# Patient Record
Sex: MALE | Race: WHITE | NOT HISPANIC OR LATINO | Employment: UNEMPLOYED | URBAN - METROPOLITAN AREA
[De-identification: names, ages, dates, MRNs, and addresses within clinical notes are randomized per-mention and may not be internally consistent; named-entity substitution may affect disease eponyms.]

---

## 2019-05-28 ENCOUNTER — TRANSCRIBE ORDERS (OUTPATIENT)
Dept: ADMINISTRATIVE | Facility: HOSPITAL | Age: 49
End: 2019-05-28

## 2019-05-28 DIAGNOSIS — I47.2 NON-SUSTAINED VENTRICULAR TACHYCARDIA (HCC): Primary | ICD-10-CM

## 2019-05-30 ENCOUNTER — HOSPITAL ENCOUNTER (OUTPATIENT)
Dept: NON INVASIVE DIAGNOSTICS | Facility: HOSPITAL | Age: 49
Discharge: HOME | End: 2019-05-30
Payer: COMMERCIAL

## 2019-05-30 DIAGNOSIS — I47.2 NON-SUSTAINED VENTRICULAR TACHYCARDIA (HCC): ICD-10-CM

## 2019-05-30 PROCEDURE — 93225 XTRNL ECG REC<48 HRS REC: CPT

## 2019-05-30 PROCEDURE — 93226 XTRNL ECG REC<48 HR SCAN A/R: CPT

## 2019-06-07 PROCEDURE — 93227 XTRNL ECG REC<48 HR R&I: CPT | Performed by: INTERNAL MEDICINE

## 2019-07-19 ENCOUNTER — TELEPHONE (OUTPATIENT)
Dept: CARDIOLOGY CLINIC | Facility: CLINIC | Age: 49
End: 2019-07-19

## 2019-07-19 ENCOUNTER — APPOINTMENT (OUTPATIENT)
Dept: LAB | Facility: HOSPITAL | Age: 49
End: 2019-07-19
Payer: COMMERCIAL

## 2019-07-19 ENCOUNTER — OFFICE VISIT (OUTPATIENT)
Dept: CARDIOLOGY CLINIC | Facility: CLINIC | Age: 49
End: 2019-07-19
Payer: COMMERCIAL

## 2019-07-19 VITALS
DIASTOLIC BLOOD PRESSURE: 66 MMHG | WEIGHT: 169.6 LBS | BODY MASS INDEX: 31.21 KG/M2 | SYSTOLIC BLOOD PRESSURE: 116 MMHG | HEART RATE: 61 BPM | HEIGHT: 62 IN

## 2019-07-19 DIAGNOSIS — R07.9 CHEST PAIN, UNSPECIFIED TYPE: Primary | ICD-10-CM

## 2019-07-19 DIAGNOSIS — I25.118 CORONARY ARTERY DISEASE OF NATIVE ARTERY OF NATIVE HEART WITH STABLE ANGINA PECTORIS (HCC): ICD-10-CM

## 2019-07-19 DIAGNOSIS — E78.2 MIXED HYPERLIPIDEMIA: ICD-10-CM

## 2019-07-19 DIAGNOSIS — I10 ESSENTIAL HYPERTENSION: ICD-10-CM

## 2019-07-19 LAB
ALBUMIN SERPL BCP-MCNC: 3.9 G/DL (ref 3.5–5.7)
ALP SERPL-CCNC: 84 U/L (ref 40–150)
ALT SERPL W P-5'-P-CCNC: 11 U/L (ref 7–52)
ANION GAP SERPL CALCULATED.3IONS-SCNC: 5 MMOL/L (ref 4–13)
AST SERPL W P-5'-P-CCNC: 11 U/L (ref 13–39)
BASOPHILS # BLD AUTO: 0 THOUSANDS/ΜL (ref 0–0.1)
BASOPHILS NFR BLD AUTO: 1 % (ref 0–2)
BILIRUB SERPL-MCNC: 0.7 MG/DL (ref 0.2–1)
BUN SERPL-MCNC: 17 MG/DL (ref 7–25)
CALCIUM SERPL-MCNC: 9 MG/DL (ref 8.6–10.5)
CHLORIDE SERPL-SCNC: 112 MMOL/L (ref 98–107)
CO2 SERPL-SCNC: 26 MMOL/L (ref 21–31)
CREAT SERPL-MCNC: 1.04 MG/DL (ref 0.7–1.3)
EOSINOPHIL # BLD AUTO: 0.2 THOUSAND/ΜL (ref 0–0.61)
EOSINOPHIL NFR BLD AUTO: 3 % (ref 0–5)
ERYTHROCYTE [DISTWIDTH] IN BLOOD BY AUTOMATED COUNT: 13.4 % (ref 11.5–14.5)
GFR SERPL CREATININE-BSD FRML MDRD: 85 ML/MIN/1.73SQ M
GLUCOSE P FAST SERPL-MCNC: 94 MG/DL (ref 65–99)
HCT VFR BLD AUTO: 35.9 % (ref 42–47)
HGB BLD-MCNC: 12.8 G/DL (ref 14–18)
LYMPHOCYTES # BLD AUTO: 1.4 THOUSANDS/ΜL (ref 0.6–4.47)
LYMPHOCYTES NFR BLD AUTO: 22 % (ref 21–51)
MCH RBC QN AUTO: 32.2 PG (ref 26–34)
MCHC RBC AUTO-ENTMCNC: 35.7 G/DL (ref 31–37)
MCV RBC AUTO: 90 FL (ref 81–99)
MONOCYTES # BLD AUTO: 0.5 THOUSAND/ΜL (ref 0.17–1.22)
MONOCYTES NFR BLD AUTO: 8 % (ref 2–12)
NEUTROPHILS # BLD AUTO: 4.2 THOUSANDS/ΜL (ref 1.4–6.5)
NEUTS SEG NFR BLD AUTO: 66 % (ref 42–75)
PLATELET # BLD AUTO: 160 THOUSANDS/UL (ref 149–390)
PMV BLD AUTO: 7.9 FL (ref 8.6–11.7)
POTASSIUM SERPL-SCNC: 3.4 MMOL/L (ref 3.5–5.5)
PROT SERPL-MCNC: 6.3 G/DL (ref 6.4–8.9)
RBC # BLD AUTO: 3.99 MILLION/UL (ref 4.3–5.9)
SODIUM SERPL-SCNC: 143 MMOL/L (ref 134–143)
WBC # BLD AUTO: 6.4 THOUSAND/UL (ref 4.8–10.8)

## 2019-07-19 PROCEDURE — 36415 COLL VENOUS BLD VENIPUNCTURE: CPT

## 2019-07-19 PROCEDURE — 80053 COMPREHEN METABOLIC PANEL: CPT

## 2019-07-19 PROCEDURE — 93000 ELECTROCARDIOGRAM COMPLETE: CPT | Performed by: INTERNAL MEDICINE

## 2019-07-19 PROCEDURE — 85025 COMPLETE CBC W/AUTO DIFF WBC: CPT

## 2019-07-19 PROCEDURE — 99244 OFF/OP CNSLTJ NEW/EST MOD 40: CPT | Performed by: INTERNAL MEDICINE

## 2019-07-19 RX ORDER — CARVEDILOL 25 MG/1
25 TABLET ORAL 2 TIMES DAILY
Refills: 3 | COMMUNITY
Start: 2019-06-17 | End: 2019-12-20 | Stop reason: ALTCHOICE

## 2019-07-19 RX ORDER — NITROGLYCERIN 0.4 MG/1
TABLET SUBLINGUAL
COMMUNITY
Start: 2019-05-03

## 2019-07-19 RX ORDER — LOSARTAN POTASSIUM 100 MG/1
100 TABLET ORAL EVERY 24 HOURS
Qty: 30 TABLET | Refills: 3
Start: 2019-07-19

## 2019-07-19 RX ORDER — ATORVASTATIN CALCIUM 80 MG/1
1 TABLET, FILM COATED ORAL EVERY 24 HOURS
COMMUNITY
Start: 2019-05-03 | End: 2020-06-29 | Stop reason: SDUPTHER

## 2019-07-19 RX ORDER — ISOSORBIDE MONONITRATE 30 MG/1
30 TABLET, EXTENDED RELEASE ORAL DAILY
Qty: 30 TABLET | Refills: 3 | Status: SHIPPED | OUTPATIENT
Start: 2019-07-19 | End: 2019-09-13

## 2019-07-19 RX ORDER — PANTOPRAZOLE SODIUM 40 MG/1
40 TABLET, DELAYED RELEASE ORAL DAILY
Refills: 3 | COMMUNITY
Start: 2019-07-11

## 2019-07-19 RX ORDER — AMLODIPINE BESYLATE 10 MG/1
10 TABLET ORAL DAILY
Refills: 0 | COMMUNITY
Start: 2019-05-30 | End: 2019-09-13 | Stop reason: ALTCHOICE

## 2019-07-19 RX ORDER — LOSARTAN POTASSIUM 100 MG/1
1 TABLET ORAL EVERY 24 HOURS
COMMUNITY
Start: 2019-05-03 | End: 2019-07-19 | Stop reason: ALTCHOICE

## 2019-07-19 NOTE — PROGRESS NOTES
Cardiology Consultation     Lizabeth Curry  [id number]  1970  [facility] [address] [address] [phone]    1  Chest pain, unspecified type  POCT ECG   2  Essential hypertension     3  Coronary artery disease of native artery of native heart with stable angina pectoris ([address] )     4  Mixed hyperlipidemia       Chief Complaint   Patient presents with    Heart Problem     NP - establish care  hx MI in May with stents   Chest Pain     random, sharp mid-sternal associated with SOB and throat/back pain  used nitro twice - relieved with one tablet   Shortness of Breath     with walking etc      Palpitations     daily    Dizziness     random, daily  can sometimes be assoc w/ low BPs     HPI: Patient is here for management of cardiac issues  He has a h/o 4 prior MIs resulting in multiple stent placements  Most recent MI was earlier this year (May 2019) with a late in-stent thrombosis in the ramus requiring placement of 2 BEVERLY  He has continued chest pains that are random in occurrence, not necessarily related to exertion, sharp, mid-sternal, and associated with shortness of breath along with neck and back pain  He has tried using NTG and it does relieve with it  He has significant fatigue as well  No reported palpitations, syncope, LE edema, orthopnea, PND, or significant weight changes  Patient Active Problem List   Diagnosis    Coronary artery disease of native artery of native heart with stable angina pectoris ([address] )    Essential hypertension    Mixed hyperlipidemia    Chest pain     History reviewed  No pertinent past medical history    Social History     Socioeconomic History    Marital status: Single     Spouse name: Not on file    Number of children: Not on file    Years of education: Not on file    Highest education level: Not on file   Occupational History    Not on file   Social Needs    Financial resource strain: Not on file    Food insecurity:     Worry: Not on file     Inability: Not on file    Transportation needs:     Medical: Not on file     Non-medical: Not on file   Tobacco Use    Smoking status: Not on file   Substance and Sexual Activity    Alcohol use: Not on file    Drug use: Not on file    Sexual activity: Not on file   Lifestyle    Physical activity:     Days per week: Not on file     Minutes per session: Not on file    Stress: Not on file   Relationships    Social connections:     Talks on phone: Not on file     Gets together: Not on file     Attends Congregational service: Not on file     Active member of club or organization: Not on file     Attends meetings of clubs or organizations: Not on file     Relationship status: Not on file    Intimate partner violence:     Fear of current or ex partner: Not on file     Emotionally abused: Not on file     Physically abused: Not on file     Forced sexual activity: Not on file   Other Topics Concern    Not on file   Social History Narrative    Not on file      History reviewed  No pertinent family history  History reviewed  No pertinent surgical history      Current Outpatient Medications:     amLODIPine (NORVASC) 10 mg tablet, Take 10 mg by mouth daily, Disp: , Rfl: 0    aspirin (ASPIRIN LOW DOSE) 81 MG tablet, Take by mouth, Disp: , Rfl:     atorvastatin (LIPITOR) 80 mg tablet, Take 1 tablet by mouth every 24 hours, Disp: , Rfl:     carvedilol (COREG) 25 mg tablet, Take 25 mg by mouth 2 (two) times a day Morning and evening, Disp: , Rfl: 3    nitroglycerin (NITROSTAT) 0 4 mg SL tablet, as directed, Disp: , Rfl:     pantoprazole (PROTONIX) 40 mg tablet, Take 40 mg by mouth daily, Disp: , Rfl: 3    ticagrelor (BRILINTA) 90 MG, Take 1 tablet by mouth Every 12 hours, Disp: , Rfl:   No Known Allergies  Vitals:    07/19/19 0751   BP: 116/66   BP Location: Left arm   Patient Position: Sitting   Cuff Size: Standard   Pulse: 61   Weight: 76 9 kg (169 lb 9 6 oz)   Height: 5' 2" (1 575 m)       Labs:  No results found for any previous visit  No results found for: CHOL, TRIG, HDL, LDLDIRECT  Imaging: No results found  Review of Systems:  Review of Systems   Constitutional: Negative for activity change, appetite change, fatigue and fever  HENT: Negative for nosebleeds and sore throat  Eyes: Negative for photophobia and visual disturbance  Respiratory: Negative for cough, chest tightness, shortness of breath and wheezing  Cardiovascular: Negative for chest pain, palpitations and leg swelling  Gastrointestinal: Negative for abdominal pain, diarrhea, nausea and vomiting  Endocrine: Negative for polyuria  Genitourinary: Negative for dysuria, frequency and hematuria  Musculoskeletal: Negative for arthralgias, back pain and gait problem  Skin: Negative for pallor and rash  Neurological: Negative for dizziness, syncope, speech difficulty and light-headedness  Hematological: Does not bruise/bleed easily  Psychiatric/Behavioral: Negative for agitation, behavioral problems and confusion  Physical Exam:  Physical Exam   Constitutional: He is oriented to person, place, and time  He appears well-developed and well-nourished  HENT:   Head: Normocephalic and atraumatic  Nose: Nose normal    Eyes: Pupils are equal, round, and reactive to light  EOM are normal  No scleral icterus  Neck: Normal range of motion  Neck supple  No JVD present  Cardiovascular: Normal rate, regular rhythm and normal heart sounds  Exam reveals no gallop and no friction rub  No murmur heard  Pulmonary/Chest: Effort normal and breath sounds normal  No respiratory distress  He has no wheezes  He has no rales  Abdominal: Soft  Bowel sounds are normal  He exhibits no distension  There is no tenderness  Musculoskeletal: Normal range of motion  He exhibits no edema or deformity     Neurological: He is alert and oriented to person, place, and time  No cranial nerve deficit  Skin: Skin is warm and dry  No rash noted  He is not diaphoretic  Psychiatric: He has a normal mood and affect  His behavior is normal    Vitals reviewed  Blood pressure 116/66, pulse 61, height 5' 2" (1 575 m), weight 76 9 kg (169 lb 9 6 oz)  EKG:  Normal sinus rhythm  Nonspecific T wave abnormality  Abnormal ECG    Discussion/Summary:  Chest pain: with underlying significant premature CAD and strong family history of CAD  With residual disease in LAD and RCA as well as per patient report  With recent stent placement in the setting of unstable angina in May 2019 - multiple episodes of in stent thrombosis/restenosis and continued chest pains even with minimal exertion - he has been on Brilinta for over a year and has instent restenosis with this  Will refer for repeat cardiac cath and potential CABG  Will also add Imdur to help with symptom management in the meantime  Continued on ASA, Brilinta, and ARB - we will stop amlodipine if BP is running on low end - at the current time, if BP runs lower then will hold amlodipine  At this time, there does appear to be a reason for having an ARB with the reported EF of 40-45%  Will check an echo to evaluate LV EF and wall motion as well  HTN: well controlled, if not low on current regimen  Will stop ARB and switch to Imdur for CAD symptom management  HLD: continued on statin  Frequent PVCs: as above, perhaps triggered by ischemic disease  Will continue coreg for the time being and then proceed with possible revascularization

## 2019-07-19 NOTE — PATIENT INSTRUCTIONS
Coronary Artery Disease   AMBULATORY CARE:   Coronary artery disease (CAD)  is narrowing of the arteries to your heart caused by a buildup of plaque  Plaque is made up of cholesterol and other substances  The narrowing in your arteries decreases the amount of blood that can flow to your heart  This causes your heart to get less oxygen  You may not have any symptoms of CAD  It is important for you to manage CAD even if you feel well  CAD can lead to a heart attack if it is not managed  Common symptoms include the following:   · Chest pain (angina), causing burning, squeezing, or crushing tightness in your chest    · Pain that spreads to your neck, jaw, or shoulder blade    · Nausea, vomiting, sweating, fainting, and hands and feet that are cold to the touch  Call 911 for any of the following:   · You have any of the following signs of a heart attack:      ¨ Squeezing, pressure, or pain in your chest that lasts longer than 5 minutes or returns    ¨ Discomfort or pain in your back, neck, jaw, stomach, or arm     ¨ Trouble breathing    ¨ Nausea or vomiting    ¨ Lightheadedness or a sudden cold sweat, especially with chest pain or trouble breathing    Contact your healthcare provider if:   · You have chest pain that is more frequent, or you have chest pain at rest     · You have questions or concerns about your condition or care  Medicines used to treat CAD:   · Blood pressure medicines  are given to lower your blood pressure  ACE inhibitors help keep your blood vessels relaxed and open to help keep blood flowing into your heart  Beta-blockers keep your heart pumping strongly and regularly so it does not work too hard to get oxygen  · Cholesterol medicines  help lower blood cholesterol levels  · Nitrates , such as nitroglycerin, relax the arteries of your heart so it gets more oxygen  They help to relieve your chest pain  · Antiplatelets , such as aspirin, help prevent blood clots   Take your antiplatelet medicine exactly as directed  These medicines make it more likely for you to bleed or bruise  If you are told to take aspirin, do not take acetaminophen or ibuprofen instead  · Blood thinners  keep clots from forming in your blood  Clots may cause heart attacks, strokes, or death  This medicine makes it more likely for you to bleed or bruise  · Do not take certain medicines without asking your healthcare provider first   These include NSAIDs, herbal or vitamin supplements, or hormones (estrogen or progestin)  Procedures used to treat CAD:   · Angioplasty  may be done to open an artery blocked by plaque  A tube with a balloon on the end is threaded into the blocked artery  Once the tube is in the artery, the balloon is inflated  As the balloon inflates, it presses the plaque against the artery wall to open the artery  A stent may be placed in your artery to keep it open  · Coronary artery bypass graft surgery (CABG)  is open heart surgery  Healthcare providers take arteries or veins from other areas in your body and use them to bypass or go around the blocked arteries of your heart  Cardiac rehabilitation:  Your healthcare provider may recommend that you attend cardiac rehabilitation (rehab)  This is a program run by specialists who will help you safely strengthen your heart and reduce the risk for more heart disease  The plan includes exercise, relaxation, stress management, and heart-healthy nutrition  Healthcare providers will also check to make sure any medicines you are taking are working  Manage CAD to prevent a heart attack:   · Do not smoke  Nicotine and other chemicals in cigarettes and cigars can cause heart and lung damage  Ask your healthcare provider for information if you currently smoke and need help to quit  E-cigarettes or smokeless tobacco still contain nicotine  Talk to your healthcare provider before you use these products  · Exercise regularly    Exercise at least 30 minutes each day, on most days of the week  Exercise helps to lower high cholesterol and high blood pressure  It can also help you maintain a healthy weight  Ask your healthcare provider about the kind of exercise you should do and how to get started  · Maintain a healthy weight  If you are overweight, talk to your healthcare provider about how to lose weight  A weight loss of 10% can improve your heart health  · Eat heart-healthy foods  Include fresh fruits and vegetables in your meal plan  Choose low-fat foods, such as skim or 1% fat milk, low-fat cheese and yogurt, fish, chicken (without skin), and lean meats  Eat two 4-ounce servings of fish high in omega-3 fats each week, such as salmon, fresh tuna, and herring  Do not eat foods that are high in sodium, such as canned foods, potato chips, salty snacks, and cold cuts  Put less table salt on your food  · Limit or do not drink alcohol  A drink of alcohol is 12 ounces of beer, 5 ounces of wine, or 1½ ounces of liquor  · Manage other health conditions  Follow your healthcare provider's advice on how to manage other conditions that can affect your heart health  These include diabetes, high blood pressure, and high cholesterol  You may need to take medicines for these conditions and make other lifestyle changes  · Ask if you should have a flu vaccine  The flu can be dangerous for a person who has CAD  The flu vaccine is available every year in the fall  Follow up with your healthcare provider as directed: You may need to return for other tests  You may also be referred to a cardiac surgeon  Write down your questions so you remember to ask them during your visits  © 2017 [address]  Information is for End User's use only and may not be sold, redistributed or otherwise used for commercial purposes  All illustrations and images included in CareNotes® are the copyrighted property of [employer]  or [employer]    The above information is an  only  It is not intended as medical advice for individual conditions or treatments  Talk to your doctor, nurse or pharmacist before following any medical regimen to see if it is safe and effective for you

## 2019-07-19 NOTE — TELEPHONE ENCOUNTER
Patient schedule for LHC per Dr Debora Riddle at [facility] on Wednesday July 24,2019 will be perform by Dr Elizabeth Jalloh  Patient is going this afternoon to get blood test     Patient aware of general instructions

## 2019-07-19 NOTE — H&P (VIEW-ONLY)
Cardiology Consultation     Roxana Pearce  [id number]  1970  [facility] [address] [address] [phone]    1  Chest pain, unspecified type  POCT ECG   2  Essential hypertension     3  Coronary artery disease of native artery of native heart with stable angina pectoris ([address] )     4  Mixed hyperlipidemia       Chief Complaint   Patient presents with    Heart Problem     NP - establish care  hx MI in May with stents   Chest Pain     random, sharp mid-sternal associated with SOB and throat/back pain  used nitro twice - relieved with one tablet   Shortness of Breath     with walking etc      Palpitations     daily    Dizziness     random, daily  can sometimes be assoc w/ low BPs     HPI: Patient is here for management of cardiac issues  He has a h/o 4 prior MIs resulting in multiple stent placements  Most recent MI was earlier this year (May 2019) with a late in-stent thrombosis in the ramus requiring placement of 2 BEVERLY  He has continued chest pains that are random in occurrence, not necessarily related to exertion, sharp, mid-sternal, and associated with shortness of breath along with neck and back pain  He has tried using NTG and it does relieve with it  He has significant fatigue as well  No reported palpitations, syncope, LE edema, orthopnea, PND, or significant weight changes  Patient Active Problem List   Diagnosis    Coronary artery disease of native artery of native heart with stable angina pectoris ([address] )    Essential hypertension    Mixed hyperlipidemia    Chest pain     History reviewed  No pertinent past medical history    Social History     Socioeconomic History    Marital status: Single     Spouse name: Not on file    Number of children: Not on file    Years of education: Not on file    Highest education level: Not on file   Occupational History    Not on file   Social Needs    Financial resource strain: Not on file    Food insecurity:     Worry: Not on file     Inability: Not on file    Transportation needs:     Medical: Not on file     Non-medical: Not on file   Tobacco Use    Smoking status: Not on file   Substance and Sexual Activity    Alcohol use: Not on file    Drug use: Not on file    Sexual activity: Not on file   Lifestyle    Physical activity:     Days per week: Not on file     Minutes per session: Not on file    Stress: Not on file   Relationships    Social connections:     Talks on phone: Not on file     Gets together: Not on file     Attends Baptism service: Not on file     Active member of club or organization: Not on file     Attends meetings of clubs or organizations: Not on file     Relationship status: Not on file    Intimate partner violence:     Fear of current or ex partner: Not on file     Emotionally abused: Not on file     Physically abused: Not on file     Forced sexual activity: Not on file   Other Topics Concern    Not on file   Social History Narrative    Not on file      History reviewed  No pertinent family history  History reviewed  No pertinent surgical history      Current Outpatient Medications:     amLODIPine (NORVASC) 10 mg tablet, Take 10 mg by mouth daily, Disp: , Rfl: 0    aspirin (ASPIRIN LOW DOSE) 81 MG tablet, Take by mouth, Disp: , Rfl:     atorvastatin (LIPITOR) 80 mg tablet, Take 1 tablet by mouth every 24 hours, Disp: , Rfl:     carvedilol (COREG) 25 mg tablet, Take 25 mg by mouth 2 (two) times a day Morning and evening, Disp: , Rfl: 3    nitroglycerin (NITROSTAT) 0 4 mg SL tablet, as directed, Disp: , Rfl:     pantoprazole (PROTONIX) 40 mg tablet, Take 40 mg by mouth daily, Disp: , Rfl: 3    ticagrelor (BRILINTA) 90 MG, Take 1 tablet by mouth Every 12 hours, Disp: , Rfl:   No Known Allergies  Vitals:    07/19/19 0751   BP: 116/66   BP Location: Left arm   Patient Position: Sitting   Cuff Size: Standard   Pulse: 61   Weight: 76 9 kg (169 lb 9 6 oz)   Height: 5' 2" (1 575 m)       Labs:  No results found for any previous visit  No results found for: CHOL, TRIG, HDL, LDLDIRECT  Imaging: No results found  Review of Systems:  Review of Systems   Constitutional: Negative for activity change, appetite change, fatigue and fever  HENT: Negative for nosebleeds and sore throat  Eyes: Negative for photophobia and visual disturbance  Respiratory: Negative for cough, chest tightness, shortness of breath and wheezing  Cardiovascular: Negative for chest pain, palpitations and leg swelling  Gastrointestinal: Negative for abdominal pain, diarrhea, nausea and vomiting  Endocrine: Negative for polyuria  Genitourinary: Negative for dysuria, frequency and hematuria  Musculoskeletal: Negative for arthralgias, back pain and gait problem  Skin: Negative for pallor and rash  Neurological: Negative for dizziness, syncope, speech difficulty and light-headedness  Hematological: Does not bruise/bleed easily  Psychiatric/Behavioral: Negative for agitation, behavioral problems and confusion  Physical Exam:  Physical Exam   Constitutional: He is oriented to person, place, and time  He appears well-developed and well-nourished  HENT:   Head: Normocephalic and atraumatic  Nose: Nose normal    Eyes: Pupils are equal, round, and reactive to light  EOM are normal  No scleral icterus  Neck: Normal range of motion  Neck supple  No JVD present  Cardiovascular: Normal rate, regular rhythm and normal heart sounds  Exam reveals no gallop and no friction rub  No murmur heard  Pulmonary/Chest: Effort normal and breath sounds normal  No respiratory distress  He has no wheezes  He has no rales  Abdominal: Soft  Bowel sounds are normal  He exhibits no distension  There is no tenderness  Musculoskeletal: Normal range of motion  He exhibits no edema or deformity     Neurological: He is alert and oriented to person, place, and time  No cranial nerve deficit  Skin: Skin is warm and dry  No rash noted  He is not diaphoretic  Psychiatric: He has a normal mood and affect  His behavior is normal    Vitals reviewed  Blood pressure 116/66, pulse 61, height 5' 2" (1 575 m), weight 76 9 kg (169 lb 9 6 oz)  EKG:  Normal sinus rhythm  Nonspecific T wave abnormality  Abnormal ECG    Discussion/Summary:  Chest pain: with underlying significant premature CAD and strong family history of CAD  With residual disease in LAD and RCA as well as per patient report  With recent stent placement in the setting of unstable angina in May 2019 - multiple episodes of in stent thrombosis/restenosis and continued chest pains even with minimal exertion - he has been on Brilinta for over a year and has instent restenosis with this  Will refer for repeat cardiac cath and potential CABG  Will also add Imdur to help with symptom management in the meantime  Continued on ASA, Brilinta, and ARB - we will stop amlodipine if BP is running on low end - at the current time, if BP runs lower then will hold amlodipine  At this time, there does appear to be a reason for having an ARB with the reported EF of 40-45%  Will check an echo to evaluate LV EF and wall motion as well  HTN: well controlled, if not low on current regimen  Will stop ARB and switch to Imdur for CAD symptom management  HLD: continued on statin  Frequent PVCs: as above, perhaps triggered by ischemic disease  Will continue coreg for the time being and then proceed with possible revascularization

## 2019-07-23 ENCOUNTER — TELEPHONE (OUTPATIENT)
Dept: INPATIENT UNIT | Facility: HOSPITAL | Age: 49
End: 2019-07-23

## 2019-07-23 RX ORDER — SODIUM CHLORIDE 9 MG/ML
125 INJECTION, SOLUTION INTRAVENOUS CONTINUOUS
Status: CANCELLED | OUTPATIENT
Start: 2019-07-23

## 2019-07-23 RX ORDER — ASPIRIN 81 MG/1
324 TABLET, CHEWABLE ORAL ONCE
Status: CANCELLED | OUTPATIENT
Start: 2019-07-23 | End: 2019-07-23

## 2019-07-24 ENCOUNTER — HOSPITAL ENCOUNTER (OUTPATIENT)
Dept: NON INVASIVE DIAGNOSTICS | Facility: HOSPITAL | Age: 49
Discharge: HOME | End: 2019-07-24
Attending: INTERNAL MEDICINE | Admitting: INTERNAL MEDICINE
Payer: COMMERCIAL

## 2019-07-24 VITALS
WEIGHT: 166 LBS | TEMPERATURE: 98 F | DIASTOLIC BLOOD PRESSURE: 68 MMHG | RESPIRATION RATE: 16 BRPM | OXYGEN SATURATION: 100 % | HEART RATE: 72 BPM | HEIGHT: 68 IN | SYSTOLIC BLOOD PRESSURE: 127 MMHG | BODY MASS INDEX: 25.16 KG/M2

## 2019-07-24 DIAGNOSIS — I25.118 CORONARY ARTERY DISEASE OF NATIVE ARTERY OF NATIVE HEART WITH STABLE ANGINA PECTORIS (HCC): Primary | ICD-10-CM

## 2019-07-24 DIAGNOSIS — R07.9 CHEST PAIN, UNSPECIFIED TYPE: ICD-10-CM

## 2019-07-24 LAB
ATRIAL RATE: 62 BPM
P AXIS: 38 DEGREES
PR INTERVAL: 168 MS
QRS AXIS: 81 DEGREES
QRSD INTERVAL: 96 MS
QT INTERVAL: 384 MS
QTC INTERVAL: 389 MS
T WAVE AXIS: 82 DEGREES
VENTRICULAR RATE: 62 BPM

## 2019-07-24 PROCEDURE — 93454 CORONARY ARTERY ANGIO S&I: CPT | Performed by: INTERNAL MEDICINE

## 2019-07-24 PROCEDURE — 99152 MOD SED SAME PHYS/QHP 5/>YRS: CPT | Performed by: INTERNAL MEDICINE

## 2019-07-24 PROCEDURE — 93010 ELECTROCARDIOGRAM REPORT: CPT | Performed by: INTERNAL MEDICINE

## 2019-07-24 PROCEDURE — 93005 ELECTROCARDIOGRAM TRACING: CPT

## 2019-07-24 PROCEDURE — C1894 INTRO/SHEATH, NON-LASER: HCPCS | Performed by: INTERNAL MEDICINE

## 2019-07-24 PROCEDURE — C1769 GUIDE WIRE: HCPCS | Performed by: INTERNAL MEDICINE

## 2019-07-24 RX ORDER — VERAPAMIL HCL 2.5 MG/ML
AMPUL (ML) INTRAVENOUS CODE/TRAUMA/SEDATION MEDICATION
Status: COMPLETED | OUTPATIENT
Start: 2019-07-24 | End: 2019-07-24

## 2019-07-24 RX ORDER — ONDANSETRON 2 MG/ML
4 INJECTION INTRAMUSCULAR; INTRAVENOUS EVERY 6 HOURS PRN
Status: DISCONTINUED | OUTPATIENT
Start: 2019-07-24 | End: 2019-07-24 | Stop reason: HOSPADM

## 2019-07-24 RX ORDER — ACETAMINOPHEN 325 MG/1
650 TABLET ORAL EVERY 4 HOURS PRN
Status: DISCONTINUED | OUTPATIENT
Start: 2019-07-24 | End: 2019-07-24 | Stop reason: HOSPADM

## 2019-07-24 RX ORDER — MIDAZOLAM HYDROCHLORIDE 1 MG/ML
INJECTION INTRAMUSCULAR; INTRAVENOUS CODE/TRAUMA/SEDATION MEDICATION
Status: COMPLETED | OUTPATIENT
Start: 2019-07-24 | End: 2019-07-24

## 2019-07-24 RX ORDER — ASPIRIN 81 MG/1
324 TABLET, CHEWABLE ORAL ONCE
Status: COMPLETED | OUTPATIENT
Start: 2019-07-24 | End: 2019-07-24

## 2019-07-24 RX ORDER — SODIUM CHLORIDE 9 MG/ML
125 INJECTION, SOLUTION INTRAVENOUS CONTINUOUS
Status: DISCONTINUED | OUTPATIENT
Start: 2019-07-24 | End: 2019-07-24

## 2019-07-24 RX ORDER — NITROGLYCERIN 0.4 MG/1
0.4 TABLET SUBLINGUAL
Status: DISCONTINUED | OUTPATIENT
Start: 2019-07-24 | End: 2019-07-24 | Stop reason: HOSPADM

## 2019-07-24 RX ORDER — NITROGLYCERIN 20 MG/100ML
INJECTION INTRAVENOUS CODE/TRAUMA/SEDATION MEDICATION
Status: COMPLETED | OUTPATIENT
Start: 2019-07-24 | End: 2019-07-24

## 2019-07-24 RX ORDER — LIDOCAINE HYDROCHLORIDE 10 MG/ML
INJECTION, SOLUTION INFILTRATION; PERINEURAL CODE/TRAUMA/SEDATION MEDICATION
Status: COMPLETED | OUTPATIENT
Start: 2019-07-24 | End: 2019-07-24

## 2019-07-24 RX ORDER — HEPARIN SODIUM 1000 [USP'U]/ML
INJECTION, SOLUTION INTRAVENOUS; SUBCUTANEOUS CODE/TRAUMA/SEDATION MEDICATION
Status: COMPLETED | OUTPATIENT
Start: 2019-07-24 | End: 2019-07-24

## 2019-07-24 RX ORDER — SODIUM CHLORIDE 9 MG/ML
125 INJECTION, SOLUTION INTRAVENOUS CONTINUOUS
Status: DISCONTINUED | OUTPATIENT
Start: 2019-07-24 | End: 2019-07-24 | Stop reason: HOSPADM

## 2019-07-24 RX ORDER — FENTANYL CITRATE 50 UG/ML
INJECTION, SOLUTION INTRAMUSCULAR; INTRAVENOUS CODE/TRAUMA/SEDATION MEDICATION
Status: COMPLETED | OUTPATIENT
Start: 2019-07-24 | End: 2019-07-24

## 2019-07-24 RX ADMIN — SODIUM CHLORIDE 125 ML/HR: 0.9 INJECTION, SOLUTION INTRAVENOUS at 08:48

## 2019-07-24 RX ADMIN — VERAPAMIL HYDROCHLORIDE 2.5 MG: 2.5 INJECTION, SOLUTION INTRAVENOUS at 11:30

## 2019-07-24 RX ADMIN — FENTANYL CITRATE 50 MCG: 50 INJECTION, SOLUTION INTRAMUSCULAR; INTRAVENOUS at 11:23

## 2019-07-24 RX ADMIN — NITROGLYCERIN 200 MCG: 20 INJECTION INTRAVENOUS at 11:30

## 2019-07-24 RX ADMIN — MIDAZOLAM 2 MG: 1 INJECTION INTRAMUSCULAR; INTRAVENOUS at 11:24

## 2019-07-24 RX ADMIN — LIDOCAINE HYDROCHLORIDE 1 ML: 10 INJECTION, SOLUTION INFILTRATION; PERINEURAL at 11:27

## 2019-07-24 RX ADMIN — IOHEXOL 80 ML: 350 INJECTION, SOLUTION INTRAVENOUS at 11:39

## 2019-07-24 RX ADMIN — ASPIRIN 81 MG 243 MG: 81 TABLET ORAL at 08:47

## 2019-07-24 RX ADMIN — HEPARIN SODIUM 4000 UNITS: 1000 INJECTION INTRAVENOUS; SUBCUTANEOUS at 11:30

## 2019-07-24 NOTE — INTERVAL H&P NOTE
Update: (This section must be completed if the H&P was completed greater than 24 hrs to procedure or admission)    H&P reviewed  After examining the patient, I find no changed to the H&P since it had been written  Patient re-evaluated   Accept as history and physical   Vitals:    07/24/19 0830   BP: 118/72   Pulse: 60   Resp: 16   Temp: 98 °F (36 7 °C)   SpO2: 99%         Carolin Cortes MD/July 24, 2019/11:16 AM

## 2019-07-24 NOTE — DISCHARGE INSTRUCTIONS
1  Please see the post cardiac catheterization dishcarge instructions  No heavy lifting, greater than 10 lbs  or strenuous  activity for 48 hrs  2 Remove band aid tomorrow  Shower and wash area- wrist gently with soap and water- beginning tomorrow  Rinse and pat dry  Apply new water seal band aid  Repeat this process for 5 days  No powders, creams lotions or antibiotic ointments  for 5 days  No tub baths, hot tubs or swimming for 5 days  3  Please call our office ([phone]) if you have any fever, redness, swelling, discharge from your wrist access site  4 No driving for 1 day    Left Heart Catheterization   WHAT YOU NEED TO KNOW:   A left heart catheterization is a procedure to look at your heart and its arteries  You may need this procedure if you have chest pain, heart disease, or your heart is not working as it should  DISCHARGE INSTRUCTIONS:   Follow up with your healthcare provider as directed:  Write down your questions so you remember to ask them during your visits  Limit activity as directed:   · Avoid unnecessary stair climbing for 48 hours, if a catheter was put in your groin  · Do not place pressure on your arm, hand, or wrist, if the catheter was placed in your wrist  Avoid pushing, pulling, or heavy lifting with that arm  · If you need to cough, support the area where the catheter was inserted with your hand  · Ask your healthcare provider how long you need to limit movement and avoid certain activities  · You may feel like resting more after your procedure  Slowly start to do more each day  Rest when you feel it is needed  Drink liquids as directed:  Liquids help flush the dye used for your procedure out of your body  Ask your healthcare provider how much liquid to drink each day, and which liquids to drink  Some foods, such as soup and fruit, also provide liquid  Wound care:  Ask your healthcare provider about how to care for your incision wound   Ask when you can get into a tub, shower, or pool  Contact your healthcare provider if:   · You have a fever  · The skin around your wound is red, swollen, or has pus coming from it  · You have trouble breathing, or your skin is itchy, swollen, or has a rash  · You have questions or concerns about your condition or care  Seek care immediately or call 911 if:   · The area where the catheter was placed is swollen and filled with blood or is bleeding  · The leg or arm used for the procedure becomes numb or turns white or blue  · You feel lightheaded, short of breath, and have chest pain  · You cough up blood  · You have any of the following signs of a heart attack:      ¨ Squeezing, pressure, or pain in your chest that lasts longer than 5 minutes or returns    ¨ Discomfort or pain in your back, neck, jaw, stomach, or arm     ¨ Trouble breathing    ¨ Nausea or vomiting    ¨ Lightheadedness or a sudden cold sweat, especially with chest pain or trouble breathing    · Your arm or leg feels warm, tender, and painful  It may look swollen and red  · You have any of the following signs of a stroke:     ¨ Part of your face droops or is numb    ¨ Weakness in an arm or leg    ¨ Confusion or difficulty speaking    ¨ Dizziness, a severe headache, or vision loss  © 2017 [address] [employer]  Information is for End User's use only and may not be sold, redistributed or otherwise used for commercial purposes  All illustrations and images included in CareNotes® are the copyrighted property of [employer]  or [employer]  The above information is an  only  It is not intended as medical advice for individual conditions or treatments  Talk to your doctor, nurse or pharmacist before following any medical regimen to see if it is safe and effective for you

## 2019-07-24 NOTE — BRIEF OP NOTE (RAD/CATH)
Cardiac catheterization:  Right radial      Angiography:  Coronary anatomy is a right-dominant system  Left main coronary artery is a large vessel that is unobstructed  It trifurcate into a left anterior descending ramus intermedius and left circumflex branch  Left anterior descending artery is a large vessel that reaches the apex  It contains diffuse mild atherosclerosis with 30% obstruction  A relatively thin diagonal branch contains a 75% obstruction  Ramus intermedius is a large vessel  It is stented throughout its proximal and midportion  Stents are patent  Circumflex coronary artery is a large vessel giving off a large marginal system  Marginal system contains a diffuse 50% obstruction  A small subbranch of the marginal is totally occluded and fills via collateral flow  Right coronary artery is a dominant vessel that bifurcates at the crux into a PDA and right posterolateral segment with a number right posterolateral branches  The right coronary artery has been stented throughout its proximal and mid segment  In its mid segment there is an eccentric 60-70% obstruction in a previously stented region  More distally near the crux there was a 40% obstruction  There was an 80% obstruction of a small right posterolateral branch  Recommendations: Three-vessel coronary artery disease however the large LAD appears nonobstructive in the ramus intermedius is revascularized  Mid right coronary artery has a borderline lesion  Small vessel disease is noted  For now continue with medical therapy  Would consider stress testing with imaging to assess functional capacity and ischemic burden

## 2019-07-26 ENCOUNTER — HOSPITAL ENCOUNTER (OUTPATIENT)
Dept: NON INVASIVE DIAGNOSTICS | Facility: CLINIC | Age: 49
Discharge: HOME | End: 2019-07-26
Payer: COMMERCIAL

## 2019-07-26 ENCOUNTER — TELEPHONE (OUTPATIENT)
Dept: CARDIOLOGY CLINIC | Facility: CLINIC | Age: 49
End: 2019-07-26

## 2019-07-26 DIAGNOSIS — I25.118 CORONARY ARTERY DISEASE OF NATIVE ARTERY OF NATIVE HEART WITH STABLE ANGINA PECTORIS (HCC): ICD-10-CM

## 2019-07-26 DIAGNOSIS — R07.9 CHEST PAIN, UNSPECIFIED TYPE: ICD-10-CM

## 2019-07-26 PROCEDURE — 93306 TTE W/DOPPLER COMPLETE: CPT

## 2019-07-26 PROCEDURE — 93306 TTE W/DOPPLER COMPLETE: CPT | Performed by: INTERNAL MEDICINE

## 2019-07-26 NOTE — TELEPHONE ENCOUNTER
Pt had cardiac cath two days ago  F/U with you on 8/30  He would like you to call him to explain cath results which he can see in My Chart  He said he did not get a satisfactory explanation on discharge  He is worried about the % stenosis mentioned      Please call:  [phone]

## 2019-09-13 ENCOUNTER — OFFICE VISIT (OUTPATIENT)
Dept: CARDIOLOGY CLINIC | Facility: CLINIC | Age: 49
End: 2019-09-13
Payer: COMMERCIAL

## 2019-09-13 VITALS
HEART RATE: 64 BPM | SYSTOLIC BLOOD PRESSURE: 114 MMHG | WEIGHT: 169.6 LBS | BODY MASS INDEX: 25.7 KG/M2 | HEIGHT: 68 IN | DIASTOLIC BLOOD PRESSURE: 68 MMHG

## 2019-09-13 DIAGNOSIS — I25.118 CORONARY ARTERY DISEASE OF NATIVE ARTERY OF NATIVE HEART WITH STABLE ANGINA PECTORIS (HCC): ICD-10-CM

## 2019-09-13 DIAGNOSIS — I10 ESSENTIAL HYPERTENSION: ICD-10-CM

## 2019-09-13 DIAGNOSIS — E78.2 MIXED HYPERLIPIDEMIA: ICD-10-CM

## 2019-09-13 DIAGNOSIS — R07.9 CHEST PAIN, UNSPECIFIED TYPE: Primary | ICD-10-CM

## 2019-09-13 PROCEDURE — 3074F SYST BP LT 130 MM HG: CPT | Performed by: INTERNAL MEDICINE

## 2019-09-13 PROCEDURE — 3078F DIAST BP <80 MM HG: CPT | Performed by: INTERNAL MEDICINE

## 2019-09-13 PROCEDURE — 99214 OFFICE O/P EST MOD 30 MIN: CPT | Performed by: INTERNAL MEDICINE

## 2019-09-13 PROCEDURE — 93000 ELECTROCARDIOGRAM COMPLETE: CPT | Performed by: INTERNAL MEDICINE

## 2019-09-13 RX ORDER — ISOSORBIDE MONONITRATE 30 MG/1
30 TABLET, EXTENDED RELEASE ORAL 2 TIMES DAILY
Qty: 60 TABLET | Refills: 3 | Status: SHIPPED | OUTPATIENT
Start: 2019-09-13 | End: 2020-10-08

## 2019-09-13 RX ORDER — VENLAFAXINE HYDROCHLORIDE 75 MG/1
75 CAPSULE, EXTENDED RELEASE ORAL DAILY
COMMUNITY
Start: 2019-08-05 | End: 2022-01-24

## 2019-09-13 NOTE — PATIENT INSTRUCTIONS
Coronary Artery Disease   AMBULATORY CARE:   Coronary artery disease (CAD)  is narrowing of the arteries to your heart caused by a buildup of plaque  Plaque is made up of cholesterol and other substances  The narrowing in your arteries decreases the amount of blood that can flow to your heart  This causes your heart to get less oxygen  You may not have any symptoms of CAD  It is important for you to manage CAD even if you feel well  CAD can lead to a heart attack if it is not managed  Common symptoms include the following:   · Chest pain (angina), causing burning, squeezing, or crushing tightness in your chest    · Pain that spreads to your neck, jaw, or shoulder blade    · Nausea, vomiting, sweating, fainting, and hands and feet that are cold to the touch  Call 911 for any of the following:   · You have any of the following signs of a heart attack:      ¨ Squeezing, pressure, or pain in your chest that lasts longer than 5 minutes or returns    ¨ Discomfort or pain in your back, neck, jaw, stomach, or arm     ¨ Trouble breathing    ¨ Nausea or vomiting    ¨ Lightheadedness or a sudden cold sweat, especially with chest pain or trouble breathing    Contact your healthcare provider if:   · You have chest pain that is more frequent, or you have chest pain at rest     · You have questions or concerns about your condition or care  Medicines used to treat CAD:   · Blood pressure medicines  are given to lower your blood pressure  ACE inhibitors help keep your blood vessels relaxed and open to help keep blood flowing into your heart  Beta-blockers keep your heart pumping strongly and regularly so it does not work too hard to get oxygen  · Cholesterol medicines  help lower blood cholesterol levels  · Nitrates , such as nitroglycerin, relax the arteries of your heart so it gets more oxygen  They help to relieve your chest pain  · Antiplatelets , such as aspirin, help prevent blood clots   Take your antiplatelet medicine exactly as directed  These medicines make it more likely for you to bleed or bruise  If you are told to take aspirin, do not take acetaminophen or ibuprofen instead  · Blood thinners  keep clots from forming in your blood  Clots may cause heart attacks, strokes, or death  This medicine makes it more likely for you to bleed or bruise  · Do not take certain medicines without asking your healthcare provider first   These include NSAIDs, herbal or vitamin supplements, or hormones (estrogen or progestin)  Procedures used to treat CAD:   · Angioplasty  may be done to open an artery blocked by plaque  A tube with a balloon on the end is threaded into the blocked artery  Once the tube is in the artery, the balloon is inflated  As the balloon inflates, it presses the plaque against the artery wall to open the artery  A stent may be placed in your artery to keep it open  · Coronary artery bypass graft surgery (CABG)  is open heart surgery  Healthcare providers take arteries or veins from other areas in your body and use them to bypass or go around the blocked arteries of your heart  Cardiac rehabilitation:  Your healthcare provider may recommend that you attend cardiac rehabilitation (rehab)  This is a program run by specialists who will help you safely strengthen your heart and reduce the risk for more heart disease  The plan includes exercise, relaxation, stress management, and heart-healthy nutrition  Healthcare providers will also check to make sure any medicines you are taking are working  Manage CAD to prevent a heart attack:   · Do not smoke  Nicotine and other chemicals in cigarettes and cigars can cause heart and lung damage  Ask your healthcare provider for information if you currently smoke and need help to quit  E-cigarettes or smokeless tobacco still contain nicotine  Talk to your healthcare provider before you use these products  · Exercise regularly    Exercise at least 30 minutes each day, on most days of the week  Exercise helps to lower high cholesterol and high blood pressure  It can also help you maintain a healthy weight  Ask your healthcare provider about the kind of exercise you should do and how to get started  · Maintain a healthy weight  If you are overweight, talk to your healthcare provider about how to lose weight  A weight loss of 10% can improve your heart health  · Eat heart-healthy foods  Include fresh fruits and vegetables in your meal plan  Choose low-fat foods, such as skim or 1% fat milk, low-fat cheese and yogurt, fish, chicken (without skin), and lean meats  Eat two 4-ounce servings of fish high in omega-3 fats each week, such as salmon, fresh tuna, and herring  Do not eat foods that are high in sodium, such as canned foods, potato chips, salty snacks, and cold cuts  Put less table salt on your food  · Limit or do not drink alcohol  A drink of alcohol is 12 ounces of beer, 5 ounces of wine, or 1½ ounces of liquor  · Manage other health conditions  Follow your healthcare provider's advice on how to manage other conditions that can affect your heart health  These include diabetes, high blood pressure, and high cholesterol  You may need to take medicines for these conditions and make other lifestyle changes  · Ask if you should have a flu vaccine  The flu can be dangerous for a person who has CAD  The flu vaccine is available every year in the fall  Follow up with your healthcare provider as directed: You may need to return for other tests  You may also be referred to a cardiac surgeon  Write down your questions so you remember to ask them during your visits  © 2017 [address] [employer]  Information is for End User's use only and may not be sold, redistributed or otherwise used for commercial purposes  All illustrations and images included in CareNotes® are the copyrighted property of [employer]  or [employer]in [employer]erg[employer]    The above information is an  only  It is not intended as medical advice for individual conditions or treatments  Talk to your doctor, nurse or pharmacist before following any medical regimen to see if it is safe and effective for you

## 2019-09-13 NOTE — PROGRESS NOTES
Cardiology Consultation     Tj Nash  [id number]  1970  [facility] [address] [id number] [phone]    1  Chest pain, unspecified type  POCT ECG   2  Coronary artery disease of native artery of native heart with stable angina pectoris ([address] )     3  Essential hypertension     4  Mixed hyperlipidemia       Chief Complaint   Patient presents with    Follow-up     1 month, recent cath   Chest Pain     took nitro x2 since cath, needed 2 tablets during one episode to relieve   Palpitations     daily - "a lot"    Fatigue     daily, worse progressively through day     HPI: Patient is here for management of cardiac issues  He has a h/o 4 prior MIs resulting in multiple stent placements  Most recent MI was earlier this year (May 2019) with a late in-stent thrombosis in the ramus requiring placement of 2 BEVERLY  He has continued chest pains that are random in occurrence, not necessarily related to exertion, sharp, mid-sternal, and associated with shortness of breath along with neck and back pain  He has tried using NTG and it does relieve with it - has required 2 nitros since his most recent cath on one episode and one other episode requiring 1 nitro  He has significant fatigue as well  No reported palpitations, syncope, LE edema, orthopnea, PND, or significant weight changes  Patient Active Problem List   Diagnosis    Coronary artery disease of native artery of native heart with stable angina pectoris ([facility] [address]    Essential hypertension    Mixed hyperlipidemia    Chest pain     History reviewed  No pertinent past medical history    Social History     Socioeconomic History    Marital status: /Civil Union     Spouse name: Not on file    Number of children: Not on file    Years of education: Not on file    Highest education level: Not on file   Occupational History  Not on file   Social Needs    Financial resource strain: Not on file    Food insecurity:     Worry: Not on file     Inability: Not on file    Transportation needs:     Medical: Not on file     Non-medical: Not on file   Tobacco Use    Smoking status: Not on file   Substance and Sexual Activity    Alcohol use: Not on file    Drug use: Not on file    Sexual activity: Not on file   Lifestyle    Physical activity:     Days per week: Not on file     Minutes per session: Not on file    Stress: Not on file   Relationships    Social connections:     Talks on phone: Not on file     Gets together: Not on file     Attends Voodoo service: Not on file     Active member of club or organization: Not on file     Attends meetings of clubs or organizations: Not on file     Relationship status: Not on file    Intimate partner violence:     Fear of current or ex partner: Not on file     Emotionally abused: Not on file     Physically abused: Not on file     Forced sexual activity: Not on file   Other Topics Concern    Not on file   Social History Narrative    Not on file      History reviewed  No pertinent family history  History reviewed  No pertinent surgical history      Current Outpatient Medications:     amLODIPine (NORVASC) 10 mg tablet, Take 10 mg by mouth daily, Disp: , Rfl: 0    aspirin (ASPIRIN LOW DOSE) 81 MG tablet, Take by mouth, Disp: , Rfl:     atorvastatin (LIPITOR) 80 mg tablet, Take 1 tablet by mouth every 24 hours, Disp: , Rfl:     carvedilol (COREG) 25 mg tablet, Take 25 mg by mouth 2 (two) times a day Morning and evening, Disp: , Rfl: 3    isosorbide mononitrate (IMDUR) 30 mg 24 hr tablet, Take 1 tablet (30 mg total) by mouth daily, Disp: 30 tablet, Rfl: 3    losartan (COZAAR) 100 MG tablet, Take 1 tablet (100 mg total) by mouth every 24 hours, Disp: 30 tablet, Rfl: 3    nitroglycerin (NITROSTAT) 0 4 mg SL tablet, as directed, Disp: , Rfl:     pantoprazole (PROTONIX) 40 mg tablet, Take 40 mg by mouth daily, Disp: , Rfl: 3    ticagrelor (BRILINTA) 90 MG, Take 1 tablet by mouth Every 12 hours, Disp: , Rfl:     venlafaxine (EFFEXOR-XR) 75 mg 24 hr capsule, Take 75 mg by mouth daily , Disp: , Rfl:   No Known Allergies  Vitals:    09/13/19 0804   BP: 114/68   BP Location: Left arm   Patient Position: Sitting   Cuff Size: Standard   Pulse: 64   Weight: 76 9 kg (169 lb 9 6 oz)   Height: 5' 8" (1 727 m)       Labs:  Admission on 07/24/2019, Discharged on 07/24/2019   Component Date Value    Ventricular Rate 07/24/2019 62     Atrial Rate 07/24/2019 62     RI Interval 07/24/2019 168     QRSD Interval 07/24/2019 96     QT Interval 07/24/2019 384     QTC Interval 07/24/2019 389     P Axis 07/24/2019 38     QRS Axis 07/24/2019 81     T Wave Axis 07/24/2019 82    Orders Only on 07/19/2019   Component Date Value    WBC 07/19/2019 6 40     RBC 07/19/2019 3 99*    Hemoglobin 07/19/2019 12 8*    Hematocrit 07/19/2019 35 9*    MCV 07/19/2019 90     MCH 07/19/2019 32 2     MCHC 07/19/2019 35 7     RDW 07/19/2019 13 4     MPV 07/19/2019 7 9*    Platelets 38/58/0665 160     Neutrophils Relative 07/19/2019 66     Lymphocytes Relative 07/19/2019 22     Monocytes Relative 07/19/2019 8     Eosinophils Relative 07/19/2019 3     Basophils Relative 07/19/2019 1     Neutrophils Absolute 07/19/2019 4 20     Lymphocytes Absolute 07/19/2019 1 40     Monocytes Absolute 07/19/2019 0 50     Eosinophils Absolute 07/19/2019 0 20     Basophils Absolute 07/19/2019 0 00     Sodium 07/19/2019 143     Potassium 07/19/2019 3 4*    Chloride 07/19/2019 112*    CO2 07/19/2019 26     ANION GAP 07/19/2019 5     BUN 07/19/2019 17     Creatinine 07/19/2019 1 04     Glucose, Fasting 07/19/2019 94     Calcium 07/19/2019 9 0     AST 07/19/2019 11*    ALT 07/19/2019 11     Alkaline Phosphatase 07/19/2019 84     Total Protein 07/19/2019 6 3*    Albumin 07/19/2019 3 9     Total Bilirubin 07/19/2019 0 70     eGFR 07/19/2019 85      No results found for: CHOL, TRIG, HDL, LDLDIRECT  Imaging: No results found  Review of Systems:  Review of Systems   Constitutional: Negative for activity change, appetite change, fatigue and fever  HENT: Negative for nosebleeds and sore throat  Eyes: Negative for photophobia and visual disturbance  Respiratory: Positive for chest tightness  Negative for cough, shortness of breath and wheezing  Cardiovascular: Negative for chest pain, palpitations and leg swelling  Gastrointestinal: Negative for abdominal pain, diarrhea, nausea and vomiting  Endocrine: Negative for polyuria  Genitourinary: Negative for dysuria, frequency and hematuria  Musculoskeletal: Negative for arthralgias, back pain and gait problem  Skin: Negative for pallor and rash  Neurological: Negative for dizziness, syncope, speech difficulty and light-headedness  Hematological: Does not bruise/bleed easily  Psychiatric/Behavioral: Negative for agitation, behavioral problems and confusion  Physical Exam:  Physical Exam   Constitutional: He is oriented to person, place, and time  He appears well-developed and well-nourished  HENT:   Head: Normocephalic and atraumatic  Nose: Nose normal    Eyes: Pupils are equal, round, and reactive to light  EOM are normal  No scleral icterus  Neck: Normal range of motion  Neck supple  No JVD present  Cardiovascular: Normal rate, regular rhythm and normal heart sounds  Exam reveals no gallop and no friction rub  No murmur heard  Pulmonary/Chest: Effort normal and breath sounds normal  No respiratory distress  He has no wheezes  He has no rales  Abdominal: Soft  Bowel sounds are normal  He exhibits no distension  There is no tenderness  Musculoskeletal: Normal range of motion  He exhibits no edema or deformity  Neurological: He is alert and oriented to person, place, and time  No cranial nerve deficit  Skin: Skin is warm and dry  No rash noted  He is not diaphoretic  Psychiatric: He has a normal mood and affect  His behavior is normal    Vitals reviewed  Blood pressure 114/68, pulse 64, height 5' 8" (1 727 m), weight 76 9 kg (169 lb 9 6 oz)  EKG:  Normal sinus rhythm  Rightward axis  Nonspecific T wave abnormality  Abnormal ECG    Discussion/Summary:  Chest pain: with underlying significant premature CAD and strong family history of CAD  With residual disease in LAD and RCA as well as per patient report  With recent stent placement in the setting of unstable angina in May 2019 - multiple episodes of in stent thrombosis/restenosis and continued chest pains even with minimal exertion - he has been on Brilinta for over a year and has instent restenosis with this  We referred for repeat cardiac cath and potential CABG - which did reveal disease, but not quite ready for CABG at this time - mild LAD atherosclerosis, 30% pLAD, 75% D2, 50% OM1 with occluded subbranch, 0% pR1 at site of prior stent, 70% mRCA at site of prior stent with eccentric lesion 70% PLV1  We also added Imdur to help with symptom management in the meantime  Continued on ASA, Brilinta, and ARB - we will stop amlodipine if BP is running on low end - at the current time, if BP runs lower then will hold amlodipine  There did appear to be a reason for having an ARB with the reported EF of 40-45%  We checked an echo to evaluate LV EF and wall motion as well - which revealed a normal LV function with no wall motion abnormalities  With continued chest pain requiring nitro use, will increase Imdur to 30mg twice daily  Discussed the possibility of starting on cardiac rehab to help with conditioning and improving fatigue level  Will refer for this now  HTN: well controlled, if not low on current regimen  Will continue to increase Imdur for CAD symptom management  Will stop amlodipine in order to allow addition of higher dose of Imdur  HLD: continued on statin    Will recheck levels after next visit  Frequent PVCs: as above, perhaps triggered by ischemic disease  Will continue coreg for the time being and then proceed with possible revascularization

## 2019-10-03 ENCOUNTER — CLINICAL SUPPORT (OUTPATIENT)
Dept: CARDIAC REHAB | Facility: HOSPITAL | Age: 49
End: 2019-10-03
Payer: COMMERCIAL

## 2019-10-03 DIAGNOSIS — I20.8 STABLE ANGINA PECTORIS (HCC): Primary | ICD-10-CM

## 2019-10-03 DIAGNOSIS — I25.118 CORONARY ARTERY DISEASE OF NATIVE ARTERY OF NATIVE HEART WITH STABLE ANGINA PECTORIS (HCC): ICD-10-CM

## 2019-10-03 PROCEDURE — 93797 PHYS/QHP OP CAR RHAB WO ECG: CPT | Performed by: PHYSICAL THERAPIST

## 2019-10-03 NOTE — PROGRESS NOTES
Cardiac Rehabilitation Plan of Care   Care Plan       Today's date: 10/3/2019   Visits: Initial Visit -  Patient name: Jessenia Valdez      : 1970  Age: 51 y o  MRN: [id number]  Referring Physician: Lacy Neal MD  Cardiologist: Dr Jamie Forbes  Provider: Vitaliy  Clinician: Nikhil Pryor, MPT    Dx:   Encounter Diagnosis   Name Primary?  Coronary artery disease of native artery of native heart with stable angina pectoris (HCC)    Stable Angina  MI w/BEVERLY X 2    Date of onset: 2019      SUMMARY OF PROGRESS: Patient performed an initial Sub max TM ETT w/correct hemodynamic responses to activity  Ben Bryson was able to attain a 5 8 MET Level and complete 12 minutes  Patient noted minimal angina/discomfort  His resting vitals were HR 74 and /54  Peak vitals were  and /80  His recovery vitals were HR 65 and /58  Patient rated the test a 4 on a 1-10 RPE Scale  Patient performed the session on room air and was able to maintain an 02 saturation of 97% or greater  Telemetry revealed NSR  Patient recently quit smoking  Patient is an excellent rehabilitation candidate due to high prior level of function, support system and willingness to improve  Patient is currently off work  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[address]  Patient's program will be progressed as he is able to tolerate  Patient will be educated specific to his disease process  Patient issued schedule of CR topics  Medication compliance: Yes   Comments: Patient admits to medication compliance  Fall Risk: Low   Comments: Patient exhibits good dynamic standing balance and 5/5 BLE strength  EKG changes: NSR      EXERCISE ASSESSMENT and PLAN    Current Exercise Program in Rehab:       Frequency: 3 days/week        Minutes: 30-35         METS: 4 to 5           HR:20-30 > RHR      RPE: 4-6        Modalities: Treadmill, UBE, NuStep and Recumbent bike      Exercise Progression 30 Day Goals :    Frequency: 5 days/week   Minutes: 35-40   METS: 5 to 5 5   HR:    RPE:4-7   Modalities: Treadmill, Airdyne bike, Lifecycle and NuStep    Strength trainin-3 days / week  12-15 repetitions  1-2 sets per modality    Modalities: Leg Press, Chest Press and Seated Row    Progressing: In Progress    Home Exercise: None    Goals: 10% improvement in functional capacity, Reduced Dyspnea with physical activity  0-10, improved DASI score by 10%, Increase in peak CR METs by 40%, Resume ADLs with increased strength, Return to work unrestricted and Exercise 5 days/wk, >150mins/wk  Education: Benefit of exercise for CAD risk factors, home exercise guidelines, signs and sxs, RPE scale, class: Risk Factors for Heart Disease and Exercise instructions/guidelines for discharge    Plan:education on home exercise guidelines, home exercise 30+ mins 2 days opposite CR and Education class: Risk Factors for Heart Disease  Readiness to change: Preparation      NUTRITION ASSESSMENT AND PLAN    Weight control:    Starting weight: 169   Current weight:   169  Waist circumference:    Starting:    Current:    Diabetes: N/A  Lipid management: Discussed diet and lipid management  Goals:LDL <100, HDL >40, TRG <150 and CHOL <200  Education: heart healthy eating  low sodium diet  hydration  diet and lipid management  class: Heart Healthy Eating  class:  Label Reading  Progressing: In Progress  Plan: Education class: Reading Food Labels, Education Class: Heart Healthy Eating, Increase PUFA and MUFA, Decrease SFA, Increase whole grains, increase fruits/vegs, eliminate processed meats, reduce red meat 1x/wk, swtich to low fat dairy and Reduce added sugars <25g/day  Readiness to change: Preparation      PSYCHOSOCIAL ASSESSMENT AND PLAN    Emotional:  PHQ-9 = 5-9 = Mild Depression  Self-reported stress level: 3   Social support: Excellent  Goals:  Reduce perceived stress to 1-3/10, improved [facility] QOL < 27, PHQ-9 - reduced severity by one level, continue medical therapy, Physical Fitness in [facility] Score < 3, Social Support in [facility] Score < 3, Daily Activity in [facility] Score < 3, Social Activities in [facility] Score < 3, Quality of Life in [facility] Score < 3 , Change in Health in [place] Score < 3 , Increased interest in doing things, improved sleep, improved positive thoughts of well being and increased energy  Education: signs/sxs of depression, benefits of positive support system, coping mechanisms, depression and CAD, class:  Stress and Your Health  and class:  Relaxation  Progressing: In Progress  Plan: Class: Stress and Your Health, Class: Relaxation and Practice relaxation techniques  Readiness to change: Preparation      OTHER CORE COMPONENTS     Tobacco:   Social History     Tobacco Use   Smoking Status Not on file       Tobacco Use Intervention: Referral to tobacco expert:   Brief counseling by cardiac rehab professional  Date: 10/3/19  Patient recently quit smoking  Patient requires further education on smoking cessation  Blood pressure:    Restin/54   Exercise: 138/80   Recovery: 120/58    Goals: consistent BP < 130/80, reduced dietary sodium <2300mg, consistent exercise >150 mins/wk, set a target quit date, make contact with a tobacco use prevention program and Abstain from smoking  Education:  understanding HTN and CAD, low sodium diet and HTN, Education class:  Common Heart Medications, Education class: Understanding Heart Disease, smoking and heart disease, tobacco triggers, setting a smoking cessation plan, relapse education and provide information on tobacco cessation treatment  Progressing: In Progress  Plan: Class: Understanding Heart Disease and Class: Common Heart Medications  Readiness to change: Preparation    CARDIAC REHAB ASSESSMENT    Today's date: October 3, 2019  Patient name: Caty Reveles     : 1970       MRN: [id number]  PCP: No primary care provider on file    Referring Physician: Hua Bush MD  Cardiologist: Dr Soha Ott  Surgeon: Dr Gwynne Cheadle  Dx:   Encounter Diagnosis   Name Primary?  Coronary artery disease of native artery of native heart with stable angina pectoris (HCC)    MI w/BEVERLY X 2  Stable Angina    Date of onset: 7/24/19  Cultural needs: None    Height:    Wt Readings from Last 1 Encounters:   09/13/19 76 9 kg (169 lb 9 6 oz)      Weight:   Ht Readings from Last 1 Encounters:   09/13/19 5' 8" (1 727 m)     Medical History: No past medical history on file  Physical Limitations: None    Risk Factors   Cholesterol: Yes  Smoking: Recent user, quit in last 6 months  HTN: No  DM: No  Obesity: No   Inactivity: No  Stress:  perceived  stress: 3/10   Stressors: Health and Employment   Goals for Stress Management: Remain a stress level of 3/10 or less    Family History:No family history on file  Allergies: Patient has no known allergies  ETOH:   Social History     Substance and Sexual Activity   Alcohol Use Not on file         Current Medications:   Current Outpatient Medications   Medication Sig Dispense Refill    aspirin (ASPIRIN LOW DOSE) 81 MG tablet Take by mouth      atorvastatin (LIPITOR) 80 mg tablet Take 1 tablet by mouth every 24 hours      carvedilol (COREG) 25 mg tablet Take 25 mg by mouth 2 (two) times a day Morning and evening  3    isosorbide mononitrate (IMDUR) 30 mg 24 hr tablet Take 1 tablet (30 mg total) by mouth 2 (two) times a day 60 tablet 3    losartan (COZAAR) 100 MG tablet Take 1 tablet (100 mg total) by mouth every 24 hours 30 tablet 3    nitroglycerin (NITROSTAT) 0 4 mg SL tablet as directed      pantoprazole (PROTONIX) 40 mg tablet Take 40 mg by mouth daily  3    ticagrelor (BRILINTA) 90 MG Take 1 tablet by mouth Every 12 hours      venlafaxine (EFFEXOR-XR) 75 mg 24 hr capsule Take 75 mg by mouth daily        No current facility-administered medications for this visit              Functional Status Prior to Diagnosis for Treatment Occupation: full time job - heavy duty labor at a [address]: Outdoor activities, carpentry, running and [address]: No limitations  [place]: No limitations  Exercise: Running  Other:     Current Functional Status  Occupation: full time job as above  Recreation: As above  ADLs:No limitations  [place]: No limitations  Exercise: Patient agreeable to attend CR until program completion  Other:     Patient Specific Goals:  Remain smoke free, attain highest functional level w/out symptoms, increase strength, decrease risk profile, return to work full time/duty and decrease reliance on medication  Short Term Program Goals: dietary modifications increased strength improved energy/stamina with ADLs exercise 120-150 mins/wk return to work    Long Term Goals: increased maximal walking duration  increased intial training workload  Improved [facility] Status score  Improved functional capacity  Improved Quality of Life - [facility] score reduced  Improved lipid profile  Abstain from smoking  Reduced stress    Ability to reach goals/rehabilitation potential:  Excellent    Projected return to function: 12 weeks  Objective tests: sub-max TM ETT      Nutritional   Reviewed details of Rate your Plate  Discussed key elements of heart healthy eating  Reviewed patient goals for dietary modifications and their clinical implications  Reviewed most recent lipid profile       Goals for dietary modification: choose lean cuts of meat  poultry without the skin  low fat ground meat and poultry  eliminate processed meats  reduce portions of meat to 3 oz  increase fish intake  more meatless meals  low fat dairy   reduced fat cheese  increase whole grains  increase fruits and vegetables  eliminate butter  low sodium  improved snack choices  more nuts/seeds      Emotional/Social  Reports sufficient emotional support    SOCIAL SUPPORT NETWORK    Marital status:     Rate 1-5:    Marriage: 5   Family: 5   Financial: 5   Relationships: 5   Spirituality: 5   Intellectual: 5      Domestic Violence Screening: No    Comments: Patient present w/weakness and debility related to ongoing cardiac issues  Patient expresses frustration w/his current physical condition  Patient requires skilled CR interventions in order to attain his goals  Patient very pleasant and cooperative w/CR staff  Patient very motivated to progress        Jayme Sorto 1970      Risk: LOW/MOD/HIGH High       Pre Post % change  Goal   Date: 10/3/2019      Physical       Sub Max ETT (mets) 5 8  -100 0% 10% increase   6MWT (feet) NA  #VALUE! 10% increase   UCSD Dyspnea Score NA  #VALUE! 5 pt decrease   Supplemental O2 use (L) 0      [facility]KE AI (est  peak O2) 36 7  -100 0%    COPD assessment Test (CAT) NA   2 pt decrease   Peak exercise CR/ CT (mets) 5 8  -100 0% 40% increase   Emotional       PHQ 9  (> 5 refer to MD) 6  -100 0% 4 pt decrease   SHAHLA 7 (> 8 refer to MD) 0      [facility] lower score = improvement     Total  30  -100 0% < 27   Feelings 1  -100 0% < 3   Physical fitness 5  -100 0% < 3   Social Support 5  0 0% < 3   Daily activities 3  -100 0% < 3   Social Activities 1  -100 0% < 3   Pain 3  -100 0% < 3   Overall Health 5  -100 0% < 3   Quality of Life 4  -100 0% < 3   Change in health 3  -100 0% < 3   Dietary       Rate your plate 59  -778 0% > 58   Measurements       Weight 169  -100 0% 2 5-5%    BMI 25 79  -100 0% 19-25   Waist Circ  36  -100 0% < 40 M / < 35 F    BP left arm     (systolic) 399  -919 1% < 814                              (diastolic) 54  -557 7% < 90   Smoking #/day (if applicable) 0  #DIV/0! 0   Lipids/ Glucose (Date) 9/7/2019  -1    Total cholesterol 127  -100 0%    Triglycerides 295  -100 0% < 150   HDL 26  -100 0% 40-60   LDL 42  -100 0% < 100   A1C % 6 2  -100 0% 4 0 - 5 6%   Fasting   -100 0%           Physician signature: _______________________ _________________     Date:

## 2019-10-07 ENCOUNTER — CLINICAL SUPPORT (OUTPATIENT)
Dept: CARDIAC REHAB | Facility: HOSPITAL | Age: 49
End: 2019-10-07
Payer: COMMERCIAL

## 2019-10-07 DIAGNOSIS — I20.8 STABLE ANGINA (HCC): ICD-10-CM

## 2019-10-07 PROCEDURE — 93798 PHYS/QHP OP CAR RHAB W/ECG: CPT

## 2019-10-11 ENCOUNTER — CLINICAL SUPPORT (OUTPATIENT)
Dept: CARDIAC REHAB | Facility: HOSPITAL | Age: 49
End: 2019-10-11
Payer: COMMERCIAL

## 2019-10-11 DIAGNOSIS — R07.9 CHEST PAIN, UNSPECIFIED TYPE: ICD-10-CM

## 2019-10-11 PROCEDURE — 93798 PHYS/QHP OP CAR RHAB W/ECG: CPT

## 2019-10-14 ENCOUNTER — CLINICAL SUPPORT (OUTPATIENT)
Dept: CARDIAC REHAB | Facility: HOSPITAL | Age: 49
End: 2019-10-14
Payer: COMMERCIAL

## 2019-10-14 DIAGNOSIS — I20.8 STABLE ANGINA (HCC): ICD-10-CM

## 2019-10-14 PROCEDURE — 93798 PHYS/QHP OP CAR RHAB W/ECG: CPT

## 2019-10-18 ENCOUNTER — CLINICAL SUPPORT (OUTPATIENT)
Dept: CARDIAC REHAB | Facility: HOSPITAL | Age: 49
End: 2019-10-18
Payer: COMMERCIAL

## 2019-10-18 DIAGNOSIS — I20.8 STABLE ANGINA (HCC): ICD-10-CM

## 2019-10-18 PROCEDURE — 93798 PHYS/QHP OP CAR RHAB W/ECG: CPT

## 2019-10-21 ENCOUNTER — CLINICAL SUPPORT (OUTPATIENT)
Dept: CARDIAC REHAB | Facility: HOSPITAL | Age: 49
End: 2019-10-21
Payer: COMMERCIAL

## 2019-10-21 DIAGNOSIS — I20.8 STABLE ANGINA (HCC): ICD-10-CM

## 2019-10-21 PROCEDURE — 93798 PHYS/QHP OP CAR RHAB W/ECG: CPT

## 2019-10-25 ENCOUNTER — CLINICAL SUPPORT (OUTPATIENT)
Dept: CARDIAC REHAB | Facility: HOSPITAL | Age: 49
End: 2019-10-25
Payer: COMMERCIAL

## 2019-10-25 DIAGNOSIS — I20.8 STABLE ANGINA (HCC): ICD-10-CM

## 2019-10-25 PROCEDURE — 93798 PHYS/QHP OP CAR RHAB W/ECG: CPT

## 2019-10-28 ENCOUNTER — CLINICAL SUPPORT (OUTPATIENT)
Dept: CARDIAC REHAB | Facility: HOSPITAL | Age: 49
End: 2019-10-28
Payer: COMMERCIAL

## 2019-10-28 DIAGNOSIS — I20.8 STABLE ANGINA (HCC): ICD-10-CM

## 2019-10-28 PROCEDURE — 93798 PHYS/QHP OP CAR RHAB W/ECG: CPT

## 2019-10-31 ENCOUNTER — CLINICAL SUPPORT (OUTPATIENT)
Dept: CARDIAC REHAB | Facility: HOSPITAL | Age: 49
End: 2019-10-31
Payer: COMMERCIAL

## 2019-10-31 DIAGNOSIS — I20.8 ANGINA DECUBITUS (HCC): ICD-10-CM

## 2019-10-31 PROCEDURE — 93798 PHYS/QHP OP CAR RHAB W/ECG: CPT

## 2019-10-31 NOTE — PROGRESS NOTES
Cardiac Rehabilitation Plan of Care   Care Plan       Today's date: 10/31/2019   Visits:   Patient name: Mary York      : 1970  Age: 51 y o  MRN: [id number]  Referring Physician: Nickie Granados MD  Cardiologist: Dr Adilene Mason  Provider: Janell Pallas  Clinician: Carol Bowman, MPT    Dx:   No diagnosis found  Stable Angina  MI w/BEVERLY X 2    Date of onset: 2019      SUMMARY OF PROGRESS: Patient has completed  visits as of today  He has correct hemodynamic responses to activity  [employer] is able to work consistently at a 4 5 MET Level  Patient has not c/o angina in the past few weeks  His resting vitals were HR 67 and /56  Peak vitals were  and /66  His recovery vitals were HR 65 and /58  Patient rates his program a 3 to 4 on a 1-10 RPE Scale  Patient performed the session on room air and was able to maintain an 02 saturation of 97% or greater  Telemetry revealed NSR  Patient recently quit smoking  Patient remains smoke free! 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[address]  Patient's program will be progressed as he is able to tolerate  Patient will be educated specific to his disease process  Patient issued schedule of CR topics  He has been attending the weekly education session  Medication compliance: Yes   Comments: Patient admits to medication compliance  Fall Risk: Low   Comments: Patient exhibits good dynamic standing balance and 5/5 BLE strength  EKG changes: NSR      EXERCISE ASSESSMENT and PLAN    Current Exercise Program in Rehab:       Frequency: 3 to 5 days/week        Minutes: 35-40         METS: 4 5  to 5           HR:20-30 > RHR      RPE: 4-6        Modalities: Treadmill, UBE, NuStep and Recumbent bike      Exercise Progression 30 Day Goals :    Frequency: 5 days/week   Minutes: 40-45   METS: 5 to 5 5   HR:    RPE:4-7   Modalities: Treadmill, Airdyne bike, Lifecycle and NuStep    Strength trainin-3 days / week  12-15 repetitions  1-2 sets per modality    Modalities: Leg Press, Chest Press and Seated Row    Progressing: In Progress    Home Exercise: None    Goals: 10% improvement in functional capacity, Reduced Dyspnea with physical activity  0-1/10, improved DASI score by 10%, Increase in peak CR METs by 40%, Resume ADLs with increased strength, Return to work unrestricted and Exercise 5 days/wk, >150mins/wk  Education: Benefit of exercise for CAD risk factors, home exercise guidelines, signs and sxs, RPE scale, class: Risk Factors for Heart Disease and Exercise instructions/guidelines for discharge    Plan:education on home exercise guidelines, home exercise 30+ mins 2 days opposite CR and Education class: Risk Factors for Heart Disease  Readiness to change: Action      NUTRITION ASSESSMENT AND PLAN    Weight control:    Starting weight: 169   Current weight:   165  Waist circumference:    Startin   Current:  32  Diabetes: N/A  Lipid management: Discussed diet and lipid management  Goals:LDL <100, HDL >40, TRG <150 and CHOL <200  Education: heart healthy eating  low sodium diet  hydration  diet and lipid management  class: Heart Healthy Eating  class:  Label Reading  Progressing: In Progress  Plan: Education class: Reading Food Labels, Education Class: Heart Healthy Eating, Increase PUFA and MUFA, Decrease SFA, Increase whole grains, increase fruits/vegs, eliminate processed meats, reduce red meat 1x/wk, swtich to low fat dairy and Reduce added sugars <25g/day  Readiness to change: Action      PSYCHOSOCIAL ASSESSMENT AND PLAN    Emotional:  PHQ-9 = 5-9 = Mild Depression  Self-reported stress level: 1 improved from 3 one month ago    Social support: Excellent  Goals:  Reduce perceived stress to 1-3/10, improved [facility] QOL < 27, PHQ-9 - reduced severity by one level, continue medical therapy, Physical Fitness in [facility] Score < 3, Social Support in [facility] Score < 3, Daily Activity in [facility] Score < 3, Social Activities in [facility] Score < 3, Quality of Life in [facility] Score < 3 , Change in Health in [place] Score < 3 , Increased interest in doing things, improved sleep, improved positive thoughts of well being and increased energy  Education: signs/sxs of depression, benefits of positive support system, coping mechanisms, depression and CAD, class:  Stress and Your Health  and class:  Relaxation  Progressing: In Progress  Plan: Class: Stress and Your Health, Class: Relaxation and Practice relaxation techniques  Readiness to change: Action      OTHER CORE COMPONENTS     Tobacco:   Social History     Tobacco Use   Smoking Status Not on file       Tobacco Use Intervention: Referral to tobacco expert:   Brief counseling by cardiac rehab professional  Date: 10/3/19  Patient recently quit smoking  Patient requires further education on smoking cessation  Blood pressure:    Restin/56   Exercise: 128/66   Recovery: 120/58    Goals: consistent BP < 130/80, reduced dietary sodium <2300mg, consistent exercise >150 mins/wk, set a target quit date, make contact with a tobacco use prevention program and Abstain from smoking  Education:  understanding HTN and CAD, low sodium diet and HTN, Education class:  Common Heart Medications, Education class: Understanding Heart Disease, smoking and heart disease, tobacco triggers, setting a smoking cessation plan, relapse education and provide information on tobacco cessation treatment  Progressing: In Progress  Plan: Class: Understanding Heart Disease and Class: Common Heart Medications  Readiness to change: Action

## 2019-11-01 ENCOUNTER — APPOINTMENT (OUTPATIENT)
Dept: CARDIAC REHAB | Facility: HOSPITAL | Age: 49
End: 2019-11-01
Payer: COMMERCIAL

## 2019-11-04 ENCOUNTER — CLINICAL SUPPORT (OUTPATIENT)
Dept: CARDIAC REHAB | Facility: HOSPITAL | Age: 49
End: 2019-11-04
Payer: COMMERCIAL

## 2019-11-04 DIAGNOSIS — I20.8 STABLE ANGINA (HCC): ICD-10-CM

## 2019-11-04 PROCEDURE — 93798 PHYS/QHP OP CAR RHAB W/ECG: CPT

## 2019-11-08 ENCOUNTER — CLINICAL SUPPORT (OUTPATIENT)
Dept: CARDIAC REHAB | Facility: HOSPITAL | Age: 49
End: 2019-11-08
Payer: COMMERCIAL

## 2019-11-08 DIAGNOSIS — I20.8 STABLE ANGINA (HCC): ICD-10-CM

## 2019-11-08 PROCEDURE — 93798 PHYS/QHP OP CAR RHAB W/ECG: CPT

## 2019-11-11 ENCOUNTER — CLINICAL SUPPORT (OUTPATIENT)
Dept: CARDIAC REHAB | Facility: HOSPITAL | Age: 49
End: 2019-11-11
Payer: COMMERCIAL

## 2019-11-11 DIAGNOSIS — I20.8 STABLE ANGINA (HCC): ICD-10-CM

## 2019-11-11 PROCEDURE — 93798 PHYS/QHP OP CAR RHAB W/ECG: CPT

## 2019-11-15 ENCOUNTER — CLINICAL SUPPORT (OUTPATIENT)
Dept: CARDIAC REHAB | Facility: HOSPITAL | Age: 49
End: 2019-11-15
Payer: COMMERCIAL

## 2019-11-15 DIAGNOSIS — I20.8 STABLE ANGINA (HCC): ICD-10-CM

## 2019-11-15 PROCEDURE — 93798 PHYS/QHP OP CAR RHAB W/ECG: CPT

## 2019-11-18 ENCOUNTER — CLINICAL SUPPORT (OUTPATIENT)
Dept: CARDIAC REHAB | Facility: HOSPITAL | Age: 49
End: 2019-11-18
Payer: COMMERCIAL

## 2019-11-18 DIAGNOSIS — I20.8 STABLE ANGINA (HCC): ICD-10-CM

## 2019-11-18 PROCEDURE — 93798 PHYS/QHP OP CAR RHAB W/ECG: CPT

## 2019-11-21 NOTE — PROGRESS NOTES
Cardiac Rehabilitation Plan of Care   60 Day Plan of Care      Today's date: 2019   Visits:   Patient name: Aldo Mcconnell      : 1970  Age: 51 y o  MRN: [id number]  Referring Physician: Camilo Brady MD  Cardiologist: Dr Bernardo Tran  Provider: Alexis Cade  Clinician: Eloise Soni, MPT    Dx:   Encounter Diagnosis   Name Primary?  Stable angina (HCC)    Stable Angina  MI w/BEVERLY X 2    Date of onset: 2019      SUMMARY OF PROGRESS: Patient has completed  visits as of today  He has correct hemodynamic responses to activity  [employer] is able to work consistently at a 5 48 MET Level  Patient has not c/o angina in the past 4-6 weeks  His resting vitals were HR 67 and /66  Peak vitals were  and /82  His recovery vitals were HR 65 and /66  Patient rates his program a 4 to 5 on a 1-10 RPE Scale  Patient performed the session on room air and was able to maintain an 02 saturation of 97% or greater  Telemetry revealed NSR  Patient recently quit smoking  Patient remains smoke free! 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[address] [employer]  Patient's program will be progressed as he is able to tolerate  Patient will be educated specific to his disease process  Patient issued schedule of CR topics  He has been attending the weekly education session  Medication compliance: Yes   Comments: Patient admits to medication compliance  Fall Risk: Low   Comments: Patient exhibits good dynamic standing balance and 5/5 BLE strength  EKG changes: NSR      EXERCISE ASSESSMENT and PLAN    Current Exercise Program in Rehab:       Frequency: 3 to 5 days/week        Minutes: 40-45        METS: 5 to 5 5           HR:20-30 > RHR      RPE: 4-6        Modalities: Treadmill, UBE, NuStep and Recumbent bike      Exercise Progression 30 Day Goals :    Frequency: 5 to 7 days/week   Minutes: 45-50   METS: 5 5 to 6   HR:    RPE:4-7   Modalities: Treadmill, Airdyne bike, Lifecycle and NuStep    Strength trainin-3 days / week  12-15 repetitions  1-2 sets per modality    Modalities: Leg Press, Chest Press and Seated Row    Progressing: In Progress    Home Exercise: None    Goals: 10% improvement in functional capacity, Reduced Dyspnea with physical activity  0-10, improved DASI score by 10%, Increase in peak CR METs by 40%, Resume ADLs with increased strength, Return to work unrestricted and Exercise 5 days/wk, >150mins/wk  Education: Benefit of exercise for CAD risk factors, home exercise guidelines, signs and sxs, RPE scale, class: Risk Factors for Heart Disease and Exercise instructions/guidelines for discharge    Plan:education on home exercise guidelines, home exercise 30+ mins 2 days opposite CR and Education class: Risk Factors for Heart Disease  Readiness to change: Action      NUTRITION ASSESSMENT AND PLAN    Weight control:    Starting weight: 169   Current weight:   165  Waist circumference:    Startin   Current:  32  Diabetes: N/A  Lipid management: Discussed diet and lipid management  Goals:LDL <100, HDL >40, TRG <150 and CHOL <200  Education: heart healthy eating  low sodium diet  hydration  diet and lipid management  class: Heart Healthy Eating  class:  Label Reading  Progressing: In Progress  Plan: Education class: Reading Food Labels, Education Class: Heart Healthy Eating, Increase PUFA and MUFA, Decrease SFA, Increase whole grains, increase fruits/vegs, eliminate processed meats, reduce red meat 1x/wk, swtich to low fat dairy and Reduce added sugars <25g/day  Readiness to change: Maintenance      PSYCHOSOCIAL ASSESSMENT AND PLAN    Emotional:  PHQ-9 = 5-9 = Mild Depression  Self-reported stress level: 1 improved from 3 one month ago    Social support: Excellent  Goals:  Reduce perceived stress to -3/10, improved [facility] QOL < 27, PHQ-9 - reduced severity by one level, continue medical therapy, Physical Fitness in [facility] Score < 3, Social Support in [facility] Score < 3, Daily Activity in [facility] Score < 3, Social Activities in [facility] Score < 3, Quality of Life in [facility] Score < 3 , Change in Health in [facility] Score < 3 , Increased interest in doing things, improved sleep, improved positive thoughts of well being and increased energy  Education: signs/sxs of depression, benefits of positive support system, coping mechanisms, depression and CAD, class:  Stress and Your Health  and class:  Relaxation  Progressing: In Progress  Plan: Class: Stress and Your Health, Class: Relaxation and Practice relaxation techniques  Readiness to change: Maintenance      OTHER CORE COMPONENTS     Tobacco:   Social History     Tobacco Use   Smoking Status Not on file       Tobacco Use Intervention: Referral to tobacco expert:   Brief counseling by cardiac rehab professional  Date: 10/3/19  Patient recently quit smoking  Patient requires further education on smoking cessation  Trygkrystle Marquez has remained smoke free since beginning CR  Blood pressure:    Restin/66   Exercise: 146/82   Recovery: 120/58    Goals: consistent BP < 130/80, reduced dietary sodium <2300mg, consistent exercise >150 mins/wk, set a target quit date, make contact with a tobacco use prevention program and Abstain from smoking  Education:  understanding HTN and CAD, low sodium diet and HTN, Education class:  Common Heart Medications, Education class: Understanding Heart Disease, smoking and heart disease, tobacco triggers, setting a smoking cessation plan, relapse education and provide information on tobacco cessation treatment  Progressing: In Progress  Plan: Class: Understanding Heart Disease and Class: Common Heart Medications  Readiness to change: Maintenance

## 2019-11-22 ENCOUNTER — CLINICAL SUPPORT (OUTPATIENT)
Dept: CARDIAC REHAB | Facility: HOSPITAL | Age: 49
End: 2019-11-22
Payer: COMMERCIAL

## 2019-11-22 DIAGNOSIS — I20.8 STABLE ANGINA (HCC): ICD-10-CM

## 2019-11-22 PROCEDURE — 93798 PHYS/QHP OP CAR RHAB W/ECG: CPT

## 2019-11-25 ENCOUNTER — CLINICAL SUPPORT (OUTPATIENT)
Dept: CARDIAC REHAB | Facility: HOSPITAL | Age: 49
End: 2019-11-25
Payer: COMMERCIAL

## 2019-11-25 DIAGNOSIS — I20.8 STABLE ANGINA (HCC): ICD-10-CM

## 2019-11-25 PROCEDURE — 93798 PHYS/QHP OP CAR RHAB W/ECG: CPT

## 2019-11-29 ENCOUNTER — CLINICAL SUPPORT (OUTPATIENT)
Dept: CARDIAC REHAB | Facility: HOSPITAL | Age: 49
End: 2019-11-29
Payer: COMMERCIAL

## 2019-11-29 DIAGNOSIS — I20.8 STABLE ANGINA (HCC): ICD-10-CM

## 2019-11-29 PROCEDURE — 93798 PHYS/QHP OP CAR RHAB W/ECG: CPT

## 2019-12-02 ENCOUNTER — CLINICAL SUPPORT (OUTPATIENT)
Dept: CARDIAC REHAB | Facility: HOSPITAL | Age: 49
End: 2019-12-02
Payer: COMMERCIAL

## 2019-12-02 DIAGNOSIS — I20.8 STABLE ANGINA (HCC): ICD-10-CM

## 2019-12-02 PROCEDURE — 93798 PHYS/QHP OP CAR RHAB W/ECG: CPT

## 2019-12-06 ENCOUNTER — TRANSCRIBE ORDERS (OUTPATIENT)
Dept: ADMINISTRATIVE | Facility: HOSPITAL | Age: 49
End: 2019-12-06

## 2019-12-06 ENCOUNTER — HOSPITAL ENCOUNTER (OUTPATIENT)
Dept: RADIOLOGY | Facility: HOSPITAL | Age: 49
Discharge: HOME | End: 2019-12-06
Payer: COMMERCIAL

## 2019-12-06 ENCOUNTER — CLINICAL SUPPORT (OUTPATIENT)
Dept: CARDIAC REHAB | Facility: HOSPITAL | Age: 49
End: 2019-12-06
Payer: COMMERCIAL

## 2019-12-06 DIAGNOSIS — M54.2 NECK PAIN: ICD-10-CM

## 2019-12-06 DIAGNOSIS — M54.2 NECK PAIN: Primary | ICD-10-CM

## 2019-12-06 DIAGNOSIS — I20.8 STABLE ANGINA (HCC): ICD-10-CM

## 2019-12-06 PROCEDURE — 93798 PHYS/QHP OP CAR RHAB W/ECG: CPT

## 2019-12-06 PROCEDURE — 72050 X-RAY EXAM NECK SPINE 4/5VWS: CPT

## 2019-12-09 ENCOUNTER — CLINICAL SUPPORT (OUTPATIENT)
Dept: CARDIAC REHAB | Facility: HOSPITAL | Age: 49
End: 2019-12-09
Payer: COMMERCIAL

## 2019-12-09 DIAGNOSIS — I20.8 STABLE ANGINA (HCC): ICD-10-CM

## 2019-12-09 PROCEDURE — 93798 PHYS/QHP OP CAR RHAB W/ECG: CPT

## 2019-12-13 ENCOUNTER — CLINICAL SUPPORT (OUTPATIENT)
Dept: CARDIAC REHAB | Facility: HOSPITAL | Age: 49
End: 2019-12-13
Payer: COMMERCIAL

## 2019-12-13 DIAGNOSIS — I20.8 STABLE ANGINA (HCC): ICD-10-CM

## 2019-12-13 PROCEDURE — 93798 PHYS/QHP OP CAR RHAB W/ECG: CPT

## 2019-12-16 ENCOUNTER — CLINICAL SUPPORT (OUTPATIENT)
Dept: CARDIAC REHAB | Facility: HOSPITAL | Age: 49
End: 2019-12-16
Payer: COMMERCIAL

## 2019-12-16 DIAGNOSIS — I20.8 STABLE ANGINA (HCC): ICD-10-CM

## 2019-12-16 PROCEDURE — 93798 PHYS/QHP OP CAR RHAB W/ECG: CPT

## 2019-12-19 NOTE — PROGRESS NOTES
Cardiac Rehabilitation Plan of Care   90 Day    Today's date: 2019   Visits:   Patient name: Kayleigh Oreilly      : 1970  Age: 51 y o  MRN: [id number]  Referring Physician: Anoop Sanders MD  Cardiologist: Dr Erica Lyons  Provider: [facility], [facility]  Clinician: Viridiana Morris MS, CCRP    Dx:   Encounter Diagnosis   Name Primary?  Stable angina (HCC)    Stable Angina  MI w/BEVERLY X 2    Date of onset: 2019      SUMMARY OF PROGRESS: Patient has completed  visits as of today  He has correct hemodynamic responses to activity  Mame Rod is able to work consistently at a 5 48 MET Level  Patient has not c/o angina in the past 4-6 weeks  His resting vitals were HR 87 and /78  Peak vitals were  and /60  His recovery vitals were HR 91 and /60  Patient rates his program a "4-5" on the 1-10 RPE Scale  Telemetry revealed NSR  Patient recently quit smoking and patient continues to remain smoke free! 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[address] [employer]  Patient's program will be progressed as he is able to tolerate  Patient will be educated specific to his disease process  Patient issued schedule of CR topics  He has been attending the weekly education session  Medication compliance: Yes   Comments: Patient admits to medication compliance  Fall Risk: Low   Comments: Patient exhibits good dynamic standing balance and 5/5 BLE strength      EKG changes: NSR      EXERCISE ASSESSMENT and PLAN    Current Exercise Program in Rehab:       Frequency: 3 to 5 days/week        Minutes: 45-50       METS: 5 to 5 5           HR: 20-30 > RHR; 107-117   RPE: 4-5       Modalities: Treadmill, UBE, NuStep and Recumbent bike      Exercise Progression 30 Day Goals :    Frequency: 5 to 7 days/week   Minutes: 45-50   METS: 5 5 to 6   HR: 20-30 > RHR; 107-117   RPE: 4-6   Modalities: Treadmill, Airdyne bike, Lifecycle and NuStep    Strength trainin-3 days / week  12-15 repetitions  1-2 sets per modality    Modalities: Leg Press, Chest Press and Seated Row    Progressing: In Progress    Home Exercise: None    Goals: 10% improvement in functional capacity, Reduced Dyspnea with physical activity  0-1/10, improved DASI score by 10%, Increase in peak CR METs by 40%, Resume ADLs with increased strength, Return to work unrestricted and Exercise 5 days/wk, >150mins/wk  Education: Benefit of exercise for CAD risk factors, home exercise guidelines, signs and sxs, RPE scale, class: Risk Factors for Heart Disease and Exercise instructions/guidelines for discharge    Plan:education on home exercise guidelines, home exercise 30+ mins 2 days opposite CR and Education class: Risk Factors for Heart Disease  Readiness to change: Action      NUTRITION ASSESSMENT AND PLAN    Weight control:    Starting weight: 169   Current weight:   165  Waist circumference:    Startin   Current:  32  Diabetes: N/A  Lipid management: Discussed diet and lipid management  Goals:LDL <100, HDL >40, TRG <150 and CHOL <200  Education: heart healthy eating  low sodium diet  hydration  diet and lipid management  class: Heart Healthy Eating  class:  Label Reading  Progressing: In Progress  Plan: Education class: Reading Food Labels, Education Class: Heart Healthy Eating, Increase PUFA and MUFA, Decrease SFA, Increase whole grains, increase fruits/vegs, eliminate processed meats, reduce red meat 1x/wk, swtich to low fat dairy and Reduce added sugars <25g/day  Readiness to change: Maintenance      PSYCHOSOCIAL ASSESSMENT AND PLAN    Emotional:  PHQ-9 = 5-9 = Mild Depression  Self-reported stress level: 1 improved from 3 one month ago    Social support: Excellent  Goals:  Reduce perceived stress to 1-3/10, improved [facility] QOL < 27, PHQ-9 - reduced severity by one level, continue medical therapy, Physical Fitness in [facility], [place] Score < 3, Social Support in [facility], [place] Score < 3, Daily Activity in [facility], [place] Score < 3, Social Activities in [facility], [place] Score < 3, Quality of Life in [facility] Score < 3 , Change in Health in [place] Score < 3 , Increased interest in doing things, improved sleep, improved positive thoughts of well being and increased energy  Education: signs/sxs of depression, benefits of positive support system, coping mechanisms, depression and CAD, class:  Stress and Your Health  and class:  Relaxation  Progressing: In Progress  Plan: Class: Stress and Your Health, Class: Relaxation and Practice relaxation techniques  Readiness to change: Maintenance      OTHER CORE COMPONENTS     Tobacco:   Social History     Tobacco Use   Smoking Status Not on file       Tobacco Use Intervention: Referral to tobacco expert:   Brief counseling by cardiac rehab professional  Date: 10/3/19  Patient recently quit smoking  Patient requires further education on smoking cessation  Mame Rod has remained smoke free since beginning CR  Blood pressure:    Restin/78   Exercise: 126/60   Recovery: 122/60    Goals: consistent BP < 130/80, reduced dietary sodium <2300mg, consistent exercise >150 mins/wk, set a target quit date, make contact with a tobacco use prevention program and Abstain from smoking  Education:  understanding HTN and CAD, low sodium diet and HTN, Education class:  Common Heart Medications, Education class: Understanding Heart Disease, smoking and heart disease, tobacco triggers, setting a smoking cessation plan, relapse education and provide information on tobacco cessation treatment  Progressing: In Progress  Plan: Class: Understanding Heart Disease and Class: Common Heart Medications  Readiness to change: Maintenance

## 2019-12-20 ENCOUNTER — APPOINTMENT (OUTPATIENT)
Dept: CARDIAC REHAB | Facility: HOSPITAL | Age: 49
End: 2019-12-20
Payer: COMMERCIAL

## 2019-12-20 ENCOUNTER — OFFICE VISIT (OUTPATIENT)
Dept: CARDIOLOGY CLINIC | Facility: CLINIC | Age: 49
End: 2019-12-20
Payer: COMMERCIAL

## 2019-12-20 VITALS
SYSTOLIC BLOOD PRESSURE: 102 MMHG | HEIGHT: 68 IN | BODY MASS INDEX: 27.13 KG/M2 | HEART RATE: 70 BPM | DIASTOLIC BLOOD PRESSURE: 70 MMHG | WEIGHT: 179 LBS

## 2019-12-20 DIAGNOSIS — I10 ESSENTIAL HYPERTENSION: ICD-10-CM

## 2019-12-20 DIAGNOSIS — I25.118 CORONARY ARTERY DISEASE OF NATIVE ARTERY OF NATIVE HEART WITH STABLE ANGINA PECTORIS (HCC): Primary | ICD-10-CM

## 2019-12-20 DIAGNOSIS — I49.3 PVC (PREMATURE VENTRICULAR CONTRACTION): ICD-10-CM

## 2019-12-20 DIAGNOSIS — E78.2 MIXED HYPERLIPIDEMIA: ICD-10-CM

## 2019-12-20 PROCEDURE — 3078F DIAST BP <80 MM HG: CPT | Performed by: INTERNAL MEDICINE

## 2019-12-20 PROCEDURE — 99214 OFFICE O/P EST MOD 30 MIN: CPT | Performed by: INTERNAL MEDICINE

## 2019-12-20 PROCEDURE — 3074F SYST BP LT 130 MM HG: CPT | Performed by: INTERNAL MEDICINE

## 2019-12-20 NOTE — PROGRESS NOTES
Cardiology Consultation     Nighat Ann  [id number]  1970  [facility] [address] [address] [phone]    1  Coronary artery disease of native artery of native heart with stable angina pectoris ([facility] [address] )     2  Essential hypertension     3  Mixed hyperlipidemia  Lipid panel   4  PVC (premature ventricular contraction)       Chief Complaint   Patient presents with    Follow-up     3 month     Dizziness    Palpitations     HPI: Patient is here for management of cardiac issues  He has a h/o 4 prior MIs resulting in multiple stent placements  Most recent MI was earlier this year (May 2019) with a late in-stent thrombosis in the ramus requiring placement of 2 BEVERLY  He has continued chest pains that are random in occurrence, not necessarily related to exertion, sharp, mid-sternal, and associated with shortness of breath along with neck and back pain  He continues using NTG and it does relieve with it  He is tolerating the addition of Imdur other than some dizziness that has occurred with low BPs  He has significant fatigue as well  No reported palpitations, syncope, LE edema, orthopnea, PND, or significant weight changes  Patient Active Problem List   Diagnosis    Coronary artery disease of native artery of native heart with stable angina pectoris ([facility] [address] )    Essential hypertension    Mixed hyperlipidemia    Chest pain    PVC (premature ventricular contraction)     History reviewed  No pertinent past medical history    Social History     Socioeconomic History    Marital status: /Civil Union     Spouse name: Not on file    Number of children: Not on file    Years of education: Not on file    Highest education level: Not on file   Occupational History    Not on file   Social Needs    Financial resource strain: Not on file    Food insecurity:     Worry: Not on file Inability: Not on file    Transportation needs:     Medical: Not on file     Non-medical: Not on file   Tobacco Use    Smoking status: Not on file   Substance and Sexual Activity    Alcohol use: Not on file    Drug use: Not on file    Sexual activity: Not on file   Lifestyle    Physical activity:     Days per week: Not on file     Minutes per session: Not on file    Stress: Not on file   Relationships    Social connections:     Talks on phone: Not on file     Gets together: Not on file     Attends Orthodoxy service: Not on file     Active member of club or organization: Not on file     Attends meetings of clubs or organizations: Not on file     Relationship status: Not on file    Intimate partner violence:     Fear of current or ex partner: Not on file     Emotionally abused: Not on file     Physically abused: Not on file     Forced sexual activity: Not on file   Other Topics Concern    Not on file   Social History Narrative    Not on file      History reviewed  No pertinent family history  History reviewed  No pertinent surgical history      Current Outpatient Medications:     aspirin (ASPIRIN LOW DOSE) 81 MG tablet, Take by mouth, Disp: , Rfl:     atorvastatin (LIPITOR) 80 mg tablet, Take 1 tablet by mouth every 24 hours, Disp: , Rfl:     isosorbide mononitrate (IMDUR) 30 mg 24 hr tablet, Take 1 tablet (30 mg total) by mouth 2 (two) times a day, Disp: 60 tablet, Rfl: 3    losartan (COZAAR) 100 MG tablet, Take 1 tablet (100 mg total) by mouth every 24 hours, Disp: 30 tablet, Rfl: 3    nitroglycerin (NITROSTAT) 0 4 mg SL tablet, as directed, Disp: , Rfl:     pantoprazole (PROTONIX) 40 mg tablet, Take 40 mg by mouth daily, Disp: , Rfl: 3    ticagrelor (BRILINTA) 90 MG, Take 1 tablet by mouth Every 12 hours, Disp: , Rfl:     venlafaxine (EFFEXOR-XR) 75 mg 24 hr capsule, Take 75 mg by mouth daily , Disp: , Rfl:   No Known Allergies  Vitals:    12/20/19 0919   BP: 102/70   BP Location: Right arm Patient Position: Sitting   Cuff Size: Standard   Pulse: 70   Weight: 81 2 kg (179 lb)   Height: 5' 8" (1 727 m)       Labs:  Admission on 07/24/2019, Discharged on 07/24/2019   Component Date Value    Ventricular Rate 07/24/2019 62     Atrial Rate 07/24/2019 62     FL Interval 07/24/2019 168     QRSD Interval 07/24/2019 96     QT Interval 07/24/2019 384     QTC Interval 07/24/2019 389     P Axis 07/24/2019 38     QRS Axis 07/24/2019 81     T Wave Axis 07/24/2019 82    Orders Only on 07/19/2019   Component Date Value    WBC 07/19/2019 6 40     RBC 07/19/2019 3 99*    Hemoglobin 07/19/2019 12 8*    Hematocrit 07/19/2019 35 9*    MCV 07/19/2019 90     MCH 07/19/2019 32 2     MCHC 07/19/2019 35 7     RDW 07/19/2019 13 4     MPV 07/19/2019 7 9*    Platelets 96/92/3410 160     Neutrophils Relative 07/19/2019 66     Lymphocytes Relative 07/19/2019 22     Monocytes Relative 07/19/2019 8     Eosinophils Relative 07/19/2019 3     Basophils Relative 07/19/2019 1     Neutrophils Absolute 07/19/2019 4 20     Lymphocytes Absolute 07/19/2019 1 40     Monocytes Absolute 07/19/2019 0 50     Eosinophils Absolute 07/19/2019 0 20     Basophils Absolute 07/19/2019 0 00     Sodium 07/19/2019 143     Potassium 07/19/2019 3 4*    Chloride 07/19/2019 112*    CO2 07/19/2019 26     ANION GAP 07/19/2019 5     BUN 07/19/2019 17     Creatinine 07/19/2019 1 04     Glucose, Fasting 07/19/2019 94     Calcium 07/19/2019 9 0     AST 07/19/2019 11*    ALT 07/19/2019 11     Alkaline Phosphatase 07/19/2019 84     Total Protein 07/19/2019 6 3*    Albumin 07/19/2019 3 9     Total Bilirubin 07/19/2019 0 70     eGFR 07/19/2019 85      No results found for: CHOL, TRIG, HDL, LDLDIRECT  Imaging: No results found  Review of Systems:  Review of Systems   Constitutional: Negative for activity change, appetite change, fatigue and fever  HENT: Negative for nosebleeds and sore throat      Eyes: Negative for photophobia and visual disturbance  Respiratory: Positive for chest tightness  Negative for cough, shortness of breath and wheezing  Cardiovascular: Negative for chest pain, palpitations and leg swelling  Gastrointestinal: Negative for abdominal pain, diarrhea, nausea and vomiting  Endocrine: Negative for polyuria  Genitourinary: Negative for dysuria, frequency and hematuria  Musculoskeletal: Negative for arthralgias, back pain and gait problem  Skin: Negative for pallor and rash  Neurological: Negative for dizziness, syncope, speech difficulty and light-headedness  Hematological: Does not bruise/bleed easily  Psychiatric/Behavioral: Negative for agitation, behavioral problems and confusion  Physical Exam:  Physical Exam   Constitutional: He is oriented to person, place, and time  He appears well-developed and well-nourished  HENT:   Head: Normocephalic and atraumatic  Nose: Nose normal    Eyes: Pupils are equal, round, and reactive to light  EOM are normal  No scleral icterus  Neck: Normal range of motion  Neck supple  No JVD present  Cardiovascular: Normal rate, regular rhythm and normal heart sounds  Exam reveals no gallop and no friction rub  No murmur heard  Pulmonary/Chest: Effort normal and breath sounds normal  No respiratory distress  He has no wheezes  He has no rales  Abdominal: Soft  Bowel sounds are normal  He exhibits no distension  There is no tenderness  Musculoskeletal: Normal range of motion  He exhibits no edema or deformity  Neurological: He is alert and oriented to person, place, and time  No cranial nerve deficit  Skin: Skin is warm and dry  No rash noted  He is not diaphoretic  Psychiatric: He has a normal mood and affect  His behavior is normal    Vitals reviewed  Blood pressure 102/70, pulse 70, height 5' 8" (1 727 m), weight 81 2 kg (179 lb)      EKG:  Normal sinus rhythm  Rightward axis  Nonspecific T wave abnormality  Abnormal ECG    Discussion/Summary:  Chest pain: with underlying significant premature CAD and strong family history of CAD  With residual disease in LAD and RCA as well as per patient report  With recent stent placement in the setting of unstable angina in May 2019 - multiple episodes of in stent thrombosis/restenosis and continued chest pains even with minimal exertion - he has been on Brilinta for over a year and has instent restenosis with this  We referred for repeat cardiac cath and potential CABG - which did reveal disease, but not quite ready for CABG at this time - mild LAD atherosclerosis, 30% pLAD, 75% D2, 50% OM1 with occluded subbranch, 0% pR1 at site of prior stent, 70% mRCA at site of prior stent with eccentric lesion 70% PLV1  We also added Imdur to help with symptom management in the meantime  Continued on ASA, Brilinta, and ARB - we stopped amlodipine since BP is running on low end  There did appear to be a reason for having an ARB with the reported EF of 40-45%  We checked an echo to evaluate LV EF and wall motion as well - which revealed a normal LV function with no wall motion abnormalities  With continued chest pain requiring nitro use, we increased Imdur to 30mg twice daily at last visit  He has started on cardiac rehab to help with conditioning and improving fatigue level - does get chest pain with exertion with this  For improved control of chest pain and fatigue, will change Coreg to metoprolol for less alpha-blockade and more beta-specific blockade  HTN: well controlled, if not low on current regimen  Will continue Imdur and Coreg for CAD symptom management  We stopped amlodipine in order to allow addition of higher dose of Imdur  HLD: continued on statin  Will recheck levels now  Frequent PVCs: as above, perhaps triggered by ischemic disease  Will continue coreg for the time being  Dizziness: seems positional - occurs with lying on left side

## 2019-12-20 NOTE — PATIENT INSTRUCTIONS
Chest Pain   AMBULATORY CARE:   Chest pain  can be caused by a range of conditions, from not serious to life-threatening  It may be caused by a heart attack or a blood clot in your lungs  Sometimes chest pain or pressure is caused by poor blood flow to your heart (angina)  Infection, inflammation, or a fracture in the bones or cartilage in your chest can cause pain or discomfort  Chest pain can also be a symptom of a digestive problem, such as acid reflux or a stomach ulcer  An anxiety attack or a strong emotion such as anger can also cause chest pain  It is important to follow up with your healthcare provider to find the cause of your chest pain  Common symptoms you may have with chest pain:   · Fever or sweating     · Nausea or vomiting     · Shortness of breath     · Discomfort or pressure that spreads from your chest to your back, jaw, or arm     · A racing or slow heartbeat     · Feeling weak, tired, or faint  Call 911 if:   · You have any of the following signs of a heart attack:      ¨ Squeezing, pressure, or pain in your chest that lasts longer than 5 minutes or returns    ¨ Discomfort or pain in your back, neck, jaw, stomach, or arm     ¨ Trouble breathing    ¨ Nausea or vomiting    ¨ Lightheadedness or a sudden cold sweat, especially with chest pain or trouble breathing    Seek care immediately if:   · You have chest discomfort that gets worse, even with medicine  · You cough or vomit blood  · Your bowel movements are black or bloody  · You cannot stop vomiting, or it hurts to swallow  Contact your healthcare provider if:   · You have questions or concerns about your condition or care  Treatment for chest pain  may include medicine to treat your symptoms while your healthcare provider finds the cause of your chest pain  · Medicines  may be given to treat the cause of your chest pain  Examples include pain medicine, anxiety medicine, or medicines to increase blood flow to your heart  · Do not take certain medicines without asking your healthcare provider first   These include NSAIDs, herbal or vitamin supplements, or hormones (estrogen or progestin)  Follow up with your healthcare provider within 72 hours, or as directed: You may need to return for more tests to find the cause of your chest pain  You may be referred to a specialist, such as a cardiologist or gastroenterologist  Write down your questions so you remember to ask them during your visits  Healthy living tips: The following are general healthy guidelines  If your chest pain is caused by a heart problem, your healthcare provider will give you specific guidelines to follow  · Do not smoke  Nicotine and other chemicals in cigarettes and cigars can cause lung and heart damage  Ask your healthcare provider for information if you currently smoke and need help to quit  E-cigarettes or smokeless tobacco still contain nicotine  Talk to your healthcare provider before you use these products  · Eat a variety of healthy, low-fat foods  Healthy foods include fruits, vegetables, whole-grain breads, low-fat dairy products, beans, lean meats, and fish  Ask for more information about a heart healthy diet  · Ask about activity  Your healthcare provider will tell you which activities to limit or avoid  Ask when you can drive, return to work, and have sex  Ask about the best exercise plan for you  · Maintain a healthy weight  Ask your healthcare provider how much you should weigh  Ask him or her to help you create a weight loss plan if you are overweight  · Get the flu and pneumonia vaccines  All adults should get the influenza (flu) vaccine  Get it every year as soon as it becomes available  The pneumococcal vaccine is given to adults aged 72 years or older  The vaccine is given every 5 years to prevent pneumococcal disease, such as pneumonia    © 2017 [employer] Information is for End User's use only and may not be sold, redistributed or otherwise used for commercial purposes  All illustrations and images included in CareNotes® are the copyrighted property of A [employer]  or [employer]in Verg[employer]  The above information is an  only  It is not intended as medical advice for individual conditions or treatments  Talk to your doctor, nurse or pharmacist before following any medical regimen to see if it is safe and effective for you

## 2019-12-23 ENCOUNTER — CLINICAL SUPPORT (OUTPATIENT)
Dept: CARDIAC REHAB | Facility: HOSPITAL | Age: 49
End: 2019-12-23
Payer: COMMERCIAL

## 2019-12-23 DIAGNOSIS — I20.8 STABLE ANGINA PECTORIS (HCC): ICD-10-CM

## 2019-12-23 PROCEDURE — 93798 PHYS/QHP OP CAR RHAB W/ECG: CPT

## 2019-12-24 ENCOUNTER — APPOINTMENT (OUTPATIENT)
Dept: CARDIAC REHAB | Facility: HOSPITAL | Age: 49
End: 2019-12-24
Payer: COMMERCIAL

## 2019-12-27 ENCOUNTER — CLINICAL SUPPORT (OUTPATIENT)
Dept: CARDIAC REHAB | Facility: HOSPITAL | Age: 49
End: 2019-12-27
Payer: COMMERCIAL

## 2019-12-27 DIAGNOSIS — I20.8 STABLE ANGINA (HCC): ICD-10-CM

## 2019-12-27 PROCEDURE — 93798 PHYS/QHP OP CAR RHAB W/ECG: CPT

## 2019-12-30 ENCOUNTER — APPOINTMENT (OUTPATIENT)
Dept: CARDIAC REHAB | Facility: HOSPITAL | Age: 49
End: 2019-12-30
Payer: COMMERCIAL

## 2019-12-31 ENCOUNTER — APPOINTMENT (OUTPATIENT)
Dept: CARDIAC REHAB | Facility: HOSPITAL | Age: 49
End: 2019-12-31
Payer: COMMERCIAL

## 2019-12-31 ENCOUNTER — CLINICAL SUPPORT (OUTPATIENT)
Dept: CARDIAC REHAB | Facility: HOSPITAL | Age: 49
End: 2019-12-31
Payer: COMMERCIAL

## 2019-12-31 DIAGNOSIS — I20.8 STABLE ANGINA (HCC): ICD-10-CM

## 2019-12-31 PROCEDURE — 93798 PHYS/QHP OP CAR RHAB W/ECG: CPT

## 2020-01-03 ENCOUNTER — CLINICAL SUPPORT (OUTPATIENT)
Dept: CARDIAC REHAB | Facility: HOSPITAL | Age: 50
End: 2020-01-03
Payer: COMMERCIAL

## 2020-01-03 DIAGNOSIS — I20.8 STABLE ANGINA (HCC): ICD-10-CM

## 2020-01-03 PROCEDURE — 93798 PHYS/QHP OP CAR RHAB W/ECG: CPT

## 2020-01-06 ENCOUNTER — CLINICAL SUPPORT (OUTPATIENT)
Dept: CARDIAC REHAB | Facility: HOSPITAL | Age: 50
End: 2020-01-06
Payer: COMMERCIAL

## 2020-01-06 DIAGNOSIS — I20.8 STABLE ANGINA (HCC): ICD-10-CM

## 2020-01-06 PROCEDURE — 93798 PHYS/QHP OP CAR RHAB W/ECG: CPT

## 2020-01-10 ENCOUNTER — CLINICAL SUPPORT (OUTPATIENT)
Dept: CARDIAC REHAB | Facility: HOSPITAL | Age: 50
End: 2020-01-10
Payer: COMMERCIAL

## 2020-01-10 DIAGNOSIS — I20.8 STABLE ANGINA (HCC): ICD-10-CM

## 2020-01-10 PROCEDURE — 93798 PHYS/QHP OP CAR RHAB W/ECG: CPT

## 2020-01-13 ENCOUNTER — CLINICAL SUPPORT (OUTPATIENT)
Dept: CARDIAC REHAB | Facility: HOSPITAL | Age: 50
End: 2020-01-13
Payer: COMMERCIAL

## 2020-01-13 DIAGNOSIS — I20.8 STABLE ANGINA (HCC): ICD-10-CM

## 2020-01-13 PROCEDURE — 93798 PHYS/QHP OP CAR RHAB W/ECG: CPT

## 2020-01-13 NOTE — PROGRESS NOTES
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[employer]  Patient's program will be progressed as he is able to tolerate  Patient will be educated specific to his disease process  Patient issued schedule of CR topics  He has been attending the weekly education session  Medication compliance: Yes   Comments: Patient admits to medication compliance  Fall Risk: Low   Comments: Patient exhibits good dynamic standing balance and 5/5 BLE strength      EKG changes: NSR      EXERCISE ASSESSMENT and PLAN    Current Exercise Program in Rehab:       Frequency: 3 to 5 days/week        Minutes: 45-50       METS: 5 to 5 5           HR: 20-30 > RHR; 107-117   RPE: 4-5       Modalities: Treadmill, UBE, NuStep and Recumbent bike      Exercise Progression 30 Day Goals :    Frequency: 5 to 7 days/week   Minutes: 45-50   METS: 5 5 to 6   HR: 20-30 > RHR; 107-117   RPE: 4-6   Modalities: Treadmill, Airdyne bike, Lifecycle and NuStep    Strength trainin-3 days / week  12-15 repetitions  1-2 sets per modality    Modalities: Leg Press, Chest Press and Seated Row    Progressing: Action    Home Exercise: None    Goals: 10% improvement in functional capacity, Reduced Dyspnea with physical activity  0-1/10, improved DASI score by 10%, Increase in peak CR METs by 40%, Resume ADLs with increased strength, Return to work unrestricted and Exercise 5 days/wk, >150mins/wk  Education: Benefit of exercise for CAD risk factors, home exercise guidelines, signs and sxs, RPE scale, class: Risk Factors for Heart Disease and Exercise instructions/guidelines for discharge    Plan:education on home exercise guidelines, home exercise 30+ mins 2 days opposite CR and Education class: Risk Factors for Heart Disease  Readiness to change: Action      NUTRITION ASSESSMENT AND PLAN    Weight control:    Starting weight: 169   Current weight:   165  Waist circumference:    Startin   Current:  32  Diabetes: N/A  Lipid management: Discussed diet and lipid management  Goals:LDL <100, HDL >40, TRG <150 and CHOL <200  Education: heart healthy eating  low sodium diet  hydration  diet and lipid management  class: Heart Healthy Eating  class:  Label Reading  Progressing: In Progress  Plan: Education class: Reading Food Labels, Education Class: Heart Healthy Eating, Increase PUFA and MUFA, Decrease SFA, Increase whole grains, increase fruits/vegs, eliminate processed meats, reduce red meat 1x/wk, swtich to low fat dairy and Reduce added sugars <25g/day  Readiness to change: Maintenance      PSYCHOSOCIAL ASSESSMENT AND PLAN    Emotional:  PHQ-9 = 5-9 = Mild Depression  Self-reported stress level: 1 improved from 3 one month ago    Social support: Excellent  Goals:  Reduce perceived stress to 1-3/10, improved [facility] QOL < 27, PHQ-9 - reduced severity by one level, continue medical therapy, Physical Fitness in [facility] Score < 3, Social Support in [facility] Score < 3, Daily Activity in [facility] Score < 3, Social Activities in [facility] Score < 3, Quality of Life in [facility] Score < 3 , Change in Health in [place] Score < 3 , Increased interest in doing things, improved sleep, improved positive thoughts of well being and increased energy  Education: signs/sxs of depression, benefits of positive support system, coping mechanisms, depression and CAD, class:  Stress and Your Health  and class:  Relaxation  Progressing: In Progress  Plan: Class: Stress and Your Health, Class: Relaxation and Practice relaxation techniques  Readiness to change: Maintenance      OTHER CORE COMPONENTS     Tobacco:   Social History     Tobacco Use   Smoking Status Not on file       Tobacco Use Intervention: Referral to tobacco expert:   Brief counseling by cardiac rehab professional  Date: 10/3/19  Patient recently quit smoking  Patient requires further education on smoking cessation  Mame Rod has remained smoke free since beginning CR  Blood pressure:    Restin/68   Exercise: 138/86   Recovery: 118/52    Goals: consistent BP < 130/80, reduced dietary sodium <2300mg, consistent exercise >150 mins/wk, set a target quit date, make contact with a tobacco use prevention program and Abstain from smoking  Education:  understanding HTN and CAD, low sodium diet and HTN, Education class:  Common Heart Medications, Education class: Understanding Heart Disease, smoking and heart disease, tobacco triggers, setting a smoking cessation plan, relapse education and provide information on tobacco cessation treatment  Progressing: In Progress  Plan: Class: Understanding Heart Disease and Class: Common Heart Medications  Readiness to change: Maintenance

## 2020-01-17 ENCOUNTER — CLINICAL SUPPORT (OUTPATIENT)
Dept: CARDIAC REHAB | Facility: HOSPITAL | Age: 50
End: 2020-01-17
Payer: COMMERCIAL

## 2020-01-17 DIAGNOSIS — I20.8 STABLE ANGINA (HCC): ICD-10-CM

## 2020-01-17 PROCEDURE — 93798 PHYS/QHP OP CAR RHAB W/ECG: CPT

## 2020-01-20 ENCOUNTER — CLINICAL SUPPORT (OUTPATIENT)
Dept: CARDIAC REHAB | Facility: HOSPITAL | Age: 50
End: 2020-01-20
Payer: COMMERCIAL

## 2020-01-20 DIAGNOSIS — I20.8 STABLE ANGINA (HCC): ICD-10-CM

## 2020-01-20 PROCEDURE — 93798 PHYS/QHP OP CAR RHAB W/ECG: CPT

## 2020-01-22 ENCOUNTER — CLINICAL SUPPORT (OUTPATIENT)
Dept: CARDIAC REHAB | Facility: HOSPITAL | Age: 50
End: 2020-01-22
Payer: COMMERCIAL

## 2020-01-22 DIAGNOSIS — I20.8 STABLE ANGINA (HCC): ICD-10-CM

## 2020-01-22 PROCEDURE — 93798 PHYS/QHP OP CAR RHAB W/ECG: CPT

## 2020-01-24 ENCOUNTER — CLINICAL SUPPORT (OUTPATIENT)
Dept: CARDIAC REHAB | Facility: HOSPITAL | Age: 50
End: 2020-01-24
Payer: COMMERCIAL

## 2020-01-24 DIAGNOSIS — I20.8 STABLE ANGINA (HCC): ICD-10-CM

## 2020-01-24 PROCEDURE — 93798 PHYS/QHP OP CAR RHAB W/ECG: CPT

## 2020-01-27 ENCOUNTER — CLINICAL SUPPORT (OUTPATIENT)
Dept: CARDIAC REHAB | Facility: HOSPITAL | Age: 50
End: 2020-01-27
Payer: COMMERCIAL

## 2020-01-27 DIAGNOSIS — I20.8 STABLE ANGINA (HCC): ICD-10-CM

## 2020-01-27 PROCEDURE — 93798 PHYS/QHP OP CAR RHAB W/ECG: CPT

## 2020-01-28 ENCOUNTER — TRANSCRIBE ORDERS (OUTPATIENT)
Dept: ADMINISTRATIVE | Facility: HOSPITAL | Age: 50
End: 2020-01-28

## 2020-01-28 DIAGNOSIS — I65.09 VERTEBRAL ARTERY STENOSIS, UNSPECIFIED LATERALITY: Primary | ICD-10-CM

## 2020-01-29 ENCOUNTER — APPOINTMENT (OUTPATIENT)
Dept: CARDIAC REHAB | Facility: HOSPITAL | Age: 50
End: 2020-01-29
Payer: COMMERCIAL

## 2020-01-29 ENCOUNTER — CLINICAL SUPPORT (OUTPATIENT)
Dept: CARDIAC REHAB | Facility: HOSPITAL | Age: 50
End: 2020-01-29
Payer: COMMERCIAL

## 2020-01-29 DIAGNOSIS — I20.8 STABLE ANGINA (HCC): ICD-10-CM

## 2020-01-29 PROCEDURE — 93798 PHYS/QHP OP CAR RHAB W/ECG: CPT

## 2020-01-31 ENCOUNTER — CLINICAL SUPPORT (OUTPATIENT)
Dept: CARDIAC REHAB | Facility: HOSPITAL | Age: 50
End: 2020-01-31
Payer: COMMERCIAL

## 2020-01-31 VITALS — BODY MASS INDEX: 26.52 KG/M2 | HEIGHT: 68 IN | WEIGHT: 175 LBS

## 2020-01-31 DIAGNOSIS — I20.8 STABLE ANGINA (HCC): ICD-10-CM

## 2020-01-31 PROCEDURE — 93798 PHYS/QHP OP CAR RHAB W/ECG: CPT

## 2020-01-31 NOTE — PROGRESS NOTES
Cardiac Rehabilitation Plan of Care   120 Day    Today's date: 2020   Visits:   Patient name: Marcin Sheth      : 1970  Age: 51 y o  MRN: [id number]  Referring Physician: Brian Ramos MD  Cardiologist: Dr Gilles Lockett  Provider: Ryne Fulton  Clinician: Alma Alas MS, CCRP    Dx: Stable Angina  MI with BEVERLY X 2    Date of onset: 2019      SUMMARY OF PROGRESS: At this date, patient has completed   He has correct hemodynamic responses to activity  Bernarda Johnson is able to work consistently at a 5 78 MET Level  Patient has not without angina in the past 4-6 weeks  His resting vitals were HR 84 and /56  Peak vitals were  and /68  His recovery vitals were HR 93 and /58  Patient rates his program a 4 on the 1-10 RPE Scale  Telemetry revealed Normal sinus rhythm with occasional PVCs    Patient recently quit smoking and patient continues to remain smoke free! 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[place] [address]  Patient's program will be progressed as he is able to tolerate  Patient will be educated specific to his disease process  Patient issued schedule of Cardiac Rehab topics  He has been attending the weekly education session  Patient states he has an elliptical at home which he plans on using, as well as a weight system  Additionally, the patient states he is planning on joining the local gym to continue his exercise program  Patient was given a Home Exercise Program upon discharge, and plans to use his current program at the gym  Medication compliance: Yes   Comments: Patient admits to medication compliance  Fall Risk: Low   Comments: Patient exhibits good dynamic standing balance and 5/5 BLE strength      EKG changes: NSR      EXERCISE ASSESSMENT and PLAN    Current Exercise Program in Rehab:       Frequency: 4-5 days/week        Minutes: 45-50       METS: 5 5-6 0     HR: 20-30 > RHR; 104-114   RPE: 4-5       Modalities: Treadmill, UBE, NuStep and Recumbent bike      Exercise Progression 30 Day Goals :    Frequency: 5-6 days/week   Minutes: 45-50   METS: 6 0-6 5   HR: 20-30 > RHR; 104-114   RPE: 4-6   Modalities: Treadmill, Airdyne bike, Lifecycle and NuStep    Strength trainin-3 days / week  12-15 repetitions  1-2 sets per modality    Modalities: Leg Press, Chest Press and Seated Row    Progressing: Action    Home Exercise: None    Goals: 10% improvement in functional capacity, Reduced Dyspnea with physical activity  0-10, improved DASI score by 10%, Increase in peak CR METs by 40%, Resume ADLs with increased strength, Return to work unrestricted and Exercise 5 days/wk, >150mins/wk  Education: Benefit of exercise for CAD risk factors, home exercise guidelines, signs and sxs, RPE scale, class: Risk Factors for Heart Disease and Exercise instructions/guidelines for discharge    Plan:education on home exercise guidelines, home exercise 30+ mins 2 days opposite CR and Education class: Risk Factors for Heart Disease  Readiness to change: Action      NUTRITION ASSESSMENT AND PLAN    Weight control:    Starting weight: 169   Current weight: Weight - Scale: 79 4 kg (175 lb) 165  Waist circumference:    Startin   Current:  32  Diabetes: N/A  Lipid management: Discussed diet and lipid management  Goals:LDL <100, HDL >40, TRG <150 and CHOL <200  Education: heart healthy eating  low sodium diet  hydration  diet and lipid management  class: Heart Healthy Eating  class:  Label Reading  Progressing: In Progress  Plan: Education class: Reading Food Labels, Education Class: Heart Healthy Eating, Increase PUFA and MUFA, Decrease SFA, Increase whole grains, increase fruits/vegs, eliminate processed meats, reduce red meat 1x/wk, swtich to low fat dairy and Reduce added sugars <25g/day  Readiness to change: Maintenance      PSYCHOSOCIAL ASSESSMENT AND PLAN    Emotional:  PHQ-9 = 5-9 = Mild Depression  Self-reported stress level: 1 improved from 3 one month ago  Social support: Excellent  Goals:  Reduce perceived stress to 1-3/10, improved [facility] QOL < 27, PHQ-9 - reduced severity by one level, continue medical therapy, Physical Fitness in [facility] Score < 3, Social Support in [facility] Score < 3, Daily Activity in [facility] Score < 3, Social Activities in [facility] Score < 3, Quality of Life in [facility] Score < 3 , Change in Health in [place] Score < 3 , Increased interest in doing things, improved sleep, improved positive thoughts of well being and increased energy  Education: signs/sxs of depression, benefits of positive support system, coping mechanisms, depression and CAD, class:  Stress and Your Health  and class:  Relaxation  Progressing: In Progress  Plan: Class: Stress and Your Health, Class: Relaxation and Practice relaxation techniques  Readiness to change: Maintenance      OTHER CORE COMPONENTS     Tobacco:   Social History     Tobacco Use   Smoking Status Not on file       Tobacco Use Intervention: Referral to tobacco expert:   Brief counseling by cardiac rehab professional  Date: 10/3/19  Patient recently quit smoking  Patient requires further education on smoking cessation  Victor Manuel Aguero has remained smoke free since beginning CR      Blood pressure:    Restin/56   Exercise: 136/68   Recovery: 120/58    Goals: consistent BP < 130/80, reduced dietary sodium <2300mg, consistent exercise >150 mins/wk, set a target quit date, make contact with a tobacco use prevention program and Abstain from smoking  Education:  understanding HTN and CAD, low sodium diet and HTN, Education class:  Common Heart Medications, Education class: Understanding Heart Disease, smoking and heart disease, tobacco triggers, setting a smoking cessation plan, relapse education and provide information on tobacco cessation treatment  Progressing: In Progress  Plan: Class: Understanding Heart Disease and Class: Common Heart Medications  Readiness to change: Maintenance    [facility] 1970      Risk: HIGH        Pre Post % change  Goal   Date: 10/3/2019      Physical           Sub Max ETT (mets) 5 8 7 5 29 3% 10% increase   6MWT (feet) NA NA #VALUE! 10% increase   UCSD Dyspnea Score NA NA #VALUE! 5 pt decrease   Supplemental O2 use (L) 0 0     [facility]KE AI (est  peak O2) 36 7 50 7 38 1%    COPD assessment Test (CAT) NA NA  2 pt decrease   Peak exercise CR/ HI (mets) 5 8 7 5 29 3% 40% increase   Emotional           PHQ 9  (> 5 refer to MD) 6 4 -33 3% 4 pt decrease   SHAHLA 7 (> 8 refer to MD) 0 0     [facility] lower score = improvement     Total  30 21 -30 0% < 27   Feelings 1 1 0 0% < 3   Physical fitness 5 1 -80 0% < 3   Social Support 5 5 100 0% < 3   Daily activities 3 1 -66 7% < 3   Social Activities 1 2 100 0% < 3   Pain 3 3 0 0% < 3   Overall Health 5 4 -20 0% < 3   Quality of Life 4 2 -50 0% < 3   Change in health 3 2 -33 3% < 3   Dietary           Rate your plate 59 65 55 9% > 58   Measurements           Weight 169 175 3 6% 2 5-5%    BMI 25 79 26 6 3 1% 19-25   Waist Circ  36 36 0 0% < 40 M / < 35 F    BP left arm     (systolic) 117 179 -4 5% < 140                              (diastolic) 54 58 5 6% < 90   Smoking #/day (if applicable) 0 0 #DIV/0! 0   Lipids/ Glucose (Date) 9/7/2019 9/7/2019 0     Total cholesterol 127 127 0 0%    Triglycerides 295 295 0 0% < 150   HDL 26 26 0 0% 40-60   LDL 42 42 0 0% < 100   A1C % 6 2 6 2 0 0% 4 0 - 5 6%   Fasting  131 0 0%           Physician signature: _______________________ _________________     Date:

## 2020-02-03 ENCOUNTER — HOSPITAL ENCOUNTER (OUTPATIENT)
Dept: MRI IMAGING | Facility: HOSPITAL | Age: 50
Discharge: HOME | End: 2020-02-03
Payer: COMMERCIAL

## 2020-02-03 DIAGNOSIS — I65.09 VERTEBRAL ARTERY STENOSIS, UNSPECIFIED LATERALITY: ICD-10-CM

## 2020-02-03 PROCEDURE — 70547 MR ANGIOGRAPHY NECK W/O DYE: CPT

## 2020-02-07 ENCOUNTER — TRANSCRIBE ORDERS (OUTPATIENT)
Dept: ADMINISTRATIVE | Facility: HOSPITAL | Age: 50
End: 2020-02-07

## 2020-02-07 DIAGNOSIS — I25.10 ATHEROSCLEROSIS OF NATIVE CORONARY ARTERY WITHOUT ANGINA PECTORIS, UNSPECIFIED WHETHER NATIVE OR TRANSPLANTED HEART: ICD-10-CM

## 2020-02-07 DIAGNOSIS — Z01.812 PRE-OPERATIVE LABORATORY EXAMINATION: Primary | ICD-10-CM

## 2020-02-07 DIAGNOSIS — M54.2 CERVICALGIA: ICD-10-CM

## 2020-02-11 ENCOUNTER — LAB (OUTPATIENT)
Dept: LAB | Facility: HOSPITAL | Age: 50
End: 2020-02-11
Payer: COMMERCIAL

## 2020-02-11 DIAGNOSIS — Z01.812 PRE-OPERATIVE LABORATORY EXAMINATION: ICD-10-CM

## 2020-02-11 LAB
BUN SERPL-MCNC: 15 MG/DL (ref 7–25)
CHOLEST SERPL-MCNC: 120 MG/DL (ref 0–200)
CREAT SERPL-MCNC: 0.89 MG/DL (ref 0.7–1.3)
GFR SERPL CREATININE-BSD FRML MDRD: 101 ML/MIN/1.73SQ M
HDLC SERPL-MCNC: 29 MG/DL
LDLC SERPL CALC-MCNC: 24 MG/DL (ref 0–100)
NONHDLC SERPL-MCNC: 91 MG/DL
TRIGL SERPL-MCNC: 333 MG/DL (ref 44–166)

## 2020-02-11 PROCEDURE — 84520 ASSAY OF UREA NITROGEN: CPT

## 2020-02-11 PROCEDURE — 36415 COLL VENOUS BLD VENIPUNCTURE: CPT

## 2020-02-11 PROCEDURE — 82565 ASSAY OF CREATININE: CPT

## 2020-02-11 PROCEDURE — 80061 LIPID PANEL: CPT | Performed by: INTERNAL MEDICINE

## 2020-02-12 ENCOUNTER — HOSPITAL ENCOUNTER (OUTPATIENT)
Dept: CT IMAGING | Facility: HOSPITAL | Age: 50
Discharge: HOME | End: 2020-02-12
Payer: COMMERCIAL

## 2020-02-12 DIAGNOSIS — M54.2 CERVICALGIA: ICD-10-CM

## 2020-02-12 DIAGNOSIS — I25.10 ATHEROSCLEROSIS OF NATIVE CORONARY ARTERY WITHOUT ANGINA PECTORIS, UNSPECIFIED WHETHER NATIVE OR TRANSPLANTED HEART: ICD-10-CM

## 2020-02-12 PROCEDURE — 70498 CT ANGIOGRAPHY NECK: CPT

## 2020-02-12 PROCEDURE — 70496 CT ANGIOGRAPHY HEAD: CPT

## 2020-02-12 RX ADMIN — IOHEXOL 85 ML: 350 INJECTION, SOLUTION INTRAVENOUS at 08:05

## 2020-02-24 ENCOUNTER — HOSPITAL ENCOUNTER (OUTPATIENT)
Facility: HOSPITAL | Age: 50
Setting detail: OBSERVATION
Discharge: HOME | End: 2020-02-25
Attending: EMERGENCY MEDICINE | Admitting: INTERNAL MEDICINE
Payer: COMMERCIAL

## 2020-02-24 ENCOUNTER — APPOINTMENT (EMERGENCY)
Dept: RADIOLOGY | Facility: HOSPITAL | Age: 50
End: 2020-02-24
Payer: COMMERCIAL

## 2020-02-24 DIAGNOSIS — R07.9 CHEST PAIN: Primary | ICD-10-CM

## 2020-02-24 DIAGNOSIS — I25.118 CORONARY ARTERY DISEASE OF NATIVE ARTERY OF NATIVE HEART WITH STABLE ANGINA PECTORIS (HCC): ICD-10-CM

## 2020-02-24 LAB
ALBUMIN SERPL BCP-MCNC: 4.2 G/DL (ref 3.5–5.7)
ALP SERPL-CCNC: 92 U/L (ref 40–150)
ALT SERPL W P-5'-P-CCNC: 26 U/L (ref 7–52)
ANION GAP SERPL CALCULATED.3IONS-SCNC: 8 MMOL/L (ref 4–13)
APTT PPP: 30 SECONDS (ref 23–37)
AST SERPL W P-5'-P-CCNC: 23 U/L (ref 13–39)
BASOPHILS # BLD AUTO: 0.1 THOUSANDS/ΜL (ref 0–0.1)
BASOPHILS NFR BLD AUTO: 1 % (ref 0–2)
BILIRUB SERPL-MCNC: 0.7 MG/DL (ref 0.2–1)
BUN SERPL-MCNC: 13 MG/DL (ref 7–25)
CALCIUM SERPL-MCNC: 9.1 MG/DL (ref 8.6–10.5)
CHLORIDE SERPL-SCNC: 103 MMOL/L (ref 98–107)
CO2 SERPL-SCNC: 28 MMOL/L (ref 21–31)
CREAT SERPL-MCNC: 1.01 MG/DL (ref 0.7–1.3)
EOSINOPHIL # BLD AUTO: 0.3 THOUSAND/ΜL (ref 0–0.61)
EOSINOPHIL NFR BLD AUTO: 3 % (ref 0–5)
ERYTHROCYTE [DISTWIDTH] IN BLOOD BY AUTOMATED COUNT: 13 % (ref 11.5–14.5)
GFR SERPL CREATININE-BSD FRML MDRD: 87 ML/MIN/1.73SQ M
GLUCOSE SERPL-MCNC: 119 MG/DL (ref 65–99)
HCT VFR BLD AUTO: 41.1 % (ref 42–47)
HGB BLD-MCNC: 14.1 G/DL (ref 14–18)
INR PPP: 0.97 (ref 0.9–1.5)
LYMPHOCYTES # BLD AUTO: 2.3 THOUSANDS/ΜL (ref 0.6–4.47)
LYMPHOCYTES NFR BLD AUTO: 25 % (ref 21–51)
MCH RBC QN AUTO: 31.6 PG (ref 26–34)
MCHC RBC AUTO-ENTMCNC: 34.3 G/DL (ref 31–37)
MCV RBC AUTO: 92 FL (ref 81–99)
MONOCYTES # BLD AUTO: 0.7 THOUSAND/ΜL (ref 0.17–1.22)
MONOCYTES NFR BLD AUTO: 8 % (ref 2–12)
NEUTROPHILS # BLD AUTO: 5.8 THOUSANDS/ΜL (ref 1.4–6.5)
NEUTS SEG NFR BLD AUTO: 63 % (ref 42–75)
PLATELET # BLD AUTO: 175 THOUSANDS/UL (ref 149–390)
PMV BLD AUTO: 7.7 FL (ref 8.6–11.7)
POTASSIUM SERPL-SCNC: 3.8 MMOL/L (ref 3.5–5.5)
PROT SERPL-MCNC: 6.8 G/DL (ref 6.4–8.9)
PROTHROMBIN TIME: 11.2 SECONDS (ref 10.2–13)
RBC # BLD AUTO: 4.47 MILLION/UL (ref 4.3–5.9)
SODIUM SERPL-SCNC: 139 MMOL/L (ref 134–143)
TROPONIN I SERPL-MCNC: <0.03 NG/ML
TROPONIN I SERPL-MCNC: <0.03 NG/ML
WBC # BLD AUTO: 9.2 THOUSAND/UL (ref 4.8–10.8)

## 2020-02-24 PROCEDURE — 85730 THROMBOPLASTIN TIME PARTIAL: CPT | Performed by: EMERGENCY MEDICINE

## 2020-02-24 PROCEDURE — 71046 X-RAY EXAM CHEST 2 VIEWS: CPT

## 2020-02-24 PROCEDURE — 84484 ASSAY OF TROPONIN QUANT: CPT | Performed by: INTERNAL MEDICINE

## 2020-02-24 PROCEDURE — 84484 ASSAY OF TROPONIN QUANT: CPT | Performed by: EMERGENCY MEDICINE

## 2020-02-24 PROCEDURE — 80053 COMPREHEN METABOLIC PANEL: CPT | Performed by: EMERGENCY MEDICINE

## 2020-02-24 PROCEDURE — 99220 PR INITIAL OBSERVATION CARE/DAY 70 MINUTES: CPT | Performed by: INTERNAL MEDICINE

## 2020-02-24 PROCEDURE — 85610 PROTHROMBIN TIME: CPT | Performed by: EMERGENCY MEDICINE

## 2020-02-24 PROCEDURE — 93005 ELECTROCARDIOGRAM TRACING: CPT

## 2020-02-24 PROCEDURE — 99285 EMERGENCY DEPT VISIT HI MDM: CPT

## 2020-02-24 PROCEDURE — 85025 COMPLETE CBC W/AUTO DIFF WBC: CPT | Performed by: EMERGENCY MEDICINE

## 2020-02-24 PROCEDURE — 99285 EMERGENCY DEPT VISIT HI MDM: CPT | Performed by: EMERGENCY MEDICINE

## 2020-02-24 PROCEDURE — 36415 COLL VENOUS BLD VENIPUNCTURE: CPT

## 2020-02-24 RX ORDER — PANTOPRAZOLE SODIUM 40 MG/1
40 TABLET, DELAYED RELEASE ORAL
Status: DISCONTINUED | OUTPATIENT
Start: 2020-02-25 | End: 2020-02-25 | Stop reason: HOSPADM

## 2020-02-24 RX ORDER — ATORVASTATIN CALCIUM 40 MG/1
80 TABLET, FILM COATED ORAL EVERY 24 HOURS
Status: DISCONTINUED | OUTPATIENT
Start: 2020-02-25 | End: 2020-02-25 | Stop reason: HOSPADM

## 2020-02-24 RX ORDER — NITROGLYCERIN 0.4 MG/1
0.4 TABLET SUBLINGUAL
Status: DISCONTINUED | OUTPATIENT
Start: 2020-02-24 | End: 2020-02-25 | Stop reason: HOSPADM

## 2020-02-24 RX ORDER — LOSARTAN POTASSIUM 50 MG/1
100 TABLET ORAL DAILY
Status: DISCONTINUED | OUTPATIENT
Start: 2020-02-25 | End: 2020-02-25 | Stop reason: HOSPADM

## 2020-02-24 RX ORDER — ASPIRIN 81 MG/1
81 TABLET, CHEWABLE ORAL DAILY
Status: DISCONTINUED | OUTPATIENT
Start: 2020-02-25 | End: 2020-02-25 | Stop reason: HOSPADM

## 2020-02-24 RX ORDER — VENLAFAXINE HYDROCHLORIDE 75 MG/1
75 CAPSULE, EXTENDED RELEASE ORAL DAILY
Status: DISCONTINUED | OUTPATIENT
Start: 2020-02-25 | End: 2020-02-25 | Stop reason: HOSPADM

## 2020-02-24 RX ORDER — ACETAMINOPHEN 325 MG/1
650 TABLET ORAL EVERY 4 HOURS PRN
Status: DISCONTINUED | OUTPATIENT
Start: 2020-02-24 | End: 2020-02-25 | Stop reason: HOSPADM

## 2020-02-24 RX ORDER — ISOSORBIDE MONONITRATE 30 MG/1
30 TABLET, EXTENDED RELEASE ORAL 2 TIMES DAILY
Status: CANCELLED | OUTPATIENT
Start: 2020-02-25

## 2020-02-24 RX ORDER — SODIUM CHLORIDE 9 MG/ML
125 INJECTION, SOLUTION INTRAVENOUS CONTINUOUS
Status: DISCONTINUED | OUTPATIENT
Start: 2020-02-24 | End: 2020-02-24

## 2020-02-24 RX ORDER — NITROGLYCERIN 0.4 MG/1
0.4 TABLET SUBLINGUAL ONCE
Status: COMPLETED | OUTPATIENT
Start: 2020-02-24 | End: 2020-02-24

## 2020-02-24 RX ADMIN — NITROGLYCERIN 0.4 MG: 0.4 TABLET SUBLINGUAL at 18:56

## 2020-02-24 RX ADMIN — SODIUM CHLORIDE 125 ML/HR: 0.9 INJECTION, SOLUTION INTRAVENOUS at 19:16

## 2020-02-24 RX ADMIN — TICAGRELOR 90 MG: 90 TABLET ORAL at 20:55

## 2020-02-24 RX ADMIN — NITROGLYCERIN 0.5 INCH: 20 OINTMENT TOPICAL at 20:55

## 2020-02-24 RX ADMIN — METOPROLOL TARTRATE 25 MG: 25 TABLET, FILM COATED ORAL at 20:54

## 2020-02-24 NOTE — ED PROVIDER NOTES
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 [employer]  pressure pain radiating to between shoulderblades  arrives to triage comparing today's pain to past four MIs   denies SOB  admits to diaphoresis     Patient presents with several hours of chest pain which has not resolved with 2 sublingual nitro as well as his daily isosorbide  Patient has had 4 MIs any tells me that for the MIs he did not have typical EKG changes  Patient has known significant lesions in his RCA and LAD  Patient is an ex-smoker with history of hypertension  There was a discussion with his cardiologist about getting CABG in the future  No fever no chills no cough no sputum no abdominal pain no urinary or bowel symptoms no leg pain or swelling  Prior to Admission Medications   Prescriptions Last Dose Informant Patient Reported?  Taking?   aspirin (ASPIRIN LOW DOSE) 81 MG tablet 2/24/2020 at Unknown time Self Yes Yes   Sig: Take by mouth   atorvastatin (LIPITOR) 80 mg tablet 2/24/2020 at Unknown time Self Yes Yes   Sig: Take 1 tablet by mouth every 24 hours   isosorbide mononitrate (IMDUR) 30 mg 24 hr tablet 2/24/2020 at Unknown time Self No Yes   Sig: Take 1 tablet (30 mg total) by mouth 2 (two) times a day   losartan (COZAAR) 100 MG tablet 2/24/2020 at Unknown time Self No Yes   Sig: Take 1 tablet (100 mg total) by mouth every 24 hours   metoprolol tartrate (LOPRESSOR) 25 mg tablet 2/24/2020 at Unknown time  No Yes   Sig: Take 1 tablet (25 mg total) by mouth every 12 (twelve) hours   nitroglycerin (NITROSTAT) 0 4 mg SL tablet 2/24/2020 at Unknown time Self Yes Yes   Sig: as directed   pantoprazole (PROTONIX) 40 mg tablet 2/24/2020 at Unknown time Self Yes Yes   Sig: Take 40 mg by mouth daily   ticagrelor (BRILINTA) 90 MG 2/24/2020 at Unknown time Self Yes Yes   Sig: Take 1 tablet by mouth Every 12 hours   venlafaxine (EFFEXOR-XR) 75 mg 24 hr capsule 2/24/2020 at Unknown time Self Yes Yes   Sig: Take 75 mg by mouth daily       Facility-Administered Medications: None       Past Medical History:   Diagnosis Date    GERD (gastroesophageal reflux disease)     History of transfusion     Hypertension     Myocardial infarct ([facility] [address] )     4 MIs as of 2/24/2020       Past Surgical History:   Procedure Laterality Date    CORONARY ANGIOPLASTY WITH STENT PLACEMENT         History reviewed  No pertinent family history  I have reviewed and agree with the history as documented  E-Cigarette/Vaping    E-Cigarette Use Never User      E-Cigarette/Vaping Substances     Social History     Tobacco Use    Smoking status: Never Smoker    Smokeless tobacco: Never Used   Substance Use Topics    Alcohol use: Yes     Frequency: 2-4 times a month    Drug use: Never       Review of Systems   Constitutional: Negative for fever  HENT: Negative for rhinorrhea  Eyes: Negative for visual disturbance  Respiratory: Positive for chest tightness  Negative for shortness of breath  Cardiovascular: Negative for chest pain  Gastrointestinal: Negative for abdominal pain, diarrhea and vomiting  Endocrine: Negative for polydipsia  Genitourinary: Negative for dysuria, frequency and hematuria  Musculoskeletal: Negative for neck stiffness  Skin: Negative for rash  Allergic/Immunologic: Negative for immunocompromised state  Neurological: Negative for speech difficulty, weakness and numbness  Physical Exam  Physical Exam   Constitutional: He is oriented to person, place, and time  He appears well-nourished  No distress  HENT:   Head: Normocephalic and atraumatic  Eyes: Conjunctivae and EOM are normal    Neck: Normal range of motion  Neck supple  Cardiovascular: Regular rhythm and normal heart sounds  Pulmonary/Chest: Effort normal and breath sounds normal    Abdominal: Soft  Bowel sounds are normal  He exhibits no mass  There is no tenderness  There is no guarding  Musculoskeletal: He exhibits no edema  Right lower leg: He exhibits no tenderness and no edema  Left lower leg: He exhibits no tenderness and no edema  Neurological: He is alert and oriented to person, place, and time  Skin: Skin is warm and dry  Psychiatric: He has a normal mood and affect         Vital Signs  ED Triage Vitals [02/24/20 1821]   Temperature Pulse Respirations Blood Pressure SpO2   99 7 °F (37 6 °C) 97 18 (!) 169/101 99 %      Temp Source Heart Rate Source Patient Position - Orthostatic VS BP Location FiO2 (%)   Temporal Monitor -- -- --      Pain Score       No Pain           Vitals:    02/24/20 1821 02/24/20 1831 02/24/20 1845   BP: (!) 169/101 165/98 143/89   Pulse: 97 89 84         Visual Acuity      ED Medications  Medications   sodium chloride 0 9 % infusion (has no administration in time range)   nitroglycerin (NITROSTAT) SL tablet 0 4 mg (0 4 mg Sublingual Given 2/24/20 1856)       Diagnostic Studies  Results Reviewed     Procedure Component Value Units Date/Time    Protime-INR [[id number]]  (Normal) Collected:  02/24/20 1853    Lab Status:  Final result Specimen:  Blood from Arm, Right Updated:  02/24/20 1904     Protime 11 2 seconds      INR 0 97    APTT [[id number]]  (Normal) Collected:  02/24/20 1853    Lab Status:  Final result Specimen:  Blood from Arm, Right Updated:  02/24/20 1904     PTT 30 seconds     Troponin I [[id number]]  (Normal) Collected:  02/24/20 1822    Lab Status:  Final result Specimen:  Blood from Arm, Right Updated:  02/24/20 1844     Troponin I <0 03 ng/mL     Comprehensive metabolic panel [[id number]]  (Abnormal) Collected:  02/24/20 1822    Lab Status:  Final result Specimen:  Blood from Arm, Right Updated:  02/24/20 1844     Sodium 139 mmol/L      Potassium 3 8 mmol/L      Chloride 103 mmol/L      CO2 28 mmol/L      ANION GAP 8 mmol/L      BUN 13 mg/dL      Creatinine 1 01 mg/dL      Glucose 119 mg/dL      Calcium 9 1 mg/dL      AST 23 U/L      ALT 26 U/L      Alkaline Phosphatase 92 U/L      Total Protein 6 8 g/dL      Albumin 4 2 g/dL      Total Bilirubin 0 70 mg/dL      eGFR 87 ml/min/1 73sq m     Narrative:       National Kidney Disease Foundation guidelines for Chronic Kidney Disease (CKD):     Stage 1 with normal or high GFR (GFR > 90 mL/min/1 73 square meters)    Stage 2 Mild CKD (GFR = 60-89 mL/min/1 73 square meters)    Stage 3A Moderate CKD (GFR = 45-59 mL/min/1 73 square meters)    Stage 3B Moderate CKD (GFR = 30-44 mL/min/1 73 square meters)    Stage 4 Severe CKD (GFR = 15-29 mL/min/1 73 square meters)    Stage 5 End Stage CKD (GFR <15 mL/min/1 73 square meters)  Note: GFR calculation is accurate only with a steady state creatinine    CBC and differential [[id number]]  (Abnormal) Collected:  02/24/20 1822    Lab Status:  Final result Specimen:  Blood from Arm, Right Updated:  02/24/20 1827     WBC 9 20 Thousand/uL      RBC 4 47 Million/uL      Hemoglobin 14 1 g/dL      Hematocrit 41 1 %      MCV 92 fL      MCH 31 6 pg      MCHC 34 3 g/dL      RDW 13 0 %      MPV 7 7 fL      Platelets 534 Thousands/uL      Neutrophils Relative 63 %      Lymphocytes Relative 25 %      Monocytes Relative 8 %      Eosinophils Relative 3 %      Basophils Relative 1 %      Neutrophils Absolute 5 80 Thousands/µL      Lymphocytes Absolute 2 30 Thousands/µL      Monocytes Absolute 0 70 Thousand/µL      Eosinophils Absolute 0 30 Thousand/µL      Basophils Absolute 0 10 Thousands/µL                  XR chest 2 views    (Results Pending)              Procedures  Procedures         ED Course  ED Course as of Feb 24 1905   Mon Feb 24, 2020   1845 EKG: Sinus rhythm, rate within normal limits, QRS axils rightward, no acute ischemic changes, some wandering baseline obscuring v3                                     MDM  Number of Diagnoses or Management Options  Chest pain:   Diagnosis management comments: EKG does not show acute ischemia and 1st troponin is negative however given the patient's prior history he will obviously need to be admitted to the hospital for serial EKGs and troponins        Disposition  Final diagnoses:   Chest pain     Time reflects when diagnosis was documented in both MDM as applicable and the Disposition within this note     Time User Action Codes Description Comment    2/24/2020  7:04 PM Armida Macario Add [R07 9] Chest pain       ED Disposition     ED Disposition Condition Date/Time Comment    Admit Stable Mon Feb 24, 2020  7:04 PM Case was discussed with macie and the patient's admission status was agreed to be Admission Status: observation status to the service of Dr Michel Cooper   Follow-up Information    None         Patient's Medications   Discharge Prescriptions    No medications on file     No discharge procedures on file      PDMP Review     None          ED Provider  Electronically Signed by           Sonali Newell MD  02/24/20 7082

## 2020-02-25 VITALS
SYSTOLIC BLOOD PRESSURE: 105 MMHG | DIASTOLIC BLOOD PRESSURE: 55 MMHG | WEIGHT: 179.01 LBS | HEART RATE: 61 BPM | RESPIRATION RATE: 12 BRPM | TEMPERATURE: 97 F | OXYGEN SATURATION: 98 % | HEIGHT: 68 IN | BODY MASS INDEX: 27.13 KG/M2

## 2020-02-25 LAB
ALBUMIN SERPL BCP-MCNC: 3.8 G/DL (ref 3.5–5.7)
ALP SERPL-CCNC: 87 U/L (ref 40–150)
ALT SERPL W P-5'-P-CCNC: 21 U/L (ref 7–52)
ANION GAP SERPL CALCULATED.3IONS-SCNC: 6 MMOL/L (ref 4–13)
AST SERPL W P-5'-P-CCNC: 18 U/L (ref 13–39)
ATRIAL RATE: 75 BPM
ATRIAL RATE: 97 BPM
BASOPHILS # BLD AUTO: 0 THOUSANDS/ΜL (ref 0–0.1)
BASOPHILS NFR BLD AUTO: 1 % (ref 0–2)
BILIRUB SERPL-MCNC: 0.9 MG/DL (ref 0.2–1)
BUN SERPL-MCNC: 13 MG/DL (ref 7–25)
CALCIUM SERPL-MCNC: 8.7 MG/DL (ref 8.6–10.5)
CHLORIDE SERPL-SCNC: 108 MMOL/L (ref 98–107)
CO2 SERPL-SCNC: 27 MMOL/L (ref 21–31)
CREAT SERPL-MCNC: 0.85 MG/DL (ref 0.7–1.3)
EOSINOPHIL # BLD AUTO: 0.3 THOUSAND/ΜL (ref 0–0.61)
EOSINOPHIL NFR BLD AUTO: 4 % (ref 0–5)
ERYTHROCYTE [DISTWIDTH] IN BLOOD BY AUTOMATED COUNT: 13.2 % (ref 11.5–14.5)
GFR SERPL CREATININE-BSD FRML MDRD: 102 ML/MIN/1.73SQ M
GLUCOSE P FAST SERPL-MCNC: 117 MG/DL (ref 65–99)
GLUCOSE SERPL-MCNC: 117 MG/DL (ref 65–99)
HCT VFR BLD AUTO: 40.5 % (ref 42–47)
HGB BLD-MCNC: 13.6 G/DL (ref 14–18)
INR PPP: 0.93 (ref 0.9–1.5)
LYMPHOCYTES # BLD AUTO: 1.5 THOUSANDS/ΜL (ref 0.6–4.47)
LYMPHOCYTES NFR BLD AUTO: 21 % (ref 21–51)
MCH RBC QN AUTO: 31.1 PG (ref 26–34)
MCHC RBC AUTO-ENTMCNC: 33.7 G/DL (ref 31–37)
MCV RBC AUTO: 92 FL (ref 81–99)
MONOCYTES # BLD AUTO: 0.6 THOUSAND/ΜL (ref 0.17–1.22)
MONOCYTES NFR BLD AUTO: 9 % (ref 2–12)
NEUTROPHILS # BLD AUTO: 4.6 THOUSANDS/ΜL (ref 1.4–6.5)
NEUTS SEG NFR BLD AUTO: 65 % (ref 42–75)
P AXIS: 57 DEGREES
P AXIS: 58 DEGREES
PLATELET # BLD AUTO: 155 THOUSANDS/UL (ref 149–390)
PMV BLD AUTO: 8 FL (ref 8.6–11.7)
POTASSIUM SERPL-SCNC: 4 MMOL/L (ref 3.5–5.5)
PR INTERVAL: 150 MS
PR INTERVAL: 158 MS
PROT SERPL-MCNC: 6.1 G/DL (ref 6.4–8.9)
PROTHROMBIN TIME: 10.8 SECONDS (ref 10.2–13)
QRS AXIS: 106 DEGREES
QRS AXIS: 106 DEGREES
QRSD INTERVAL: 96 MS
QRSD INTERVAL: 96 MS
QT INTERVAL: 338 MS
QT INTERVAL: 390 MS
QTC INTERVAL: 429 MS
QTC INTERVAL: 435 MS
RBC # BLD AUTO: 4.38 MILLION/UL (ref 4.3–5.9)
SODIUM SERPL-SCNC: 141 MMOL/L (ref 134–143)
T WAVE AXIS: 70 DEGREES
T WAVE AXIS: 81 DEGREES
TROPONIN I SERPL-MCNC: <0.03 NG/ML
VENTRICULAR RATE: 75 BPM
VENTRICULAR RATE: 97 BPM
WBC # BLD AUTO: 7.1 THOUSAND/UL (ref 4.8–10.8)

## 2020-02-25 PROCEDURE — 93005 ELECTROCARDIOGRAM TRACING: CPT

## 2020-02-25 PROCEDURE — 93010 ELECTROCARDIOGRAM REPORT: CPT | Performed by: INTERNAL MEDICINE

## 2020-02-25 PROCEDURE — 97530 THERAPEUTIC ACTIVITIES: CPT

## 2020-02-25 PROCEDURE — 97166 OT EVAL MOD COMPLEX 45 MIN: CPT

## 2020-02-25 PROCEDURE — 99217 PR OBSERVATION CARE DISCHARGE MANAGEMENT: CPT | Performed by: HOSPITALIST

## 2020-02-25 PROCEDURE — 80053 COMPREHEN METABOLIC PANEL: CPT | Performed by: PHYSICIAN ASSISTANT

## 2020-02-25 PROCEDURE — 84484 ASSAY OF TROPONIN QUANT: CPT | Performed by: PHYSICIAN ASSISTANT

## 2020-02-25 PROCEDURE — 99244 OFF/OP CNSLTJ NEW/EST MOD 40: CPT | Performed by: INTERNAL MEDICINE

## 2020-02-25 PROCEDURE — 90686 IIV4 VACC NO PRSV 0.5 ML IM: CPT | Performed by: PHYSICIAN ASSISTANT

## 2020-02-25 PROCEDURE — 90471 IMMUNIZATION ADMIN: CPT | Performed by: PHYSICIAN ASSISTANT

## 2020-02-25 PROCEDURE — 85025 COMPLETE CBC W/AUTO DIFF WBC: CPT | Performed by: PHYSICIAN ASSISTANT

## 2020-02-25 PROCEDURE — 85610 PROTHROMBIN TIME: CPT | Performed by: PHYSICIAN ASSISTANT

## 2020-02-25 PROCEDURE — 97163 PT EVAL HIGH COMPLEX 45 MIN: CPT

## 2020-02-25 RX ADMIN — PANTOPRAZOLE SODIUM 40 MG: 40 TABLET, DELAYED RELEASE ORAL at 05:48

## 2020-02-25 RX ADMIN — INFLUENZA VIRUS VACCINE 0.5 ML: 15; 15; 15; 15 SUSPENSION INTRAMUSCULAR at 01:12

## 2020-02-25 RX ADMIN — METOPROLOL TARTRATE 25 MG: 25 TABLET, FILM COATED ORAL at 09:13

## 2020-02-25 RX ADMIN — ENOXAPARIN SODIUM 40 MG: 40 INJECTION SUBCUTANEOUS at 09:13

## 2020-02-25 RX ADMIN — ASPIRIN 81 MG 81 MG: 81 TABLET ORAL at 09:13

## 2020-02-25 RX ADMIN — TICAGRELOR 90 MG: 90 TABLET ORAL at 09:13

## 2020-02-25 RX ADMIN — ACETAMINOPHEN 650 MG: 325 TABLET ORAL at 01:10

## 2020-02-25 RX ADMIN — VENLAFAXINE HYDROCHLORIDE 75 MG: 75 CAPSULE, EXTENDED RELEASE ORAL at 09:12

## 2020-02-25 RX ADMIN — LOSARTAN POTASSIUM 100 MG: 50 TABLET, FILM COATED ORAL at 09:12

## 2020-02-25 NOTE — SOCIAL WORK
Evaluated the pt at the bedside  Pt was admitted to the hospital with chest pain  Explained the role of CM and the options of discharge planning with the pt  Pt states he lives with his SO in a 2 story home  Pt has 15 NAI from the basement  Bedroom and bathroom are on the first floor  Pt states he is independent and works  Pt gets his medications from [facility]  Pt would get any meds needed right away from [address] [facility]  Pt is awaiting a cardiology consult  Pt SO will transport him home upon discharge  Patient/caregiver received discharge checklist   Content reviewed  Patient/caregiver encouraged to participate in discharge plan of care prior to discharge home  CM reviewed d/c planning process including the following: identifying help at home, patient preference for d/c planning needs, availability of treatment team to discuss questions or concerns patient and/or family may have regarding understanding medications and recognizing signs and symptoms once discharged  CM also encouraged patient to follow up with all recommended appointments after discharge  Patient advised of importance for patient and family to participate in managing patients medical well being

## 2020-02-25 NOTE — PLAN OF CARE
Pt verbalizes pain and rates same on 1-10 scale  At present is pain free  No s/s of infection  Denies nausea and sob  Denies dizziness or lightheadedness    Remains free of falls

## 2020-02-25 NOTE — OCCUPATIONAL THERAPY NOTE
Occupational Therapy Evaluation      Fredy David    2/25/2020    Principal Problem:    Chest pain  Active Problems:    Coronary artery disease of native artery of native heart with stable angina pectoris [facility])    Essential hypertension    Mixed hyperlipidemia      Past Medical History:   Diagnosis Date    Arthritis     Coronary artery disease     MI x 4; cardiac cath with 4 stents    GERD (gastroesophageal reflux disease)     Hypertension     Myocardial infarct ([facility] [address] )     4 MIs as of 2/24/2020       Past Surgical History:   Procedure Laterality Date    CORONARY ANGIOPLASTY WITH STENT PLACEMENT          02/25/20 0827   Note Type   Note type Eval only   Restrictions/Precautions   Weight Bearing Precautions Per Order No   Pain Assessment   Pain Assessment 0-10   Pain Score No Pain   Home Living   Type of [address] One level;Performs ADLs on one level; Able to live on main level with bedroom/bathroom   [address]   (N/A)   Additional Comments ambulatory w/o devices   Prior Function   Level of [place] Independent with ADLs and functional mobility   Lives With Significant other   ADL Assistance Independent   IADLs Independent   Falls in the last 6 months 0   Psychosocial   Psychosocial (WDL) WDL   ADL   Eating Assistance 7  Independent   Grooming Assistance 7  Independent   UB Bathing Assistance 7  Independent   LB Bathing Assistance 7  Independent   UB Dressing Assistance 7  Independent   LB Dressing Assistance 7  Independent   LB Dressing Deficit   (denies dizziness/lightheadedness when )   Toileting Assistance  7  Independent   Bed Mobility   Rolling R 7  Independent   Rolling L 7  Independent   Supine to Sit 7  Independent   Transfers   Sit to Stand 5  Supervision   Additional items Bedrails   Stand to Sit 5  Supervision   Additional items Armrests   Functional Mobility   Functional Mobility 5  Supervision   Additional items   (no devices)   Balance   Static Sitting Normal   Dynamic Sitting Normal   Static Standing Normal   Dynamic Standing Good   Ambulatory Good   Activity Tolerance   Activity Tolerance Patient tolerated treatment well   Nurse Made Aware yes   RUE Assessment   RUE Assessment WFL   LUE Assessment   LUE Assessment WFL   Hand Function   Gross Motor Coordination Functional   Fine Motor Coordination Functional   Cognition   Overall Cognitive Status WFL   Arousal/Participation Alert; Cooperative   Attention Within functional limits   Orientation Level Oriented X4   Memory Within functional limits   Following Commands Follows all commands and directions without difficulty   Assessment   Limitation   (at baseline)   Prognosis Good   Assessment Pt is a 53 y o  male seen for OT evaluation s/p admit to [facility] on 2/24/2020 w/ Chest pain  Comorbidities affecting pt's functional performance at time of assessment include: HTN and CAD  Personal factors affecting pt at time of IE include:steps to enter environment  Prior to admission, pt was I with all self care ADL's, IADL's, mobility w/o devices  Upon evaluation: Pt is essentially at baseline with self care and mobility  Active OT is not indicated at this time From OT standpoint, recommendation at time of d/c would be home  Independently      Goals   Patient Goals to go back home   Plan   Treatment Interventions   (eval only)   Goal Expiration Date 02/25/20   OT Treatment Day 0   OT Frequency Eval only   Recommendation   OT Discharge Recommendation Home independent   OT - OK to Discharge Yes   Barthel Index   Feeding 10   Bathing 5   Grooming Score 5   Dressing Score 10   Bladder Score 10   Bowels Score 10   Toilet Use Score 10   Transfers (Bed/Chair) Score 15   Mobility (Level Surface) Score 15   Stairs Score 0  (DNT)   Barthel Index Score 90   Clau Noriega

## 2020-02-25 NOTE — PLAN OF CARE
Problem: PHYSICAL THERAPY ADULT  Goal: Performs mobility at highest level of function for planned discharge setting  See evaluation for individualized goals  Description  Treatment/Interventions: LE strengthening/ROM, Therapeutic exercise, Endurance training, Gait training, Spoke to nursing          See flowsheet documentation for full assessment, interventions and recommendations  Note:   Prognosis: Good  Problem List: Decreased strength, Decreased endurance, Impaired balance, Decreased mobility, Decreased safety awareness  Assessment: Pt is 53 y o  male seen for PT evaluation s/p admit to [facility] ICU on 2/24/2020 w/ Chest pain  PT consulted to assess pt's functional mobility and d/c needs  Order placed for PT eval and tx, w/ eval & treat order  Comorbidities affecting pt's physical performance at time of assessment include: weakness, chest pain,HTN,CAD,PVC  PTA, pt was independent w/ all functional mobility w/ o AD and lives on a one story house   Personal factors affecting pt at time of IE include: inability to navigate community distances, unable to perform dynamic tasks in community and inability to perform IADLs  Please find objective findings from PT assessment regarding body systems outlined above with impairments and limitations including weakness, impaired balance, decreased endurance, gait deviations, decreased safety awareness and fall risk  The following objective measures performed on IE also reveal limitations: Barthel Index: 80/100  Pt's clinical presentation is currently unstable/unpredictable seen in pt's presentation of progressing symptoms prior to hospitalization,abnormal lab values  Pt to benefit from continued PT tx to address deficits as defined above and maximize level of functional independent mobility and consistency  From PT/mobility standpoint, recommendation at time of d/c would be anticipate no needs pending progress in order to facilitate return to OF  Recommendation: Home independently     PT - OK to Discharge: No    See flowsheet documentation for full assessment

## 2020-02-25 NOTE — NURSING NOTE
Assessment as charted  No chestpain, lightheadness, dizziness or sob  Breath sounds clear; no noted coughing  Pulse ox upper 90's/RA  Tolerated ambulating in unit; no S/S of any distress  Will DC home mid morning  Callbell in reach; safety maintained

## 2020-02-25 NOTE — PLAN OF CARE
Problem: DISCHARGE PLANNING - CARE MANAGEMENT  Goal: Discharge to post-acute care or home with appropriate resources  Description  INTERVENTIONS:  - Conduct assessment to determine patient/family and health care team treatment goals, and need for post-acute services based on payer coverage, community resources, and patient preferences, and barriers to discharge  - Address psychosocial, clinical, and financial barriers to discharge as identified in assessment in conjunction with the patient/family and health care team  - Arrange appropriate level of post-acute services according to patient's   needs and preference and payer coverage in collaboration with the physician and health care team  - Communicate with and update the patient/family, physician, and health care team regarding progress on the discharge plan  - Arrange appropriate transportation to post-acute venues  Discharge home with SO who will transport  Pt is independent and will have no needs     Outcome: Progressing

## 2020-02-25 NOTE — ASSESSMENT & PLAN NOTE
· History of multiple MIs in the past with multiple stents  · Continue aspirin, Brilinta, statin, beta-blocker, nitrates  · Being worked up for CABG as an outpatient  · Anatomy as follows:  mild LAD atherosclerosis, 30% pLAD, 75% D2, 50% OM1 with occluded subbranch, 0% pR1 at site of prior stent, 70% mRCA at site of prior stent with eccentric lesion 70% PLV1

## 2020-02-25 NOTE — ASSESSMENT & PLAN NOTE
· History of multiple MIs in the past with multiple stents  · Continue aspirin, Brilinta, statin, beta-blocker, nitrates  · Being worked up for CABG as an outpatient  · Anatomy as follows:  mild LAD atherosclerosis, 30% pLAD, 75% D2, 50% OM1 with occluded subbranch, 0% pR1 at site of prior stent, 70% mRCA at site of prior stent with eccentric lesion 70% PLV1  Okay for discharge-see the outline above

## 2020-02-25 NOTE — NURSING NOTE
Received into ICU bed #5 via bed from [facility] accompanied by 1 RN and pt's wife  Placed on ICU monitors and made comf   Denies sob and nausea  States of a #1 out of 10 mid chest pain through to his back    Nasal cannula O2 conts at 2 lpm

## 2020-02-25 NOTE — DISCHARGE INSTR - OTHER ORDERS
Report difficulty breathing, increased sob, lightheadedness, dizziness, nausea and/or vomiting, headaches, increased fatigue not going away with rest and chest discomfort

## 2020-02-25 NOTE — CONSULTS
Consult- Jay Cuellar 1970, 53 y o  male MRN: [id number]    Unit/Bed#: ICU 05 Encounter: [id number]    Primary Care Provider: Nakita Carey DO   Date and time admitted to hospital: 2/24/2020  6:17 PM      Inpatient consult to Cardiology  Consult performed by: Hien Bonilla MD  Consult ordered by: Carmela Rincon PA-C          Coronary artery disease of native artery of native heart with stable angina pectoris [facility])  Assessment & Plan  Likely CAD related pain  Given last cath, continued medical rx still preferred  Will ambulate  Mixed hyperlipidemia  Assessment & Plan  Tolerating high-intensity statin therapy  Essential hypertension  Assessment & Plan  Well controlled  Other summary comments:   Given negative troponin, absence of EKG changes, and currently being symptom-free , I would be in favor of ambulation and if still asymptomatic go home with continued outpatient follow-up  He sees my colleague Dr Dee Sharma in cardiology  HPI: Jay Cuellar is a 53y o  year old male with a known history of significant premature CAD  He states he has had 4 heart attacks and stents and that all of his symptoms are characterized by a sharp feeling of discomfort in the center of the chest radiating to the back  His most recent heart catheterization did not reveal surgical disease and he has been managed medically since then  He describes having an episode of chest pain approximately once per month  Yesterday afternoon while doing woodworking in his home he experienced chest pain similar to prior  Nitro was not helpful and he came to the emergency room  Total duration of symptoms was approximately 6 hours  Symptoms finally resolved after nitro paste  Troponins have been negative  This morning he feels well  EKG does not reveal any changes and troponins are negative  EKG:   Sinus rhythm  Leftward axis  Nonspecific ST segment changes      MOST  RECENT CARDIAC IMAGING:   Cardiac catheterization 2019:LAD: Angiography showed mild atherosclerosis  Proximal LAD: There was a 30 % stenosis  2nd diagonal: There was a 75 % stenosis  Circumflex: Angiography showed mild atherosclerosis  1st obtuse marginal: There was a diffuse 50 % stenosis  Small subbranch occluded  Proximal ramus intermedius: There was a 0 % stenosis at the site of a prior stent  Mid RCA: There was a diffuse 70 % stenosis at the site of a prior stent  The lesion was eccentric  1st posterolateral segment: There was a 70 % stenosis  Echocardiogram 2019:  Normal left ventricular systolic function  Review of Systems: a 10 point review of systems was conducted and is negative except for as mentioned in the HPI or as below  Historical Information   Past Medical History:   Diagnosis Date    Arthritis     Coronary artery disease     MI x 4; cardiac cath with 4 stents    GERD (gastroesophageal reflux disease)     Hypertension     Myocardial infarct ([facility] [address] )     4 MIs as of 2020     Past Surgical History:   Procedure Laterality Date    CORONARY ANGIOPLASTY WITH STENT PLACEMENT       Social History     Substance and Sexual Activity   Alcohol Use Never    Frequency: Never     Social History     Substance and Sexual Activity   Drug Use Never     Social History     Tobacco Use   Smoking Status Former Smoker    Packs/day: 1 00    Years: 10 00    Pack years: 10 00    Types: Cigarettes    Start date: 1998   Ming Lopez Last attempt to quit: 2018    Years since quittin 2   Smokeless Tobacco Never Used       Family History:   Father had early CAD      Meds/Allergies   all current active meds have been reviewed  Medications Prior to Admission   Medication    aspirin (ASPIRIN LOW DOSE) 81 MG tablet    atorvastatin (LIPITOR) 80 mg tablet    isosorbide mononitrate (IMDUR) 30 mg 24 hr tablet    losartan (COZAAR) 100 MG tablet    metoprolol tartrate (LOPRESSOR) 25 mg tablet    nitroglycerin (NITROSTAT) 0 4 mg SL tablet    pantoprazole (PROTONIX) 40 mg tablet    ticagrelor (BRILINTA) 90 MG    venlafaxine (EFFEXOR-XR) 75 mg 24 hr capsule       No Known Allergies    Objective   Vitals: Blood pressure 105/55, pulse 61, temperature (!) 97 °F (36 1 °C), temperature source Tympanic, resp  rate 12, height 5' 8" (1 727 m), weight 81 2 kg (179 lb 0 2 oz), SpO2 98 %  , Body mass index is 27 22 kg/m² ,   Orthostatic Blood Pressures      Most Recent Value   Blood Pressure  105/55 filed at 02/25/2020 0800   Patient Position - Orthostatic VS  Lying filed at 02/25/2020 8622          Systolic (76NMO), ELMA:329 , Min:105 , YDI:838     Diastolic (53OHB), EAA:28, Min:54, Max:101              Physical Exam:    General:  Normal appearance in no distress  Eyes:  Anicteric  Oral mucosa:  Moist   Neck:  No JVD  Carotid upstrokes are brisk without bruits  No masses  Chest:  Clear to auscultation and percussion  Cardiac:  Normal PMI  Normal S1 and S2  No murmur gallop or rub  Abdomen:  Soft and nontender  No palpable organomegaly or aortic enlargement  Extremities:  No peripheral edema  Musculoskeletal:  Symmetric  Vascular:  Femoral pulses are brisk without bruits  Popliteal  pulses are intact bilaterally  Pedal pulses are intact  Neuro:  Grossly symmetric  Psych:  Alert and oriented x3          Lab Results:     Troponins:   Results from last 7 days   Lab Units 02/25/20  0107 02/24/20  2206 02/24/20  1822   TROPONIN I ng/mL <0 03 <0 03 <0 03     BNP:       CBC :   Results from last 7 days   Lab Units 02/25/20  0555 02/24/20  1822   WBC Thousand/uL 7 10 9 20   HEMOGLOBIN g/dL 13 6* 14 1   HEMATOCRIT % 40 5* 41 1*   MCV fL 92 92   PLATELETS Thousands/uL 155 175     TSH:     CMP:   Results from last 7 days   Lab Units 02/25/20  0555 02/24/20  1822   POTASSIUM mmol/L 4 0 3 8   CHLORIDE mmol/L 108* 103   CO2 mmol/L 27 28   BUN mg/dL 13 13   CREATININE mg/dL 0 85 1 01   AST U/L 18 23   ALT U/L 21 26   EGFR ml/min/1 73sq m 102 87     Lipid Profile:     Coags:   Results from last 7 days   Lab Units 02/25/20  0555 02/24/20  1853   INR  0 93 0 97

## 2020-02-25 NOTE — OCCUPATIONAL THERAPY NOTE
Occupational Therapy Evaluation      Jaci Bina    2/25/2020    Principal Problem:    Chest pain  Active Problems:    Coronary artery disease of native artery of native heart with stable angina pectoris (HCC)    Essential hypertension    Mixed hyperlipidemia      Past Medical History:   Diagnosis Date    Arthritis     Coronary artery disease     MI x 4; cardiac cath with 4 stents    GERD (gastroesophageal reflux disease)     Hypertension     Myocardial infarct ([facility] [address] )     4 MIs as of 2/24/2020       Past Surgical History:   Procedure Laterality Date    CORONARY ANGIOPLASTY WITH STENT PLACEMENT          02/25/20 1864   Note Type   Note type Eval only   Restrictions/Precautions   Weight Bearing Precautions Per Order No   Pain Assessment   Pain Assessment 0-10   Pain Score No Pain   Home Living   Type of Home House   Prior Function   Level of [place] Independent with ADLs and functional mobility   Lives With Significant other   ADL Assistance Independent   IADLs Independent   Falls in the last 6 months 0   Psychosocial   Psychosocial (WDL) WDL   ADL   Eating Assistance 7  Independent   Grooming Assistance 7  Independent   UB Bathing Assistance 7  Independent   LB Bathing Assistance 7  Independent   UB Dressing Assistance 7  Independent   LB Dressing Assistance 7  Independent   LB Dressing Deficit   (denies dizziness/lightheadedness when )   Toileting Assistance  7  Independent   Bed Mobility   Rolling R 7  Independent   Rolling L 7  Independent   Supine to Sit 7  Independent   Transfers   Sit to Stand 5  Supervision   Additional items Bedrails   Stand to Sit 5  Supervision   Additional items Armrests   Functional Mobility   Functional Mobility 5  Supervision   Additional items   (no devices)   Balance   Static Sitting Normal   Dynamic Sitting Normal   Static Standing Normal   Dynamic Standing Good   Ambulatory Good   Activity Tolerance   Activity Tolerance Patient tolerated treatment well   Nurse Made Aware yes   RUE Assessment   RUE Assessment WFL   LUE Assessment   LUE Assessment WFL   Hand Function   Gross Motor Coordination Functional   Fine Motor Coordination Functional   Cognition   Overall Cognitive Status WFL   Arousal/Participation Alert; Cooperative   Attention Within functional limits   Orientation Level Oriented X4   Memory Within functional limits   Following Commands Follows all commands and directions without difficulty   Assessment   Limitation   (at baseline)   Prognosis Good   Assessment Pt is a 53 y o  male seen for OT evaluation s/p admit to [facility] on 2/24/2020 w/ Chest pain  Comorbidities affecting pt's functional performance at time of assessment include: HTN and CAD  Personal factors affecting pt at time of IE include:steps to enter environment  Prior to admission, pt was I with all self care ADL's, IADL's, mobility w/o devices  Upon evaluation: Pt is essentially at baseline with self care and mobility  Active OT is not indicated at this time From OT standpoint, recommendation at time of d/c would be home  Independently      Goals   Patient Goals to go back home   Plan   Treatment Interventions   (eval only)   Goal Expiration Date 02/25/20   OT Treatment Day 0   OT Frequency Eval only   Recommendation   OT Discharge Recommendation Home independent   OT - OK to Discharge Yes   Barthel Index   Feeding 10   Bathing 5   Grooming Score 5   Dressing Score 10   Bladder Score 10   Bowels Score 10   Toilet Use Score 10   Transfers (Bed/Chair) Score 15   Mobility (Level Surface) Score 15   Stairs Score 0  (DNT)   Barthel Index Score 90   Clau Guzman

## 2020-02-25 NOTE — DISCHARGE SUMMARY
Discharge- Luis Legacy 1970, 53 y o  male MRN: [id number]    Unit/Bed#: ICU 05 Encounter: [id number]    Primary Care Provider: Betty Damon DO   Date and time admitted to hospital: 2/24/2020  6:17 PM        * Chest pain  Assessment & Plan  · Typical chest pain - resolved  · Most likely secondary to a variant of angina  · ACS ruled out with 3 sets of troponins that were normal and EKG testing which was normal  · Status post a Cardiology evaluation  · Okay for discharge from their standpoint  · No further inpatient testing needed  · DC home on all pre-admit meds at pre-admit dosages these include aspirin, Brilinta, metoprolol, losartan, Imdur and atorvastatin  · Outpatient follow-up with PCP and Cardiology    Coronary artery disease of native artery of native heart with stable angina pectoris ([facility] [address] )  Assessment & Plan  · History of multiple MIs in the past with multiple stents  · Continue aspirin, Brilinta, statin, beta-blocker, nitrates  · Being worked up for CABG as an outpatient  · Anatomy as follows:  mild LAD atherosclerosis, 30% pLAD, 75% D2, 50% OM1 with occluded subbranch, 0% pR1 at site of prior stent, 70% mRCA at site of prior stent with eccentric lesion 70% PLV1  Okay for discharge-see the outline above    Essential hypertension  Assessment & Plan  · DC home on all pre-admit meds at pre-admit dosages    Mixed hyperlipidemia  Assessment & Plan  · Continue high-intensity statin at time of discharge      Depression-continue Effexor    Discharging Physician / Practitioner: Byron Davies MD  PCP: Betty Damon DO  Admission Date:   Admission Orders (From admission, onward)     Ordered        02/24/20 1905  Place in Observation  Once                   Discharge Date: 02/25/20    Resolved Problems  Date Reviewed: 12/20/2019    None          Consultations During Hospital Stay:  · Cardiology    Procedures Performed:   · None    Significant Findings / Test Results:   · Chest x-ray-no acute cardiopulmonary disease    Incidental Findings:   · None     Test Results Pending at Discharge (will require follow up): · None     Outpatient Tests Requested:  · None    Complications:     None    Reason for Admission:  Chest pain rule out ACS    Hospital Course:     Edwige Sellers is a 53 y o  male patient who originally presented to the hospital on 2/24/2020 due to chest pain  Please refer to the initial history and physical examination completed by Dr Reno Smith for the initial presenting features and complaints  In brief, the patient is a pleasant 45-year-old male who was admitted to the step-down unit after he came into the emergency room complaining of chest pain  Patient has a longstanding history of cardiac disease and has had stents placed in the past   He ruled out for the possibility of an acute coronary event with 3 sets of troponins that were normal here in the hospital   EKGs yielded normal sinus rhythm with no changes in any leads suggestive of any type of ischemia  After being evaluated by Cardiology, it was deemed that the patient was okay to go home and follow-up with his regular outpatient cardiologist   No changes were made to any medications  In the hospital he was additionally treated with a little nitropaste  He is on the correct cocktail of medications which include aspirin, Brilinta, metoprolol, losartan and atorvastatin along with Imdur  He will follow up in the outpatient setting with his PCP and Cardiology  Please see above list of diagnoses and related plan for additional information       Condition at Discharge: good     Discharge Day Visit / Exam:     Subjective:  Patient seen and examined, no complaints no distress  Vitals: Blood Pressure: 105/55 (02/25/20 0800)  Pulse: 61 (02/25/20 0800)  Temperature: (!) 97 °F (36 1 °C) (02/25/20 0800)  Temp Source: Tympanic (02/25/20 0800)  Respirations: 12 (02/25/20 0800)  Height: 5' 8" (172 7 cm) (02/24/20 2034)  Weight - Scale: 81 2 kg (179 lb 0 2 oz) (02/25/20 0600)  SpO2: 98 % (02/25/20 0800)  Exam:   Physical Exam   Constitutional: He is oriented to person, place, and time  He appears well-developed and well-nourished  HENT:   Head: Normocephalic and atraumatic  Nose: Nose normal    Mouth/Throat: Oropharynx is clear and moist    Eyes: Pupils are equal, round, and reactive to light  Conjunctivae and EOM are normal    Neck: Normal range of motion  Neck supple  No JVD present  No thyromegaly present  Cardiovascular: Normal rate, regular rhythm and intact distal pulses  Exam reveals no gallop and no friction rub  No murmur heard  Pulmonary/Chest: Effort normal and breath sounds normal  No respiratory distress  Abdominal: Soft  Bowel sounds are normal  He exhibits no distension and no mass  There is no tenderness  There is no guarding  Musculoskeletal: Normal range of motion  He exhibits no edema  Lymphadenopathy:     He has no cervical adenopathy  Neurological: He is alert and oriented to person, place, and time  No cranial nerve deficit  Skin: Skin is warm  No rash noted  No erythema  Psychiatric: He has a normal mood and affect  His behavior is normal    Vitals reviewed  Discussion with Family:  Patient's significant other Mario Kuhn (an employee at this campus) was bedside at the time of my discharge evaluation, all questions answered to her satisfaction    Discharge instructions/Information to patient and family:   See after visit summary for information provided to patient and family  Provisions for Follow-Up Care:  See after visit summary for information related to follow-up care and any pertinent home health orders  Disposition:     Home    For Discharges to   [address] SNF:   · Not Applicable to this Patient - Not Applicable to this Patient    Planned Readmission:    None     Discharge Statement:  I spent 40 minutes discharging the patient  This time was spent on the day of discharge   I had direct contact with the patient on the day of discharge  Greater than 50% of the total time was spent examining patient, answering all patient questions, arranging and discussing plan of care with patient as well as directly providing post-discharge instructions  Additional time then spent on discharge activities  Discharge Medications:  See after visit summary for reconciled discharge medications provided to patient and family        ** Please Note: This note has been constructed using a voice recognition system **

## 2020-02-25 NOTE — ASSESSMENT & PLAN NOTE
· Typical chest pain - resolved  · Most likely secondary to a variant of angina  · ACS ruled out with 3 sets of troponins that were normal and EKG testing which was normal  · Status post a Cardiology evaluation  · Okay for discharge from their standpoint  · No further inpatient testing needed  · DC home on all pre-admit meds at pre-admit dosages these include aspirin, Brilinta, metoprolol, losartan, Imdur and atorvastatin  · Outpatient follow-up with PCP and Cardiology

## 2020-02-25 NOTE — PHYSICAL THERAPY NOTE
Physical Therapy Evaluation     Patient's Name: Cristiane Juarez    Admitting Diagnosis  Chest pain [R07 9]  Coronary artery disease of native artery of native heart with stable angina pectoris ([facility] [address] ) [I25 118]    Problem List  Patient Active Problem List   Diagnosis    Coronary artery disease of native artery of native heart with stable angina pectoris ([facility] [address] )    Essential hypertension    Mixed hyperlipidemia    Chest pain    PVC (premature ventricular contraction)       Past Medical History  Past Medical History:   Diagnosis Date    Arthritis     Coronary artery disease     MI x 4; cardiac cath with 4 stents    GERD (gastroesophageal reflux disease)     Hypertension     [facility]     4 MIs as of 2/24/2020       Past Surgical History  Past Surgical History:   Procedure Laterality Date    CORONARY ANGIOPLASTY WITH STENT PLACEMENT          02/25/20 0851   Note Type   Note type Eval/Treat   Pain Assessment   Pain Assessment No/denies pain   Pain Score No Pain   Home Living   Type of [address] One level;Performs ADLs on one level; Able to live on main level with bedroom/bathroom   Bathroom Shower/Tub Tub/shower unit   Bathroom Toilet Standard   Bathroom Accessibility Accessible   Home Equipment   (PTA, pt  did not utilize an AD or DME )   Prior Function   Level of Young Independent with ADLs and functional mobility   Lives With Significant other   ADL Assistance Independent   IADLs Independent   Falls in the last 6 months 0   Restrictions/Precautions   Weight Bearing Precautions Per Order No   Other Precautions Fall Risk;Telemetry;Multiple lines;O2  (2 L O2 via NC)   General   Family/Caregiver Present No   Cognition   Overall Cognitive Status WFL   Arousal/Participation Alert   Orientation Level Oriented X4   Memory Within functional limits   Following Commands Follows one step commands without difficulty   RUE Assessment   RUE Assessment   (Please see OT assessment )   KIRA Assessment   LUE Assessment   (Please see OT assessment )   RLE Assessment   RLE Assessment X  (4-/5 gross musculature)   LLE Assessment   LLE Assessment X  (4-/5 gross musculature)   Coordination   Movements are Fluid and Coordinated 1   Light Touch   RLE Light Touch Grossly intact   LLE Light Touch Grossly intact   Sharp/Dull   RLE Sharp/Dull Grossly intact   LLE Sharp/Dull Grossly intact   Bed Mobility   Rolling R 7  Independent   Additional items HOB elevated;Verbal cues   Rolling L 7  Independent   Additional items HOB elevated;Verbal cues   Supine to Sit 6  Modified independent   Additional items Assist x 1;HOB elevated; Bedrails;Verbal cues   Sit to Supine   (DNT re:pt  was sitting OOB in chair upon conclusion )   Transfers   Sit to Stand 5  Supervision   Additional items Assist x 1;Bedrails;Verbal cues   Stand to Sit 5  Supervision   Additional items Assist x 1; Armrests; Verbal cues   Stand pivot 5  Supervision   Additional items Assist x 1;Verbal cues   Ambulation/Elevation   Gait pattern Forward Flexion; Short stride   Gait Assistance 5  Supervision   Additional items Assist x 1;Verbal cues   Assistive Device None   Distance 10 feet   Stair Management Assistance Not tested   Balance   Static Sitting Normal   Dynamic Sitting Normal   Static Standing Good   Dynamic Standing Good   Ambulatory Good   Endurance Deficit   Endurance Deficit Yes   Endurance Deficit Description /55->126/79  HR 79->68   Activity Tolerance   Activity Tolerance Patient tolerated treatment well   Nurse Made Aware yes   Assessment   Prognosis Good   Problem List Decreased strength;Decreased endurance; Impaired balance;Decreased mobility; Decreased safety awareness   Assessment Pt is 53 y o  male seen for PT evaluation s/p admit to [facility] ICU on 2/24/2020 w/ Chest pain  PT consulted to assess pt's functional mobility and d/c needs  Order placed for PT eval and tx, w/ eval & treat order   Comorbidities affecting pt's physical performance at time of assessment include: weakness, chest pain,HTN,CAD,PVC  PTA, pt was independent w/ all functional mobility w/ o AD and lives on a one story house   Personal factors affecting pt at time of IE include: inability to navigate community distances, unable to perform dynamic tasks in community and inability to perform IADLs  Please find objective findings from PT assessment regarding body systems outlined above with impairments and limitations including weakness, impaired balance, decreased endurance, gait deviations, decreased safety awareness and fall risk  The following objective measures performed on IE also reveal limitations: Barthel Index: 80/100  Pt's clinical presentation is currently unstable/unpredictable seen in pt's presentation of progressing symptoms prior to hospitalization,abnormal lab values  Pt to benefit from continued PT tx to address deficits as defined above and maximize level of functional independent mobility and consistency  From PT/mobility standpoint, recommendation at time of d/c would be anticipate no needs pending progress in order to facilitate return to PLOF  Goals   Patient Goals get a shower   LTG Expiration Date 03/06/20   Long Term Goal #1  BLE strength to greater than/equal to 4/5 gross musculature to increase ability to safely transfer, control descent to chair  Patient will exhibit increase dynamic standing balance to Normal, for 5-7 minutes without LOB independently to improve endurance  Patient will exhibit increase dynamic ambulatory balance to Normal for 350-500 feet w/o AD independently to improve ability to mobilize to toilet, chair and decrease risk for additional medical complications  Patient will exhibit good self monitoring and ability to follow 2 step commands to increase complexity of tasks and resume ADL's without LOB  PT Treatment Day 1   Plan   Treatment/Interventions LE strengthening/ROM; Therapeutic exercise; Endurance training;Gait training;Spoke to nursing   PT Frequency Other (Comment)  (3-5x/wk)   Recommendation   Recommendation Home independently   PT - OK to Discharge No   Additional Comments Upon conclusion, pt  was resting OOB in chair w/all needs within reach & SO at bedside  Barthel Index   Feeding 10   Bathing 5   Grooming Score 5   Dressing Score 10   Bladder Score 10   Bowels Score 10   Toilet Use Score 10   Transfers (Bed/Chair) Score 10   Mobility (Level Surface) Score 10   Stairs Score 0   Barthel Index Score 80     Additional skilled interventions: Therapeutic Activity x 10 minutes    S: No reported pain prior or during session, patient agreeable to mobilize OOB as tolerated;A&Ox4  O: therapeutic activity x 10 minutes including mobilization education: bed mobility, supine->sit, static/dynamic sitting balance w/ postural facilitation, sit<->stand transfers, static/dynamic standing balance w/ postural facilitation, and continued safety training for increased awareness  A: Patient exhibited weakness, impaired balance, gait dysfunction, decreased endurance, and decline from PLOF to benefit from continued interventions  P: Continue PT tx with progression of functional tasks as patient can safely tolerate, 3-5x/week      Promise Amor, PT

## 2020-02-25 NOTE — UTILIZATION REVIEW
Initial Clinical Review    Admission: Date/Time/Statement: Admission Orders (From admission, onward)     Ordered        02/24/20 1905  Place in Observation  Once                   Orders Placed This Encounter   Procedures    Place in Observation     Standing Status:   Standing     Number of Occurrences:   1     Order Specific Question:   Admitting Physician     Answer:   Kaylen Kerr [[id number]]     Order Specific Question:   Level of Care     Answer:   Med Surg [16]     Order Specific Question:   Bed request comments     Answer:   telemetry     ED Arrival Information     Expected Arrival [address] of Arrival Escorted By Service Admission Type    - 2/24/2020 18:13 Emergent [address] Emergency    Arrival Complaint    chest pain         Chief Complaint   Patient presents with    Chest Pain     onset 1300 today 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 [employer]  pressure pain radiating to between shoulderblades  arrives to triage comparing today's pain to past four MIs   denies SOB  admits to diaphoresis     · Assessment/Plan: 53 yr old male to the ed from home with pmh of cad status post mi with 32 stents came in with chest pain which began around 1pm  And states it is typical of the type of pain he had when he had his mi  He took 2 ntg with modest relief at home  Currently he is being worked up for a possible cabg  As an op  The plan is serial trops, telmetry, consult cardiology  He is admitted as an observation patient  With chest pain  Anatomy as follows:  mild LAD atherosclerosis, 30% pLAD, 75% D2, 50% OM1 with occluded subbranch, 0% pR1 at site of prior stent, 70% mRCA at site of prior stent with eccentric lesion 70% PLV1       ED Triage Vitals   Temperature Pulse Respirations Blood Pressure SpO2   02/24/20 1821 02/24/20 1821 02/24/20 1821 02/24/20 1821 02/24/20 1821   99 7 °F (37 6 °C) 97 18 (!) 169/101 99 %      Temp Source Heart Rate Source Patient Position - Orthostatic VS BP Location FiO2 (%) 02/24/20 1821 02/24/20 1821 02/24/20 1957 02/24/20 1957 --   Temporal Monitor Lying Right arm       Pain Score       02/24/20 1821       No Pain        Wt Readings from Last 1 Encounters:   02/25/20 81 2 kg (179 lb 0 2 oz)     Additional Vital Signs:   /25/20 0800  97 °F (36 1 °C)Abnormal   61  12  105/55  75  98 %  Nasal cannula  Lying   02/25/20 0600    78  13  122/82  96         02/25/20 0500    64  13  105/57  77         02/25/20 0400  98 °F (36 7 °C)  69  16  120/60  84  98 %  Nasal cannula  Lying   02/25/20 0300    74  17  119/54  77         02/25/20 0200    68  20  120/68  89         02/25/20 0130    73  18      99 %  Nasal cannula     02/25/20 0100    70  15  112/73  88  99 %       02/25/20 0030    71  16  119/67  86  98 %       02/25/20 0000  98 9 °F (37 2 °C)  68  15  117/69  88  98 %  Nasal cannula  Lying   02/24/20 2345    69  16  119/62  86  99 %       02/24/20 2330    69  16  120/71  90  99 %       02/24/20 2315    72  17  127/76  97  98 %           Pertinent Labs/Diagnostic Test Results:   Results from last 7 days   Lab Units 02/25/20  0555 02/24/20  1822   WBC Thousand/uL 7 10 9 20   HEMOGLOBIN g/dL 13 6* 14 1   HEMATOCRIT % 40 5* 41 1*   PLATELETS Thousands/uL 155 175   NEUTROS ABS Thousands/µL 4 60 5 80         Results from last 7 days   Lab Units 02/25/20  0555 02/24/20  1822   SODIUM mmol/L 141 139   POTASSIUM mmol/L 4 0 3 8   CHLORIDE mmol/L 108* 103   CO2 mmol/L 27 28   ANION GAP mmol/L 6 8   BUN mg/dL 13 13   CREATININE mg/dL 0 85 1 01   EGFR ml/min/1 73sq m 102 87   CALCIUM mg/dL 8 7 9 1     Results from last 7 days   Lab Units 02/25/20  0555 02/24/20  1822   AST U/L 18 23   ALT U/L 21 26   ALK PHOS U/L 87 92   TOTAL PROTEIN g/dL 6 1* 6 8   ALBUMIN g/dL 3 8 4 2   TOTAL BILIRUBIN mg/dL 0 90 0 70         Results from last 7 days   Lab Units 02/25/20  0555 02/24/20  1822   GLUCOSE RANDOM mg/dL 117* 119*     Results from last 7 days   Lab Units 02/25/20  0107 02/24/20  2206 02/24/20  1822   TROPONIN I ng/mL <0 03 <0 03 <0 03         Results from last 7 days   Lab Units 02/25/20  0555 02/24/20  1853   PROTIME seconds 10 8 11 2   INR  0 93 0 97   PTT seconds  --  30   ekg-Sinus rhythm  Rightward axis  Borderline ECG  When compared with ECG of 24-JUL-2019 08:27,  Vent   rate has increased BY  35 BPM  Nonspecific T wave abnormality no longer evident in Anterolateral leads  ekg-Normal sinus rhythm  Rightward axis  Abnormal ECG  When compared with ECG of 24-FEB-2020 18:20, (unconfirmed)  No significant change was found    Chest- no acute cardiopulmonary disease  ED Treatment:   Medication Administration from 02/24/2020 1813 to 02/24/2020 1953       Date/Time Order Dose Route Action Comments     02/24/2020 1916 sodium chloride 0 9 % infusion 125 mL/hr Intravenous New Bag      02/24/2020 1856 nitroglycerin (NITROSTAT) SL tablet 0 4 mg 0 4 mg Sublingual Given         Past Medical History:   Diagnosis Date    Arthritis     Coronary artery disease     MI x 4; cardiac cath with 4 stents    GERD (gastroesophageal reflux disease)     Hypertension     Myocardial infarct ([address] )     4 MIs as of 2/24/2020     Present on Admission:   Chest pain   Coronary artery disease of native artery of native heart with stable angina pectoris ([address] )   Essential hypertension   Mixed hyperlipidemia      Admitting Diagnosis: Chest pain [R07 9]  Coronary artery disease of native artery of native heart with stable angina pectoris ([address] ) [I25 118]  Age/Sex: 53 y o  male  Admission Orders:  Scheduled Medications:    Medications:  aspirin 81 mg Oral Daily   atorvastatin 80 mg Oral Q24H   enoxaparin 40 mg Subcutaneous Daily   losartan 100 mg Oral Daily   metoprolol tartrate 25 mg Oral Q12H KAREN   pantoprazole 40 mg Oral Early Morning   ticagrelor 90 mg Oral Q12H KAREN   venlafaxine 75 mg Oral Daily     Continuous IV Infusions:     PRN Meds:    acetaminophen 650 mg Oral Q4H PRN x1   morphine injection 2 mg Intravenous Q4H PRN   nitroglycerin 0 4 mg Sublingual Q5 Min PRN   pt    IP CONSULT TO CARDIOLOGY-- for op follow-up    Network Utilization Review Department  [email or web address]  ATTENTION: Please call with any questions or concerns to [phone] and carefully listen to the prompts so that you are directed to the right person  All voicemails are confidential   Rohith Los all requests for admission clinical reviews, approved or denied determinations and any other requests to dedicated fax number below belonging to the campus where the patient is receiving treatment   List of dedicated fax numbers for the Facilities:  [address] DENIALS (Administrative/Medical Necessity) [phone]   [address] (Maternity/NICU/Pediatrics) [phone]   [facility] [phone]   Derek Mendez [phone]   [facility] [phone]   Christean Half [phone]   [facility] [phone]   [facility]  [phone]   [address] [facility] [address]  [facility] [address] [facility] [phone]

## 2020-02-25 NOTE — DISCHARGE INSTR - APPOINTMENTS
Maintain all appointments as scheduled with your doctor  Ensure to take updated medication list to appointment

## 2020-02-25 NOTE — NURSING NOTE
DC instructions reviewed with patient and wife  Patient shaking head "yes" with full understanding  Off unit via wheelchair to car for home

## 2020-02-25 NOTE — H&P
H&P- Kayleigh Oreilly 1970, 53 y o  male MRN: [id number]    Unit/Bed#: -01 Encounter: [id number]    Primary Care Provider: Aniceto Toscano DO   Date and time admitted to hospital: 2/24/2020  6:17 PM        * Chest pain  Assessment & Plan  · Typical chest pain  · CHARBEL score 4  · Serial troponins  · Aspirin, Brilinta, statin, beta-blocker, nitrates  · Patient with known CAD, be worked up for CABG as an outpatient  · Will consult cardiology  · Will defer to Cardiology regarding further diagnostic testing such as echo  · Monitor on telemetry  · Nitro paste, morphine p r n  For chest pain    Mixed hyperlipidemia  Assessment & Plan  · Continue high-intensity statin    Essential hypertension  Assessment & Plan  · Continue home antihypertensives  · Blood pressure well controlled    Coronary artery disease of native artery of native heart with stable angina pectoris (HCC)  Assessment & Plan  · History of multiple MIs in the past with multiple stents  · Continue aspirin, Brilinta, statin, beta-blocker, nitrates  · Being worked up for CABG as an outpatient  · Anatomy as follows:  mild LAD atherosclerosis, 30% pLAD, 75% D2, 50% OM1 with occluded subbranch, 0% pR1 at site of prior stent, 70% mRCA at site of prior stent with eccentric lesion 70% PLV1      VTE Prophylaxis: Enoxaparin (Lovenox)  Code Status: full  POLST: POLST is not applicable to this patient  Discussion with family: n/a    Anticipated Length of Stay:  Patient will be admitted on an Observation basis with an anticipated length of stay of  < 2 midnights  Justification for Hospital Stay: chest pain    Chief Complaint:   Chest pain    History of Present Illness:    Kayleigh Oreilly is a 53 y o  male with a past medical history of coronary artery disease, status post MI and stents in the past, who presents with chest pain  Pain began at around 1:00 p m  This afternoon  Patient notes the pain to be typical of his cardiac chest pain    He took 2 nitroglycerin with modest relief  He subsequently presented to the emergency department for further treatment evaluation  Patient was given nitroglycerin again in the ER with improvement of his chest pain  Patient denies any shortness of breath, fevers, chills, nausea, vomiting is otherwise well  In terms of his coronary disease, he is being worked up for a CABG as an outpatient by his primary cardiologist     Review of Systems:  Review of Systems   Cardiovascular: Positive for chest pain  All other systems reviewed and are negative  Past Medical and Surgical History:   Past Medical History:   Diagnosis Date    GERD (gastroesophageal reflux disease)     History of transfusion     Hypertension     Myocardial infarct ([facility] [address] )     4 MIs as of 2/24/2020       Past Surgical History:   Procedure Laterality Date    CORONARY ANGIOPLASTY WITH STENT PLACEMENT         Meds/Allergies:  Prior to Admission medications    Medication Sig Start Date End Date Taking?  Authorizing Provider   aspirin (ASPIRIN LOW DOSE) 81 MG tablet Take by mouth   Yes Historical Provider, MD   atorvastatin (LIPITOR) 80 mg tablet Take 1 tablet by mouth every 24 hours 5/3/19  Yes Historical Provider, MD   isosorbide mononitrate (IMDUR) 30 mg 24 hr tablet Take 1 tablet (30 mg total) by mouth 2 (two) times a day 9/13/19  Yes Francesca Joy MD   losartan (COZAAR) 100 MG tablet Take 1 tablet (100 mg total) by mouth every 24 hours 7/19/19  Yes Francesca Joy MD   metoprolol tartrate (LOPRESSOR) 25 mg tablet Take 1 tablet (25 mg total) by mouth every 12 (twelve) hours 12/20/19  Yes Francesca Joy MD   nitroglycerin (NITROSTAT) 0 4 mg SL tablet as directed 5/3/19  Yes Historical Provider, MD   pantoprazole (PROTONIX) 40 mg tablet Take 40 mg by mouth daily 7/11/19  Yes Historical Provider, MD   ticagrelor (BRILINTA) 90 MG Take 1 tablet by mouth Every 12 hours 5/3/19  Yes Historical Provider, MD   venlafaxine (EFFEXOR-XR) 75 mg 24 hr capsule Take 75 mg by mouth daily  8/5/19  Yes Historical Provider, MD     I have reviewed home medications with patient personally  Allergies: No Known Allergies    Social History:  Marital Status: /Civil Union   Occupation: n/a  Patient Pre-hospital Living Situation: home  Patient Pre-hospital Level of Mobility: good  Patient Pre-hospital Diet Restrictions: cardiac  Substance Use History:   Social History     Substance and Sexual Activity   Alcohol Use Yes    Frequency: 2-4 times a month     Social History     Tobacco Use   Smoking Status Never Smoker   Smokeless Tobacco Never Used     Social History     Substance and Sexual Activity   Drug Use Never       Family History:  I have reviewed the patients family history    Physical Exam:   Vitals:   Blood Pressure: 143/92 (02/24/20 1957)  Pulse: 83 (02/24/20 1957)  Temperature: 98 2 °F (36 8 °C) (02/24/20 1957)  Temp Source: Temporal (02/24/20 1957)  Respirations: 18 (02/24/20 1957)  Height: 5' 8" (172 7 cm) (02/24/20 1821)  Weight - Scale: 83 kg (183 lb) (02/24/20 1821)  SpO2: 99 % (02/24/20 1957)    Physical Exam   Constitutional: He is oriented to person, place, and time  He appears well-developed and well-nourished  HENT:   Head: Normocephalic and atraumatic  Eyes: Pupils are equal, round, and reactive to light  EOM are normal    Neck: Normal range of motion  Neck supple  Cardiovascular: Normal rate and regular rhythm  Pulmonary/Chest: Effort normal and breath sounds normal    Abdominal: Soft  Bowel sounds are normal    Musculoskeletal: Normal range of motion  He exhibits no edema  Neurological: He is alert and oriented to person, place, and time  Skin: Skin is warm  Capillary refill takes less than 2 seconds  Psychiatric: He has a normal mood and affect  His behavior is normal  Judgment and thought content normal    Nursing note and vitals reviewed  Additional Data:   Lab Results: I have personally reviewed pertinent reports      Results from last 7 days Lab Units 02/24/20  1822   WBC Thousand/uL 9 20   HEMOGLOBIN g/dL 14 1   HEMATOCRIT % 41 1*   PLATELETS Thousands/uL 175   NEUTROS PCT % 63   LYMPHS PCT % 25   MONOS PCT % 8   EOS PCT % 3     Results from last 7 days   Lab Units 02/24/20  1822   POTASSIUM mmol/L 3 8   CHLORIDE mmol/L 103   CO2 mmol/L 28   BUN mg/dL 13   CREATININE mg/dL 1 01   CALCIUM mg/dL 9 1   ALK PHOS U/L 92   ALT U/L 26   AST U/L 23     Results from last 7 days   Lab Units 02/24/20  1853   INR  0 97               Imaging: I have personally reviewed pertinent reports  XR chest 2 views    (Results Pending)       EKG, Pathology, and Other Studies Reviewed on Admission:   · EKG: nsr    NetAccess / Epic Records Reviewed: Yes     ** Please Note: This note has been constructed using a voice recognition system   **

## 2020-02-25 NOTE — ASSESSMENT & PLAN NOTE
· Typical chest pain  · CHARBEL score 4  · Serial troponins  · Aspirin, Brilinta, statin, beta-blocker, nitrates  · Patient with known CAD, be worked up for CABG as an outpatient  · Will consult cardiology  · Will defer to Cardiology regarding further diagnostic testing such as echo  · Monitor on telemetry  · Nitro paste, morphine p r n   For chest pain

## 2020-04-29 ENCOUNTER — TELEMEDICINE (OUTPATIENT)
Dept: CARDIOLOGY CLINIC | Facility: CLINIC | Age: 50
End: 2020-04-29
Payer: COMMERCIAL

## 2020-04-29 VITALS — WEIGHT: 180 LBS | BODY MASS INDEX: 27.37 KG/M2

## 2020-04-29 DIAGNOSIS — I25.118 CORONARY ARTERY DISEASE OF NATIVE ARTERY OF NATIVE HEART WITH STABLE ANGINA PECTORIS (HCC): ICD-10-CM

## 2020-04-29 DIAGNOSIS — I10 ESSENTIAL HYPERTENSION: ICD-10-CM

## 2020-04-29 DIAGNOSIS — E78.2 MIXED HYPERLIPIDEMIA: ICD-10-CM

## 2020-04-29 DIAGNOSIS — I49.3 PVC (PREMATURE VENTRICULAR CONTRACTION): Primary | ICD-10-CM

## 2020-04-29 DIAGNOSIS — I20.8 STABLE ANGINA PECTORIS (HCC): ICD-10-CM

## 2020-04-29 PROCEDURE — 99214 OFFICE O/P EST MOD 30 MIN: CPT | Performed by: INTERNAL MEDICINE

## 2020-04-29 RX ORDER — METOPROLOL TARTRATE 50 MG/1
50 TABLET, FILM COATED ORAL EVERY 12 HOURS SCHEDULED
Qty: 60 TABLET | Refills: 3 | Status: SHIPPED | OUTPATIENT
Start: 2020-04-29 | End: 2021-03-30

## 2020-06-29 ENCOUNTER — OFFICE VISIT (OUTPATIENT)
Dept: CARDIOLOGY CLINIC | Facility: CLINIC | Age: 50
End: 2020-06-29
Payer: COMMERCIAL

## 2020-06-29 VITALS
DIASTOLIC BLOOD PRESSURE: 82 MMHG | HEIGHT: 68 IN | SYSTOLIC BLOOD PRESSURE: 132 MMHG | WEIGHT: 189 LBS | BODY MASS INDEX: 28.64 KG/M2 | OXYGEN SATURATION: 99 % | HEART RATE: 62 BPM | TEMPERATURE: 98.6 F

## 2020-06-29 DIAGNOSIS — I20.8 STABLE ANGINA PECTORIS (HCC): ICD-10-CM

## 2020-06-29 DIAGNOSIS — I25.118 CORONARY ARTERY DISEASE OF NATIVE ARTERY OF NATIVE HEART WITH STABLE ANGINA PECTORIS (HCC): ICD-10-CM

## 2020-06-29 DIAGNOSIS — I49.3 PVC (PREMATURE VENTRICULAR CONTRACTION): Primary | ICD-10-CM

## 2020-06-29 DIAGNOSIS — I10 ESSENTIAL HYPERTENSION: ICD-10-CM

## 2020-06-29 DIAGNOSIS — E78.2 MIXED HYPERLIPIDEMIA: ICD-10-CM

## 2020-06-29 PROCEDURE — 99214 OFFICE O/P EST MOD 30 MIN: CPT | Performed by: INTERNAL MEDICINE

## 2020-06-29 RX ORDER — ATORVASTATIN CALCIUM 40 MG/1
40 TABLET, FILM COATED ORAL EVERY 24 HOURS
Qty: 30 TABLET | Refills: 3 | Status: SHIPPED | OUTPATIENT
Start: 2020-06-29 | End: 2020-12-30

## 2020-07-16 ENCOUNTER — TRANSCRIBE ORDERS (OUTPATIENT)
Dept: ADMINISTRATIVE | Facility: HOSPITAL | Age: 50
End: 2020-07-16

## 2020-07-16 ENCOUNTER — HOSPITAL ENCOUNTER (OUTPATIENT)
Dept: RADIOLOGY | Facility: HOSPITAL | Age: 50
Discharge: HOME | End: 2020-07-16
Payer: COMMERCIAL

## 2020-07-16 DIAGNOSIS — M54.50 CHRONIC LOW BACK PAIN, UNSPECIFIED BACK PAIN LATERALITY, UNSPECIFIED WHETHER SCIATICA PRESENT: ICD-10-CM

## 2020-07-16 DIAGNOSIS — G89.29 CHRONIC LOW BACK PAIN, UNSPECIFIED BACK PAIN LATERALITY, UNSPECIFIED WHETHER SCIATICA PRESENT: Primary | ICD-10-CM

## 2020-07-16 DIAGNOSIS — M54.50 CHRONIC LOW BACK PAIN, UNSPECIFIED BACK PAIN LATERALITY, UNSPECIFIED WHETHER SCIATICA PRESENT: Primary | ICD-10-CM

## 2020-07-16 DIAGNOSIS — G89.29 CHRONIC LOW BACK PAIN, UNSPECIFIED BACK PAIN LATERALITY, UNSPECIFIED WHETHER SCIATICA PRESENT: ICD-10-CM

## 2020-07-16 PROCEDURE — 72110 X-RAY EXAM L-2 SPINE 4/>VWS: CPT

## 2020-10-08 DIAGNOSIS — R07.9 CHEST PAIN, UNSPECIFIED TYPE: ICD-10-CM

## 2020-10-08 DIAGNOSIS — I25.118 CORONARY ARTERY DISEASE OF NATIVE ARTERY OF NATIVE HEART WITH STABLE ANGINA PECTORIS (HCC): ICD-10-CM

## 2020-10-08 RX ORDER — ISOSORBIDE MONONITRATE 30 MG/1
TABLET, EXTENDED RELEASE ORAL
Qty: 90 TABLET | Refills: 0 | Status: SHIPPED | OUTPATIENT
Start: 2020-10-08 | End: 2020-12-30

## 2020-10-13 ENCOUNTER — LAB (OUTPATIENT)
Dept: LAB | Facility: HOSPITAL | Age: 50
End: 2020-10-13
Payer: COMMERCIAL

## 2020-10-13 DIAGNOSIS — E78.2 MIXED HYPERLIPIDEMIA: ICD-10-CM

## 2020-10-13 LAB
CHOLEST SERPL-MCNC: 139 MG/DL (ref 0–200)
HDLC SERPL-MCNC: 36 MG/DL
LDLC SERPL CALC-MCNC: 78 MG/DL (ref 0–100)
NONHDLC SERPL-MCNC: 103 MG/DL
TRIGL SERPL-MCNC: 123 MG/DL (ref 44–166)

## 2020-10-13 PROCEDURE — 36415 COLL VENOUS BLD VENIPUNCTURE: CPT

## 2020-10-13 PROCEDURE — 80061 LIPID PANEL: CPT

## 2020-10-15 ENCOUNTER — OFFICE VISIT (OUTPATIENT)
Dept: CARDIOLOGY CLINIC | Facility: CLINIC | Age: 50
End: 2020-10-15
Payer: COMMERCIAL

## 2020-10-15 VITALS
HEART RATE: 84 BPM | DIASTOLIC BLOOD PRESSURE: 82 MMHG | OXYGEN SATURATION: 99 % | HEIGHT: 68 IN | SYSTOLIC BLOOD PRESSURE: 114 MMHG | TEMPERATURE: 98 F | BODY MASS INDEX: 29.1 KG/M2 | WEIGHT: 192 LBS

## 2020-10-15 DIAGNOSIS — I49.3 PVC (PREMATURE VENTRICULAR CONTRACTION): ICD-10-CM

## 2020-10-15 DIAGNOSIS — I25.118 CORONARY ARTERY DISEASE OF NATIVE ARTERY OF NATIVE HEART WITH STABLE ANGINA PECTORIS (HCC): ICD-10-CM

## 2020-10-15 DIAGNOSIS — E78.2 MIXED HYPERLIPIDEMIA: ICD-10-CM

## 2020-10-15 DIAGNOSIS — R07.89 OTHER CHEST PAIN: Primary | ICD-10-CM

## 2020-10-15 DIAGNOSIS — I10 ESSENTIAL HYPERTENSION: ICD-10-CM

## 2020-10-15 PROCEDURE — 99214 OFFICE O/P EST MOD 30 MIN: CPT | Performed by: INTERNAL MEDICINE

## 2020-12-30 DIAGNOSIS — I25.118 CORONARY ARTERY DISEASE OF NATIVE ARTERY OF NATIVE HEART WITH STABLE ANGINA PECTORIS (HCC): ICD-10-CM

## 2020-12-30 DIAGNOSIS — E78.2 MIXED HYPERLIPIDEMIA: ICD-10-CM

## 2020-12-30 DIAGNOSIS — R07.9 CHEST PAIN, UNSPECIFIED TYPE: ICD-10-CM

## 2020-12-30 RX ORDER — ATORVASTATIN CALCIUM 40 MG/1
TABLET, FILM COATED ORAL
Qty: 90 TABLET | Refills: 0 | Status: SHIPPED | OUTPATIENT
Start: 2020-12-30 | End: 2021-03-30

## 2020-12-30 RX ORDER — ISOSORBIDE MONONITRATE 30 MG/1
TABLET, EXTENDED RELEASE ORAL
Qty: 90 TABLET | Refills: 0 | Status: SHIPPED | OUTPATIENT
Start: 2020-12-30 | End: 2021-03-30

## 2021-03-15 ENCOUNTER — IMMUNIZATIONS (OUTPATIENT)
Dept: FAMILY MEDICINE CLINIC | Facility: HOSPITAL | Age: 51
End: 2021-03-15

## 2021-03-15 DIAGNOSIS — Z23 ENCOUNTER FOR IMMUNIZATION: Primary | ICD-10-CM

## 2021-03-15 PROCEDURE — 0011A SARS-COV-2 / COVID-19 MRNA VACCINE (MODERNA) 100 MCG: CPT

## 2021-03-15 PROCEDURE — 91301 SARS-COV-2 / COVID-19 MRNA VACCINE (MODERNA) 100 MCG: CPT

## 2021-03-30 DIAGNOSIS — I25.118 CORONARY ARTERY DISEASE OF NATIVE ARTERY OF NATIVE HEART WITH STABLE ANGINA PECTORIS (HCC): ICD-10-CM

## 2021-03-30 DIAGNOSIS — R07.9 CHEST PAIN, UNSPECIFIED TYPE: ICD-10-CM

## 2021-03-30 DIAGNOSIS — E78.2 MIXED HYPERLIPIDEMIA: ICD-10-CM

## 2021-03-30 RX ORDER — ATORVASTATIN CALCIUM 40 MG/1
TABLET, FILM COATED ORAL
Qty: 90 TABLET | Refills: 0 | Status: SHIPPED | OUTPATIENT
Start: 2021-03-30 | End: 2021-06-29

## 2021-03-30 RX ORDER — METOPROLOL TARTRATE 50 MG/1
TABLET, FILM COATED ORAL
Qty: 180 TABLET | Refills: 0 | Status: SHIPPED | OUTPATIENT
Start: 2021-03-30 | End: 2021-06-29

## 2021-03-30 RX ORDER — ISOSORBIDE MONONITRATE 30 MG/1
TABLET, EXTENDED RELEASE ORAL
Qty: 90 TABLET | Refills: 0 | Status: SHIPPED | OUTPATIENT
Start: 2021-03-30 | End: 2021-04-22 | Stop reason: SDUPTHER

## 2021-04-14 ENCOUNTER — IMMUNIZATIONS (OUTPATIENT)
Dept: FAMILY MEDICINE CLINIC | Facility: HOSPITAL | Age: 51
End: 2021-04-14

## 2021-04-14 DIAGNOSIS — Z23 ENCOUNTER FOR IMMUNIZATION: Primary | ICD-10-CM

## 2021-04-14 PROCEDURE — 0012A SARS-COV-2 / COVID-19 MRNA VACCINE (MODERNA) 100 MCG: CPT

## 2021-04-14 PROCEDURE — 91301 SARS-COV-2 / COVID-19 MRNA VACCINE (MODERNA) 100 MCG: CPT

## 2021-04-22 ENCOUNTER — OFFICE VISIT (OUTPATIENT)
Dept: CARDIOLOGY CLINIC | Facility: CLINIC | Age: 51
End: 2021-04-22
Payer: COMMERCIAL

## 2021-04-22 VITALS
HEIGHT: 68 IN | HEART RATE: 71 BPM | DIASTOLIC BLOOD PRESSURE: 90 MMHG | SYSTOLIC BLOOD PRESSURE: 130 MMHG | WEIGHT: 196.1 LBS | BODY MASS INDEX: 29.72 KG/M2

## 2021-04-22 DIAGNOSIS — E78.2 MIXED HYPERLIPIDEMIA: ICD-10-CM

## 2021-04-22 DIAGNOSIS — I49.3 PVC (PREMATURE VENTRICULAR CONTRACTION): ICD-10-CM

## 2021-04-22 DIAGNOSIS — I25.118 CORONARY ARTERY DISEASE OF NATIVE ARTERY OF NATIVE HEART WITH STABLE ANGINA PECTORIS (HCC): ICD-10-CM

## 2021-04-22 DIAGNOSIS — R07.9 CHEST PAIN, UNSPECIFIED TYPE: Primary | ICD-10-CM

## 2021-04-22 DIAGNOSIS — R00.2 PALPITATIONS: ICD-10-CM

## 2021-04-22 DIAGNOSIS — I10 ESSENTIAL HYPERTENSION: ICD-10-CM

## 2021-04-22 PROCEDURE — 93000 ELECTROCARDIOGRAM COMPLETE: CPT | Performed by: INTERNAL MEDICINE

## 2021-04-22 PROCEDURE — 99214 OFFICE O/P EST MOD 30 MIN: CPT | Performed by: INTERNAL MEDICINE

## 2021-04-22 RX ORDER — ISOSORBIDE MONONITRATE 60 MG/1
60 TABLET, EXTENDED RELEASE ORAL DAILY
Qty: 60 TABLET | Refills: 3 | Status: SHIPPED | OUTPATIENT
Start: 2021-04-22 | End: 2021-10-11

## 2021-04-22 NOTE — PROGRESS NOTES
Cardiology Consultation     Maggi Ga  [id number]  1970  [facility] [address] [address] [phone]    1  Chest pain, unspecified type  POCT ECG    isosorbide mononitrate (IMDUR) 60 mg 24 hr tablet   2  PVC (premature ventricular contraction)     3  Essential hypertension     4  Coronary artery disease of native artery of native heart with stable angina pectoris (HCC)  ticagrelor (Brilinta) 90 MG    isosorbide mononitrate (IMDUR) 60 mg 24 hr tablet   5  Mixed hyperlipidemia  Lipid panel   6  Palpitations  Holter monitor - 24 hour     Chief Complaint   Patient presents with    Follow-up    Palpitations     when walking     HPI: Patient is here for management of cardiac issues  He has a h/o 4 prior MIs resulting in multiple stent placements  Most recent MI was May 2019 with a late in-stent thrombosis in the ramus requiring placement of 2 BEVERLY  He had been battling continued chest pains that are random in occurrence, not necessarily related to exertion, sharp, mid-sternal, and associated with shortness of breath along with neck and back pain  Some improvements in chest pains noted, but still with chest pains with exertion - sits and relaxes and seems to go away - doesn't take a nitro  Seems like less activity is causing more pain - sometimes even getting up to go to the bathroom can cause it  He has also noted increased palpitations, which feels like they are coming every night and lasting much longer than his PVCs  No reported shortness of breath, lightheadedness, syncope, LE edema, orthopnea, PND, or significant weight changes  Patient remains active without any increased fatigue out of the ordinary        Patient Active Problem List   Diagnosis    Coronary artery disease of native artery of native heart with stable angina pectoris ([address] )    Essential hypertension    Mixed hyperlipidemia    Chest pain    PVC (premature ventricular contraction)    Palpitations     Past Medical History:   Diagnosis Date    Arthritis     Coronary artery disease     MI x 4; cardiac cath with 4 stents    GERD (gastroesophageal reflux disease)     Hypertension     Myocardial infarct ([facility] [address] )     4 MIs as of 2020     Social History     Socioeconomic History    Marital status: /Civil Union     Spouse name: Not on file    Number of children: Not on file    Years of education: Not on file    Highest education level: Not on file   Occupational History    Not on file   Social Needs    Financial resource strain: Not on file    Food insecurity     Worry: Not on file     Inability: Not on file   Crocs needs     Medical: Not on file     Non-medical: Not on file   Tobacco Use    Smoking status: Former Smoker     Packs/day: 1 00     Years: 10 00     Pack years: 10 00     Types: Cigarettes     Start date: 1998     Quit date: 2018     Years since quittin 4    Smokeless tobacco: Never Used   Substance and Sexual Activity    Alcohol use: Never     Frequency: Never    Drug use: Never    Sexual activity: Yes     Partners: Female     Birth control/protection: None   Lifestyle    Physical activity     Days per week: Not on file     Minutes per session: Not on file    Stress: Not on file   Relationships    Social connections     Talks on phone: Not on file     Gets together: Not on file     Attends [employer] service: Not on file     Active member of club or organization: Not on file     Attends meetings of clubs or organizations: Not on file     Relationship status: Not on file    Intimate partner violence     Fear of current or ex partner: Not on file     Emotionally abused: Not on file     Physically abused: Not on file     Forced sexual activity: Not on file   Other Topics Concern    Not on file   Social History Narrative    Not on file      Family History Problem Relation Age of Onset    Heart disease Father     Hyperlipidemia Father     Diabetes Sister     Early death Sister     Diabetes Brother     Diabetes Maternal Grandmother      Past Surgical History:   Procedure Laterality Date    CORONARY ANGIOPLASTY WITH STENT PLACEMENT         Current Outpatient Medications:     aspirin (ASPIRIN LOW DOSE) 81 MG tablet, Take by mouth, Disp: , Rfl:     atorvastatin (LIPITOR) 40 mg tablet, TAKE ONE TABLET BY MOUTH EVERY 24 HOURS, Disp: 90 tablet, Rfl: 0    isosorbide mononitrate (IMDUR) 60 mg 24 hr tablet, Take 1 tablet (60 mg total) by mouth daily, Disp: 60 tablet, Rfl: 3    losartan (COZAAR) 100 MG tablet, Take 1 tablet (100 mg total) by mouth every 24 hours, Disp: 30 tablet, Rfl: 3    metoprolol tartrate (LOPRESSOR) 50 mg tablet, TAKE ONE TABLET BY MOUTH EVERY 12 HOURS, Disp: 180 tablet, Rfl: 0    pantoprazole (PROTONIX) 40 mg tablet, Take 40 mg by mouth daily, Disp: , Rfl: 3    venlafaxine (EFFEXOR-XR) 75 mg 24 hr capsule, Take 75 mg by mouth daily , Disp: , Rfl:     nitroglycerin (NITROSTAT) 0 4 mg SL tablet, as directed, Disp: , Rfl:     ticagrelor (Brilinta) 90 MG, Take 1 tablet (90 mg total) by mouth every 12 (twelve) hours, Disp: 180 tablet, Rfl: 3  No Known Allergies  Vitals:    04/22/21 0951   BP: 130/90   BP Location: Right arm   Patient Position: Sitting   Cuff Size: Large   Pulse: 71   Weight: 89 kg (196 lb 1 6 oz)   Height: 5' 8" (1 727 m)       Labs:  No visits with results within 6 Month(s) from this visit  Latest known visit with results is:   Lab on 10/13/2020   Component Date Value    Cholesterol 10/13/2020 139     Triglycerides 10/13/2020 123     HDL, Direct 10/13/2020 36*    LDL Calculated 10/13/2020 78     Non-HDL-Chol (CHOL-HDL) 10/13/2020 103      Lab Results   Component Value Date    TRIG 123 10/13/2020    HDL 36 (L) 10/13/2020     Imaging: No results found      Review of Systems:  Review of Systems   Constitutional: Negative for activity change, appetite change, fatigue and fever  HENT: Negative for nosebleeds and sore throat  Eyes: Negative for photophobia and visual disturbance  Respiratory: Negative for cough, chest tightness, shortness of breath and wheezing  Cardiovascular: Negative for chest pain, palpitations and leg swelling  Gastrointestinal: Negative for abdominal pain, diarrhea, nausea and vomiting  Endocrine: Negative for polyuria  Genitourinary: Negative for dysuria, frequency and hematuria  Musculoskeletal: Negative for arthralgias, back pain and gait problem  Skin: Negative for pallor and rash  Neurological: Negative for dizziness, syncope, speech difficulty and light-headedness  Hematological: Does not bruise/bleed easily  Psychiatric/Behavioral: Negative for agitation, behavioral problems and confusion  Physical Exam:  Physical Exam  Vitals signs reviewed  Constitutional:       General: He is not in acute distress  Appearance: He is well-developed  He is not diaphoretic  HENT:      Head: Normocephalic and atraumatic  Nose: Nose normal    Eyes:      General: No scleral icterus  Pupils: Pupils are equal, round, and reactive to light  Neck:      Musculoskeletal: Normal range of motion and neck supple  Vascular: No JVD  Cardiovascular:      Rate and Rhythm: Normal rate and regular rhythm  Heart sounds: S1 normal and S2 normal  Heart sounds not distant  No murmur  No systolic murmur  No friction rub  No gallop  No S3 sounds  Pulmonary:      Effort: Pulmonary effort is normal  No respiratory distress  Breath sounds: Normal breath sounds  No wheezing or rales  Abdominal:      General: Bowel sounds are normal  There is no distension  Palpations: Abdomen is soft  Musculoskeletal:         General: No deformity  Skin:     General: Skin is warm and dry  Findings: No erythema     Neurological:      Mental Status: He is alert and oriented to person, place, and time  Cranial Nerves: No cranial nerve deficit  Psychiatric:         Behavior: Behavior normal        Blood pressure 130/90, pulse 71, height 5' 8" (1 727 m), weight 89 kg (196 lb 1 6 oz)  EKG:  Normal sinus rhythm  Nonspecific T wave abnormality  Abnormal ECG    Discussion/Summary:  Chest pain: with underlying significant premature CAD and strong family history of CAD  With residual disease in LAD and RCA as well as per patient report  With recent stent placement in the setting of unstable angina in May 2019 - multiple episodes of in stent thrombosis/restenosis and continued chest pains even with minimal exertion - he has been on Brilinta for over a year and has instent restenosis with this  We referred for repeat cardiac cath and potential CABG - which did reveal disease, but not quite ready for CABG at this time - mild LAD atherosclerosis, 30% pLAD, 75% D2, 50% OM1 with occluded subbranch, 0% pR1 at site of prior stent, 70% mRCA at site of prior stent with eccentric lesion 70% PLV1  We also added Imdur to help with symptom management in the meantime  Continued on ASA, Brilinta, and ARB - we stopped amlodipine since BP is running on low end  There did appear to be a reason for having an ARB with the reported EF of 40-45%  We checked an echo to evaluate LV EF and wall motion as well - which revealed a normal LV function with no wall motion abnormalities  With continued chest pain requiring nitro use, we increased Imdur to 30mg twice daily - which has helped significantly with chest pain  He completed cardiac rehab to help with conditioning and improving fatigue level  For improved control of chest pain and fatigue, we changed Coreg to metoprolol for less alpha-blockade and more beta-specific blockade  He has increased chest pain, sounds like stable angina that seems to be increasing - will increase Imdur to 60mg to see if that will help with chest pain    With increased palpitations as well, will check another Holter - seems like these episodes feel longer than his PVCs  HTN: well controlled  Will continue Imdur and metoprolol for CAD symptom management  We stopped amlodipine in order to allow addition of higher dose of Imdur  Doing well, no changes today  HLD: continued on statin  LDL 24 in Feb 2020 - reduced Lipitor to 40mg, perhaps if there is a component of musculoskeletal pain, this will help it  Recheck levels in 3 months after the change in dosing revealed LDL of 78, which is at goal, and improved symptoms  Recheck before next visit  Frequent PVCs: as above, perhaps triggered by ischemic disease  Will continue metoprolol for the time being

## 2021-04-22 NOTE — PATIENT INSTRUCTIONS
Coronary Artery Disease   AMBULATORY CARE:   Coronary artery disease (CAD)  is narrowing of the arteries to your heart caused by a buildup of plaque  Plaque is made up of cholesterol and other substances  The narrowing in your arteries decreases the amount of blood that can flow to your heart  This causes your heart to get less oxygen  You may not have any symptoms of CAD  It is important for you to manage CAD even if you feel well  CAD can lead to a heart attack if it is not managed  Common symptoms include the following:   · Chest pain (angina), causing burning, squeezing, or crushing tightness in your chest    · Pain that spreads to your neck, jaw, or shoulder blade    · Nausea, vomiting, sweating, fainting, and hands and feet that are cold to the touch    Call 911 for any of the following:   · You have any of the following signs of a heart attack:      ? Squeezing, pressure, or pain in your chest    ? You may  also have any of the following:     § Discomfort or pain in your back, neck, jaw, stomach, or arm    § Shortness of breath    § Nausea or vomiting    § Lightheadedness or a sudden cold sweat      Contact your healthcare provider if:   · You have chest pain that is more frequent, or you have chest pain at rest     · You have questions or concerns about your condition or care  Medicines used to treat CAD:   · Blood pressure medicines  are given to lower your blood pressure  ACE inhibitors help keep your blood vessels relaxed and open to help keep blood flowing into your heart  Beta-blockers keep your heart pumping strongly and regularly so it does not work too hard to get oxygen  · Cholesterol medicines  help lower blood cholesterol levels  · Nitrates , such as nitroglycerin, relax the arteries of your heart so it gets more oxygen  They help to relieve your chest pain  · Antiplatelets , such as aspirin, help prevent blood clots  Take your antiplatelet medicine exactly as directed   These medicines make it more likely for you to bleed or bruise  If you are told to take aspirin, do not take acetaminophen or ibuprofen instead  · Blood thinners  keep clots from forming in your blood  Clots may cause heart attacks, strokes, or death  This medicine makes it more likely for you to bleed or bruise  · Do not take certain medicines without asking your healthcare provider first   These include NSAIDs, herbal or vitamin supplements, or hormones (estrogen or progestin)  Procedures used to treat CAD:   · Angioplasty  may be done to open an artery blocked by plaque  A tube with a balloon on the end is threaded into the blocked artery  Once the tube is in the artery, the balloon is inflated  As the balloon inflates, it presses the plaque against the artery wall to open the artery  A stent may be placed in your artery to keep it open  · Coronary artery bypass graft surgery (CABG)  is open heart surgery  Healthcare providers take arteries or veins from other areas in your body and use them to bypass or go around the blocked arteries of your heart  Cardiac rehabilitation:  Your healthcare provider may recommend that you attend cardiac rehabilitation (rehab)  This is a program run by specialists who will help you safely strengthen your heart and reduce the risk for more heart disease  The plan includes exercise, relaxation, stress management, and heart-healthy nutrition  Healthcare providers will also check to make sure any medicines you are taking are working  Manage CAD to prevent a heart attack:   · Do not smoke  Nicotine and other chemicals in cigarettes and cigars can cause heart and lung damage  Ask your healthcare provider for information if you currently smoke and need help to quit  E-cigarettes or smokeless tobacco still contain nicotine  Talk to your healthcare provider before you use these products  · Exercise regularly    Exercise at least 30 minutes each day, on most days of the week  Exercise helps to lower high cholesterol and high blood pressure  It can also help you maintain a healthy weight  Ask your healthcare provider about the kind of exercise you should do and how to get started  · Maintain a healthy weight  If you are overweight, talk to your healthcare provider about how to lose weight  A weight loss of 10% can improve your heart health  · Eat heart-healthy foods  Include fresh fruits and vegetables in your meal plan  Choose low-fat foods, such as skim or 1% fat milk, low-fat cheese and yogurt, fish, chicken (without skin), and lean meats  Eat two 4-ounce servings of fish high in omega-3 fats each week, such as salmon, fresh tuna, and herring  Do not eat foods that are high in sodium, such as canned foods, potato chips, salty snacks, and cold cuts  Put less table salt on your food  · Limit or do not drink alcohol  A drink of alcohol is 12 ounces of beer, 5 ounces of wine, or 1½ ounces of liquor  · Manage other health conditions  Follow your healthcare provider's advice on how to manage other conditions that can affect your heart health  These include diabetes, high blood pressure, and high cholesterol  You may need to take medicines for these conditions and make other lifestyle changes  · Ask if you should have a flu vaccine  The flu can be dangerous for a person who has CAD  The flu vaccine is available every year in the fall  Follow up with your healthcare provider as directed: You may need to return for other tests  You may also be referred to a cardiac surgeon  Write down your questions so you remember to ask them during your visits  © Copyright [address] Information is for End User's use only and may not be sold, redistributed or otherwise used for commercial purposes   All illustrations and images included in CareNotes® are the copyrighted property of [employer]  or [facility] Kathy Powers   The above information is an educational aid only  It is not intended as medical advice for individual conditions or treatments  Talk to your doctor, nurse or pharmacist before following any medical regimen to see if it is safe and effective for you

## 2021-04-28 ENCOUNTER — HOSPITAL ENCOUNTER (OUTPATIENT)
Dept: NON INVASIVE DIAGNOSTICS | Facility: HOSPITAL | Age: 51
Discharge: HOME | End: 2021-04-28
Payer: COMMERCIAL

## 2021-04-28 DIAGNOSIS — R00.2 PALPITATIONS: ICD-10-CM

## 2021-04-28 PROCEDURE — 93226 XTRNL ECG REC<48 HR SCAN A/R: CPT

## 2021-04-28 PROCEDURE — 93225 XTRNL ECG REC<48 HRS REC: CPT

## 2021-05-03 PROCEDURE — 93227 XTRNL ECG REC<48 HR R&I: CPT | Performed by: INTERNAL MEDICINE

## 2021-06-11 ENCOUNTER — HOSPITAL ENCOUNTER (OUTPATIENT)
Dept: RADIOLOGY | Facility: HOSPITAL | Age: 51
Discharge: HOME | End: 2021-06-11
Attending: FAMILY MEDICINE
Payer: COMMERCIAL

## 2021-06-11 ENCOUNTER — APPOINTMENT (OUTPATIENT)
Dept: LAB | Facility: HOSPITAL | Age: 51
End: 2021-06-11
Payer: COMMERCIAL

## 2021-06-11 ENCOUNTER — TRANSCRIBE ORDERS (OUTPATIENT)
Dept: ADMINISTRATIVE | Facility: HOSPITAL | Age: 51
End: 2021-06-11

## 2021-06-11 DIAGNOSIS — M51.06 INTERVERTEBRAL LUMBAR DISC DISORDER WITH MYELOPATHY, LUMBAR REGION: ICD-10-CM

## 2021-06-11 DIAGNOSIS — M51.06 INTERVERTEBRAL LUMBAR DISC DISORDER WITH MYELOPATHY, LUMBAR REGION: Primary | ICD-10-CM

## 2021-06-11 LAB
CHOLEST SERPL-MCNC: 127 MG/DL (ref 0–200)
HDLC SERPL-MCNC: 29 MG/DL
LDLC SERPL CALC-MCNC: 47 MG/DL (ref 0–100)
NONHDLC SERPL-MCNC: 98 MG/DL
TRIGL SERPL-MCNC: 257 MG/DL (ref 44–166)

## 2021-06-11 PROCEDURE — 80061 LIPID PANEL: CPT | Performed by: INTERNAL MEDICINE

## 2021-06-11 PROCEDURE — 36415 COLL VENOUS BLD VENIPUNCTURE: CPT | Performed by: INTERNAL MEDICINE

## 2021-06-11 PROCEDURE — 72110 X-RAY EXAM L-2 SPINE 4/>VWS: CPT

## 2021-06-15 ENCOUNTER — TRANSCRIBE ORDERS (OUTPATIENT)
Dept: ADMINISTRATIVE | Facility: HOSPITAL | Age: 51
End: 2021-06-15

## 2021-06-15 DIAGNOSIS — M51.06 INTERVERTEBRAL LUMBAR DISC DISORDER WITH MYELOPATHY, LUMBAR REGION: Primary | ICD-10-CM

## 2021-06-23 ENCOUNTER — HOSPITAL ENCOUNTER (OUTPATIENT)
Dept: MRI IMAGING | Facility: HOSPITAL | Age: 51
Discharge: HOME | End: 2021-06-23
Attending: FAMILY MEDICINE
Payer: COMMERCIAL

## 2021-06-23 DIAGNOSIS — M51.06 INTERVERTEBRAL LUMBAR DISC DISORDER WITH MYELOPATHY, LUMBAR REGION: ICD-10-CM

## 2021-06-23 PROCEDURE — 72148 MRI LUMBAR SPINE W/O DYE: CPT

## 2021-06-29 DIAGNOSIS — I25.118 CORONARY ARTERY DISEASE OF NATIVE ARTERY OF NATIVE HEART WITH STABLE ANGINA PECTORIS (HCC): ICD-10-CM

## 2021-06-29 DIAGNOSIS — E78.2 MIXED HYPERLIPIDEMIA: ICD-10-CM

## 2021-06-29 RX ORDER — ATORVASTATIN CALCIUM 40 MG/1
TABLET, FILM COATED ORAL
Qty: 90 TABLET | Refills: 0 | Status: SHIPPED | OUTPATIENT
Start: 2021-06-29 | End: 2021-10-11

## 2021-06-29 RX ORDER — METOPROLOL TARTRATE 50 MG/1
TABLET, FILM COATED ORAL
Qty: 180 TABLET | Refills: 0 | Status: SHIPPED | OUTPATIENT
Start: 2021-06-29 | End: 2021-06-30 | Stop reason: ALTCHOICE

## 2021-06-30 ENCOUNTER — OFFICE VISIT (OUTPATIENT)
Dept: CARDIOLOGY CLINIC | Facility: CLINIC | Age: 51
End: 2021-06-30
Payer: COMMERCIAL

## 2021-06-30 VITALS
SYSTOLIC BLOOD PRESSURE: 140 MMHG | HEIGHT: 68 IN | WEIGHT: 201 LBS | BODY MASS INDEX: 30.46 KG/M2 | HEART RATE: 64 BPM | DIASTOLIC BLOOD PRESSURE: 86 MMHG

## 2021-06-30 DIAGNOSIS — I20.8 STABLE ANGINA PECTORIS (HCC): ICD-10-CM

## 2021-06-30 DIAGNOSIS — I49.3 PVC (PREMATURE VENTRICULAR CONTRACTION): ICD-10-CM

## 2021-06-30 DIAGNOSIS — E78.2 MIXED HYPERLIPIDEMIA: ICD-10-CM

## 2021-06-30 DIAGNOSIS — I10 ESSENTIAL HYPERTENSION: ICD-10-CM

## 2021-06-30 DIAGNOSIS — I25.118 CORONARY ARTERY DISEASE OF NATIVE ARTERY OF NATIVE HEART WITH STABLE ANGINA PECTORIS (HCC): ICD-10-CM

## 2021-06-30 DIAGNOSIS — R00.2 PALPITATIONS: Primary | ICD-10-CM

## 2021-06-30 PROCEDURE — 93000 ELECTROCARDIOGRAM COMPLETE: CPT | Performed by: INTERNAL MEDICINE

## 2021-06-30 PROCEDURE — 99214 OFFICE O/P EST MOD 30 MIN: CPT | Performed by: INTERNAL MEDICINE

## 2021-06-30 RX ORDER — TRAMADOL HYDROCHLORIDE 50 MG/1
TABLET ORAL
COMMUNITY
Start: 2021-06-17 | End: 2021-11-26

## 2021-06-30 RX ORDER — METOPROLOL SUCCINATE 50 MG/1
50 TABLET, EXTENDED RELEASE ORAL 2 TIMES DAILY
Qty: 60 TABLET | Refills: 3 | Status: SHIPPED | OUTPATIENT
Start: 2021-06-30 | End: 2021-09-30

## 2021-06-30 NOTE — PROGRESS NOTES
Cardiology Consultation     Jed Chavez  [id number]  1970  [facility] [address] [address] [phone]    1  Palpitations  POCT ECG   2  PVC (premature ventricular contraction)  metoprolol succinate (TOPROL-XL) 50 mg 24 hr tablet   3  Essential hypertension     4  Coronary artery disease of native artery of native heart with stable angina pectoris (HCC)  metoprolol succinate (TOPROL-XL) 50 mg 24 hr tablet   5  Mixed hyperlipidemia     6  Stable angina pectoris (HCC)  metoprolol succinate (TOPROL-XL) 50 mg 24 hr tablet     Chief Complaint   Patient presents with    Follow-up    Palpitations     all the time    Chest Pain     when walking     HPI: Patient is here for management of cardiac issues  He has a h/o 4 prior MIs resulting in multiple stent placements  Most recent MI was May 2019 with a late in-stent thrombosis in the ramus requiring placement of 2 BEVERLY  He had been battling continued chest pains that are random in occurrence, not necessarily related to exertion, sharp, mid-sternal, and associated with shortness of breath along with neck and back pain  Some improvements in chest pains noted, but still with chest pains with exertion - sits and relaxes and seems to go away - doesn't take a nitro  Seems like less activity is causing more pain - sometimes even getting up to go to the bathroom can cause it  He has also noted increased palpitations, which feels like they are coming every night and lasting much longer than his PVCs - which are continued  No reported shortness of breath, lightheadedness, syncope, LE edema, orthopnea, PND, or significant weight changes  Patient remains active without any increased fatigue out of the ordinary            Patient Active Problem List   Diagnosis    Coronary artery disease of native artery of native heart with stable angina pectoris [facility]    Essential hypertension    Mixed hyperlipidemia    Chest pain    PVC (premature ventricular contraction)    Palpitations     Past Medical History:   Diagnosis Date    Arthritis     Coronary artery disease     MI x 4; cardiac cath with 4 stents    GERD (gastroesophageal reflux disease)     Hypertension     [facility]ocardial infarct ([facility] )     4 MIs as of 2020     Social History     Socioeconomic History    Marital status: /Civil Union     Spouse name: Not on file    Number of children: Not on file    Years of education: Not on file    Highest education level: Not on file   Occupational History    Not on file   Tobacco Use    Smoking status: Former Smoker     Packs/day: 1 00     Years: 10 00     Pack years: 10      Types: Cigarettes     Start date: 1998     Quit date: 2018     Years since quittin 6    Smokeless tobacco: Never Used   Vaping Use    Vaping Use: Never used   Substance and Sexual Activity    Alcohol use: Never    Drug use: Never    Sexual activity: Yes     Partners: Female     Birth control/protection: None   Other Topics Concern    Not on file   Social History Narrative    Not on file     Social Determinants of Health     Financial Resource Strain:     Difficulty of Paying Living Expenses:    Food Insecurity:     Worried About Running Out of Food in the Last Year:     Ran Out of Food in the Last Year:    Transportation Needs:     Lack of Transportation (Medical):      Lack of Transportation (Non-Medical):    Physical Activity:     Days of Exercise per Week:     Minutes of Exercise per Session:    Stress:     Feeling of Stress :    Social Connections:     Frequency of Communication with Friends and Family:     Frequency of Social Gatherings with Friends and Family:     Attends Druze Services:     Active Member of Clubs or Organizations:     Attends Club or Organization Meetings:     Marital Status:    Intimate Partner Violence:  Fear of Current or Ex-Partner:     Emotionally Abused:     Physically Abused:     Sexually Abused:       Family History   Problem Relation Age of Onset    Heart disease Father     Hyperlipidemia Father     Diabetes Sister     Early death Sister     Diabetes Brother     Diabetes Maternal Grandmother      Past Surgical History:   Procedure Laterality Date    CORONARY ANGIOPLASTY WITH STENT PLACEMENT         Current Outpatient Medications:     aspirin (ASPIRIN LOW DOSE) 81 MG tablet, Take by mouth, Disp: , Rfl:     atorvastatin (LIPITOR) 40 mg tablet, TAKE ONE TABLET BY MOUTH EVERY 24 HOURS, Disp: 90 tablet, Rfl: 0    isosorbide mononitrate (IMDUR) 60 mg 24 hr tablet, Take 1 tablet (60 mg total) by mouth daily, Disp: 60 tablet, Rfl: 3    losartan (COZAAR) 100 MG tablet, Take 1 tablet (100 mg total) by mouth every 24 hours, Disp: 30 tablet, Rfl: 3    pantoprazole (PROTONIX) 40 mg tablet, Take 40 mg by mouth daily, Disp: , Rfl: 3    ticagrelor (Brilinta) 90 MG, Take 1 tablet (90 mg total) by mouth every 12 (twelve) hours, Disp: 180 tablet, Rfl: 3    traMADol (ULTRAM) 50 mg tablet, , Disp: , Rfl:     venlafaxine (EFFEXOR-XR) 75 mg 24 hr capsule, Take 75 mg by mouth daily , Disp: , Rfl:     metoprolol succinate (TOPROL-XL) 50 mg 24 hr tablet, Take 1 tablet (50 mg total) by mouth 2 (two) times a day, Disp: 60 tablet, Rfl: 3    nitroglycerin (NITROSTAT) 0 4 mg SL tablet, as directed (Patient not taking: Reported on 6/30/2021), Disp: , Rfl:   No Known Allergies  Vitals:    06/30/21 0859   BP: 140/86   BP Location: Left arm   Patient Position: Sitting   Cuff Size: Standard   Pulse: 64   Weight: 91 2 kg (201 lb)   Height: 5' 8" (1 727 m)       Labs:  Office Visit on 04/22/2021   Component Date Value    Cholesterol 06/11/2021 127     Triglycerides 06/11/2021 257*    HDL, Direct 06/11/2021 29*    LDL Calculated 06/11/2021 47     Non-HDL-Chol (CHOL-HDL) 06/11/2021 98      Lab Results   Component Value Date    TRIG 257 (H) 06/11/2021    HDL 29 (L) 06/11/2021     Imaging: No results found  Review of Systems:  Review of Systems   Constitutional: Negative for activity change, appetite change, fatigue and fever  HENT: Negative for nosebleeds and sore throat  Eyes: Negative for photophobia and visual disturbance  Respiratory: Negative for cough, chest tightness, shortness of breath and wheezing  Cardiovascular: Positive for palpitations  Negative for chest pain and leg swelling  Gastrointestinal: Negative for abdominal pain, diarrhea, nausea and vomiting  Endocrine: Negative for polyuria  Genitourinary: Negative for dysuria, frequency and hematuria  Musculoskeletal: Negative for arthralgias, back pain and gait problem  Skin: Negative for pallor and rash  Neurological: Negative for dizziness, syncope, speech difficulty and light-headedness  Hematological: Does not bruise/bleed easily  Psychiatric/Behavioral: Negative for agitation, behavioral problems and confusion  Physical Exam:  Physical Exam  Vitals reviewed  Constitutional:       General: He is not in acute distress  Appearance: He is well-developed  He is not diaphoretic  HENT:      Head: Normocephalic and atraumatic  Nose: Nose normal    Eyes:      General: No scleral icterus  Pupils: Pupils are equal, round, and reactive to light  Neck:      Vascular: No JVD  Cardiovascular:      Rate and Rhythm: Normal rate and regular rhythm  Heart sounds: S1 normal and S2 normal  Heart sounds not distant  No murmur heard  No systolic murmur is present  No friction rub  No gallop  No S3 sounds  Pulmonary:      Effort: Pulmonary effort is normal  No respiratory distress  Breath sounds: Normal breath sounds  No wheezing or rales  Abdominal:      General: Bowel sounds are normal  There is no distension  Palpations: Abdomen is soft  Musculoskeletal:         General: No deformity        Cervical back: Normal range of motion and neck supple  Skin:     General: Skin is warm and dry  Findings: No erythema  Neurological:      Mental Status: He is alert and oriented to person, place, and time  Cranial Nerves: No cranial nerve deficit  Psychiatric:         Behavior: Behavior normal        Blood pressure 140/86, pulse 64, height 5' 8" (1 727 m), weight 91 2 kg (201 lb)  EKG:  Normal sinus rhythm  Nonspecific T wave abnormality  Abnormal ECG    Discussion/Summary:  Chest pain: with underlying significant premature CAD and strong family history of CAD  With residual disease in LAD and RCA as well as per patient report  With stent placement in the setting of unstable angina in May 2019 - multiple episodes of in stent thrombosis/restenosis and continued chest pains even with minimal exertion - he has been on Brilinta for over a year and has instent restenosis with this  We referred for repeat cardiac cath and potential CABG - which did reveal disease, but not quite ready for CABG at this time - mild LAD atherosclerosis, 30% pLAD, 75% D2, 50% OM1 with occluded subbranch, 0% pR1 at site of prior stent, 70% mRCA at site of prior stent with eccentric lesion 70% PLV1  We also added Imdur to help with symptom management in the meantime  Continued on ASA, Brilinta, and ARB - we stopped amlodipine since BP is running on low end  There did appear to be a reason for having an ARB with the reported EF of 40-45%  We checked an echo to evaluate LV EF and wall motion as well - which revealed a normal LV function with no wall motion abnormalities  With continued chest pain requiring nitro use, we increased Imdur to 30mg twice daily - which has helped significantly with chest pain  He completed cardiac rehab to help with conditioning and improving fatigue level  For improved control of chest pain and fatigue, we changed Coreg to metoprolol for less alpha-blockade and more beta-specific blockade    He had increased chest pain, sounds like stable angina that seems to be increasing - we increased Imdur to 60mg to see if that will help with chest pain  With increased palpitations as well, we checked another Holter - seems like these episodes feel longer than his PVCs- this revealed common ventricular ectopy (5 4%) with brief runs (longest 4 beats in duration) - will change BB to Toprol XL to help with suppression  HTN: well controlled  Will continue Imdur and metoprolol for CAD symptom management  We stopped amlodipine in order to allow addition of higher dose of Imdur  Doing well, no changes today  HLD: continued on statin  LDL 24 in Feb 2020 - reduced Lipitor to 40mg, perhaps if there is a component of musculoskeletal pain, this will help it  Recheck levels in 3 months after the change in dosing revealed LDL of 78, which is at goal, and improved symptoms  Recheck in June 2021 revealed LDL of 47  Frequent PVCs: as above, perhaps triggered by ischemic disease  Will continue metoprolol for the time being - with frequency of ectopy, will change metoprolol to Toprol XL BID

## 2021-06-30 NOTE — PATIENT INSTRUCTIONS
Premature Ventricular Contractions   WHAT YOU NEED TO KNOW:   What are premature ventricular contractions? Premature ventricular contractions (PVCs) are an interruption in your heart rhythm  They are caused by an early signal for your heart to pump  Your risk of PVCs increases when you drink alcohol or caffeine, or if you are fatigued or stressed  It is very important for you to follow up with your healthcare provider so the cause of your PVCs can be diagnosed and treated  What are symptoms of PVCs? · A skipped heartbeat     · A fast heartbeat    · Chest pain     · Lightheadedness, dizziness, or faintness    · Tiredness with exercise or activity    How are PVCs diagnosed? Your healthcare provider will ask if you have a family history of heart problems  You may also need one or more of the following:  · An EKG  records your heart rhythm and how fast your heart beats  It is used to check for PVCs  · A Holter monitor  is a device that you wear for a period of time  It records how fast your heart beats, and if it beats in a regular pattern  You may need to wear it up to 72 hours  This will show how frequent your PVCs are during your normal daily activities  · An echocardiogram  (echo) is a type of ultrasound that shows the movement and blood vessels of your heart on a monitor  How are PVCs treated? Your treatment will depend on the cause of your PVCs  You may need any of the following:  · Heart medicine  may be given to make your heart beat at a regular rate and rhythm  · Cardiac ablation  is a procedure that is used to treat an abnormal heart rhythm  Ask your healthcare provider for more information  Call 911 if:   · You have any of the following signs of a heart attack:      ?  Squeezing, pressure, or pain in your chest    ? You may  also have any of the following:     § Discomfort or pain in your back, neck, jaw, stomach, or arm    § Shortness of breath    § Nausea or vomiting    § Lightheadedness or a sudden cold sweat      When should I contact my healthcare provider? · You still have symptoms after treatment, or your symptoms worsen  · You have questions or concerns about your condition or care  CARE AGREEMENT:   You have the right to help plan your care  Learn about your health condition and how it may be treated  Discuss treatment options with your healthcare providers to decide what care you want to receive  You always have the right to refuse treatment  The above information is an  only  It is not intended as medical advice for individual conditions or treatments  Talk to your doctor, nurse or pharmacist before following any medical regimen to see if it is safe and effective for you  © Copyright [address] Information is for End User's use only and may not be sold, redistributed or otherwise used for commercial purposes   All illustrations and images included in CareNotes® are the copyrighted property of A D A M , Inc  or [address] [place] [address] [facility]

## 2021-07-02 ENCOUNTER — TELEPHONE (OUTPATIENT)
Dept: OBGYN CLINIC | Facility: CLINIC | Age: 51
End: 2021-07-02

## 2021-07-02 NOTE — TELEPHONE ENCOUNTER
Received referral from Dr Demetrius Lund for pt to be seen for back pain    LMOM for return call from pt to do intake

## 2021-07-22 ENCOUNTER — CONSULT (OUTPATIENT)
Dept: PAIN MEDICINE | Facility: CLINIC | Age: 51
End: 2021-07-22
Payer: COMMERCIAL

## 2021-07-22 VITALS
DIASTOLIC BLOOD PRESSURE: 92 MMHG | HEIGHT: 68 IN | SYSTOLIC BLOOD PRESSURE: 146 MMHG | WEIGHT: 192 LBS | TEMPERATURE: 98.8 F | HEART RATE: 72 BPM | BODY MASS INDEX: 29.1 KG/M2

## 2021-07-22 DIAGNOSIS — M51.36 LUMBAR DEGENERATIVE DISC DISEASE: ICD-10-CM

## 2021-07-22 DIAGNOSIS — M54.50 CHRONIC BILATERAL LOW BACK PAIN WITHOUT SCIATICA: ICD-10-CM

## 2021-07-22 DIAGNOSIS — G89.29 CHRONIC BILATERAL LOW BACK PAIN WITHOUT SCIATICA: ICD-10-CM

## 2021-07-22 DIAGNOSIS — M46.1 SACROILIITIS (HCC): Primary | ICD-10-CM

## 2021-07-22 DIAGNOSIS — M79.18 MYOFASCIAL PAIN SYNDROME: ICD-10-CM

## 2021-07-22 PROBLEM — M51.369 LUMBAR DEGENERATIVE DISC DISEASE: Status: ACTIVE | Noted: 2021-07-22

## 2021-07-22 PROCEDURE — 99244 OFF/OP CNSLTJ NEW/EST MOD 40: CPT | Performed by: ANESTHESIOLOGY
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
## 2021-07-28 ENCOUNTER — TELEPHONE (OUTPATIENT)
Dept: PAIN MEDICINE | Facility: CLINIC | Age: 51
End: 2021-07-28

## 2021-07-28 NOTE — TELEPHONE ENCOUNTER
Called pt to update him on the status of his auth for the procedure he's having done with Dr Yinka Nagy I s/w Lolly Cole from [employer] and she said that the case is still pending & she recommended that I send a new form with "urgent" written on it and that may speed up the process  I let the pt know that if I do not have authorization for this by the day of his procedure, he will need to be r/s  Pt verbalized understanding

## 2021-07-30 ENCOUNTER — HOSPITAL ENCOUNTER (OUTPATIENT)
Dept: RADIOLOGY | Facility: HOSPITAL | Age: 51
Discharge: HOME | End: 2021-07-30
Admitting: ANESTHESIOLOGY
Payer: COMMERCIAL

## 2021-07-30 VITALS
TEMPERATURE: 97.9 F | DIASTOLIC BLOOD PRESSURE: 101 MMHG | SYSTOLIC BLOOD PRESSURE: 149 MMHG | RESPIRATION RATE: 20 BRPM | OXYGEN SATURATION: 98 % | HEART RATE: 78 BPM

## 2021-07-30 DIAGNOSIS — M46.1 SACROILIITIS (HCC): ICD-10-CM

## 2021-07-30 RX ORDER — METHYLPREDNISOLONE ACETATE 40 MG/ML
80 INJECTION, SUSPENSION INTRA-ARTICULAR; INTRALESIONAL; INTRAMUSCULAR; PARENTERAL; SOFT TISSUE ONCE
Status: COMPLETED | OUTPATIENT
Start: 2021-07-30 | End: 2021-07-30

## 2021-07-30 RX ORDER — BUPIVACAINE HCL/PF 2.5 MG/ML
2 VIAL (ML) INJECTION ONCE
Status: COMPLETED | OUTPATIENT
Start: 2021-07-30 | End: 2021-07-30

## 2021-07-30 RX ORDER — LIDOCAINE HYDROCHLORIDE 10 MG/ML
5 INJECTION, SOLUTION EPIDURAL; INFILTRATION; INTRACAUDAL; PERINEURAL ONCE
Status: COMPLETED | OUTPATIENT
Start: 2021-07-30 | End: 2021-07-30

## 2021-07-30 RX ADMIN — METHYLPREDNISOLONE ACETATE 80 MG: 40 INJECTION, SUSPENSION INTRA-ARTICULAR; INTRALESIONAL; INTRAMUSCULAR; PARENTERAL; SOFT TISSUE at 08:41

## 2021-07-30 RX ADMIN — LIDOCAINE HYDROCHLORIDE 3 ML: 10 INJECTION, SOLUTION EPIDURAL; INFILTRATION; INTRACAUDAL; PERINEURAL at 08:39

## 2021-07-30 RX ADMIN — Medication 2 ML: at 08:41

## 2021-07-30 NOTE — DISCHARGE INSTRUCTIONS

## 2021-07-30 NOTE — INTERVAL H&P NOTE
Update: (This section must be completed if the H&P was completed greater than 24 hrs to procedure or admission)    H&P reviewed  After examining the patient, I find no changed to the H&P since it had been written  Patient re-evaluated   Accept as history and physical     Dony Maciel MD/July 30, 2021/8:28 AM

## 2021-07-30 NOTE — DISCHARGE INSTR - LAB

## 2021-08-06 ENCOUNTER — TELEPHONE (OUTPATIENT)
Dept: PAIN MEDICINE | Facility: CLINIC | Age: 51
End: 2021-08-06

## 2021-08-06 NOTE — TELEPHONE ENCOUNTER
S/W pt  Pt stated his whole body is cramping-legs, shoulders, back and thighs  It started on august 1 and is a little bit better  The cramps can wake him up during the night  Pt denies fever and does not feel sick  Pt stated he has not changed anything and still drinking the same amount of fluids  Please advise

## 2021-08-06 NOTE — TELEPHONE ENCOUNTER
Called and spoke to the patient he states 1/10 pain level and 90% improvement  He states he has been having some generalized cramping all over and not sure if this is a side effect  Would like to talk to a nurse

## 2021-08-26 ENCOUNTER — OFFICE VISIT (OUTPATIENT)
Dept: PAIN MEDICINE | Facility: CLINIC | Age: 51
End: 2021-08-26
Payer: COMMERCIAL

## 2021-08-26 VITALS
BODY MASS INDEX: 30.16 KG/M2 | HEIGHT: 68 IN | SYSTOLIC BLOOD PRESSURE: 162 MMHG | WEIGHT: 199 LBS | HEART RATE: 75 BPM | DIASTOLIC BLOOD PRESSURE: 88 MMHG

## 2021-08-26 DIAGNOSIS — G89.29 CHRONIC BILATERAL LOW BACK PAIN WITHOUT SCIATICA: ICD-10-CM

## 2021-08-26 DIAGNOSIS — M54.50 CHRONIC BILATERAL LOW BACK PAIN WITHOUT SCIATICA: ICD-10-CM

## 2021-08-26 DIAGNOSIS — M79.18 MYOFASCIAL PAIN SYNDROME: ICD-10-CM

## 2021-08-26 DIAGNOSIS — M46.1 SACROILIITIS (HCC): Primary | ICD-10-CM

## 2021-08-26 PROCEDURE — 99214 OFFICE O/P EST MOD 30 MIN: CPT | Performed by: NURSE PRACTITIONER

## 2021-08-26 NOTE — PROGRESS NOTES
Assessment:  1  Sacroiliitis ([address] )    2  Chronic bilateral low back pain without sciatica    3  Myofascial pain syndrome        Plan:   While the patient was in the office today, I did have a thorough conversation regarding their chronic pain syndrome, medication management, and treatment plan options  Patient is being seen for follow-up visit  He underwent bilateral sacroiliac joint injections on 07/30/2021  He reports that his pain was improved by 90% for 1 week after the injection  Today, he is complaining of increasing pain in his low back, left worse than right  At this point, we will schedule repeat bilateral sacroiliac joint injections  Complete risks and benefits including bleeding, infection, tissue reaction, nerve injury and allergic reaction were discussed  The approach was demonstrated using models and literature was provided  Verbal and written consent was obtained  Trial diclofenac 50 mg twice daily with food  Prescription was sent electronically to his pharmacy  recommend physical therapy for SI joint strengthening  Will follow-up 1 month after the injection  History of Present Illness: The patient is a 49 y o  male who presents for a follow up office visit in regards to Back Pain  The patients current symptoms include   Complaints of low back pain, left worse than right  Current pain level is a 6/10  Quality pain is described as sharp  Current pain medications includes:   Over-the-counter Advil if needed   The patient reports that this regimen is providing  0% pain relief  The patient is reporting no side effects from this pain medication regimen  I have personally reviewed and/or updated the patient's past medical history, past surgical history, family history, social history, current medications, allergies, and vital signs today  Review of Systems  Review of Systems   All other systems reviewed and are negative          Past Medical History: Diagnosis Date    Arthritis     Coronary artery disease     MI x 4; cardiac cath with 4 stents    GERD (gastroesophageal reflux disease)     Hypertension     Myocardial infarct ([facility] [address] )     4 MIs as of 2020       Past Surgical History:   Procedure Laterality Date    CORONARY ANGIOPLASTY WITH STENT PLACEMENT         Family History   Problem Relation Age of Onset    Heart disease Father     Hyperlipidemia Father     Diabetes Sister     Early death Sister     Diabetes Brother     Diabetes Maternal Grandmother        Social History     Occupational History    Not on file   Tobacco Use    Smoking status: Former Smoker     Packs/day: 1 00     Years: 10 00     Pack years: 10 00     Types: Cigarettes     Start date: 1998     Quit date: 2018     Years since quittin 7    Smokeless tobacco: Never Used   Vaping Use    Vaping Use: Never used   Substance and Sexual Activity    Alcohol use: Never    Drug use: Never    Sexual activity: Yes     Partners: Female     Birth control/protection: None         Current Outpatient Medications:     aspirin (ASPIRIN LOW DOSE) 81 MG tablet, Take by mouth, Disp: , Rfl:     atorvastatin (LIPITOR) 40 mg tablet, TAKE ONE TABLET BY MOUTH EVERY 24 HOURS, Disp: 90 tablet, Rfl: 0    isosorbide mononitrate (IMDUR) 60 mg 24 hr tablet, Take 1 tablet (60 mg total) by mouth daily, Disp: 60 tablet, Rfl: 3    losartan (COZAAR) 100 MG tablet, Take 1 tablet (100 mg total) by mouth every 24 hours, Disp: 30 tablet, Rfl: 3    metoprolol succinate (TOPROL-XL) 50 mg 24 hr tablet, Take 1 tablet (50 mg total) by mouth 2 (two) times a day, Disp: 60 tablet, Rfl: 3    pantoprazole (PROTONIX) 40 mg tablet, Take 40 mg by mouth daily, Disp: , Rfl: 3    ticagrelor (Brilinta) 90 MG, Take 1 tablet (90 mg total) by mouth every 12 (twelve) hours, Disp: 180 tablet, Rfl: 3    traMADol (ULTRAM) 50 mg tablet, , Disp: , Rfl:     venlafaxine (EFFEXOR-XR) 75 mg 24 hr capsule, Take 75 mg by mouth daily , Disp: , Rfl:     diclofenac sodium (VOLTAREN) 50 mg EC tablet, Take 1 tablet (50 mg total) by mouth 2 (two) times a day With food, Disp: 60 tablet, Rfl: 1    nitroglycerin (NITROSTAT) 0 4 mg SL tablet, as directed (Patient not taking: Reported on 6/30/2021), Disp: , Rfl:     No Known Allergies    Physical Exam:    /88   Pulse 75   Ht 5' 8" (1 727 m)   Wt 90 3 kg (199 lb)   BMI 30 26 kg/m²     Constitutional:normal, well developed, well nourished, alert, in no distress and non-toxic and no overt pain behavior  Eyes:anicteric  HEENT:grossly intact  Neck:supple, symmetric, trachea midline and no masses   Pulmonary:even and unlabored  Cardiovascular:No edema or pitting edema present  Skin:Normal without rashes or lesions and well hydrated  Psychiatric:Mood and affect appropriate  Neurologic:Cranial Nerves II-XII grossly intact  Musculoskeletal:There is tenderness to palpation over bilateral sacroiliac joints  Chaparro's maneuver is positive bilaterally  Compression test is positive bilaterally      Imaging  No orders to display       Orders Placed This Encounter   Procedures    Ambulatory referral to Physical Therapy

## 2021-08-27 ENCOUNTER — TELEPHONE (OUTPATIENT)
Dept: OBGYN CLINIC | Facility: CLINIC | Age: 51
End: 2021-08-27

## 2021-09-14 ENCOUNTER — TELEPHONE (OUTPATIENT)
Dept: PAIN MEDICINE | Facility: CLINIC | Age: 51
End: 2021-09-14

## 2021-09-14 DIAGNOSIS — G89.29 CHRONIC BILATERAL LOW BACK PAIN WITHOUT SCIATICA: ICD-10-CM

## 2021-09-14 DIAGNOSIS — M46.1 SACROILIITIS (HCC): ICD-10-CM

## 2021-09-14 DIAGNOSIS — M54.50 CHRONIC BILATERAL LOW BACK PAIN WITHOUT SCIATICA: ICD-10-CM

## 2021-09-14 RX ORDER — DICLOFENAC SODIUM 75 MG/1
75 TABLET, DELAYED RELEASE ORAL 2 TIMES DAILY
Qty: 60 TABLET | Refills: 1 | Status: SHIPPED | OUTPATIENT
Start: 2021-09-14 | End: 2021-11-26

## 2021-09-14 RX ORDER — METHOCARBAMOL 500 MG/1
500 TABLET, FILM COATED ORAL 3 TIMES DAILY
Qty: 90 TABLET | Refills: 1 | Status: SHIPPED | OUTPATIENT
Start: 2021-09-14 | End: 2021-11-26

## 2021-09-14 NOTE — TELEPHONE ENCOUNTER
Will increase diclofenac to 75 mg twice daily with food    I sent the prescription to [facility]  pharmacy

## 2021-09-14 NOTE — TELEPHONE ENCOUNTER
S/W pt  Advised pt of the same  Pt stated for the last 2 1/2 weeks he has been taking 2 pills at bedtime which doesn't do anything  He can't sleep at night  He is not taking anything else for pain b/c nothing else helps  Please advise

## 2021-09-14 NOTE — TELEPHONE ENCOUNTER
----- Message from Lakia Mendoza sent at 9/14/2021  9:00 AM EDT -----  CHIRAG ARZATE :)    This pt had a SIJ injection scheduled, but had to be r/s to 11/3 due to there not being 3 months in between the previous one and the one that was scheduled on the 24th of this month  Pt understood that he needed to be r/s but wants to know if there's anything he can do for the pain until then? He said you prescribed him Voltaren, but that it is not working       Thanks [employer]

## 2021-09-14 NOTE — TELEPHONE ENCOUNTER
S/W pt  Advised pt of the same  Pt stated he is going to start PT when he gets back and they called him today  Pt verbalized understanding

## 2021-09-14 NOTE — TELEPHONE ENCOUNTER
I sent a prescription for Robaxin 500 milligrams which he can take 3 times daily if needed for pain/spasms  Unfortunately, we do not have a lot of options which is why he is scheduled for the injection     As discussed during our last visit, I would strongly suggest that he start physical therapy

## 2021-09-30 ENCOUNTER — OFFICE VISIT (OUTPATIENT)
Dept: CARDIOLOGY CLINIC | Facility: CLINIC | Age: 51
End: 2021-09-30
Payer: COMMERCIAL

## 2021-09-30 VITALS
DIASTOLIC BLOOD PRESSURE: 88 MMHG | HEART RATE: 70 BPM | SYSTOLIC BLOOD PRESSURE: 148 MMHG | WEIGHT: 197.8 LBS | OXYGEN SATURATION: 100 % | HEIGHT: 68 IN | BODY MASS INDEX: 29.98 KG/M2

## 2021-09-30 DIAGNOSIS — I20.8 STABLE ANGINA PECTORIS (HCC): ICD-10-CM

## 2021-09-30 DIAGNOSIS — I10 ESSENTIAL HYPERTENSION: ICD-10-CM

## 2021-09-30 DIAGNOSIS — I25.118 CORONARY ARTERY DISEASE OF NATIVE ARTERY OF NATIVE HEART WITH STABLE ANGINA PECTORIS (HCC): ICD-10-CM

## 2021-09-30 DIAGNOSIS — I49.3 PVC (PREMATURE VENTRICULAR CONTRACTION): Primary | ICD-10-CM

## 2021-09-30 DIAGNOSIS — E78.2 MIXED HYPERLIPIDEMIA: ICD-10-CM

## 2021-09-30 PROCEDURE — 99214 OFFICE O/P EST MOD 30 MIN: CPT | Performed by: INTERNAL MEDICINE

## 2021-09-30 RX ORDER — METOPROLOL SUCCINATE 100 MG/1
100 TABLET, EXTENDED RELEASE ORAL 2 TIMES DAILY
Qty: 60 TABLET | Refills: 3 | Status: SHIPPED | OUTPATIENT
Start: 2021-09-30 | End: 2022-01-05

## 2021-09-30 NOTE — PROGRESS NOTES
Cardiology Consultation     Manuel Arzola  [id number]  1970  [facility] [address] [address] [phone]    1  PVC (premature ventricular contraction)     2  Essential hypertension     3  Coronary artery disease of native artery of native heart with stable angina pectoris ([facility] [address] )     4  Mixed hyperlipidemia     5  Stable angina pectoris [facility])       Chief Complaint   Patient presents with    Follow-up    Chest Pain     discomfort when walking     HPI: Patient is here for management of cardiac issues  He had 4 prior MIs resulting in multiple stent placements  Most recent MI was May 2019 with a late in-stent thrombosis in the ramus requiring placement of 2 BEVERLY  He had been battling continued chest pains that are random in occurrence, now more related to exertion, sharp, mid-sternal, and associated with shortness of breath along with neck and back pain  Some improvements in chest pains noted, but still with chest pains with exertion - sits and relaxes and seems to go away - doesn't take a nitro  Also continues with palpitations as well, same as before  No reported shortness of breath, lightheadedness, syncope, LE edema, orthopnea, PND, or significant weight changes  Patient remains active without any increased fatigue out of the ordinary          Patient Active Problem List   Diagnosis    Coronary artery disease of native artery of native heart with stable angina pectoris ([address] )    Essential hypertension    Mixed hyperlipidemia    Chest pain    PVC (premature ventricular contraction)    Palpitations    Chronic bilateral low back pain without sciatica    Lumbar degenerative disc disease    Myofascial pain syndrome    Sacroiliitis ([facility] [address] )     Past Medical History:   Diagnosis Date    Arthritis     Coronary artery disease     MI x 4; cardiac cath with 4 stents    GERD (gastroesophageal reflux disease)     Hypertension     Myocardial infarct (HCC)     4 MIs as of 2020     Social History     Socioeconomic History    Marital status: /Civil Union     Spouse name: Not on file    Number of children: Not on file    Years of education: Not on file    Highest education level: Not on file   Occupational History    Not on file   Tobacco Use    Smoking status: Former Smoker     Packs/day: 1 00     Years: 10 00     Pack years: 10 00     Types: Cigarettes     Start date: 1998     Quit date: 2018     Years since quittin 8    Smokeless tobacco: Never Used   Vaping Use    Vaping Use: Never used   Substance and Sexual Activity    Alcohol use: Never    Drug use: Never    Sexual activity: Yes     Partners: Female     Birth control/protection: None   Other Topics Concern    Not on file   Social History Narrative    Not on file     Social Determinants of Health     Financial Resource Strain:     Difficulty of Paying Living Expenses:    Food Insecurity:     Worried About Running Out of Food in the Last Year:     [address] in the Last Year:    Transportation Needs:     Lack of Transportation (Medical):      Lack of Transportation (Non-Medical):    Physical Activity:     Days of Exercise per Week:     Minutes of Exercise per Session:    Stress:     Feeling of Stress :    Social Connections:     Frequency of Communication with Friends and Family:     Frequency of Social Gatherings with Friends and Family:     Attends [employer] Services:     Active Member of Clubs or Organizations:     Attends Club or Organization Meetings:     Marital Status:    Intimate Partner Violence:     Fear of Current or Ex-Partner:     Emotionally Abused:     Physically Abused:     Sexually Abused:       Family History   Problem Relation Age of Onset    Heart disease Father     Hyperlipidemia Father     Diabetes Sister     Early death Sister     Diabetes Brother  Diabetes Maternal Grandmother      Past Surgical History:   Procedure Laterality Date    CORONARY ANGIOPLASTY WITH STENT PLACEMENT         Current Outpatient Medications:     aspirin (ASPIRIN LOW DOSE) 81 MG tablet, Take by mouth, Disp: , Rfl:     atorvastatin (LIPITOR) 40 mg tablet, TAKE ONE TABLET BY MOUTH EVERY 24 HOURS, Disp: 90 tablet, Rfl: 0    diclofenac sodium (VOLTAREN) 75 mg EC tablet, Take 1 tablet (75 mg total) by mouth 2 (two) times a day With food, Disp: 60 tablet, Rfl: 1    isosorbide mononitrate (IMDUR) 60 mg 24 hr tablet, Take 1 tablet (60 mg total) by mouth daily, Disp: 60 tablet, Rfl: 3    losartan (COZAAR) 100 MG tablet, Take 1 tablet (100 mg total) by mouth every 24 hours, Disp: 30 tablet, Rfl: 3    methocarbamol (ROBAXIN) 500 mg tablet, Take 1 tablet (500 mg total) by mouth 3 (three) times a day As needed for spasms/pain, Disp: 90 tablet, Rfl: 1    metoprolol succinate (TOPROL-XL) 50 mg 24 hr tablet, Take 1 tablet (50 mg total) by mouth 2 (two) times a day, Disp: 60 tablet, Rfl: 3    pantoprazole (PROTONIX) 40 mg tablet, Take 40 mg by mouth daily, Disp: , Rfl: 3    ticagrelor (Brilinta) 90 MG, Take 1 tablet (90 mg total) by mouth every 12 (twelve) hours, Disp: 180 tablet, Rfl: 3    venlafaxine (EFFEXOR-XR) 75 mg 24 hr capsule, Take 75 mg by mouth daily , Disp: , Rfl:     nitroglycerin (NITROSTAT) 0 4 mg SL tablet, as directed (Patient not taking: Reported on 6/30/2021), Disp: , Rfl:     traMADol (ULTRAM) 50 mg tablet, , Disp: , Rfl:   No Known Allergies  Vitals:    09/30/21 0901   BP: 148/88   BP Location: Right arm   Patient Position: Sitting   Cuff Size: Standard   Pulse: 70   SpO2: 100%   Weight: 89 7 kg (197 lb 12 8 oz)   Height: 5' 8" (1 727 m)       Labs:  Office Visit on 04/22/2021   Component Date Value    Cholesterol 06/11/2021 127     Triglycerides 06/11/2021 257*    HDL, Direct 06/11/2021 29*    LDL Calculated 06/11/2021 47     Non-HDL-Chol (CHOL-HDL) 06/11/2021 98      Lab Results   Component Value Date    TRIG 257 (H) 06/11/2021    HDL 29 (L) 06/11/2021     Imaging: No results found  Review of Systems:  Review of Systems   Constitutional: Negative for activity change, appetite change, fatigue and fever  HENT: Negative for nosebleeds and sore throat  Eyes: Negative for photophobia and visual disturbance  Respiratory: Negative for cough, chest tightness, shortness of breath and wheezing  Cardiovascular: Positive for chest pain  Negative for palpitations and leg swelling  Gastrointestinal: Negative for abdominal pain, diarrhea, nausea and vomiting  Endocrine: Negative for polyuria  Genitourinary: Negative for dysuria, frequency and hematuria  Musculoskeletal: Negative for arthralgias, back pain and gait problem  Skin: Negative for pallor and rash  Neurological: Negative for dizziness, syncope, speech difficulty and light-headedness  Hematological: Does not bruise/bleed easily  Psychiatric/Behavioral: Negative for agitation, behavioral problems and confusion  Physical Exam:  Physical Exam  Vitals reviewed  Constitutional:       General: He is not in acute distress  Appearance: He is well-developed  He is not diaphoretic  HENT:      Head: Normocephalic and atraumatic  Nose: Nose normal    Eyes:      General: No scleral icterus  Pupils: Pupils are equal, round, and reactive to light  Neck:      Vascular: No JVD  Cardiovascular:      Rate and Rhythm: Normal rate and regular rhythm  Heart sounds: S1 normal and S2 normal  Heart sounds not distant  No murmur heard  No systolic murmur is present  No friction rub  No gallop  No S3 sounds  Pulmonary:      Effort: Pulmonary effort is normal  No respiratory distress  Breath sounds: Normal breath sounds  No wheezing or rales  Abdominal:      General: Bowel sounds are normal  There is no distension  Palpations: Abdomen is soft     Musculoskeletal: General: No deformity  Cervical back: Normal range of motion and neck supple  Skin:     General: Skin is warm and dry  Findings: No erythema  Neurological:      Mental Status: He is alert and oriented to person, place, and time  Cranial Nerves: No cranial nerve deficit  Psychiatric:         Behavior: Behavior normal        Blood pressure 148/88, pulse 70, height 5' 8" (1 727 m), weight 89 7 kg (197 lb 12 8 oz), SpO2 100 %  EKG:  Normal sinus rhythm  Nonspecific T wave abnormality  Abnormal ECG    Discussion/Summary:  Chest pain: with underlying significant premature CAD and strong family history of CAD  With residual disease in LAD and RCA as well as per patient report  With stent placement in the setting of unstable angina in May 2019 - multiple episodes of in stent thrombosis/restenosis and continued chest pains even with minimal exertion - he has been on Brilinta for over a year and has instent restenosis with this  We referred for repeat cardiac cath and potential CABG - which did reveal disease, but not quite ready for CABG at this time - mild LAD atherosclerosis, 30% pLAD, 75% D2, 50% OM1 with occluded subbranch, 0% pR1 at site of prior stent, 70% mRCA at site of prior stent with eccentric lesion 70% PLV1  We also added Imdur to help with symptom management in the meantime  Continued on ASA, Brilinta, and ARB - we stopped amlodipine since BP is running on low end  There did appear to be a reason for having an ARB with the reported EF of 40-45%  We checked an echo to evaluate LV EF and wall motion as well - which revealed a normal LV function with no wall motion abnormalities  With continued chest pain requiring nitro use, we increased Imdur to 30mg twice daily - which has helped with chest pain  He completed cardiac rehab to help with conditioning and improving fatigue level    For improved control of chest pain and fatigue, we changed Coreg to metoprolol for less alpha-blockade and more beta-specific blockade  He had increased chest pain, sounds like stable angina that seems to be increasing - we increased Imdur to 60mg to see if that will help with chest pain  With increased palpitations as well, we checked another Holter - seems like these episodes feel longer than his PVCs- this revealed common ventricular ectopy (5 4%) with brief runs (longest 4 beats in duration) - changed BB to Toprol XL to help with suppression - which has not helped symptoms of palpitations too much - will increase to 100mg  If his symptoms don't improve with this, then will send for repeat cath  HTN: relatively well controlled  Will continue Imdur and metoprolol for CAD symptom management  We stopped amlodipine in order to allow addition of higher dose of Imdur  Will increase Toprol to 100mg  HLD: continued on statin  LDL 24 in Feb 2020 - reduced Lipitor to 40mg, perhaps if there is a component of musculoskeletal pain, this will help it  Recheck levels in 3 months after the change in dosing revealed LDL of 78, which is at goal, and improved symptoms  Recheck in June 2021 revealed LDL of 47  Frequent PVCs: as above, perhaps triggered by ischemic disease  Will continue metoprolol for the time being - with frequency of ectopy, changed metoprolol to Toprol XL BID, which he is tolerating

## 2021-09-30 NOTE — PATIENT INSTRUCTIONS
Angina   AMBULATORY CARE:   Angina  is pain, pressure, or tightness that is usually felt in your chest  It is caused by decreased blood flow and oxygen to your heart  Angina is usually caused by atherosclerosis (hardening of the arteries)  Chest pain may come on when you are stressed or do physical activities, such as walking or exercising  If left untreated, angina may get worse, increase your risk for a heart attack, or become life-threatening  Common symptoms include the following:   · Pressure, tightness, or pain in your neck, jaw, shoulder, or back    · Pain or numbness in either arm    · Discomfort that feels like heartburn    · Shortness of breath, sweating, nausea, or lightheadedness    Call your local emergency number (911 in the [address] [facility]) or have someone call if:   · You have any of the following signs of a heart attack:      ? Squeezing, pressure, or pain in your chest    ? You may  also have any of the following:     § Discomfort or pain in your back, neck, jaw, stomach, or arm    § Shortness of breath    § Nausea or vomiting    § Lightheadedness or a sudden cold sweat    · You have chest pain that does not go away after you take medicine as directed  · You lose feeling in your face, arms, or legs, or you suddenly feel weak  Seek care immediately if:   · Your angina is happening more often, lasting longer, or causing worse pain  Call your doctor or cardiologist if:   · You are dizzy or nauseated after you take your medicine  · You have shortness of breath at rest     · You have new or worse swelling in your feet or ankles  · You have questions or concerns about your condition or care  Treatment for angina  may include any of the following:  · Medicines  may be needed to prevent blood clots or treat a condition causing angina  Do not take certain medicines without asking your healthcare provider first  These include NSAIDs, herbal or vitamin supplements, or hormones (estrogen or progestin)  You may need any of the following, depending on the cause of your angina:    ? Antiplatelets , such as aspirin, help prevent blood clots  Take your antiplatelet medicine exactly as directed  These medicines make it more likely for you to bleed or bruise  If you are told to take aspirin, do not take acetaminophen or ibuprofen instead  ? Blood thinners  help prevent blood clots  Clots can cause strokes, heart attacks, and death  The following are general safety guidelines to follow while you are taking a blood thinner:    § Watch for bleeding and bruising while you take blood thinners  Watch for bleeding from your gums or nose  Watch for blood in your urine and bowel movements  Use a soft washcloth on your skin, and a soft toothbrush to brush your teeth  This can keep your skin and gums from bleeding  If you shave, use an electric shaver  Do not play contact sports  § Tell your dentist and other healthcare providers that you take a blood thinner  Wear a bracelet or necklace that says you take this medicine  § Do not start or stop any other medicines unless your healthcare provider tells you to  Many medicines cannot be used with blood thinners  § Take your blood thinner exactly as prescribed by your healthcare provider  Do not skip does or take less than prescribed  Tell your provider right away if you forget to take your blood thinner, or if you take too much  § Warfarin  is a blood thinner that you may need to take  The following are things you should be aware of if you take warfarin:     § Foods and medicines can affect the amount of warfarin in your blood  Do not make major changes to your diet while you take warfarin  Warfarin works best when you eat about the same amount of vitamin K every day  Vitamin K is found in green leafy vegetables and certain other foods  Ask for more information about what to eat when you are taking warfarin      § You will need to see your healthcare provider for follow-up visits when you are on warfarin  You will need regular blood tests  These tests are used to decide how much medicine you need  ? Other medicines  may be given to open the arteries to your heart, slow your heartbeat, or decrease your blood pressure or cholesterol  · Angioplasty and stenting  help open the coronary arteries and allow blood to flow to the heart  Ask for more information about these procedures  · Coronary artery bypass graft (CABG) , or open heart surgery, can improve blood flow to the heart  This will help decrease your chest pain and prevent a heart attack  Manage angina:   · Keep a record or a calendar with details about your chest pain  Every time you have pain or symptoms, record what the pain is like, how long it lasts, and how severe it is  Also record what you are doing when the pain starts, and what makes it go away  Bring this with you every time you see your healthcare provider  · Avoid activities that cause angina  Pay attention to your symptoms and find out what seems to make your angina worse  · Manage health conditions that can cause angina  Healthcare providers can help you manage a chronic health condition such as diabetes  Ask your provider for more information  · Ask about vaccines you may need  Vaccines help prevent infections that can put more stress on your heart  Get the influenza (flu) vaccine every year as soon as recommended, usually in September or October  You may also need a pneumococcal vaccine to prevent pneumonia  The vaccine is usually given every 5 years, starting at age 72  Your healthcare provider can tell you if should get other vaccines, and when to get them  Healthy living tips:   · Do not smoke  Nicotine and other chemicals in cigarettes and cigars can cause heart and lung damage  Ask your healthcare provider for information if you currently smoke and need help to quit   E-cigarettes or smokeless tobacco still contain nicotine  Talk to your healthcare provider before you use these products  · Maintain a healthy weight  When you weigh more than is healthy for you, your heart must work harder  You are at higher risk for serious health problems if you are overweight  Ask your healthcare provider what a healthy weight is for you  Ask him or her to help you create a weight loss plan if you are overweight  · Ask about physical activity  Physical activity, such as exercise, can help strengthen your heart  Your healthcare provider can help you create a safe physical activity plan  · Choose a variety of heart-healthy foods as often as possible  Include fresh, frozen, or canned fruits and vegetables  Also include low-fat dairy products, fish, chicken (without skin), and lean meats  Your healthcare provider or a dietitian can help you create meal plans  · Lower your sodium (salt) intake  Limit foods that are high in sodium, such as canned foods, salty snacks, and cold cuts  If you add salt when you cook food, do not add more at the table  Choose low-sodium canned foods as much as possible  · Eat foods high in omega-3 fatty acids  Fish that are high in omega-3 fats include salmon, tuna, and herring  Other sources include broccoli, walnuts, and eggs  Follow up with your healthcare provider as directed:  Write down your questions so you remember to ask them during your visits  © Copyright [employer] 2021 Information is for End User's use only and may not be sold, redistributed or otherwise used for commercial purposes  All illustrations and images included in CareNotes® are the copyrighted property of [employer] Inc  or [facility] Kathy Powers   The above information is an  only  It is not intended as medical advice for individual conditions or treatments  Talk to your doctor, nurse or pharmacist before following any medical regimen to see if it is safe and effective for you

## 2021-10-04 ENCOUNTER — EVALUATION (OUTPATIENT)
Dept: PHYSICAL THERAPY | Facility: CLINIC | Age: 51
End: 2021-10-04
Payer: COMMERCIAL

## 2021-10-04 DIAGNOSIS — M46.1 SACROILIITIS (HCC): ICD-10-CM

## 2021-10-04 DIAGNOSIS — M54.50 CHRONIC BILATERAL LOW BACK PAIN WITHOUT SCIATICA: ICD-10-CM

## 2021-10-04 DIAGNOSIS — G89.29 CHRONIC BILATERAL LOW BACK PAIN WITHOUT SCIATICA: ICD-10-CM

## 2021-10-04 PROCEDURE — 97161 PT EVAL LOW COMPLEX 20 MIN: CPT | Performed by: PHYSICAL THERAPIST

## 2021-10-04 PROCEDURE — 97110 THERAPEUTIC EXERCISES: CPT | Performed by: PHYSICAL THERAPIST

## 2021-10-06 ENCOUNTER — TELEPHONE (OUTPATIENT)
Dept: PAIN MEDICINE | Facility: CLINIC | Age: 51
End: 2021-10-06

## 2021-10-06 ENCOUNTER — OFFICE VISIT (OUTPATIENT)
Dept: PHYSICAL THERAPY | Facility: CLINIC | Age: 51
End: 2021-10-06
Payer: COMMERCIAL

## 2021-10-06 DIAGNOSIS — M46.1 SACROILIITIS (HCC): Primary | ICD-10-CM

## 2021-10-06 DIAGNOSIS — M54.50 CHRONIC BILATERAL LOW BACK PAIN WITHOUT SCIATICA: ICD-10-CM

## 2021-10-06 DIAGNOSIS — G89.29 CHRONIC BILATERAL LOW BACK PAIN WITHOUT SCIATICA: ICD-10-CM

## 2021-10-06 PROCEDURE — 97110 THERAPEUTIC EXERCISES: CPT

## 2021-10-06 PROCEDURE — 97140 MANUAL THERAPY 1/> REGIONS: CPT

## 2021-10-06 PROCEDURE — 97112 NEUROMUSCULAR REEDUCATION: CPT

## 2021-10-11 ENCOUNTER — OFFICE VISIT (OUTPATIENT)
Dept: PHYSICAL THERAPY | Facility: CLINIC | Age: 51
End: 2021-10-11
Payer: COMMERCIAL

## 2021-10-11 DIAGNOSIS — M46.1 SACROILIITIS (HCC): Primary | ICD-10-CM

## 2021-10-11 DIAGNOSIS — I25.118 CORONARY ARTERY DISEASE OF NATIVE ARTERY OF NATIVE HEART WITH STABLE ANGINA PECTORIS (HCC): ICD-10-CM

## 2021-10-11 DIAGNOSIS — R07.9 CHEST PAIN, UNSPECIFIED TYPE: ICD-10-CM

## 2021-10-11 DIAGNOSIS — E78.2 MIXED HYPERLIPIDEMIA: ICD-10-CM

## 2021-10-11 DIAGNOSIS — M54.50 CHRONIC BILATERAL LOW BACK PAIN WITHOUT SCIATICA: ICD-10-CM

## 2021-10-11 DIAGNOSIS — G89.29 CHRONIC BILATERAL LOW BACK PAIN WITHOUT SCIATICA: ICD-10-CM

## 2021-10-11 PROCEDURE — 97110 THERAPEUTIC EXERCISES: CPT

## 2021-10-11 PROCEDURE — 97140 MANUAL THERAPY 1/> REGIONS: CPT

## 2021-10-11 RX ORDER — ATORVASTATIN CALCIUM 40 MG/1
TABLET, FILM COATED ORAL
Qty: 90 TABLET | Refills: 0 | Status: SHIPPED | OUTPATIENT
Start: 2021-10-11 | End: 2022-01-05

## 2021-10-11 RX ORDER — ISOSORBIDE MONONITRATE 60 MG/1
TABLET, EXTENDED RELEASE ORAL
Qty: 90 TABLET | Refills: 0 | Status: SHIPPED | OUTPATIENT
Start: 2021-10-11 | End: 2022-01-06

## 2021-10-13 ENCOUNTER — OFFICE VISIT (OUTPATIENT)
Dept: PHYSICAL THERAPY | Facility: CLINIC | Age: 51
End: 2021-10-13
Payer: COMMERCIAL

## 2021-10-13 DIAGNOSIS — G89.29 CHRONIC BILATERAL LOW BACK PAIN WITHOUT SCIATICA: ICD-10-CM

## 2021-10-13 DIAGNOSIS — M46.1 SACROILIITIS (HCC): Primary | ICD-10-CM

## 2021-10-13 DIAGNOSIS — M54.50 CHRONIC BILATERAL LOW BACK PAIN WITHOUT SCIATICA: ICD-10-CM

## 2021-10-13 PROCEDURE — 97110 THERAPEUTIC EXERCISES: CPT

## 2021-10-13 PROCEDURE — 97140 MANUAL THERAPY 1/> REGIONS: CPT

## 2021-10-25 ENCOUNTER — OFFICE VISIT (OUTPATIENT)
Dept: PHYSICAL THERAPY | Facility: CLINIC | Age: 51
End: 2021-10-25
Payer: COMMERCIAL

## 2021-10-25 DIAGNOSIS — G89.29 CHRONIC BILATERAL LOW BACK PAIN WITHOUT SCIATICA: ICD-10-CM

## 2021-10-25 DIAGNOSIS — M54.50 CHRONIC BILATERAL LOW BACK PAIN WITHOUT SCIATICA: ICD-10-CM

## 2021-10-25 DIAGNOSIS — M46.1 SACROILIITIS (HCC): Primary | ICD-10-CM

## 2021-10-25 PROCEDURE — 97112 NEUROMUSCULAR REEDUCATION: CPT

## 2021-10-25 PROCEDURE — 97140 MANUAL THERAPY 1/> REGIONS: CPT

## 2021-10-25 PROCEDURE — 97110 THERAPEUTIC EXERCISES: CPT

## 2021-10-28 ENCOUNTER — APPOINTMENT (OUTPATIENT)
Dept: PHYSICAL THERAPY | Facility: CLINIC | Age: 51
End: 2021-10-28
Payer: COMMERCIAL

## 2021-11-26 ENCOUNTER — OFFICE VISIT (OUTPATIENT)
Dept: PAIN MEDICINE | Facility: CLINIC | Age: 51
End: 2021-11-26
Payer: COMMERCIAL

## 2021-11-26 ENCOUNTER — TELEPHONE (OUTPATIENT)
Dept: CARDIOLOGY CLINIC | Facility: CLINIC | Age: 51
End: 2021-11-26

## 2021-11-26 VITALS
HEART RATE: 78 BPM | DIASTOLIC BLOOD PRESSURE: 85 MMHG | BODY MASS INDEX: 30.31 KG/M2 | HEIGHT: 68 IN | SYSTOLIC BLOOD PRESSURE: 138 MMHG | WEIGHT: 200 LBS

## 2021-11-26 DIAGNOSIS — M54.50 CHRONIC BILATERAL LOW BACK PAIN WITHOUT SCIATICA: ICD-10-CM

## 2021-11-26 DIAGNOSIS — M46.1 SACROILIITIS (HCC): ICD-10-CM

## 2021-11-26 DIAGNOSIS — M79.18 MYOFASCIAL PAIN SYNDROME: ICD-10-CM

## 2021-11-26 DIAGNOSIS — M51.36 LUMBAR DEGENERATIVE DISC DISEASE: Primary | ICD-10-CM

## 2021-11-26 DIAGNOSIS — G89.29 CHRONIC BILATERAL LOW BACK PAIN WITHOUT SCIATICA: ICD-10-CM

## 2021-11-26 DIAGNOSIS — M54.16 LUMBAR RADICULOPATHY: ICD-10-CM

## 2021-11-26 PROCEDURE — 99214 OFFICE O/P EST MOD 30 MIN: CPT | Performed by: NURSE PRACTITIONER

## 2021-12-02 ENCOUNTER — TELEPHONE (OUTPATIENT)
Dept: PAIN MEDICINE | Facility: CLINIC | Age: 51
End: 2021-12-02

## 2021-12-17 ENCOUNTER — OFFICE VISIT (OUTPATIENT)
Dept: CARDIOLOGY CLINIC | Facility: CLINIC | Age: 51
End: 2021-12-17
Payer: COMMERCIAL

## 2021-12-17 VITALS
BODY MASS INDEX: 29.96 KG/M2 | HEIGHT: 68 IN | DIASTOLIC BLOOD PRESSURE: 70 MMHG | SYSTOLIC BLOOD PRESSURE: 112 MMHG | WEIGHT: 197.7 LBS | HEART RATE: 80 BPM | OXYGEN SATURATION: 97 %

## 2021-12-17 DIAGNOSIS — I10 ESSENTIAL HYPERTENSION: ICD-10-CM

## 2021-12-17 DIAGNOSIS — E78.2 MIXED HYPERLIPIDEMIA: ICD-10-CM

## 2021-12-17 DIAGNOSIS — I49.3 PVC (PREMATURE VENTRICULAR CONTRACTION): ICD-10-CM

## 2021-12-17 DIAGNOSIS — I20.8 ANGINA AT REST (HCC): ICD-10-CM

## 2021-12-17 DIAGNOSIS — R00.2 PALPITATIONS: Primary | ICD-10-CM

## 2021-12-17 DIAGNOSIS — I25.118 CORONARY ARTERY DISEASE OF NATIVE ARTERY OF NATIVE HEART WITH STABLE ANGINA PECTORIS (HCC): ICD-10-CM

## 2021-12-17 PROCEDURE — 99214 OFFICE O/P EST MOD 30 MIN: CPT | Performed by: INTERNAL MEDICINE

## 2021-12-17 NOTE — H&P (VIEW-ONLY)
Cardiology Consultation     Prosper Rojas  [id number]  1970  [facility] [address] [address] [phone]    1  Palpitations  Case Request Cath Lab: Cardiac Left Heart Cath    Case Request Cath Lab: Cardiac Left Heart Cath   2  Mixed hyperlipidemia     3  Coronary artery disease of native artery of native heart with stable angina pectoris [facility])  Case Request Cath Lab: Cardiac Left Heart Cath    Case Request Cath Lab: Cardiac Left Heart Cath   4  Essential hypertension     5  PVC (premature ventricular contraction)  Case Request Cath Lab: Cardiac Left Heart Cath    Case Request Cath Lab: Cardiac Left Heart Cath   6  Angina at rest [facility])  Case Request Cath Lab: Cardiac Left Heart Cath    Case Request Cath Lab: Cardiac Left Heart Cath     Chief Complaint   Patient presents with    Follow-up     3 mth f/u     Palpitations    Rapid Heart Rate     occured twice in one day      HPI: Patient is here for management of cardiac issues  He had 4 prior MIs resulting in multiple stent placements  Most recent MI was May 2019 with a late in-stent thrombosis in the ramus requiring placement of 2 BEVERLY  He had been battling continued chest pains that are random in occurrence, now more related to exertion, sharp, mid-sternal, and associated with shortness of breath along with neck and back pain  Some improvements in chest pains noted, but still with chest pains with exertion - sits and relaxes and seems to go away - doesn't take a nitro  Also continues with palpitations as well, same as before  No reported shortness of breath, lightheadedness, syncope, LE edema, orthopnea, PND, or significant weight changes  Patient remains active without any increased fatigue out of the ordinary          Patient Active Problem List   Diagnosis    Coronary artery disease of native artery of native heart with [facility]ina [facility]    Essential hypertension    Mixed hyperlipidemia    Chest pain    PVC (premature ventricular contraction)    Palpitations    Chronic bilateral low back pain without sciatica    Lumbar degenerative disc disease    Myofascial pain syndrome    Sacroiliitis (HCC)     Past Medical History:   Diagnosis Date    Arthritis     Coronary artery disease     MI x 4; cardiac cath with 4 stents    GERD (gastroesophageal reflux disease)     Hypertension     [facility]ocardial infarct ([facility] )     4 MIs as of 2/24/2020     Social History     Socioeconomic History    Marital status: /Civil Union     Spouse name: Not on file    Number of children: Not on file    Years of education: Not on file    Highest education level: Not on file   Occupational History    Not on file   Tobacco Use    Smoking status: Former Smoker     Packs/day: 1 00     Years: 10 00     Pack years: 10 00     Types: Cigarettes     Start date: 2/24/1998     Quit date: 11/24/2018     Years since quitting: 3 0    Smokeless tobacco: Never Used   Vaping Use    Vaping Use: Never used   Substance and Sexual Activity    Alcohol use: Never    Drug use: Never    Sexual activity: Yes     Partners: Female     Birth control/protection: None   Other Topics Concern    Not on file   Social History Narrative    Not on file     Social Determinants of Health     Financial Resource Strain: Not on file   Food Insecurity: Not on file   Transportation Needs: Not on file   Physical Activity: Not on file   Stress: Not on file   Social Connections: Not on file   Intimate Partner Violence: Not on file   Housing Stability: Not on file      Family History   Problem Relation Age of Onset    Heart disease Father     Hyperlipidemia Father     Diabetes Sister     Early death Sister     Diabetes Brother     Diabetes Maternal Grandmother      Past Surgical History:   Procedure Laterality Date    CORONARY ANGIOPLASTY WITH STENT PLACEMENT Current Outpatient Medications:     aspirin (ASPIRIN LOW DOSE) 81 MG tablet, Take by mouth, Disp: , Rfl:     atorvastatin (LIPITOR) 40 mg tablet, TAKE ONE TABLET BY MOUTH EVERY 24 HOURS, Disp: 90 tablet, Rfl: 0    isosorbide mononitrate (IMDUR) 60 mg 24 hr tablet, TAKE ONE TABLET BY MOUTH EVERY DAY, Disp: 90 tablet, Rfl: 0    losartan (COZAAR) 100 MG tablet, Take 1 tablet (100 mg total) by mouth every 24 hours, Disp: 30 tablet, Rfl: 3    metoprolol succinate (TOPROL-XL) 100 mg 24 hr tablet, Take 1 tablet (100 mg total) by mouth 2 (two) times a day, Disp: 60 tablet, Rfl: 3    pantoprazole (PROTONIX) 40 mg tablet, Take 40 mg by mouth daily, Disp: , Rfl: 3    ticagrelor (Brilinta) 90 MG, Take 1 tablet (90 mg total) by mouth every 12 (twelve) hours, Disp: 180 tablet, Rfl: 3    venlafaxine (EFFEXOR-XR) 75 mg 24 hr capsule, Take 75 mg by mouth daily , Disp: , Rfl:     nitroglycerin (NITROSTAT) 0 4 mg SL tablet, as directed (Patient not taking: Reported on 12/17/2021), Disp: , Rfl:   No Known Allergies  Vitals:    12/17/21 1301   BP: 112/70   BP Location: Right arm   Patient Position: Sitting   Cuff Size: Standard   Pulse: 80   SpO2: 97%   Weight: 89 7 kg (197 lb 11 2 oz)   Height: 5' 8" (1 727 m)       Labs:  No visits with results within 6 Month(s) from this visit  Latest known visit with results is:   Office Visit on 04/22/2021   Component Date Value    Cholesterol 06/11/2021 127     Triglycerides 06/11/2021 257*    HDL, Direct 06/11/2021 29*    LDL Calculated 06/11/2021 47     Non-HDL-Chol (CHOL-HDL) 06/11/2021 98      Lab Results   Component Value Date    TRIG 257 (H) 06/11/2021    HDL 29 (L) 06/11/2021     Imaging: No results found  Review of Systems:  Review of Systems   Constitutional: Negative for activity change, appetite change, fatigue and fever  HENT: Negative for nosebleeds and sore throat  Eyes: Negative for photophobia and visual disturbance     Respiratory: Negative for cough, chest tightness, shortness of breath and wheezing  Cardiovascular: Positive for palpitations  Negative for chest pain and leg swelling  Gastrointestinal: Negative for abdominal pain, diarrhea, nausea and vomiting  Endocrine: Negative for polyuria  Genitourinary: Negative for dysuria, frequency and hematuria  Musculoskeletal: Negative for arthralgias, back pain and gait problem  Skin: Negative for pallor and rash  Neurological: Negative for dizziness, syncope, speech difficulty and light-headedness  Hematological: Does not bruise/bleed easily  Psychiatric/Behavioral: Negative for agitation, behavioral problems and confusion  Physical Exam:  Physical Exam  Vitals reviewed  Constitutional:       General: He is not in acute distress  Appearance: He is well-developed  He is not diaphoretic  HENT:      Head: Normocephalic and atraumatic  Nose: Nose normal    Eyes:      General: No scleral icterus  Pupils: Pupils are equal, round, and reactive to light  Neck:      Vascular: No JVD  Cardiovascular:      Rate and Rhythm: Normal rate and regular rhythm  Heart sounds: S1 normal and S2 normal  Heart sounds not distant  No murmur heard  No systolic murmur is present  No friction rub  No gallop  No S3 sounds  Pulmonary:      Effort: Pulmonary effort is normal  No respiratory distress  Breath sounds: Normal breath sounds  No wheezing or rales  Abdominal:      General: Bowel sounds are normal  There is no distension  Palpations: Abdomen is soft  Musculoskeletal:         General: No deformity  Cervical back: Normal range of motion and neck supple  Skin:     General: Skin is warm and dry  Findings: No erythema  Neurological:      Mental Status: He is alert and oriented to person, place, and time  Cranial Nerves: No cranial nerve deficit     Psychiatric:         Behavior: Behavior normal        Blood pressure 112/70, pulse 80, height 5' 8" (1 727 m), weight 89 7 kg (197 lb 11 2 oz), SpO2 97 %  EKG:  Normal sinus rhythm  Nonspecific T wave abnormality  Abnormal ECG    Discussion/Summary:  Chest pain: with underlying significant premature CAD and strong family history of CAD  With residual disease in LAD and RCA as well as per patient report  With stent placement in the setting of unstable angina in May 2019 - multiple episodes of in stent thrombosis/restenosis and continued chest pains even with minimal exertion - he has been on Brilinta for over a year and has instent restenosis with this  We referred for repeat cardiac cath and potential CABG - which did reveal disease, but not quite ready for CABG at this time - mild LAD atherosclerosis, 30% pLAD, 75% D2, 50% OM1 with occluded subbranch, 0% pR1 at site of prior stent, 70% mRCA at site of prior stent with eccentric lesion 70% PLV1  We also added Imdur to help with symptom management in the meantime  Continued on ASA, Brilinta, and ARB - we stopped amlodipine since BP is running on low end  There did appear to be a reason for having an ARB with the reported EF of 40-45%  We checked an echo to evaluate LV EF and wall motion as well - which revealed a normal LV function with no wall motion abnormalities  With continued chest pain requiring nitro use, we increased Imdur to 30mg twice daily - which has helped with chest pain  He completed cardiac rehab to help with conditioning and improving fatigue level  For improved control of chest pain and fatigue, we changed Coreg to metoprolol for less alpha-blockade and more beta-specific blockade  He had increased chest pain, sounds like stable angina that seems to be increasing - we increased Imdur to 60mg to see if that will help with chest pain    With increased palpitations as well, we checked another Holter - seems like these episodes feel longer than his PVCs- this revealed common ventricular ectopy (5 4%) with brief runs (longest 4 beats in duration) - changed BB to Toprol XL to help with suppression - which has not helped symptoms of palpitations too much - increased to 100mg  Since his symptoms have not improved, will send for repeat cath now  HTN: relatively well controlled  Will continue Imdur and metoprolol for CAD symptom management  We stopped amlodipine in order to allow addition of higher dose of Imdur  Increased Toprol to 100mg at last visit  HLD: continued on statin  LDL 24 in Feb 2020 - reduced Lipitor to 40mg, perhaps if there is a component of musculoskeletal pain, this will help it  Recheck levels in 3 months after the change in dosing revealed LDL of 78, which is at goal, and improved symptoms  Recheck in June 2021 revealed LDL of 47  Frequent PVCs: as above, perhaps triggered by ischemic disease  Will continue metoprolol for the time being - with frequency of ectopy, changed metoprolol to Toprol XL BID, which he is tolerating, but still very symptomatic

## 2021-12-22 ENCOUNTER — TELEPHONE (OUTPATIENT)
Dept: CARDIOLOGY CLINIC | Facility: CLINIC | Age: 51
End: 2021-12-22

## 2021-12-22 ENCOUNTER — PREP FOR PROCEDURE (OUTPATIENT)
Dept: CARDIOLOGY CLINIC | Facility: CLINIC | Age: 51
End: 2021-12-22

## 2021-12-22 DIAGNOSIS — R00.2 PALPITATIONS: Primary | ICD-10-CM

## 2022-01-04 ENCOUNTER — APPOINTMENT (OUTPATIENT)
Dept: LAB | Facility: HOSPITAL | Age: 52
End: 2022-01-04
Payer: COMMERCIAL

## 2022-01-04 ENCOUNTER — HOSPITAL ENCOUNTER (OUTPATIENT)
Dept: RADIOLOGY | Facility: HOSPITAL | Age: 52
Discharge: HOME | End: 2022-01-04
Admitting: ANESTHESIOLOGY
Payer: COMMERCIAL

## 2022-01-04 VITALS
DIASTOLIC BLOOD PRESSURE: 80 MMHG | HEART RATE: 60 BPM | TEMPERATURE: 97.5 F | RESPIRATION RATE: 18 BRPM | SYSTOLIC BLOOD PRESSURE: 122 MMHG

## 2022-01-04 DIAGNOSIS — M79.18 MYOFASCIAL PAIN SYNDROME: ICD-10-CM

## 2022-01-04 DIAGNOSIS — M51.36 LUMBAR DEGENERATIVE DISC DISEASE: ICD-10-CM

## 2022-01-04 DIAGNOSIS — M54.50 CHRONIC BILATERAL LOW BACK PAIN WITHOUT SCIATICA: ICD-10-CM

## 2022-01-04 DIAGNOSIS — M54.16 LUMBAR RADICULOPATHY: ICD-10-CM

## 2022-01-04 DIAGNOSIS — R00.2 PALPITATIONS: ICD-10-CM

## 2022-01-04 DIAGNOSIS — G89.29 CHRONIC BILATERAL LOW BACK PAIN WITHOUT SCIATICA: ICD-10-CM

## 2022-01-04 LAB
ALBUMIN SERPL BCP-MCNC: 3.8 G/DL (ref 3.5–5)
ALP SERPL-CCNC: 114 U/L (ref 34–104)
ALT SERPL W P-5'-P-CCNC: 20 U/L (ref 7–52)
ANION GAP SERPL CALCULATED.3IONS-SCNC: 4 MMOL/L (ref 4–13)
AST SERPL W P-5'-P-CCNC: 13 U/L (ref 13–39)
BASOPHILS # BLD AUTO: 0.04 THOUSANDS/ΜL (ref 0–0.1)
BASOPHILS NFR BLD AUTO: 1 % (ref 0–1)
BILIRUB SERPL-MCNC: 0.38 MG/DL (ref 0.2–1)
BUN SERPL-MCNC: 15 MG/DL (ref 5–25)
CALCIUM SERPL-MCNC: 9 MG/DL (ref 8.4–10.2)
CHLORIDE SERPL-SCNC: 106 MMOL/L (ref 96–108)
CO2 SERPL-SCNC: 27 MMOL/L (ref 21–32)
CREAT SERPL-MCNC: 0.97 MG/DL (ref 0.6–1.3)
EOSINOPHIL # BLD AUTO: 0.26 THOUSAND/ΜL (ref 0–0.61)
EOSINOPHIL NFR BLD AUTO: 4 % (ref 0–6)
ERYTHROCYTE [DISTWIDTH] IN BLOOD BY AUTOMATED COUNT: 13 % (ref 11.6–15.1)
GFR SERPL CREATININE-BSD FRML MDRD: 90 ML/MIN/1.73SQ M
GLUCOSE P FAST SERPL-MCNC: 182 MG/DL (ref 65–99)
HCT VFR BLD AUTO: 41.8 % (ref 36.5–49.3)
HGB BLD-MCNC: 13.7 G/DL (ref 12–17)
IMM GRANULOCYTES # BLD AUTO: 0.02 THOUSAND/UL (ref 0–0.2)
IMM GRANULOCYTES NFR BLD AUTO: 0 % (ref 0–2)
LYMPHOCYTES # BLD AUTO: 1.76 THOUSANDS/ΜL (ref 0.6–4.47)
LYMPHOCYTES NFR BLD AUTO: 30 % (ref 14–44)
MCH RBC QN AUTO: 30.2 PG (ref 26.8–34.3)
MCHC RBC AUTO-ENTMCNC: 32.8 G/DL (ref 31.4–37.4)
MCV RBC AUTO: 92 FL (ref 82–98)
MONOCYTES # BLD AUTO: 0.4 THOUSAND/ΜL (ref 0.17–1.22)
MONOCYTES NFR BLD AUTO: 7 % (ref 4–12)
NEUTROPHILS # BLD AUTO: 3.45 THOUSANDS/ΜL (ref 1.85–7.62)
NEUTS SEG NFR BLD AUTO: 58 % (ref 43–75)
NRBC BLD AUTO-RTO: 0 /100 WBCS
PLATELET # BLD AUTO: 166 THOUSANDS/UL (ref 149–390)
PMV BLD AUTO: 9.7 FL (ref 8.9–12.7)
POTASSIUM SERPL-SCNC: 4.2 MMOL/L (ref 3.5–5.3)
PROT SERPL-MCNC: 6.3 G/DL (ref 6.4–8.4)
RBC # BLD AUTO: 4.53 MILLION/UL (ref 3.88–5.62)
SODIUM SERPL-SCNC: 137 MMOL/L (ref 135–147)
WBC # BLD AUTO: 5.93 THOUSAND/UL (ref 4.31–10.16)

## 2022-01-04 PROCEDURE — 80053 COMPREHEN METABOLIC PANEL: CPT

## 2022-01-04 PROCEDURE — 36415 COLL VENOUS BLD VENIPUNCTURE: CPT

## 2022-01-04 PROCEDURE — 85025 COMPLETE CBC W/AUTO DIFF WBC: CPT

## 2022-01-04 RX ORDER — METHYLPREDNISOLONE ACETATE 80 MG/ML
80 INJECTION, SUSPENSION INTRA-ARTICULAR; INTRALESIONAL; INTRAMUSCULAR; PARENTERAL; SOFT TISSUE ONCE
Status: COMPLETED | OUTPATIENT
Start: 2022-01-04 | End: 2022-01-04

## 2022-01-04 RX ORDER — LIDOCAINE HYDROCHLORIDE 10 MG/ML
2 INJECTION, SOLUTION EPIDURAL; INFILTRATION; INTRACAUDAL; PERINEURAL ONCE
Status: COMPLETED | OUTPATIENT
Start: 2022-01-04 | End: 2022-01-04

## 2022-01-04 RX ADMIN — LIDOCAINE HYDROCHLORIDE 3 ML: 10 INJECTION, SOLUTION EPIDURAL; INFILTRATION; INTRACAUDAL; PERINEURAL at 08:16

## 2022-01-04 RX ADMIN — IOHEXOL 1 ML: 300 INJECTION, SOLUTION INTRAVENOUS at 08:21

## 2022-01-04 RX ADMIN — METHYLPREDNISOLONE ACETATE 80 MG: 80 INJECTION, SUSPENSION INTRA-ARTICULAR; INTRALESIONAL; INTRAMUSCULAR; SOFT TISSUE at 08:22

## 2022-01-04 NOTE — DISCHARGE INSTRUCTIONS
Epidural Steroid Injection   WHAT YOU NEED TO KNOW:   An epidural steroid injection (LUIS) is a procedure to inject steroid medicine into the epidural space  The epidural space is between your spinal cord and vertebrae  Steroids reduce inflammation and fluid buildup in your spine that may be causing pain  You may be given pain medicine along with the steroids  ACTIVITY  · Do not drive or operate machinery today  · No strenuous activity today - bending, lifting, etc   · You may resume normal activites starting tomorrow - start slowly and as tolerated  · You may shower today, but no tub baths or hot tubs  · You may have numbness for several hours from the local anesthetic  Please use caution and common sense, especially with weight-bearing activities  CARE OF THE INJECTION SITE  · If you have soreness or pain, apply ice to the area today (20 minutes on/20 minutes off)  · Starting tomorrow, you may use warm, moist heat or ice if needed  · You may have an increase or change in your discomfort for 36-48 hours after your treatment  · Apply ice and continue with any pain medication you have been prescribed  · Notify [facility] if you have any of the following: redness, drainage, swelling, headache, stiff neck or fever above 100°F     SPECIAL INSTRUCTIONS  · Our office will contact you in approximately 7 days for a progress report  MEDICATIONS  · Continue to take all routine medications  · Our office may have instructed you to hold some medications  As no general anesthesia was used in today's procedure, you should not experience any side effects related to anesthesia  If you have a problem specifically related to your procedure, please call our office at [phone]  Problems not related to your procedure should be directed to your primary care physician

## 2022-01-04 NOTE — DISCHARGE INSTR - LAB
Epidural Steroid Injection   WHAT YOU NEED TO KNOW:   An epidural steroid injection (LUIS) is a procedure to inject steroid medicine into the epidural space  The epidural space is between your spinal cord and vertebrae  Steroids reduce inflammation and fluid buildup in your spine that may be causing pain  You may be given pain medicine along with the steroids  ACTIVITY  Do not drive or operate machinery today  No strenuous activity today - bending, lifting, etc   You may resume normal activites starting tomorrow - start slowly and as tolerated  You may shower today, but no tub baths or hot tubs  You may have numbness for several hours from the local anesthetic  Please use caution and common sense, especially with weight-bearing activities  CARE OF THE INJECTION SITE  If you have soreness or pain, apply ice to the area today (20 minutes on/20 minutes off)  Starting tomorrow, you may use warm, moist heat or ice if needed  You may have an increase or change in your discomfort for 36-48 hours after your treatment  Apply ice and continue with any pain medication you have been prescribed  Notify the [facility] if you have any of the following: redness, drainage, swelling, headache, stiff neck or fever above 100°F     SPECIAL INSTRUCTIONS  Our office will contact you in approximately 7 days for a progress report  MEDICATIONS  Continue to take all routine medications  Restart Brilinta on 1/5/22  Our office may have instructed you to hold some medications  As no general anesthesia was used in today's procedure, you should not experience any side effects related to anesthesia  If you have a problem specifically related to your procedure, please call our office at [phone]  Problems not related to your procedure should be directed to your primary care physician

## 2022-01-04 NOTE — H&P
Assessment:  1  Lumbar degenerative disc disease  FL spine and pain procedure    FL spine and pain procedure   2  Chronic bilateral low back pain without sciatica  FL spine and pain procedure    FL spine and pain procedure   3  Myofascial pain syndrome  FL spine and pain procedure    FL spine and pain procedure   4  Lumbar radiculopathy  FL spine and pain procedure    FL spine and pain procedure       Plan:  Sherman Forrester is a 49 y o  male with complaints of low back and leg pain presents to surgical center for procedure  1  We will perform a L5-S1 interlaminar epidural steroid injection  2  2  Follow-up 1 month after injection    Complete risks and benefits including bleeding, infection, tissue reaction, nerve injury and allergic reaction were discussed  The approach was demonstrated using models and literature was provided  Verbal and written consent was obtained  My impressions and treatment recommendations were discussed in detail with the patient who verbalized understanding and had no further questions  Discharge instructions were provided  I personally saw and examined the patient and I agree with the above discussed plan of care  Orders Placed This Encounter   Procedures    FL spine and pain procedure     Standing Status:   Standing     Number of Occurrences:   1     Order Specific Question:   Reason for Exam:     Answer:   L5-S1 IESI     Order Specific Question:   Anticoagulant hold needed? Answer:   Edgard Carbajal and david ASA     No orders of the defined types were placed in this encounter  History of Present Illness:  Sherman Forrester is a 49 y o  male who presents for a follow up office visit in regards to low back and leg pain  The patients current symptoms include 8/10 constant dull/aching, cramping, throbbing pain without any particular time pattern        I have personally reviewed and/or updated the patient's past medical history, past surgical history, family history, social history, current medications, allergies, and vital signs today  Review of Systems   Musculoskeletal: Positive for arthralgias and back pain  All other systems reviewed and are negative        Patient Active Problem List   Diagnosis    Coronary artery disease of native artery of native heart with stable angina pectoris ([facility] )    Essential hypertension    Mixed hyperlipidemia    Chest pain    PVC (premature ventricular contraction)    Palpitations    Chronic bilateral low back pain without sciatica    Lumbar degenerative disc disease    Myofascial pain syndrome    Sacroiliitis ([facility] )       Past Medical History:   Diagnosis Date    Arthritis     Coronary artery disease     MI x 4; cardiac cath with 4 stents    GERD (gastroesophageal reflux disease)     Hypertension     Myocardial infarct ([facility] )     4 MIs as of 2/24/2020       Past Surgical History:   Procedure Laterality Date    CORONARY ANGIOPLASTY WITH STENT PLACEMENT         Family History   Problem Relation Age of Onset    Heart disease Father     Hyperlipidemia Father     Diabetes Sister     Early death Sister     Diabetes Brother     Diabetes Maternal Grandmother        Social History     Occupational History    Not on file   Tobacco Use    Smoking status: Former Smoker     Packs/day: 1 00     Years: 10 00     Pack years: 10 00     Types: Cigarettes     Start date: 2/24/1998     Quit date: 11/24/2018     Years since quitting: 3 1    Smokeless tobacco: Never Used   Vaping Use    Vaping Use: Never used   Substance and Sexual Activity    Alcohol use: Never    Drug use: Never    Sexual activity: Yes     Partners: Female     Birth control/protection: None       Current Outpatient Medications on File Prior to Encounter   Medication Sig    aspirin (ASPIRIN LOW DOSE) 81 MG tablet Take by mouth    atorvastatin (LIPITOR) 40 mg tablet TAKE ONE TABLET BY MOUTH EVERY 24 HOURS    isosorbide mononitrate (IMDUR) 60 mg 24 hr tablet TAKE ONE TABLET BY MOUTH EVERY DAY    losartan (COZAAR) 100 MG tablet Take 1 tablet (100 mg total) by mouth every 24 hours    metoprolol succinate (TOPROL-XL) 100 mg 24 hr tablet Take 1 tablet (100 mg total) by mouth 2 (two) times a day    nitroglycerin (NITROSTAT) 0 4 mg SL tablet as directed (Patient not taking: Reported on 12/17/2021)    pantoprazole (PROTONIX) 40 mg tablet Take 40 mg by mouth daily    ticagrelor (Brilinta) 90 MG Take 1 tablet (90 mg total) by mouth every 12 (twelve) hours    venlafaxine (EFFEXOR-XR) 75 mg 24 hr capsule Take 75 mg by mouth daily      No current facility-administered medications on file prior to encounter  No Known Allergies    Physical Exam:    /80   Pulse 62   Temp 97 5 °F (36 4 °C) (Temporal)   Resp 18     Constitutional:normal, well developed, well nourished, alert, in no distress and non-toxic and no overt pain behavior    Eyes:anicteric  HEENT:grossly intact  Neck:supple, symmetric, trachea midline and no masses   Pulmonary:even and unlabored  Cardiovascular:No edema or pitting edema present  Skin:Normal without rashes or lesions and well hydrated  Psychiatric:Mood and affect appropriate  Neurologic:Cranial Nerves II-XII grossly intact  Musculoskeletal:normal

## 2022-01-05 DIAGNOSIS — I49.3 PVC (PREMATURE VENTRICULAR CONTRACTION): ICD-10-CM

## 2022-01-05 DIAGNOSIS — E78.2 MIXED HYPERLIPIDEMIA: ICD-10-CM

## 2022-01-05 DIAGNOSIS — I20.8 STABLE ANGINA PECTORIS (HCC): ICD-10-CM

## 2022-01-05 DIAGNOSIS — I25.118 CORONARY ARTERY DISEASE OF NATIVE ARTERY OF NATIVE HEART WITH STABLE ANGINA PECTORIS (HCC): ICD-10-CM

## 2022-01-05 RX ORDER — ATORVASTATIN CALCIUM 40 MG/1
TABLET, FILM COATED ORAL
Qty: 90 TABLET | Refills: 0 | Status: SHIPPED | OUTPATIENT
Start: 2022-01-05 | End: 2022-01-06 | Stop reason: SDUPTHER

## 2022-01-05 RX ORDER — METOPROLOL SUCCINATE 100 MG/1
TABLET, EXTENDED RELEASE ORAL
Qty: 180 TABLET | Refills: 0 | Status: SHIPPED | OUTPATIENT
Start: 2022-01-05 | End: 2022-01-06

## 2022-01-06 DIAGNOSIS — R07.9 CHEST PAIN, UNSPECIFIED TYPE: ICD-10-CM

## 2022-01-06 DIAGNOSIS — I49.3 PVC (PREMATURE VENTRICULAR CONTRACTION): ICD-10-CM

## 2022-01-06 DIAGNOSIS — E78.2 MIXED HYPERLIPIDEMIA: ICD-10-CM

## 2022-01-06 DIAGNOSIS — I20.8 STABLE ANGINA PECTORIS (HCC): ICD-10-CM

## 2022-01-06 DIAGNOSIS — I25.118 CORONARY ARTERY DISEASE OF NATIVE ARTERY OF NATIVE HEART WITH STABLE ANGINA PECTORIS (HCC): ICD-10-CM

## 2022-01-06 RX ORDER — ATORVASTATIN CALCIUM 40 MG/1
40 TABLET, FILM COATED ORAL EVERY 24 HOURS
Qty: 90 TABLET | Refills: 1 | Status: SHIPPED | OUTPATIENT
Start: 2022-01-06

## 2022-01-06 RX ORDER — ISOSORBIDE MONONITRATE 60 MG/1
60 TABLET, EXTENDED RELEASE ORAL DAILY
Qty: 90 TABLET | Refills: 1 | Status: SHIPPED | OUTPATIENT
Start: 2022-01-06

## 2022-01-06 RX ORDER — METOPROLOL SUCCINATE 100 MG/1
100 TABLET, EXTENDED RELEASE ORAL 2 TIMES DAILY
Qty: 180 TABLET | Refills: 2 | Status: SHIPPED | OUTPATIENT
Start: 2022-01-06 | End: 2022-04-11

## 2022-01-07 ENCOUNTER — HOSPITAL ENCOUNTER (OUTPATIENT)
Facility: HOSPITAL | Age: 52
Setting detail: OUTPATIENT SURGERY
Discharge: HOME | End: 2022-01-07
Attending: INTERNAL MEDICINE | Admitting: INTERNAL MEDICINE
Payer: COMMERCIAL

## 2022-01-07 VITALS
SYSTOLIC BLOOD PRESSURE: 145 MMHG | WEIGHT: 196 LBS | BODY MASS INDEX: 29.7 KG/M2 | TEMPERATURE: 98.1 F | OXYGEN SATURATION: 97 % | HEART RATE: 66 BPM | HEIGHT: 68 IN | RESPIRATION RATE: 17 BRPM | DIASTOLIC BLOOD PRESSURE: 76 MMHG

## 2022-01-07 DIAGNOSIS — I20.8 ANGINA AT REST (HCC): ICD-10-CM

## 2022-01-07 DIAGNOSIS — R00.2 PALPITATIONS: ICD-10-CM

## 2022-01-07 DIAGNOSIS — I25.10 CAD S/P PERCUTANEOUS CORONARY ANGIOPLASTY: Primary | ICD-10-CM

## 2022-01-07 DIAGNOSIS — I25.118 CORONARY ARTERY DISEASE OF NATIVE ARTERY OF NATIVE HEART WITH STABLE ANGINA PECTORIS (HCC): ICD-10-CM

## 2022-01-07 DIAGNOSIS — I49.3 PVC (PREMATURE VENTRICULAR CONTRACTION): ICD-10-CM

## 2022-01-07 DIAGNOSIS — Z98.61 CAD S/P PERCUTANEOUS CORONARY ANGIOPLASTY: Primary | ICD-10-CM

## 2022-01-07 PROBLEM — Z95.5 PRESENCE OF DRUG COATED STENT IN LEFT CIRCUMFLEX CORONARY ARTERY: Status: ACTIVE | Noted: 2022-01-07

## 2022-01-07 LAB
ATRIAL RATE: 66 BPM
ATRIAL RATE: 75 BPM
KCT BLD-ACNC: 252 SEC (ref 89–137)
P AXIS: 37 DEGREES
P AXIS: 44 DEGREES
PR INTERVAL: 160 MS
PR INTERVAL: 166 MS
QRS AXIS: 86 DEGREES
QRS AXIS: 89 DEGREES
QRSD INTERVAL: 92 MS
QRSD INTERVAL: 98 MS
QT INTERVAL: 380 MS
QT INTERVAL: 404 MS
QTC INTERVAL: 423 MS
QTC INTERVAL: 424 MS
SPECIMEN SOURCE: ABNORMAL
T WAVE AXIS: 92 DEGREES
T WAVE AXIS: 93 DEGREES
VENTRICULAR RATE: 66 BPM
VENTRICULAR RATE: 75 BPM

## 2022-01-07 PROCEDURE — C9600 PERC DRUG-EL COR STENT SING: HCPCS | Performed by: INTERNAL MEDICINE

## 2022-01-07 PROCEDURE — C1725 CATH, TRANSLUMIN NON-LASER: HCPCS | Performed by: INTERNAL MEDICINE

## 2022-01-07 PROCEDURE — 99152 MOD SED SAME PHYS/QHP 5/>YRS: CPT | Performed by: INTERNAL MEDICINE

## 2022-01-07 PROCEDURE — 93454 CORONARY ARTERY ANGIO S&I: CPT | Performed by: INTERNAL MEDICINE

## 2022-01-07 PROCEDURE — C1887 CATHETER, GUIDING: HCPCS | Performed by: INTERNAL MEDICINE

## 2022-01-07 PROCEDURE — 85347 COAGULATION TIME ACTIVATED: CPT

## 2022-01-07 PROCEDURE — 93010 ELECTROCARDIOGRAM REPORT: CPT | Performed by: INTERNAL MEDICINE

## 2022-01-07 PROCEDURE — C1769 GUIDE WIRE: HCPCS | Performed by: INTERNAL MEDICINE

## 2022-01-07 PROCEDURE — 99153 MOD SED SAME PHYS/QHP EA: CPT | Performed by: INTERNAL MEDICINE

## 2022-01-07 PROCEDURE — C1874 STENT, COATED/COV W/DEL SYS: HCPCS | Performed by: INTERNAL MEDICINE

## 2022-01-07 PROCEDURE — NC001 PR NO CHARGE: Performed by: INTERNAL MEDICINE

## 2022-01-07 PROCEDURE — 93005 ELECTROCARDIOGRAM TRACING: CPT

## 2022-01-07 PROCEDURE — C1894 INTRO/SHEATH, NON-LASER: HCPCS | Performed by: INTERNAL MEDICINE

## 2022-01-07 PROCEDURE — 92928 PRQ TCAT PLMT NTRAC ST 1 LES: CPT | Performed by: INTERNAL MEDICINE

## 2022-01-07 DEVICE — STENT RONYX25022UX RESOLUTE ONYX 2.50X22
Type: IMPLANTABLE DEVICE | Status: FUNCTIONAL
Brand: RESOLUTE ONYX™

## 2022-01-07 DEVICE — STENT RONYX22512UX RESOLUTE ONYX 2.25X12
Type: IMPLANTABLE DEVICE | Status: FUNCTIONAL
Brand: RESOLUTE ONYX™

## 2022-01-07 RX ORDER — HEPARIN SODIUM 1000 [USP'U]/ML
INJECTION, SOLUTION INTRAVENOUS; SUBCUTANEOUS AS NEEDED
Status: DISCONTINUED | OUTPATIENT
Start: 2022-01-07 | End: 2022-01-07 | Stop reason: HOSPADM

## 2022-01-07 RX ORDER — SODIUM CHLORIDE 9 MG/ML
125 INJECTION, SOLUTION INTRAVENOUS CONTINUOUS
Status: DISCONTINUED | OUTPATIENT
Start: 2022-01-07 | End: 2022-01-07

## 2022-01-07 RX ORDER — ONDANSETRON 2 MG/ML
4 INJECTION INTRAMUSCULAR; INTRAVENOUS EVERY 6 HOURS PRN
Status: DISCONTINUED | OUTPATIENT
Start: 2022-01-07 | End: 2022-01-07 | Stop reason: HOSPADM

## 2022-01-07 RX ORDER — FENTANYL CITRATE 50 UG/ML
INJECTION, SOLUTION INTRAMUSCULAR; INTRAVENOUS AS NEEDED
Status: DISCONTINUED | OUTPATIENT
Start: 2022-01-07 | End: 2022-01-07 | Stop reason: HOSPADM

## 2022-01-07 RX ORDER — NITROGLYCERIN 20 MG/100ML
INJECTION INTRAVENOUS AS NEEDED
Status: DISCONTINUED | OUTPATIENT
Start: 2022-01-07 | End: 2022-01-07 | Stop reason: HOSPADM

## 2022-01-07 RX ORDER — VERAPAMIL HCL 2.5 MG/ML
AMPUL (ML) INTRAVENOUS AS NEEDED
Status: DISCONTINUED | OUTPATIENT
Start: 2022-01-07 | End: 2022-01-07 | Stop reason: HOSPADM

## 2022-01-07 RX ORDER — ACETAMINOPHEN 325 MG/1
650 TABLET ORAL EVERY 4 HOURS PRN
Status: DISCONTINUED | OUTPATIENT
Start: 2022-01-07 | End: 2022-01-07 | Stop reason: HOSPADM

## 2022-01-07 RX ORDER — PANTOPRAZOLE SODIUM 40 MG/1
40 TABLET, DELAYED RELEASE ORAL ONCE
Status: COMPLETED | OUTPATIENT
Start: 2022-01-07 | End: 2022-01-07

## 2022-01-07 RX ORDER — LIDOCAINE HYDROCHLORIDE 10 MG/ML
INJECTION, SOLUTION EPIDURAL; INFILTRATION; INTRACAUDAL; PERINEURAL AS NEEDED
Status: DISCONTINUED | OUTPATIENT
Start: 2022-01-07 | End: 2022-01-07 | Stop reason: HOSPADM

## 2022-01-07 RX ORDER — MIDAZOLAM HYDROCHLORIDE 2 MG/2ML
INJECTION, SOLUTION INTRAMUSCULAR; INTRAVENOUS AS NEEDED
Status: DISCONTINUED | OUTPATIENT
Start: 2022-01-07 | End: 2022-01-07 | Stop reason: HOSPADM

## 2022-01-07 RX ORDER — ASPIRIN 81 MG/1
324 TABLET, CHEWABLE ORAL ONCE
Status: COMPLETED | OUTPATIENT
Start: 2022-01-07 | End: 2022-01-07

## 2022-01-07 RX ORDER — SODIUM CHLORIDE 9 MG/ML
200 INJECTION, SOLUTION INTRAVENOUS CONTINUOUS
Status: DISPENSED | OUTPATIENT
Start: 2022-01-07 | End: 2022-01-07

## 2022-01-07 RX ADMIN — SODIUM CHLORIDE 125 ML/HR: 0.9 INJECTION, SOLUTION INTRAVENOUS at 07:31

## 2022-01-07 RX ADMIN — PANTOPRAZOLE SODIUM 40 MG: 40 TABLET, DELAYED RELEASE ORAL at 12:23

## 2022-01-07 RX ADMIN — ASPIRIN 81 MG CHEWABLE TABLET 324 MG: 81 TABLET CHEWABLE at 07:28

## 2022-01-07 NOTE — DISCHARGE INSTRUCTIONS
1  Please see the post cardiac catheterization dishcarge instructions  No heavy lifting, greater than 10 lbs  or strenuous  activity for 48 hrs  2 Remove band aid tomorrow  Shower and wash area- wrist gently with soap and water- beginning tomorrow  Rinse and pat dry  Apply new water seal band aid  Repeat this process for 5 days  No powders, creams lotions or antibiotic ointments  for 5 days  No tub baths, hot tubs or swimming for 5 days  3  Please call our office ([phone]) if you have any fever, redness, swelling, discharge from your wrist access site  4 No driving for 2 days    5  none     Left Heart Catheterization   WHAT YOU NEED TO KNOW:   A left heart catheterization is a procedure to look at your heart and its arteries  You may need this procedure if you have chest pain, heart disease, or your heart is not working as it should  DISCHARGE INSTRUCTIONS:   Call 911 for any of the following:   · You have any of the following signs of a heart attack:      ? Squeezing, pressure, or pain in your chest     ? and  any of the following:     ? Discomfort or pain in your back, neck, jaw, stomach, or arm     ? Shortness of breath    ? Nausea or vomiting    ? Lightheadedness or a sudden cold sweat    · You have any of the following signs of a stroke:      ? Numbness or drooping on one side of your face     ? Weakness in an arm or leg    ? Confusion or difficulty speaking    ? Dizziness, a severe headache, or vision loss    Seek care immediately if:   · Your arm or leg feels warm, tender, and painful  It may look swollen and red  · The leg or arm used for your angiogram is numb, painful, or changes color  · The bruise at your catheter site gets bigger or becomes swollen  · Your wound does not stop bleeding even after you apply firm pressure for 15 minutes  · You have weakness in an arm or leg  · You become confused or have difficulty speaking      · You have dizziness, a severe headache, or vision loss  Contact your healthcare provider if:   · You have a fever  · Your catheter site is red, leaks pus, or smells bad  · You have increasing pain at your catheter site  · You have questions or concerns about your condition or care  Limit activity as directed:   · Avoid unnecessary stair climbing for 48 hours, if a catheter was put in your groin  · Do not place pressure on your arm, hand, or wrist, if the catheter was placed in your wrist  Avoid pushing, pulling, or heavy lifting with that arm  · If you need to cough, support the area where the catheter was inserted with your hand  · Ask your healthcare provider how long you need to limit movement and avoid certain activities  · You may feel like resting more after your procedure  Slowly start to do more each day  Rest when you feel it is needed  Keep your bandage clean and dry:  Ask your healthcare provider when you can bathe and shower  Do not take baths or go in pools or hot tubs  Check your site every day for signs of infection such as swelling, redness, or pus  Drink liquids as directed:  Liquids help flush the dye used for your procedure out of your body  Ask your healthcare provider how much liquid to drink each day, and which liquids to drink  Some foods, such as soup and fruit, also provide liquid  Limit alcohol:  Do not drink alcohol for 24 hours after your procedure  Then limit alcohol  Women should limit alcohol to 1 drink a day  Men should limit alcohol to 2 drinks a day  A drink of alcohol is 12 ounces of beer, 5 ounces of wine, or 1½ ounces of liquor  Do not smoke:  Nicotine and other chemicals in cigarettes and cigars can damage your blood vessels  Ask your healthcare provider for information if you currently smoke and need help to quit  E-cigarettes or smokeless tobacco still contain nicotine  Talk to your healthcare provider before you use these products     Follow up with your healthcare provider as directed:  Write down your questions so you remember to ask them during your visits  © Copyright [employer] 2018 Information is for End User's use only and may not be sold, redistributed or otherwise used for commercial purposes  All illustrations and images included in CareNotes® are the copyrighted property of [employer]  or Jacques Rojas  The above information is an  only  It is not intended as medical advice for individual conditions or treatments  Talk to your doctor, nurse or pharmacist before following any medical regimen to see if it is safe and effective for you

## 2022-01-07 NOTE — PROGRESS NOTES
TR band off without complications  Radial and ulnar pulse +2  Circulation to hand and arm with brisk refill  Neuro vascular intact to hand  Up and ambulating  OK to D/CC at 1600

## 2022-01-07 NOTE — INTERVAL H&P NOTE
Prior H&P reviewed  Patient seen and examined  /78 (BP Location: Left arm)   Pulse 65   Temp 98 1 °F (36 7 °C) (Oral)   Resp 17   Ht 5' 8" (1 727 m)   Wt 88 9 kg (196 lb)   SpO2 97%   BMI 29 80 kg/m²      After examining the patient, I find no changes to the H&P since it has been written       Accept for today's history and physical

## 2022-01-11 ENCOUNTER — IMMUNIZATIONS (OUTPATIENT)
Dept: FAMILY MEDICINE CLINIC | Facility: HOSPITAL | Age: 52
End: 2022-01-11

## 2022-01-11 DIAGNOSIS — Z23 ENCOUNTER FOR IMMUNIZATION: Primary | ICD-10-CM

## 2022-01-11 PROCEDURE — 91306 COVID-19 MODERNA VACC 0.25 ML BOOSTER: CPT

## 2022-01-11 PROCEDURE — 0064A COVID-19 MODERNA VACC 0.25 ML BOOSTER: CPT

## 2022-01-18 ENCOUNTER — APPOINTMENT (OUTPATIENT)
Dept: LAB | Facility: HOSPITAL | Age: 52
End: 2022-01-18
Payer: COMMERCIAL

## 2022-01-18 DIAGNOSIS — Z98.61 CAD S/P PERCUTANEOUS CORONARY ANGIOPLASTY: ICD-10-CM

## 2022-01-18 DIAGNOSIS — Q99.8 R BINDER PROTEIN DEFICIENCY: ICD-10-CM

## 2022-01-18 DIAGNOSIS — B99.9 ZOOANTHROPONOSIS: ICD-10-CM

## 2022-01-18 DIAGNOSIS — Q20.8: ICD-10-CM

## 2022-01-18 DIAGNOSIS — I25.10 CAD S/P PERCUTANEOUS CORONARY ANGIOPLASTY: ICD-10-CM

## 2022-01-18 LAB
ALBUMIN SERPL BCP-MCNC: 3.9 G/DL (ref 3.5–5)
ALP SERPL-CCNC: 131 U/L (ref 34–104)
ALT SERPL W P-5'-P-CCNC: 25 U/L (ref 7–52)
ANION GAP SERPL CALCULATED.3IONS-SCNC: 4 MMOL/L (ref 4–13)
AST SERPL W P-5'-P-CCNC: 15 U/L (ref 13–39)
BILIRUB SERPL-MCNC: 0.48 MG/DL (ref 0.2–1)
BUN SERPL-MCNC: 12 MG/DL (ref 5–25)
CALCIUM SERPL-MCNC: 9.5 MG/DL (ref 8.4–10.2)
CHLORIDE SERPL-SCNC: 102 MMOL/L (ref 96–108)
CHOLEST SERPL-MCNC: 153 MG/DL
CO2 SERPL-SCNC: 29 MMOL/L (ref 21–32)
CREAT SERPL-MCNC: 0.92 MG/DL (ref 0.6–1.3)
ERYTHROCYTE [DISTWIDTH] IN BLOOD BY AUTOMATED COUNT: 13.1 % (ref 11.6–15.1)
EST. AVERAGE GLUCOSE BLD GHB EST-MCNC: 194 MG/DL
GFR SERPL CREATININE-BSD FRML MDRD: 96 ML/MIN/1.73SQ M
GLUCOSE P FAST SERPL-MCNC: 216 MG/DL (ref 65–99)
HBA1C MFR BLD: 8.4 %
HCT VFR BLD AUTO: 41.9 % (ref 36.5–49.3)
HDLC SERPL-MCNC: 34 MG/DL
HGB BLD-MCNC: 13.9 G/DL (ref 12–17)
LDLC SERPL CALC-MCNC: 70 MG/DL (ref 0–100)
MCH RBC QN AUTO: 30.1 PG (ref 26.8–34.3)
MCHC RBC AUTO-ENTMCNC: 33.2 G/DL (ref 31.4–37.4)
MCV RBC AUTO: 91 FL (ref 82–98)
NONHDLC SERPL-MCNC: 119 MG/DL
PLATELET # BLD AUTO: 208 THOUSANDS/UL (ref 149–390)
PMV BLD AUTO: 9.3 FL (ref 8.9–12.7)
POTASSIUM SERPL-SCNC: 4.3 MMOL/L (ref 3.5–5.3)
PROT SERPL-MCNC: 6.6 G/DL (ref 6.4–8.4)
RBC # BLD AUTO: 4.62 MILLION/UL (ref 3.88–5.62)
SODIUM SERPL-SCNC: 135 MMOL/L (ref 135–147)
TRIGL SERPL-MCNC: 247 MG/DL
WBC # BLD AUTO: 7.22 THOUSAND/UL (ref 4.31–10.16)

## 2022-01-18 PROCEDURE — 85027 COMPLETE CBC AUTOMATED: CPT

## 2022-01-18 PROCEDURE — 80053 COMPREHEN METABOLIC PANEL: CPT

## 2022-01-18 PROCEDURE — 36415 COLL VENOUS BLD VENIPUNCTURE: CPT

## 2022-01-18 PROCEDURE — 80061 LIPID PANEL: CPT

## 2022-01-18 PROCEDURE — 83036 HEMOGLOBIN GLYCOSYLATED A1C: CPT

## 2022-01-20 NOTE — PROGRESS NOTES
Cardiac Rehabilitation Plan of Care   Initial Care Plan          Today's date: `2022  # of Exercise Sessions Completed: Initial Visit  Patient name: Reji Rivera      : 1970  Age: 49 y o  MRN: [id number]  Referring Physician: Doreen Castillo, *  Cardiologist: Dr Erika Malin MD  Provider: Justin leon  Clinician: Geno Petty, MPT, CCRP    Dx: S/P PCI w/BEVERLY OM 2  Hx BEVERLY LAD  Hx MI  Stable Angina    Date of onset: 2022      SUMMARY OF PROGRESS:  Today is the patient's initial visit at Cardiac  Rehab  Patient admits to 100% medication compliance  Patient reports the following physical limitations: fatigue, general weakness and overall functional decline  The patient completed an initial Submaximal TM ETT for a total of 12 minutes and reached a 5 8 MET level  Patient does not require supplemental O2 at this time, and does not require O2 to sleep  Patient's PHQ9 Score was three  At this time, patient denies having depression  Patient's GAD7 Score was zero  At this time, patient denies having anxiety  Currently, the patient does follow a formal exercise program at home  Patient was counseled on exercise guidelines including 1-2 days of moderate exercise on opposite days of Cardiac Rehab, as tolerated  Patient was issued the [facility] Cardiopulmonary Guide this session  At rest, the patient's HR was 92, O2Sat was 98%, and BP was 122/62  During exercise, the patient's peak HR was 118, while peak BP was 158/76  During the test he was able to maintain his 02 Sat at 97% %  Patient rated the test a 4 on a 1-10 RPE Scale  During recovery, the patient's HR was 101, O2Sat was 98%, and BP was 128/64  Patient denied having any other symptoms during exercise  Additionally, the patient's telemetry revealed NSR  Patient had correct hemodynamic responses to activity       Patient has goals of initiate HEP, improve A1C, decrease fatigue, improve strength, decrease reliance on medication, ability to perform moderate to heavy work w/out symptoms and continue to abstain from smoking  Patient is agreeable to coming to Cardiac Rehab 3X times/week for 12 weeks or until he reaches 36 sessions  He is scheduled to begin CR 2022          Medication compliance: Yes   Comments: Pt reports to be compliant with medications  Fall Risk: Low   Comments: Ambulates with a steady gait with no assist device    EKG Interpretation: NSR      EXERCISE ASSESSMENT and PLAN    Current Exercise Program in Rehab:       Frequency: 3 days/week Supplement with home exercise 2+ days/wk as tolerated       Minutes: 30-35         METS: 4 to 5            HR: 20-30 > RHR   RPE: 4-6        Modalities: Treadmill, UBE, NuStep and Recumbent bike      Exercise Progression 30 Day Goals :    Frequency: 3 days/week of cardiac rehab     Supplement with home exercise 2+ days/wk as tolerated    Minutes: 35-45                       >150 mins/wk of moderate intensity exercise   METS: 5 to 5   HR: 20-30 > RHR   RPE: 4-6   Modalities: Treadmill, Airdyne bike, UBE, Lifecycle, NuStep and Recumbent bike    Strength trainin-3 days / week  12-15 repetitions  1-2 sets per modality    Modalities: Leg Press and UE free weights    Home Exercise: none    Goals: 10% improvement in functional capacity - based on max METs achieved in fitness assessment, Increase in exercise capacity by 40% - based on peak METs tolerated in cardiac rehab exercise session, Exercise 5 days/wk, >150mins/wk of moderate intensity exercise, Resume ADLs with increased strength, Attend Rehab regularly and start a home exercise program    Progression Toward Goals:  Reviewed Pt goals and determined plan of care    Education: benefit of exercise for CAD risk factors, home exercise guidelines, AHA guidelines to achieve >150 mins/wk of moderate exercise, RPE scale and physical activity/exercise in extreme weather conditions   Plan:education on home exercise guidelines, home exercise 30+ mins 2 days opposite CR and Education class: Risk Factors for Heart Disease  Readiness to change: Preparation:  (Getting ready to change)       NUTRITION ASSESSMENT AND PLAN    Weight control:    Starting weight: 196   Current weight:   196  Waist circumference:    Starting: NA   Current:  NA    Diabetes: T2D - Recently diagnosed  A1c: 8 5    last measured: 1/18/2022    Lipid management: Discussed diet and lipid management    Goals:LDL <100, HDL >40, TRG <150, CHOL <200, improved A1c  < 7 0%, Improved Rate Your Plate score  >81, increase intake of fish, shellfish, cook without added fat or use vegetable oil/spray, increase intake of meatless meals, reduce cheese intake or use reduced-fat, eat 3 or more servings of whole grains a day, Eat 4-5 cups of fruits and vegetables daily, use olive or canola oil in baking, choose low sodium processed foods and eliminate butter    Progression Toward Goals: Reviewed Pt goals and determined plan of care    Education: heart healthy eating  low sodium diet  hydration  exercise and diabetes management   wt  loss   education class: Heart Healthy Eating  education class:  Label Reading  Plan: Education class: Reading Food Labels, Education Class: Heart Healthy Eating, replace butter with soft spreads made with olive oil, canola or yogurt, replace refined grain bread with whole grain bread, replace unhealthy snacks with fruits & vegs, reduce portion sizes, avoid processed foods, monitor home blood glucose, drink more water and keep added daily sugar <25g/day  Readiness to change: Preparation:  (Getting ready to change)       PSYCHOSOCIAL ASSESSMENT AND PLAN    Emotional:  Depression assessment:  PHQ-9 = 1-4 = Minimal Depression            Anxiety measure:  SHAHLA-7 = 0-4  = Not anxious  Self-reported stress level:  Zero  Social support: Excellent    Goals:  Reduce perceived stress to 1-3/10, PHQ-9 - reduced severity by one level, follow up with therapist, contact behavioral health , Feelings in [facility] Score < 3, Physical Fitness in [facility] Score < 3, Daily Activity in [facility] Score < 3, Social Activities in [facility] Score < 3, Overall Health in [facility] Score < 3, Quality of Life in [facility] Score < 3 , improved positive thoughts of well being, increased energy and Handle anger/frustration better    Progression Toward Goals: Reviewed Pt goals and determined plan of care    Education: signs/sxs of depression, benefits of a positive support system, stress management techniques, depression and CAD, benefits of mental health counseling, class:  Stress and Your Health , class:  Relaxation and class:  Stress and Pulmonary Disease  Plan: Class: Stress and Your Health, Class: Relaxation, Practice relaxation techniques and Exercise  Readiness to change: Preparation:  (Getting ready to change)       OTHER CORE COMPONENTS     Tobacco:   Social History     Tobacco Use   Smoking Status Former Smoker    Packs/day:     Years: 10 00    Pack years: 10 00    Types: Cigarettes    Start date: 1998   Elza Luanas Quit date: 2018    Years since quitting: 3 1   Smokeless Tobacco Never Used       Tobacco Use Intervention:   N/A: Pt has a remote history of smoking -quit     Anginal Symptoms:  None   NTG use: No prescription  Patient takes Imdur daily      Blood pressure:    Restin/62   Exercise: 158/76   Recovery:128/64    Goals: consistent BP < 130/80, reduced dietary sodium <2300mg, moderate intensity exercise >150 mins/wk and medication compliance    Progression Toward Goals: Reviewed Pt goals and determined plan of care    Education:  understanding high blood pressure and it's relationship to CAD, low sodium diet and HTN, proper use of sublingual NTG, Education class:  Common Heart Medications and Education class: Understanding Heart Disease  Plan: Class: Understanding Heart Disease, Class: Common Heart Medications, Avoid Processed foods, engage in regular exercise, eliminate salt shaker at the table, check labels for sodium content and monitor home BP  Readiness to change: Preparation:  (Getting ready to change)           CARDIAC REHAB ASSESSMENT    Today's date: 2022  Patient name: Radha Peter     : 1970       MRN: [id number]  PCP: Sabrina Boston DO  Referring Physician: Dariusz Robles, *  Cardiologist: Dr Thom Lyle MD  Surgeon: DR Ronan Tomas DO  Dx: S/P PCI w/BEVERLY OM 2  Hx BEVERLY LAD  Hx MI  Stable Angina  Date of onset: 2022  Cultural needs: None    Weight    Wt Readings from Last 1 Encounters:   22 88 9 kg (196 lb)      Height:   Ht Readings from Last 1 Encounters:   22 5' 8" (1 727 m)     Medical History:   Past Medical History:   Diagnosis Date    Arthritis     Coronary artery disease     MI x 4; cardiac cath with 4 stents    GERD (gastroesophageal reflux disease)     Hypertension     Myocardial infarct ([facility] [address] )     4 MIs as of 2020         Physical Limitations: Patient is limited by back issues- Lumbar DDD  Fall Risk: Low   Comments: Ambulates with a steady gait with no assist device    Anginal Equivalent: None/denies angina   NTG use: No prescription  Patient has a RX for Imdur, he takes it daily  Risk Factors   Cholesterol: Yes  Smoking: Former user  HTN: Yes  DM: Type 2   Obesity: Yes   Inactivity: No  Stress:  perceived  stress: 0/10   Stressors: Patient states he has no stress  Goals for Stress Management:Improve Time Management and Spend time outside    Family History:  Family History   Problem Relation Age of Onset    Heart disease Father     Hyperlipidemia Father     Diabetes Sister     Early death Sister     Diabetes Brother     Diabetes Maternal Grandmother        Allergies: Patient has no known allergies    ETOH:   Social History     Substance and Sexual Activity   Alcohol Use Never         Current Medications:   Current Outpatient Medications   Medication Sig Dispense Refill    aspirin (ASPIRIN LOW DOSE) 81 MG tablet Take by mouth      atorvastatin (LIPITOR) 40 mg tablet Take 1 tablet (40 mg total) by mouth every 24 hours 90 tablet 1    isosorbide mononitrate (IMDUR) 60 mg 24 hr tablet Take 1 tablet (60 mg total) by mouth daily 90 tablet 1    losartan (COZAAR) 100 MG tablet Take 1 tablet (100 mg total) by mouth every 24 hours 30 tablet 3    metoprolol succinate (TOPROL-XL) 100 mg 24 hr tablet Take 1 tablet (100 mg total) by mouth 2 (two) times a day 180 tablet 2    nitroglycerin (NITROSTAT) 0 4 mg SL tablet as directed (Patient not taking: Reported on 12/17/2021)      pantoprazole (PROTONIX) 40 mg tablet Take 40 mg by mouth daily  3    ticagrelor (Brilinta) 90 MG Take 1 tablet (90 mg total) by mouth every 12 (twelve) hours 180 tablet 3    venlafaxine (EFFEXOR-XR) 75 mg 24 hr capsule Take 75 mg by mouth daily        No current facility-administered medications for this visit  Functional Status Prior to Diagnosis for Treatment   Occupation: unemployed  Recreation: Woodworking and outdoor activities  ADLs: No limitations  [place]: No limitations  Exercise: No formal HEP  Other:       Current Functional Status  Occupation: unemployed  Recreation: as above   ADLs:able to perform self-care resumed driving  [place]: Capable of performing light to moderate ADLs  Exercise: Agreeable to attend CR  Other:     Patient Specific Goals:  Patient has goals of initiate HEP, improve A1C, decrease fatigue, improve strength, decrease reliance on medication, ability to perform moderate to heavy work w/out symptoms and continue to abstain from smoking          Short Term Program Goals: dietary modifications increased strength improved energy/stamina with ADLs exercise 120-150 mins/wk improved BG control    Long Term Goals: increased maximal walking duration  increased intial training workload  Improved [facility] Activity Status score  Improved functional capacity  Improved Quality of Life - [facility] score reduced  Reduced body fat%  Improved A1c  Improved fasting glucose    Ability to reach goals/rehabilitation potential:  Excellent    Projected return to function: 12 weeks  Objective tests: sub-max TM ETT      Nutritional   Reviewed details of Rate your Plate  Discussed key elements of heart healthy eating  Reviewed patient goals for dietary modifications and their clinical implications  Reviewed most recent lipid profile  Goals for dietary modification: choose lean cuts of meat  poultry without the skin  low fat ground meat and poultry  eliminate processed meats  reduce portions of meat to 3 oz  increase fish intake  more meatless meals  increase whole grains  increase fruits and vegetables  eliminate butter  low sodium      Emotional/Social  Patient reports he/she is coping well with good social support and denies depression or anxiety    Marital status:     Domestic Violence Screening: No    Comments: Patient requires skilled CR interventions in order to attain his goals and maximal level of function  CR is medically necessary for secondary prevention

## 2022-01-21 ENCOUNTER — CLINICAL SUPPORT (OUTPATIENT)
Dept: CARDIAC REHAB | Facility: HOSPITAL | Age: 52
End: 2022-01-21
Payer: COMMERCIAL

## 2022-01-21 DIAGNOSIS — I25.10 CAD S/P PERCUTANEOUS CORONARY ANGIOPLASTY: ICD-10-CM

## 2022-01-21 DIAGNOSIS — Z98.61 CAD S/P PERCUTANEOUS CORONARY ANGIOPLASTY: ICD-10-CM

## 2022-01-21 DIAGNOSIS — Z95.5 STATUS POST PRIMARY ANGIOPLASTY WITH CORONARY STENT: Primary | ICD-10-CM

## 2022-01-21 DIAGNOSIS — Z95.5 PRESENCE OF DRUG COATED STENT IN LEFT CIRCUMFLEX CORONARY ARTERY: ICD-10-CM

## 2022-01-21 PROCEDURE — 93797 PHYS/QHP OP CAR RHAB WO ECG: CPT

## 2022-01-24 ENCOUNTER — OFFICE VISIT (OUTPATIENT)
Dept: CARDIOLOGY CLINIC | Facility: CLINIC | Age: 52
End: 2022-01-24
Payer: COMMERCIAL

## 2022-01-24 VITALS
DIASTOLIC BLOOD PRESSURE: 80 MMHG | OXYGEN SATURATION: 99 % | WEIGHT: 195.7 LBS | HEIGHT: 68 IN | HEART RATE: 98 BPM | SYSTOLIC BLOOD PRESSURE: 120 MMHG | BODY MASS INDEX: 29.66 KG/M2

## 2022-01-24 DIAGNOSIS — I25.10 CORONARY ARTERY DISEASE INVOLVING NATIVE HEART WITHOUT ANGINA PECTORIS, UNSPECIFIED VESSEL OR LESION TYPE: ICD-10-CM

## 2022-01-24 DIAGNOSIS — E78.5 DYSLIPIDEMIA: ICD-10-CM

## 2022-01-24 DIAGNOSIS — E11.59 TYPE 2 DIABETES MELLITUS WITH OTHER CIRCULATORY COMPLICATION, WITHOUT LONG-TERM CURRENT USE OF INSULIN (HCC): ICD-10-CM

## 2022-01-24 DIAGNOSIS — Z95.5 S/P DRUG ELUTING CORONARY STENT PLACEMENT: Primary | ICD-10-CM

## 2022-01-24 PROCEDURE — 99215 OFFICE O/P EST HI 40 MIN: CPT | Performed by: INTERNAL MEDICINE

## 2022-01-24 RX ORDER — DULOXETIN HYDROCHLORIDE 60 MG/1
60 CAPSULE, DELAYED RELEASE ORAL DAILY
COMMUNITY

## 2022-01-24 NOTE — PROGRESS NOTES
Cardiology Follow Up    Tae Stephen  1970  [id number]  [facility] [place] [id number] [facility]  [phone]    1  S/P drug eluting coronary stent placement     2  Coronary artery disease involving native heart without angina pectoris, unspecified vessel or lesion type     3  Dyslipidemia     4  Type 2 diabetes mellitus with other circulatory complication, without long-term current use of insulin ([facility])         Interval History:   On 12/17/21 Mr Tae Stephen was seen by Dr Ed Etienne in the office  He was ferozing delicia RETANA, recently with exertion  Cardiac catheterization was scheduled  On 1/07/22 Mr Jhon Naqvi underwent a cardiac catheterization which showed Mid LAD 40% stenosis,  1st diagonal branch vessel small 100% stenosis lesion chronically occluded  Second diagonal branch 90% stenosis vessel small  Ramus intermedius 40% stenosis lesion was previously treated using a stent of unknown type  Left circumflex 2nd obtuse marginal branch  80% stenosis lesion is diffuse  Right coronary artery proximal % stenosis lesion is chronically occluded previously treated using a stent of unknown type  Right posterior descending artery filled by collaterals from the distal LAD  2nd obtuse marginal branch balloon angioplasty x2 with drug-eluting stent successfully placed,  Resolute  [place] [facility] drug-eluting stent  Angioplasty with Resolute [place] [facility] drug-eluting stent 0% residual stenosis  1/18/22  Lab studies stable , , HDL 34, LDL 70     Mr Tae Stephen presents to our office for a follow up visit after [facility]  He admits to daily palpitations worsening with splitting fire wood and with walking fast   Mac Phalen admits to occasional chest pain with exertion, mid sternal, 3-10, relieved with rest  He is not longer using PRN SL NTG  Mac Phalen admits to difficulty with eating healthy diet at home  According to Renee Will, his wife will not allow him to go grocery shopping  She is not eating a healthy diet  HPI:  MI x 4 with stenting   multiple episodes of InStent thrombosis /re stenoses and continued chest pain with exertion  Dyslipidmiea  DM2 HgbA1C 8 4    Family hx of premature CAD    7/26/19 TTE:  Left ventricular systolic function normal, LVEF 60% no regional wall motion abnormality  Right ventricular systolic function normal   Trace MR, trace aortic valve regurgitation, trace TR, trace pulmonic valve regurgitation    Patient Active Problem List   Diagnosis    Coronary artery disease involving native coronary artery    Essential hypertension    Mixed hyperlipidemia    Chest pain    PVC (premature ventricular contraction)    Palpitations    Chronic bilateral low back pain without sciatica    Lumbar degenerative disc disease    Myofascial pain syndrome    Sacroiliitis (HCC)    Presence of drug coated stent in left circumflex coronary artery     Past Medical History:   Diagnosis Date    Arthritis     Coronary artery disease     MI x 4; cardiac cath with 4 stents    GERD (gastroesophageal reflux disease)     Hypertension     Myocardial infarct ([facility] [address] )     4 MIs as of 2/24/2020     Social History     Socioeconomic History    Marital status: /Civil Union     Spouse name: Not on file    Number of children: Not on file    Years of education: Not on file    Highest education level: Not on file   Occupational History    Not on file   Tobacco Use    Smoking status: Former Smoker     Packs/day: 1 00     Years: 10 00     Pack years: 10 00     Types: Cigarettes     Start date: 2/24/1998     Quit date: 11/24/2018     Years since quitting: 3 1    Smokeless tobacco: Never Used   Vaping Use    Vaping Use: Never used   Substance and Sexual Activity    Alcohol use: Never    Drug use: Never    Sexual activity: Yes     Partners: Female     Birth control/protection: None   Other Topics Concern    Not on file   Social History Narrative    Not on file     Social Determinants of Health     Financial Resource Strain: Not on file   Food Insecurity: Not on file   Transportation Needs: Not on file   Physical Activity: Not on file   Stress: Not on file   Social Connections: Not on file   Intimate Partner Violence: Not on file   Housing Stability: Not on file      Family History   Problem Relation Age of Onset    Heart disease Father     Hyperlipidemia Father     Diabetes Sister     Early death Sister     Diabetes Brother     Diabetes Maternal Grandmother      Past Surgical History:   Procedure Laterality Date    CARDIAC CATHETERIZATION  1/7/2022    Procedure: Cardiac catheterization;  Surgeon: Raul Fatima DO;  Location: [facility];   Service: Cardiology    CORONARY ANGIOPLASTY WITH STENT PLACEMENT         Current Outpatient Medications:     aspirin (ASPIRIN LOW DOSE) 81 MG tablet, Take by mouth, Disp: , Rfl:     atorvastatin (LIPITOR) 40 mg tablet, Take 1 tablet (40 mg total) by mouth every 24 hours, Disp: 90 tablet, Rfl: 1    isosorbide mononitrate (IMDUR) 60 mg 24 hr tablet, Take 1 tablet (60 mg total) by mouth daily, Disp: 90 tablet, Rfl: 1    losartan (COZAAR) 100 MG tablet, Take 1 tablet (100 mg total) by mouth every 24 hours, Disp: 30 tablet, Rfl: 3    metoprolol succinate (TOPROL-XL) 100 mg 24 hr tablet, Take 1 tablet (100 mg total) by mouth 2 (two) times a day, Disp: 180 tablet, Rfl: 2    nitroglycerin (NITROSTAT) 0 4 mg SL tablet, as directed (Patient not taking: Reported on 12/17/2021), Disp: , Rfl:     pantoprazole (PROTONIX) 40 mg tablet, Take 40 mg by mouth daily, Disp: , Rfl: 3    ticagrelor (Brilinta) 90 MG, Take 1 tablet (90 mg total) by mouth every 12 (twelve) hours, Disp: 180 tablet, Rfl: 3    venlafaxine (EFFEXOR-XR) 75 mg 24 hr capsule, Take 75 mg by mouth daily , Disp: , Rfl:   No Known Allergies    Labs:  Appointment on 01/18/2022   Component Date Value    WBC 01/18/2022 7 22     RBC 01/18/2022 4 62     Hemoglobin 01/18/2022 13 9     Hematocrit 01/18/2022 41 9     MCV 01/18/2022 91     MCH 01/18/2022 30 1     MCHC 01/18/2022 33 2     RDW 01/18/2022 13 1     Platelets 86/26/4810 208     MPV 01/18/2022 9 3     Sodium 01/18/2022 135     Potassium 01/18/2022 4 3     Chloride 01/18/2022 102     CO2 01/18/2022 29     ANION GAP 01/18/2022 4     BUN 01/18/2022 12     Creatinine 01/18/2022 0 92     Glucose, Fasting 01/18/2022 216*    Calcium 01/18/2022 9 5     AST 01/18/2022 15     ALT 01/18/2022 25     Alkaline Phosphatase 01/18/2022 131*    Total Protein 01/18/2022 6 6     Albumin 01/18/2022 3 9     Total Bilirubin 01/18/2022 0 48     eGFR 01/18/2022 96     Cholesterol 01/18/2022 153     Triglycerides 01/18/2022 247*    HDL, Direct 01/18/2022 34*    LDL Calculated 01/18/2022 70     Non-HDL-Chol (CHOL-HDL) 01/18/2022 119     Hemoglobin A1C 01/18/2022 8 4*    EAG 01/18/2022 194    Admission on 01/07/2022, Discharged on 01/07/2022   Component Date Value    Activated Clotting Time,* 01/07/2022 252*    Specimen Type 01/07/2022 ARTERIAL     Ventricular Rate 01/07/2022 75     Atrial Rate 01/07/2022 75     OK Interval 01/07/2022 160     QRSD Interval 01/07/2022 92     QT Interval 01/07/2022 380     QTC Interval 01/07/2022 424     P Axis 01/07/2022 37     QRS Axis 01/07/2022 86     T Wave Axis 01/07/2022 92     Ventricular Rate 01/07/2022 66     Atrial Rate 01/07/2022 66     OK Interval 01/07/2022 166     QRSD Interval 01/07/2022 98     QT Interval 01/07/2022 404     QTC Interval 01/07/2022 423     P Axis 01/07/2022 44     QRS Axis 01/07/2022 89     T Wave [place] 01/07/2022 93    Appointment on 01/04/2022   Component Date Value    WBC 01/04/2022 5 93     RBC 01/04/2022 4 53     Hemoglobin 01/04/2022 13 7     Hematocrit 01/04/2022 41 8     MCV 01/04/2022 92     MCH 01/04/2022 30 2     MCHC 01/04/2022 32 8     RDW 2022 13 0     MPV 2022 9 7     Platelets  166     nRBC 2022 0     Neutrophils Relative 2022 58     Immat GRANS % 2022 0     Lymphocytes Relative 2022 30     Monocytes Relative 2022 7     Eosinophils Relative 2022 4     Basophils Relative 2022 1     Neutrophils Absolute 2022 3 45     Immature Grans Absolute 2022 0 02     Lymphocytes Absolute 2022 1 76     Monocytes Absolute 2022 0 40     Eosinophils Absolute 2022 0 26     Basophils Absolute 2022 0 04     Sodium 2022 137     Potassium 2022 4 2     Chloride 2022 106     CO2 2022 27     ANION GAP 2022 4     BUN 2022 15     Creatinine 2022 0 97     Glucose, Fasting 2022 182*    Calcium 2022 9 0     AST 2022 13     ALT 2022 20     Alkaline Phosphatase 2022 114*    Total Protein 2022 6 3*    Albumin 2022 3 8     Total Bilirubin 2022 0 38     eGFR 2022 90      Imaging: Cardiac catheterization    Result Date: 2022  Narrative: · 100%  RCA site prior stents, collateralized (known) · Patent proximal circumflex stents · Patent Ramus stents · Small D1 and D2 disease, known (too small PCI or CABG) · No significant obstructive disease in LAD · 80% disease in mid circumflex beyond prior stented area, new BEVERLY placed successfully      [place] spine and pain procedure    Result Date: 2022  Narrative: [place] [facility] Procedure Note PATIENT NAME: Celia Cerda : 1970 MRN: [id number] Pre-op Diagnosis: 1  Lumbar degenerative disc disease  2  Chronic bilateral low back pain without sciatica  3  Myofascial pain syndrome  4  Lumbar radiculopathy  Post-op Diagnosis: 1  Lumbar degenerative disc disease  2  Chronic bilateral low back pain without sciatica  3  Myofascial pain syndrome  4   Lumbar radiculopathy  Surgeon: Felipe Leslie MD PROCEDURE DESCRIPTION: Fluoroscopically-guided  L5-S1 interlaminar epidural steroid injection under fluoroscopy After discussing the risks, benefits, and alternatives to the procedure, the patient expressed understanding and wished to proceed  The patient was brought to the fluoroscopy suite and placed in the prone position  A procedural pause was conducted to verify:  correct patient identity, procedure to be performed and as applicable, correct side and site, correct patient position, and availability of implants, special equipment and special requirements  After identifying the L5-S1 space fluoroscopically, the skin was sterilely prepped and draped in the usual fashion using Chloraprep skin prep  The skin and subcutaneous tissue were anesthetized with 0 5 % lidocaine  Utilizing a loss of resistance technique and intermittent fluoroscopic guidance, a 3 5 20 gauge Tuohy needle was advanced into the epidural space  Proper needle positioning was confirmed using multiple fluoroscopic views  After negative aspiration, Omnipaque 300 contrast was injected confirming epidural spread without evidence of intravascular or intrathecal spread  A 4 ml solution consisting of 80 mg Depo-Medrol in sterile saline was injected slowly and incrementally into the epidural space  Following the injection the needle was withdrawn slightly and flushed with 0 5 % lidocaine as it was fully extracted  The patient tolerated the procedure well and there were no apparent complications  After appropriate observation, the patient was dismissed from the clinic in good condition under their own power   Estimated Blood Loss: none Findings: NONE Specimens: NONE Complications:  NONE Anesthesia: NONE  I was present throughout the entire procedure DISPOSITION: Patient tolerated the procedure well and sent to recovery room awake, alert, and hemodynamically stable       Review of Systems:  Review of Systems   Cardiovascular: Positive for chest pain    All other systems reviewed and are negative  Physical Exam:  Physical Exam  Vitals reviewed  Constitutional:       Appearance: Normal appearance  HENT:      Head: Normocephalic  Eyes:      Extraocular Movements: Extraocular movements intact  Pupils: Pupils are equal, round, and reactive to light  Cardiovascular:      Rate and Rhythm: Normal rate and regular rhythm  Pulses: Normal pulses  Heart sounds: Normal heart sounds  Pulmonary:      Effort: Pulmonary effort is normal       Breath sounds: Normal breath sounds  Abdominal:      General: Bowel sounds are normal       Palpations: Abdomen is soft  Musculoskeletal:         General: Normal range of motion  Cervical back: Normal range of motion and neck supple  Right lower leg: No edema  Left lower leg: No edema  Skin:     General: Skin is warm and dry  Capillary Refill: Capillary refill takes less than 2 seconds  Comments: Right radial appears healed   Neurological:      General: No focal deficit present  Mental Status: He is alert and oriented to person, place, and time  Psychiatric:         Mood and Affect: Mood normal          Behavior: Behavior normal          Discussion/Summary:  1  1/07/22 sp Angioplasty x 2 and BEVERLY x 2 to 2nd obtuse marginal branch  Balloon angioplasty x2 with drug-eluting stent successfully placed,  Resolute  [place] Rx drug-eluting stent  Angioplasty with Resolute [place] Rx drug-eluting stent 0% residual stenosis  Continue on aspirin 81 mg daily, Brilinta 90 mg b i d  do not stop for any reason  Continue metoprolol succinate 100 mg q 12 hours, Lipitor 40 mg daily,   Imdur 60 mg daily losartan 100 mg daily  Cardiac rehabilitation,  follow-up in our office in 2 months with Dr Jaclyn Burden evaluate symptoms  Handout information provided on a heart healthy Mediterranean diet  Had information provided on a 2 g sodium  Diet and reading food labels      2  CAD Mid LAD 40% stenosis,  1st diagonal branch vessel small 100% stenosis lesion chronically occluded  Second diagonal branch 90% stenosis vessel small  Ramus intermedius 40% stenosis lesion was previously treated using a stent of unknown type  Right coronary artery proximal % stenosis lesion is chronically occluded previously treated using a stent of unknown type  Right posterior descending artery filled by collaterals from the distal LAD  Admits to slight midsternal chest pain with exertion relieved with rest   Continue on aspirin 81 mg daily, Brilinta 90 mg b i d , losartan 100 mg daily, metoprolol succinate 100 mg q 12 hours, Lipitor 40 mg daily,  heart healthy diet  3  Dyslipidemia continue Lipitor 40 mg daily,  Over-the-counter fish oil 1000 mg b i d   4  DM2 HgbA1C 8 4 education provided on diabetic diet limiting carbohydrates and concentrated sweets    Continue on metformin 500 mg b i d  follow-up PCP

## 2022-01-24 NOTE — PATIENT INSTRUCTIONS
2gm sodium low fat low cholesterol, low carbohydrate, no concentrated sweets diet       Cardiac rehabilitation

## 2022-01-26 ENCOUNTER — APPOINTMENT (OUTPATIENT)
Dept: CARDIAC REHAB | Facility: HOSPITAL | Age: 52
End: 2022-01-26
Payer: COMMERCIAL

## 2022-01-28 ENCOUNTER — APPOINTMENT (OUTPATIENT)
Dept: CARDIAC REHAB | Facility: HOSPITAL | Age: 52
End: 2022-01-28
Payer: COMMERCIAL

## 2022-02-14 ENCOUNTER — CLINICAL SUPPORT (OUTPATIENT)
Dept: CARDIAC REHAB | Facility: HOSPITAL | Age: 52
End: 2022-02-14
Payer: COMMERCIAL

## 2022-02-14 DIAGNOSIS — Z95.5 STATUS POST PRIMARY ANGIOPLASTY WITH CORONARY STENT: Primary | ICD-10-CM

## 2022-02-14 PROCEDURE — 93798 PHYS/QHP OP CAR RHAB W/ECG: CPT

## 2022-02-14 NOTE — PROGRESS NOTES
Cardiac Rehabilitation Plan of Care   Initial Care Plan          Today's date: 2022  # of Exercise Sessions Completed:   Patient name: Job Uriarte      : 1970  Age: 49 y o  MRN: [id number]  Referring Physician: Juanjo Ledesma MD  Cardiologist: Dr Juanito Augustin MD  Provider: Elvira Jj  Clinician: Samm Szymanski, MPT, CCRP    Dx: S/P PCI w/BEVERLY OM 2  Hx BEVERLY LAD  Hx MI  Stable Angina    Date of onset: 2022      SUMMARY OF PROGRESS:  Today was Kai's first exercise session in CR  He had to delay hs start date due to exposure to COVID 23 w/in his family  Patient was negative and has no symptoms  Today is the patient's initial visit at Cardiac  Rehab  Patient admits to 100% medication compliance  Patient reports the following physical limitations: fatigue, general weakness and overall functional decline  The patient completed an initial Submaximal TM ETT for a total of 12 minutes and reached a 5 8 MET level  Patient does not require supplemental O2 at this time, and does not require O2 to sleep  Patient's PHQ9 Score was three  At this time, patient denies having depression  Patient's GAD7 Score was zero  At this time, patient denies having anxiety  Currently, the patient does follow a formal exercise program at home  Patient was counseled on exercise guidelines including 1-2 days of moderate exercise on opposite days of Cardiac Rehab, as tolerated  Patient was issued the [facility] Cardiopulmonary Guide this session  At rest, the patient's HR was 92, O2Sat was 98%, and BP was 122/62  During exercise, the patient's peak HR was 118, while peak BP was 158/76  During the test he was able to maintain his 02 Sat at 97% %  Patient rated the test a 4 on a 1-10 RPE Scale  During recovery, the patient's HR was 101, O2Sat was 98%, and BP was 128/64  Patient denied having any other symptoms during exercise  Additionally, the patient's telemetry revealed NSR   Patient had correct hemodynamic responses to activity  Patient has goals of initiate HEP, improve A1C, decrease fatigue, improve strength, decrease reliance on medication, ability to perform moderate to heavy work w/out symptoms and continue to abstain from smoking  Patient is agreeable to coming to Cardiac Rehab 3X times/week for 12 weeks or until he reaches 36 sessions  He is scheduled to begin CR 2022          Medication compliance: Yes   Comments: Pt reports to be compliant with medications  Fall Risk: Low   Comments: Ambulates with a steady gait with no assist device    EKG Interpretation: NSR      EXERCISE ASSESSMENT and PLAN    Current Exercise Program in Rehab:       Frequency: 3 days/week Supplement with home exercise 2+ days/wk as tolerated       Minutes: 30-35         METS: 4 to 5            HR: 20-30 > RHR   RPE: 4-6        Modalities: Treadmill, UBE, NuStep and Recumbent bike      Exercise Progression 30 Day Goals :    Frequency: 3 days/week of cardiac rehab     Supplement with home exercise 2+ days/wk as tolerated    Minutes: 35-45                       >150 mins/wk of moderate intensity exercise   METS: 5 to 5   HR: 20-30 > RHR   RPE: 4-6   Modalities: Treadmill, Airdyne bike, UBE, Lifecycle, NuStep and Recumbent bike    Strength trainin-3 days / week  12-15 repetitions  1-2 sets per modality    Modalities: Leg Press and UE free weights    Home Exercise: none    Goals: 10% improvement in functional capacity - based on max METs achieved in fitness assessment, Increase in exercise capacity by 40% - based on peak METs tolerated in cardiac rehab exercise session, Exercise 5 days/wk, >150mins/wk of moderate intensity exercise, Resume ADLs with increased strength, Attend Rehab regularly and start a home exercise program    Progression Toward Goals:  Reviewed Pt goals and determined plan of care    Education: benefit of exercise for CAD risk factors, home exercise guidelines, AHA guidelines to achieve >150 mins/wk of moderate exercise, RPE scale and physical activity/exercise in extreme weather conditions   Plan:education on home exercise guidelines, home exercise 30+ mins 2 days opposite CR and Education class: Risk Factors for Heart Disease  Readiness to change: Preparation:  (Getting ready to change)       NUTRITION ASSESSMENT AND PLAN    Weight control:    Starting weight: 196   Current weight:   196  Waist circumference:    Starting: NA   Current:  NA    Diabetes: T2D - Recently diagnosed  A1c: 8 5    last measured: 1/18/2022    Lipid management: Discussed diet and lipid management    Goals:LDL <100, HDL >40, TRG <150, CHOL <200, improved A1c  < 7 0%, Improved Rate Your Plate score  >47, increase intake of fish, shellfish, cook without added fat or use vegetable oil/spray, increase intake of meatless meals, reduce cheese intake or use reduced-fat, eat 3 or more servings of whole grains a day, Eat 4-5 cups of fruits and vegetables daily, use olive or canola oil in baking, choose low sodium processed foods and eliminate butter    Progression Toward Goals: Reviewed Pt goals and determined plan of care    Education: heart healthy eating  low sodium diet  hydration  exercise and diabetes management   wt  loss   education class: Heart Healthy Eating  education class:  Label Reading  Plan: Education class: Reading Food Labels, Education Class: Heart Healthy Eating, replace butter with soft spreads made with olive oil, canola or yogurt, replace refined grain bread with whole grain bread, replace unhealthy snacks with fruits & vegs, reduce portion sizes, avoid processed foods, monitor home blood glucose, drink more water and keep added daily sugar <25g/day  Readiness to change: Preparation:  (Getting ready to change)       PSYCHOSOCIAL ASSESSMENT AND PLAN    Emotional:  Depression assessment:  PHQ-9 = 1-4 = Minimal Depression            Anxiety measure:  SHAHLA-7 = 0-4  = Not anxious  Self-reported stress level:  Zero  Social support: Excellent    Goals:  Reduce perceived stress to 1-3/10, PHQ-9 - reduced severity by one level, follow up with therapist, contact behavioral health , Feelings in [facility] Score < 3, Physical Fitness in [facility] Score < 3, Daily Activity in [facility] Score < 3, Social Activities in [facility] Score < 3, Overall Health in [facility] Score < 3, Quality of Life in [facility] Score < 3 , improved positive thoughts of well being, increased energy and Handle anger/frustration better    Progression Toward Goals: Reviewed Pt goals and determined plan of care    Education: signs/sxs of depression, benefits of a positive support system, stress management techniques, depression and CAD, benefits of mental health counseling, class:  Stress and Your Health , class:  Relaxation and class:  Stress and Pulmonary Disease  Plan: Class: Stress and Your Health, Class: Relaxation, Practice relaxation techniques and Exercise  Readiness to change: Preparation:  (Getting ready to change)       OTHER CORE COMPONENTS     Tobacco:   Social History     Tobacco Use   Smoking Status Former Smoker    Packs/day:     Years: 10 00    Pack years: 10 00    Types: Cigarettes    Start date: 1998   Annalise Clinton Quit date: 2018    Years since quitting: 3 2   Smokeless Tobacco Never Used       Tobacco Use Intervention:   N/A: Pt has a remote history of smoking -quit     Anginal Symptoms:  None   NTG use: No prescription  Patient takes Imdur daily      Blood pressure:    Restin/62   Exercise: 158/76   Recovery:128/64    Goals: consistent BP < 130/80, reduced dietary sodium <2300mg, moderate intensity exercise >150 mins/wk and medication compliance    Progression Toward Goals: Reviewed Pt goals and determined plan of care    Education:  understanding high blood pressure and it's relationship to CAD, low sodium diet and HTN, proper use of sublingual NTG, Education class:  Common Heart Medications and Education class: Understanding Heart Disease  Plan: Class: Understanding Heart Disease, Class: Common Heart Medications, Avoid Processed foods, engage in regular exercise, eliminate salt shaker at the table, check labels for sodium content and monitor home BP  Readiness to change: Preparation:  (Getting ready to change)

## 2022-02-16 ENCOUNTER — APPOINTMENT (OUTPATIENT)
Dept: CARDIAC REHAB | Facility: HOSPITAL | Age: 52
End: 2022-02-16
Payer: COMMERCIAL

## 2022-02-18 ENCOUNTER — APPOINTMENT (OUTPATIENT)
Dept: CARDIAC REHAB | Facility: HOSPITAL | Age: 52
End: 2022-02-18
Payer: COMMERCIAL

## 2022-02-21 ENCOUNTER — CLINICAL SUPPORT (OUTPATIENT)
Dept: CARDIAC REHAB | Facility: HOSPITAL | Age: 52
End: 2022-02-21
Payer: COMMERCIAL

## 2022-02-21 DIAGNOSIS — Z95.5 STATUS POST PRIMARY ANGIOPLASTY WITH CORONARY STENT: ICD-10-CM

## 2022-02-21 PROCEDURE — 93798 PHYS/QHP OP CAR RHAB W/ECG: CPT

## 2022-02-23 ENCOUNTER — APPOINTMENT (OUTPATIENT)
Dept: CARDIAC REHAB | Facility: HOSPITAL | Age: 52
End: 2022-02-23
Payer: COMMERCIAL

## 2022-02-25 ENCOUNTER — APPOINTMENT (OUTPATIENT)
Dept: CARDIAC REHAB | Facility: HOSPITAL | Age: 52
End: 2022-02-25
Payer: COMMERCIAL

## 2022-02-28 ENCOUNTER — CLINICAL SUPPORT (OUTPATIENT)
Dept: CARDIAC REHAB | Facility: HOSPITAL | Age: 52
End: 2022-02-28
Payer: COMMERCIAL

## 2022-02-28 DIAGNOSIS — Z95.5 STATUS POST PRIMARY ANGIOPLASTY WITH CORONARY STENT: ICD-10-CM

## 2022-02-28 PROCEDURE — 93798 PHYS/QHP OP CAR RHAB W/ECG: CPT

## 2022-03-02 ENCOUNTER — APPOINTMENT (OUTPATIENT)
Dept: CARDIAC REHAB | Facility: HOSPITAL | Age: 52
End: 2022-03-02
Payer: COMMERCIAL

## 2022-03-07 ENCOUNTER — APPOINTMENT (OUTPATIENT)
Dept: CARDIAC REHAB | Facility: HOSPITAL | Age: 52
End: 2022-03-07
Payer: COMMERCIAL

## 2022-03-09 ENCOUNTER — APPOINTMENT (OUTPATIENT)
Dept: CARDIAC REHAB | Facility: HOSPITAL | Age: 52
End: 2022-03-09
Payer: COMMERCIAL

## 2022-03-11 ENCOUNTER — OFFICE VISIT (OUTPATIENT)
Dept: PAIN MEDICINE | Facility: CLINIC | Age: 52
End: 2022-03-11
Payer: COMMERCIAL

## 2022-03-11 ENCOUNTER — CLINICAL SUPPORT (OUTPATIENT)
Dept: CARDIAC REHAB | Facility: HOSPITAL | Age: 52
End: 2022-03-11
Payer: COMMERCIAL

## 2022-03-11 VITALS
BODY MASS INDEX: 29.49 KG/M2 | TEMPERATURE: 98.3 F | HEIGHT: 68 IN | DIASTOLIC BLOOD PRESSURE: 64 MMHG | WEIGHT: 194.6 LBS | HEART RATE: 88 BPM | SYSTOLIC BLOOD PRESSURE: 104 MMHG

## 2022-03-11 DIAGNOSIS — M54.50 CHRONIC BILATERAL LOW BACK PAIN WITHOUT SCIATICA: ICD-10-CM

## 2022-03-11 DIAGNOSIS — Z95.5 STATUS POST PRIMARY ANGIOPLASTY WITH CORONARY STENT: Primary | ICD-10-CM

## 2022-03-11 DIAGNOSIS — M79.18 MYOFASCIAL PAIN SYNDROME: ICD-10-CM

## 2022-03-11 DIAGNOSIS — G89.29 CHRONIC BILATERAL LOW BACK PAIN WITHOUT SCIATICA: ICD-10-CM

## 2022-03-11 DIAGNOSIS — M46.1 SACROILIITIS (HCC): ICD-10-CM

## 2022-03-11 DIAGNOSIS — M51.36 LUMBAR DEGENERATIVE DISC DISEASE: ICD-10-CM

## 2022-03-11 DIAGNOSIS — M54.16 LUMBAR RADICULOPATHY: ICD-10-CM

## 2022-03-11 DIAGNOSIS — G89.4 CHRONIC PAIN SYNDROME: Primary | ICD-10-CM

## 2022-03-11 PROCEDURE — 99214 OFFICE O/P EST MOD 30 MIN: CPT | Performed by: NURSE PRACTITIONER

## 2022-03-11 PROCEDURE — 93798 PHYS/QHP OP CAR RHAB W/ECG: CPT

## 2022-03-11 RX ORDER — GABAPENTIN 300 MG/1
CAPSULE ORAL
Qty: 90 CAPSULE | Refills: 1 | Status: SHIPPED | OUTPATIENT
Start: 2022-03-11

## 2022-03-11 NOTE — PATIENT INSTRUCTIONS
Gabapentin (By mouth)   Gabapentin (ron-a-PEN-tin)  Treats seizures and pain caused by shingles  Brand Name(s): FusePaq Fanatrex, Neurontin   There may be other brand names for this medicine  When This Medicine Should Not Be Used: This medicine is not right for everyone  Do not use it if you had an allergic reaction to gabapentin  How to Use This Medicine:   Capsule, Liquid, Tablet  · Take your medicine as directed  Your dose may need to be changed several times to find what works best for you  If you have epilepsy, do not allow more than 12 hours to pass between doses  · Capsule: Swallow the capsule whole with plenty of water  Do not open, crush, or chew it  · Gralise® tablet: Swallow the tablet whole   Do not crush, break, or chew it  · Neurontin® tablet: If you break a tablet into 2 pieces, use the second half as your next dose  Do not use the half-tablet if the whole tablet has been cut or broken after 28 days  · Oral liquid: Measure the oral liquid medicine with a marked measuring spoon, oral syringe, or medicine cup  · This medicine should come with a Medication Guide  Ask your pharmacist for a copy if you do not have one  · Missed dose: Take a dose as soon as you remember  If it is almost time for your next dose, wait until then and take a regular dose  Do not take extra medicine to make up for a missed dose  · Store the medicine in a closed container at room temperature, away from heat, moisture, and direct light  Store the Neurontin® oral liquid in the refrigerator  Do not freeze  Drugs and Foods to Avoid:   Ask your doctor or pharmacist before using any other medicine, including over-the-counter medicines, vitamins, and herbal products  · Some medicines can affect how gabapentin works  Tell your doctor if you also using hydrocodone or morphine  · If you take an antacid, wait at least 2 hours before you take gabapentin  · Do not drink alcohol while you are using this medicine    · Tell your doctor if you use anything else that makes you sleepy  Some examples are allergy medicine, narcotic pain medicine, and alcohol  Tell your doctor if you are also using lorazepam, oxycodone, or zolpidem  Warnings While Using This Medicine:   · Tell your doctor if you are pregnant or breastfeeding, or if you have kidney problems (including patients receiving dialysis) or lung problems  Tell your doctor if you have a history of depression or mental health problems  · This medicine may cause the following problems:  ? Drug reaction with eosinophilia and systemic symptoms (DRESS) or multiorgan hypersensitivity, which may damage the liver, kidney, blood, heart, or muscles  ? Changes in mood or behavior, including suicidal thoughts or behavior  ? Respiratory depression (serious breathing problem that can be life-threatening), when used with narcotic pain medicines  · Do not stop using this medicine suddenly  Your doctor will need to slowly decrease your dose before you stop it completely  · This medicine may make you dizzy or drowsy  Do not drive or do anything else that could be dangerous until you know how this medicine affects you  · Tell any doctor or dentist who treats you that you are using this medicine  This medicine may affect certain medical test results  · Your doctor will check your progress and the effects of this medicine at regular visits  Keep all appointments  · Keep all medicine out of the reach of children  Never share your medicine with anyone    Possible Side Effects While Using This Medicine:   Call your doctor right away if you notice any of these side effects:  · Allergic reaction: Itching or hives, swelling in your face or hands, swelling or tingling in your mouth or throat, chest tightness, trouble breathing  · Behavior problems, aggression, restlessness, trouble concentrating, moodiness (especially in children)  · Blistering, peeling, red skin rash  · Blue lips, fingernails, or skin, chest pain, fast heartbeat, trouble breathing  · Change in how much or how often you urinate, bloody or cloudy urine  · Dark urine or pale stools, nausea, vomiting, loss of appetite, stomach pain, yellow skin or eyes  · Fever, chills, cough, sore throat, body aches  · Problems with coordination, shakiness, unsteadiness, unusual eye movement  · Rapid weight gain, swelling in your hands, ankles, or feet  · Rash, swollen or tender glands in the neck, armpit, or groin  · Unusual moods or behaviors, thoughts of hurting yourself, feeling depressed  If you notice these less serious side effects, talk with your doctor:   · Dizziness, drowsiness, sleepiness, tiredness  If you notice other side effects that you think are caused by this medicine, tell your doctor  Call your doctor for medical advice about side effects  You may report side effects to FDA at 4-064-FDA-2914    © Copyright [facility] [facility] 2022 Information is for End User's use only and may not be sold, redistributed or otherwise used for commercial purposes  The above information is an  only  It is not intended as medical advice for individual conditions or treatments  Talk to your doctor, nurse or pharmacist before following any medical regimen to see if it is safe and effective for you

## 2022-03-11 NOTE — PROGRESS NOTES
Assessment:  1  Chronic pain syndrome    2  Chronic bilateral low back pain without sciatica    3  Lumbar radiculopathy    4  Lumbar degenerative disc disease    5  Myofascial pain syndrome    6  Sacroiliitis ([address] )        Plan:  While the patient was in the office today, I did have a thorough conversation regarding their chronic pain syndrome, medication management, and treatment plan options  Patient is being seen for follow-up visit  He underwent an L5-S1 interlaminar epidural steroid injection on 01/04/2022  Unfortunately, he denies any improvement  He previously underwent a sacroiliac joint injection which provided him with short-term relief  He returns to the office today complaining of low back pain that can radiate to the lateral right thigh and down the posterior aspect of his left leg  At this point we will refer him for a neurosurgical evaluation  I discussed with the patient that I feel a medication such as Neurontin would be helpful in treating her pain  I discussed with patient the type of medication it is, however works, and that it requires a titration process that is specific to each individual   I reviewed with the patient that it may take 3-4 weeks for the medications side effects to be noticed and it should never be abruptly stop  Possible side effects include, but are not limited to:  Vertigo, lethargy, nausea, and edema of the extremities  Advised the patient to call our office if he experience any side effects  The patient verbalized an understanding  Follow-up after neurosurgical evaluation  Could consider neuromodulation if surgery is not recommended  Continue Cymbalta 60 milligrams daily  History of Present Illness: The patient is a 47 y o  male who presents for a follow up office visit in regards to Back Pain  The patients current symptoms include complaints of low back pain that radiates to the right lateral thigh and down the posterior aspect of his left leg  Current pain level is a 7/10  Quality pain is described as dull, aching  Current pain medications includes:  Cymbalta 60 milligrams daily   The patient reports that this regimen is providing 0 % pain relief  The patient is reporting no side effects from this pain medication regimen  I have personally reviewed and/or updated the patient's past medical history, past surgical history, family history, social history, current medications, allergies, and vital signs today  Review of Systems  Review of Systems   Respiratory: Negative for shortness of breath  Cardiovascular: Negative for chest pain  Gastrointestinal: Negative for constipation, diarrhea, nausea and vomiting  Musculoskeletal: Positive for gait problem  Negative for arthralgias, joint swelling and myalgias  Neurological: Negative for dizziness, seizures and weakness  All other systems reviewed and are negative  Past Medical History:   Diagnosis Date    Arthritis     Coronary artery disease     MI x 4; cardiac cath with 4 stents    GERD (gastroesophageal reflux disease)     Hypertension     Myocardial infar[address] [address] )     4 MIs as of 2/24/2020       Past Surgical History:   Procedure Laterality Date    CARDIAC CATHETERIZATION  1/7/2022    Procedure: Cardiac catheterization;  Surgeon: Francesco Flores DO;  Location: [facility];   Service: Cardiology    CORONARY ANGIOPLASTY WITH STENT PLACEMENT         Family History   Problem Relation Age of Onset    Heart disease Father     Hyperlipidemia Father     Diabetes Sister     Early death Sister     Diabetes Brother     Diabetes Maternal Grandmother        Social History     Occupational History    Not on file   Tobacco Use    Smoking status: Former Smoker     Packs/day: 1 00     Years: 10 00     Pack years: 10 00     Types: Cigarettes     Start date: 2/24/1998     Quit date: 11/24/2018     Years since quitting: 3 2    Smokeless tobacco: Never Used   Vaping Use    Vaping Use: Never used   Substance and Sexual Activity    Alcohol use: Never    Drug use: Never    Sexual activity: Yes     Partners: Female     Birth control/protection: None         Current Outpatient Medications:     aspirin (ASPIRIN LOW DOSE) 81 MG tablet, Take 81 mg by mouth daily  , Disp: , Rfl:     atorvastatin (LIPITOR) 40 mg tablet, Take 1 tablet (40 mg total) by mouth every 24 hours, Disp: 90 tablet, Rfl: 1    DULoxetine (CYMBALTA) 60 mg delayed release capsule, Take 60 mg by mouth daily, Disp: , Rfl:     isosorbide mononitrate (IMDUR) 60 mg 24 hr tablet, Take 1 tablet (60 mg total) by mouth daily, Disp: 90 tablet, Rfl: 1    losartan (COZAAR) 100 MG tablet, Take 1 tablet (100 mg total) by mouth every 24 hours, Disp: 30 tablet, Rfl: 3    metFORMIN (GLUCOPHAGE) 500 mg tablet, Take 500 mg by mouth 2 (two) times a day with meals, Disp: , Rfl:     metoprolol succinate (TOPROL-XL) 100 mg 24 hr tablet, Take 1 tablet (100 mg total) by mouth 2 (two) times a day, Disp: 180 tablet, Rfl: 2    pantoprazole (PROTONIX) 40 mg tablet, Take 40 mg by mouth daily, Disp: , Rfl: 3    ticagrelor (Brilinta) 90 MG, Take 1 tablet (90 mg total) by mouth every 12 (twelve) hours, Disp: 180 tablet, Rfl: 3    gabapentin (NEURONTIN) 300 mg capsule, 1 PO QHS x 4 days, then 1 PO BID x 4 days, then 1 PO TID, Disp: 90 capsule, Rfl: 1    nitroglycerin (NITROSTAT) 0 4 mg SL tablet, as directed (Patient not taking: Reported on 12/17/2021), Disp: , Rfl:     No Known Allergies    Physical Exam:    /64 (BP Location: Left arm, Patient Position: Sitting, Cuff Size: Standard)   Pulse 88   Temp 98 3 °F (36 8 °C)   Ht 5' 8" (1 727 m)   Wt 88 3 kg (194 lb 9 6 oz)   BMI 29 59 kg/m²     Constitutional:normal, well developed, well nourished, alert, in no distress and non-toxic and no overt pain behavior    Eyes:anicteric  HEENT:grossly intact  Neck:supple, symmetric, trachea midline and no masses   Pulmonary:even and unlabored  Cardiovascular:No edema or pitting edema present  Skin:Normal without rashes or lesions and well hydrated  Psychiatric:Mood and affect appropriate  Neurologic:Cranial Nerves II-XII grossly intact  Musculoskeletal:Gait is slow and guarded      Imaging  No orders to display       Orders Placed This Encounter   Procedures    Ambulatory Referral to Neurosurgery

## 2022-03-14 ENCOUNTER — APPOINTMENT (OUTPATIENT)
Dept: CARDIAC REHAB | Facility: HOSPITAL | Age: 52
End: 2022-03-14
Payer: COMMERCIAL

## 2022-03-14 NOTE — PROGRESS NOTES
Cardiac Rehabilitation Plan of Care   60 Day Reassessment          Today's date: 3/14/2022  # of Exercise Sessions Completed:   Patient name: Tia Maurice      : 1970  Age: 47 y o  MRN: [id number]  Referring Physician: Clay Zamudio MD  Cardiologist: Dr Jass Greene MD  Provider: Justin leon  Clinician: Marianela Benavidez, MPT, CCRP    Dx: S/P PCI w/BEVERLY OM 2  Hx BEVERLY LAD  Hx MI  Stable Angina    Date of onset: 2022      SUMMARY OF PROGRESS:  There is no significant progress to report in this 30 day reporting window because the patient has only been in attendance 3 addition times  He has been going to pain management and dealing with back pain that limits his exercise sessions  The patient has attended 5 sessions of cardiac rehab  Patient admits to 100% medication compliance  Patient reports the following physical limitations: fatigue, general weakness and overall functional decline  The patient completed an initial Submaximal TM ETT for a total of 12 minutes and reached a 5 8 MET level  Patient does not require supplemental O2 at this time, and does not require O2 to sleep  Patient's PHQ9 Score was three  At this time, patient denies having depression  Patient's GAD7 Score was zero  At this time, patient denies having anxiety  Currently, the patient does follow a formal exercise program at home  Patient was counseled on exercise guidelines including 1-2 days of moderate exercise on opposite days of Cardiac Rehab, as tolerated  Patient was issued the [facility] Cardiopulmonary Guide this session  At rest, the patient's HR was 92, O2Sat was 98%, and BP was 122/62  During exercise, the patient's peak HR was 118, while peak BP was 158/76  During the test he was able to maintain his 02 Sat at 97% %  Patient rated the test a 4 on a 1-10 RPE Scale  During recovery, the patient's HR was 101, O2Sat was 98%, and BP was 128/64  Patient denied having any other symptoms during exercise  Additionally, the patient's telemetry revealed NSR  Patient had correct hemodynamic responses to activity  Patient has goals of initiate HEP, improve A1C, decrease fatigue, improve strength, decrease reliance on medication, ability to perform moderate to heavy work w/out symptoms and continue to abstain from smoking  Medication compliance: Yes   Comments: Pt reports to be compliant with medications  Fall Risk: Low   Comments: Ambulates with a steady gait with no assist device    EKG Interpretation: NSR      EXERCISE ASSESSMENT and PLAN    Current Exercise Program in Rehab:       Frequency: 3 days/week Supplement with home exercise 2+ days/wk as tolerated       Minutes: 30-35         METS: 4 to 5            HR: 20-30 > RHR   RPE: 4-6        Modalities: Treadmill, UBE, NuStep and Recumbent bike      Exercise Progression 30 Day Goals :    Frequency: 3 days/week of cardiac rehab     Supplement with home exercise 2+ days/wk as tolerated    Minutes: 35-45                       >150 mins/wk of moderate intensity exercise   METS: 5 to 5   HR: 20-30 > RHR   RPE: 4-6   Modalities: Treadmill, Airdyne bike, UBE, Lifecycle, NuStep and Recumbent bike    Strength trainin-3 days / week  12-15 repetitions  1-2 sets per modality    Modalities: Leg Press and UE free weights    Home Exercise: none    Goals: 10% improvement in functional capacity - based on max METs achieved in fitness assessment, Increase in exercise capacity by 40% - based on peak METs tolerated in cardiac rehab exercise session, Exercise 5 days/wk, >150mins/wk of moderate intensity exercise, Resume ADLs with increased strength, Attend Rehab regularly and start a home exercise program    Progression Toward Goals:  Pt has not made progress toward the following goals: improving MET level/functional capacity, attending rehab regularly  , Will continue to educate and progress as tolerated      Education: benefit of exercise for CAD risk factors, home exercise guidelines, AHA guidelines to achieve >150 mins/wk of moderate exercise, RPE scale and physical activity/exercise in extreme weather conditions   Plan:education on home exercise guidelines, home exercise 30+ mins 2 days opposite CR and Education class: Risk Factors for Heart Disease  Readiness to change: Preparation:  (Getting ready to change)       NUTRITION ASSESSMENT AND PLAN    Weight control:    Starting weight: 196   Current weight:   196  Waist circumference:    Starting: NA   Current:  NA    Diabetes: T2D - Recently diagnosed  A1c: 8 5    last measured: 1/18/2022    Lipid management: Discussed diet and lipid management    Goals:LDL <100, HDL >40, TRG <150, CHOL <200, improved A1c  < 7 0%, Improved Rate Your Plate score  >93, increase intake of fish, shellfish, cook without added fat or use vegetable oil/spray, increase intake of meatless meals, reduce cheese intake or use reduced-fat, eat 3 or more servings of whole grains a day, Eat 4-5 cups of fruits and vegetables daily, use olive or canola oil in baking, choose low sodium processed foods and eliminate butter    Progression Toward Goals: Pt has not made progress toward the following goals: is not working on dietary habits  , Will continue to educate and progress as tolerated      Education: heart healthy eating  low sodium diet  hydration  exercise and diabetes management   wt  loss   education class: Heart Healthy Eating  education class:  Label Reading  Plan: Education class: Reading Food Labels, Education Class: Heart Healthy Eating, replace butter with soft spreads made with olive oil, canola or yogurt, replace refined grain bread with whole grain bread, replace unhealthy snacks with fruits & vegs, reduce portion sizes, avoid processed foods, monitor home blood glucose, drink more water and keep added daily sugar <25g/day  Readiness to change: Preparation:  (Getting ready to change)       PSYCHOSOCIAL ASSESSMENT AND PLAN    Emotional: Depression assessment:  PHQ-9 = 1-4 = Minimal Depression            Anxiety measure:  SHAHLA-7 = 0-4  = Not anxious  Self-reported stress level:  Zero  Social support: Excellent    Goals:  Reduce perceived stress to 1-3/10, PHQ-9 - reduced severity by one level, follow up with therapist, contact behavioral health , Feelings in [place] Score < 3, Physical Fitness in [place] Score < 3, Daily Activity in [place] Score < 3, Social Activities in [place] Score < 3, [facility] Score < 3, [facility] < 3 , improved positive thoughts of well being, increased energy and Handle anger/frustration better    Progression Toward Goals: Will continue to educate and progress as tolerated  Education: signs/sxs of depression, benefits of a positive support system, stress management techniques, depression and CAD, benefits of mental health counseling, class:  Stress and Your Health , class:  Relaxation and class:  Stress and Pulmonary Disease  Plan: Class: Stress and Your Health, Class: Relaxation, Practice relaxation techniques and Exercise  Readiness to change: Preparation:  (Getting ready to change)       OTHER CORE COMPONENTS     Tobacco:   Social History     Tobacco Use   Smoking Status Former Smoker    Packs/day: 1 00    Years: 10 00    Pack years: 10 00    Types: Cigarettes    Start date: 1998   [facility] Quit date: 2018    Years since quitting: 3 3   Smokeless Tobacco Never Used       Tobacco Use Intervention:   N/A: Pt has a remote history of smoking -quit     Anginal Symptoms:  None   NTG use: No prescription  Patient takes Imdur daily  Blood pressure:    Restin/62   Exercise: 158/76   Recovery:128/64    Goals: consistent BP < 130/80, reduced dietary sodium <2300mg, moderate intensity exercise >150 mins/wk and medication compliance    Progression Toward Goals:  Will continue to educate and progress as tolerated , Goals met: consistent Bp <130/80 at rest     Education:  understanding high blood pressure and it's relationship to CAD, low sodium diet and HTN, proper use of sublingual NTG, Education class:  Common Heart Medications and Education class: Understanding Heart Disease  Plan: Class: Understanding Heart Disease, Class: Common Heart Medications, Avoid Processed foods, engage in regular exercise, eliminate salt shaker at the table, check labels for sodium content and monitor home BP  Readiness to change: Preparation:  (Getting ready to change)

## 2022-03-21 ENCOUNTER — APPOINTMENT (OUTPATIENT)
Dept: CARDIAC REHAB | Facility: HOSPITAL | Age: 52
End: 2022-03-21
Payer: COMMERCIAL

## 2022-03-23 ENCOUNTER — APPOINTMENT (OUTPATIENT)
Dept: CARDIAC REHAB | Facility: HOSPITAL | Age: 52
End: 2022-03-23
Payer: COMMERCIAL

## 2022-03-24 ENCOUNTER — OFFICE VISIT (OUTPATIENT)
Dept: NEUROSURGERY | Facility: CLINIC | Age: 52
End: 2022-03-24
Payer: COMMERCIAL

## 2022-03-24 VITALS
DIASTOLIC BLOOD PRESSURE: 88 MMHG | RESPIRATION RATE: 16 BRPM | BODY MASS INDEX: 28.34 KG/M2 | HEIGHT: 68 IN | HEART RATE: 93 BPM | WEIGHT: 187 LBS | SYSTOLIC BLOOD PRESSURE: 127 MMHG | TEMPERATURE: 98.8 F

## 2022-03-24 DIAGNOSIS — G89.4 CHRONIC PAIN SYNDROME: ICD-10-CM

## 2022-03-24 DIAGNOSIS — G89.29 CHRONIC BILATERAL LOW BACK PAIN WITHOUT SCIATICA: ICD-10-CM

## 2022-03-24 DIAGNOSIS — M54.50 CHRONIC BILATERAL LOW BACK PAIN WITHOUT SCIATICA: ICD-10-CM

## 2022-03-24 DIAGNOSIS — M54.16 LUMBAR RADICULOPATHY: ICD-10-CM

## 2022-03-24 PROCEDURE — 99243 OFF/OP CNSLTJ NEW/EST LOW 30: CPT | Performed by: NURSE PRACTITIONER

## 2022-03-24 RX ORDER — EMPAGLIFLOZIN 10 MG/1
10 TABLET, FILM COATED ORAL DAILY
COMMUNITY
Start: 2022-02-25
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
## 2022-03-24 NOTE — PATIENT INSTRUCTIONS
SI joint dysfunction  [employer] [place] SI joint fusion system  [facility]  [phone]      Back Pain   AMBULATORY CARE:   Back pain  is common  You may have back pain and muscle spasms  You may feel sore or stiff on one or both sides of your back  The pain may spread to your lower body  Conditions that affect the spine, joints, or muscles can cause back pain  These may include arthritis, spinal stenosis (narrowing of the spinal column), muscle tension, or breakdown of the spinal discs  Call your local emergency number (911 in the [address]) if:   · You have severe back pain with chest pain  · You cannot control your urine or bowel movements  · Your pain becomes so severe that you cannot walk  Seek care immediately if:   · You have pain, numbness, or weakness in one or both legs  · You have severe back pain, nausea, and vomiting  · You have severe back pain that spreads to your side or genital area  Call your doctor if:   · You have back pain that does not get better with rest and pain medicine  · You have a fever  · You have pain that worsens when you are on your back or when you rest     · You have pain that worsens when you cough or sneeze  · You lose weight without trying  · You have questions or concerns about your condition or care  Medicines: You may need any of the following:  · NSAIDs , such as ibuprofen, help decrease swelling, pain, and fever  This medicine is available with or without a doctor's order  NSAIDs can cause stomach bleeding or kidney problems in certain people  If you take blood thinner medicine, always ask your healthcare provider if NSAIDs are safe for you  Always read the medicine label and follow directions  · Acetaminophen  decreases pain and fever  It is available without a doctor's order  Ask how much to take and how often to take it  Follow directions   Read the labels of all other medicines you are using to see if they also contain acetaminophen, or ask your doctor or pharmacist  Acetaminophen can cause liver damage if not taken correctly  Do not use more than 4 grams (4,000 milligrams) total of acetaminophen in one day  · Muscle relaxers  help decrease muscle spasms and back pain  · Take your medicine as directed  Contact your healthcare provider if you think your medicine is not helping or if you have side effects  Tell him or her if you are allergic to any medicine  Keep a list of the medicines, vitamins, and herbs you take  Include the amounts, and when and why you take them  Bring the list or the pill bottles to follow-up visits  Carry your medicine list with you in case of an emergency  Manage your back pain:   · Apply ice  on your back for 15 to 20 minutes every hour or as directed  Use an ice pack, or put crushed ice in a plastic bag  Cover it with a towel before you apply it to your skin  Ice helps prevent tissue damage and decreases pain  · Apply heat  on your back for 20 to 30 minutes every 2 hours for as many days as directed  Heat helps decrease pain and muscle spasms  · Stay active  as much as you can without causing more pain  Bed rest could make your back pain worse  Avoid heavy lifting until your pain is gone  · Go to physical therapy as directed  A physical therapist can teach you exercises to help improve movement and strength, and to decrease pain  Follow up with your doctor in 2 weeks, or as directed: You might need to see a specialist  Write down your questions so you remember to ask them during your visits  © [employer] 2022 Information is for End User's use only and may not be sold, redistributed or otherwise used for commercial purposes  All illustrations and images included in CareNotes® are the copyrighted property of [employer]  or Jacques Rojas  The above information is an  only   It is not intended as medical advice for individual conditions or treatments  Talk to your doctor, nurse or pharmacist before following any medical regimen to see if it is safe and effective for you

## 2022-03-25 NOTE — ASSESSMENT & PLAN NOTE
· As addressed in hPO  · No dermatoidal pattern to pain   · No bowel or bladder dysfunction, no saddle anesthesia   · no gait instability         MRI Lumbar 6/23/2021   · Lower thoracic degenerative change at T9-10 and T10-11 with central disc herniations and mild canal stenosis/foraminal narrowing  Degenerative disc disease L1-2 through L4-5 with annular bulging  There is an inferiorly extruded central disc herniation at the L1-2 level which is small in size resulting in mild canal stenosis  · Reviewed in collaboratin w/ Dr Seferino Sinha -no surgical pathology appreciated  PLAN  · Continue care with pain management recommend Right L4 L5  LUIS  Continue multimodal strategies   · Consider SCS   · RTO if symptoms worsen, lumbar radiculopathy Contact  neurosurgery with additional questions or concerns

## 2022-03-25 NOTE — ASSESSMENT & PLAN NOTE
· As addressed in hPI      PLAN  · Continue care with pain management     · Discuss with pain management referral for SI joint fusion

## 2022-03-28 ENCOUNTER — TELEPHONE (OUTPATIENT)
Dept: CARDIOLOGY CLINIC | Facility: CLINIC | Age: 52
End: 2022-03-28

## 2022-03-28 ENCOUNTER — OFFICE VISIT (OUTPATIENT)
Dept: CARDIOLOGY CLINIC | Facility: CLINIC | Age: 52
End: 2022-03-28
Payer: COMMERCIAL

## 2022-03-28 VITALS
HEIGHT: 68 IN | SYSTOLIC BLOOD PRESSURE: 142 MMHG | BODY MASS INDEX: 30.16 KG/M2 | WEIGHT: 199 LBS | DIASTOLIC BLOOD PRESSURE: 90 MMHG | HEART RATE: 84 BPM

## 2022-03-28 DIAGNOSIS — I10 ESSENTIAL HYPERTENSION: ICD-10-CM

## 2022-03-28 DIAGNOSIS — I49.3 PVC (PREMATURE VENTRICULAR CONTRACTION): ICD-10-CM

## 2022-03-28 DIAGNOSIS — I25.10 CORONARY ARTERY DISEASE INVOLVING NATIVE CORONARY ARTERY OF NATIVE HEART WITHOUT ANGINA PECTORIS: Primary | ICD-10-CM

## 2022-03-28 DIAGNOSIS — I20.8 STABLE ANGINA PECTORIS (HCC): ICD-10-CM

## 2022-03-28 DIAGNOSIS — Z95.5 PRESENCE OF DRUG COATED STENT IN LEFT CIRCUMFLEX CORONARY ARTERY: ICD-10-CM

## 2022-03-28 DIAGNOSIS — E78.2 MIXED HYPERLIPIDEMIA: ICD-10-CM

## 2022-03-28 PROCEDURE — 99214 OFFICE O/P EST MOD 30 MIN: CPT | Performed by: INTERNAL MEDICINE

## 2022-03-28 NOTE — PATIENT INSTRUCTIONS
Coronary Artery Disease   AMBULATORY CARE:   Coronary artery disease (CAD)  is narrowing of the arteries to your heart caused by a buildup of plaque  Plaque is made up of cholesterol and other substances  The narrowing in your arteries decreases the amount of blood that can flow to your heart  This causes your heart to get less oxygen, which may be life-threatening  You may not have any symptoms of CAD  It is important for you to manage CAD even if you feel well  CAD can lead to a heart attack if it is not managed  Common symptoms include the following:   · Chest pain (angina), causing burning, squeezing, or crushing tightness in your chest    · Pain that spreads to your neck, jaw, or shoulder blade    · Nausea, vomiting, sweating, fainting, and hands and feet that are cold to the touch    Call your local emergency number (911 in the [place]), or have someone call if:   · You have any of the following signs of a heart attack:      ? Squeezing, pressure, or pain in your chest    ? You may  also have any of the following:     § Discomfort or pain in your back, neck, jaw, stomach, or arm    § Shortness of breath    § Nausea or vomiting    § Lightheadedness or a sudden cold sweat      Seek care immediately if:   · You have chest pain that happens often  · You have chest pain at rest     Call your doctor if:   · You have questions or concerns about your condition or care  Medicines used to treat CAD:  You may  need any of the following:  · Blood pressure medicines  are given to lower your blood pressure  These medicines may include ACE inhibitors and beta-blockers  ACE inhibitors help keep your blood vessels relaxed and open  This helps keep blood flowing into your heart  Beta-blockers keep your heart pumping strongly and regularly  This helps keep your heart from working too hard to get oxygen  · Cholesterol medicines  help lower blood cholesterol levels      · Nitrates , such as nitroglycerin, relax the arteries of your heart so it gets more oxygen  Nitrates may also help relieve your chest pain  · Diuretics  help your body get rid of extra fluid and protect your heart from more damage  You may urinate more often while you are taking diuretics  · Antiplatelets , such as aspirin, help prevent blood clots  Take your antiplatelet medicine exactly as directed  These medicines make it more likely for you to bleed or bruise  If you are told to take aspirin, do not take acetaminophen or ibuprofen instead  · Blood thinners  keep clots from forming in your blood  Clots may cause heart attacks, strokes, or death  This medicine makes it more likely for you to bleed or bruise  · Do not take certain medicines without asking your healthcare provider first   These include NSAIDs, herbal or vitamin supplements, or hormones (estrogen or progestin)  Procedures used to treat CAD:   · An angioplasty  may be done to open an artery blocked by plaque  A tube with a balloon on the end is put into the blocked artery  When the tube is in the artery, the balloon is inflated  As the balloon inflates, it presses the plaque against the artery wall to open the artery  A stent may be placed in your artery to keep it open  · Coronary artery bypass surgery (CABG)  is open heart surgery  Healthcare providers take arteries or veins from other areas in your body  These are used to bypass (go around) the blocked arteries of your heart  Cardiac rehabilitation (rehab)  is a program run by specialists who will help you safely strengthen your heart and reduce the risk for more heart disease  The plan includes exercise, relaxation, stress management, and heart-healthy nutrition  Healthcare providers will also check to make sure any medicines you are taking are working  Manage CAD to prevent a heart attack:   · Do not smoke  Nicotine and other chemicals in cigarettes and cigars can cause heart and lung damage   Ask your healthcare provider for information if you currently smoke and need help to quit  E-cigarettes or smokeless tobacco still contain nicotine  Talk to your healthcare provider before you use these products  · Be physically active  Physical activity, such as exercise, can lower your blood pressure, cholesterol, weight, and blood sugar levels  Healthcare providers will help you create physical activity goals  They can also help you make a plan to reach your goals  For example, you can break activity into 10-minute periods, 3 times in the day  Find activities you enjoy  This will make it easier for you to reach your goals  · Maintain a healthy weight  If you are overweight, talk to your healthcare provider about how to lose weight  A weight loss of 10% can improve your heart health  · Eat heart healthy foods  Include fresh fruits and vegetables in your meal plan  Choose low-fat foods, such as skim or 1% fat milk, low-fat cheese and yogurt, fish, chicken (without skin), and lean meats  Eat two 4-ounce servings of fish high in omega-3 fats each week, such as salmon, fresh tuna, and herring  Avoid foods that are high in sodium, such as canned foods, potato chips, salty snacks, and cold cuts  Put less table salt on your food  · Limit or do not drink alcohol  A drink of alcohol is 12 ounces of beer, 5 ounces of wine, or 1½ ounces of liquor  Your healthcare provider can tell you how many drinks are okay within 24 hours and within 1 week  · Manage other health conditions  Follow your healthcare provider's advice on how to manage other conditions that can affect your heart health  These include diabetes, high blood pressure, and high cholesterol  You may need to take medicines for these conditions and make other lifestyle changes  · Manage stress  Stress can raise your blood pressure  Find new ways to relax, such as deep breathing or listening to music  · Ask about vaccines you may need  Vaccines help prevent certain diseases that can be dangerous for a person with CAD  Get a flu vaccine as soon as recommended each year, usually in September or October  You may also need vaccines to prevent pneumonia or COVID-19  Your healthcare provider can tell you if you should get other vaccines, and when to get them  Follow up with your doctor as directed: You may need to return for other tests  You may also be referred to a cardiac surgeon  Write down your questions so you remember to ask them during your visits  © Copyright [employer] 2022 Information is for End User's use only and may not be sold, redistributed or otherwise used for commercial purposes  All illustrations and images included in CareNotes® are the copyrighted property of [employer]  or [facility] Kathy Powers   The above information is an  only  It is not intended as medical advice for individual conditions or treatments  Talk to your doctor, nurse or pharmacist before following any medical regimen to see if it is safe and effective for you

## 2022-03-28 NOTE — PROGRESS NOTES
Cardiology Consultation     Jed Chavez  [id number]  1970  [facility] [address] [address] [phone]    1  Coronary artery disease involving native coronary artery of native heart without angina pectoris     2  PVC (premature ventricular contraction)     3  Essential hypertension     4  Mixed hyperlipidemia     5  Stable angina pectoris ([address] )     6  Presence of drug coated stent in left circumflex coronary artery       Chief Complaint   Patient presents with    Follow-up    Palpitations     PVCs, not new  HPI: Patient is here for management of cardiac issues  He had 4 prior MIs resulting in multiple stent placements  Most recent MI was May 2019 with a late in-stent thrombosis in the ramus requiring placement of 2 BEVERLY  He had been battling continued chest pains that are random in occurrence, now more related to exertion, sharp, mid-sternal, and associated with shortness of breath along with neck and back pain  CP has not improved after recent BEVERLY  Palpitations also continue without change  No reported shortness of breath, lightheadedness, syncope, LE edema, orthopnea, PND, or significant weight changes  Patient remains active without any increased fatigue out of the ordinary          Patient Active Problem List   Diagnosis    Coronary artery disease involving native coronary artery    Essential hypertension    Mixed hyperlipidemia    Chest pain    PVC (premature ventricular contraction)    Palpitations    Chronic bilateral low back pain without sciatica    Lumbar degenerative disc disease    Myofascial pain syndrome    Sacroiliitis (HCC)    Presence of drug coated stent in left circumflex coronary artery    Chronic pain syndrome    Lumbar radiculopathy     Past Medical History:   Diagnosis Date    Arthritis     Coronary artery disease     MI x 4; cardiac cath with 4 stents    GERD (gastroesophageal reflux disease)     Hypertension     Myocardial infarct ([facility] [address] )     4 MIs as of 2/24/2020     Social History     Socioeconomic History    Marital status: /Civil Union     Spouse name: Not on file    Number of children: Not on file    Years of education: Not on file    Highest education level: Not on file   Occupational History    Not on file   Tobacco Use    Smoking status: Former Smoker     Packs/day: 1 00     Years: 10 00     Pack years: 10 00     Types: Cigarettes     Start date: 2/24/1998     Quit date: 11/24/2018     Years since quitting: 3 3    Smokeless tobacco: Never Used   Vaping Use    Vaping Use: Never used   Substance and Sexual Activity    Alcohol use: Never    Drug use: Never    Sexual activity: Yes     Partners: Female     Birth control/protection: None   Other Topics Concern    Not on file   Social History Narrative    Not on file     Social Determinants of Health     Financial Resource Strain: Not on file   Food Insecurity: Not on file   Transportation Needs: Not on file   Physical Activity: Not on file   Stress: Not on file   Social Connections: Not on file   Intimate Partner Violence: Not on file   Housing Stability: Not on file      Family History   Problem Relation Age of Onset    Heart disease Father     Hyperlipidemia Father     Diabetes Sister     Early death Sister     Diabetes Brother     Diabetes Maternal Grandmother      Past Surgical History:   Procedure Laterality Date    CARDIAC CATHETERIZATION  1/7/2022    Procedure: Cardiac catheterization;  Surgeon: Alfredo Dickinson DO;  Location: [facility];   Service: Cardiology    CORONARY ANGIOPLASTY WITH STENT PLACEMENT         Current Outpatient Medications:     aspirin (ASPIRIN LOW DOSE) 81 MG tablet, Take 81 mg by mouth daily  , Disp: , Rfl:     atorvastatin (LIPITOR) 40 mg tablet, Take 1 tablet (40 mg total) by mouth every 24 hours, Disp: 90 tablet, Rfl: 1    DULoxetine (CYMBALTA) 60 mg delayed release capsule, Take 60 mg by mouth daily, Disp: , Rfl:     gabapentin (NEURONTIN) 300 mg capsule, 1 PO QHS x 4 days, then 1 PO BID x 4 days, then 1 PO TID, Disp: 90 capsule, Rfl: 1    isosorbide mononitrate (IMDUR) 60 mg 24 hr tablet, Take 1 tablet (60 mg total) by mouth daily, Disp: 90 tablet, Rfl: 1    Jardiance 10 MG TABS, Take 10 mg by mouth in the morning, Disp: , Rfl:     losartan (COZAAR) 100 MG tablet, Take 1 tablet (100 mg total) by mouth every 24 hours, Disp: 30 tablet, Rfl: 3    metoprolol succinate (TOPROL-XL) 100 mg 24 hr tablet, Take 1 tablet (100 mg total) by mouth 2 (two) times a day, Disp: 180 tablet, Rfl: 2    Omega-3 Fatty Acids (OMEGA 3 PO), Take 2,000 mg by mouth 2 (two) times a day, Disp: , Rfl:     pantoprazole (PROTONIX) 40 mg tablet, Take 40 mg by mouth daily, Disp: , Rfl: 3    ticagrelor (Brilinta) 90 MG, Take 1 tablet (90 mg total) by mouth every 12 (twelve) hours, Disp: 180 tablet, Rfl: 3    metFORMIN (GLUCOPHAGE) 500 mg tablet, Take 500 mg by mouth 2 (two) times a day with meals (Patient not taking: Reported on 3/24/2022 ), Disp: , Rfl:     nitroglycerin (NITROSTAT) 0 4 mg SL tablet, as directed (Patient not taking: Reported on 12/17/2021), Disp: , Rfl:   No Known Allergies  Vitals:    03/28/22 1103   BP: 142/90   BP Location: Right arm   Patient Position: Sitting   Cuff Size: Large   Pulse: 84   Weight: 90 3 kg (199 lb)   Height: 5' 8" (1 727 m)       Labs:  Appointment on 01/18/2022   Component Date Value    WBC 01/18/2022 7 22     RBC 01/18/2022 4 62     Hemoglobin 01/18/2022 13 9     Hematocrit 01/18/2022 41 9     MCV 01/18/2022 91     MCH 01/18/2022 30 1     MCHC 01/18/2022 33 2     RDW 01/18/2022 13 1     Platelets 04/43/9810 208     MPV 01/18/2022 9 3     Sodium 01/18/2022 135     Potassium 01/18/2022 4 3     Chloride 01/18/2022 102     CO2 01/18/2022 29     ANION GAP 01/18/2022 4     BUN 01/18/2022 12     Creatinine 01/18/2022 0 92     Glucose, Fasting 01/18/2022 216*    Calcium 01/18/2022 9 5     AST 01/18/2022 15     ALT 01/18/2022 25     Alkaline Phosphatase 01/18/2022 131*    Total Protein 01/18/2022 6 6     Albumin 01/18/2022 3 9     Total Bilirubin 01/18/2022 0 48     eGFR 01/18/2022 96     Cholesterol 01/18/2022 153     Triglycerides 01/18/2022 247*    HDL, Direct 01/18/2022 34*    LDL Calculated 01/18/2022 70     Non-HDL-Chol (CHOL-HDL) 01/18/2022 119     Hemoglobin A1C 01/18/2022 8 4*    EAG 01/18/2022 194    Admission on 01/07/2022, Discharged on 01/07/2022   Component Date Value    Activated Clotting Time,* 01/07/2022 252*    Specimen Type 01/07/2022 ARTERIAL     Ventricular Rate 01/07/2022 75     Atrial Rate 01/07/2022 75     OK Interval 01/07/2022 160     QRSD Interval 01/07/2022 92     QT Interval 01/07/2022 380     QTC Interval 01/07/2022 424     P Axis 01/07/2022 37     QRS Axis 01/07/2022 86     T Wave Axis 01/07/2022 92     Ventricular Rate 01/07/2022 66     Atrial Rate 01/07/2022 66     OK Interval 01/07/2022 166     QRSD Interval 01/07/2022 98     QT Interval 01/07/2022 404     QTC Interval 01/07/2022 423     P Axis 01/07/2022 44     QRS Axis 01/07/2022 89     T Wave [place] 01/07/2022 93    Appointment on 01/04/2022   Component Date Value    WBC 01/04/2022 5 93     RBC 01/04/2022 4 53     Hemoglobin 01/04/2022 13 7     Hematocrit 01/04/2022 41 8     MCV 01/04/2022 92     MCH 01/04/2022 30 2     MCHC 01/04/2022 32 8     RDW 01/04/2022 13 0     MPV 01/04/2022 9 7     Platelets 02/44/2371 166     nRBC 01/04/2022 0     Neutrophils Relative 01/04/2022 58     Immat GRANS % 01/04/2022 0     Lymphocytes Relative 01/04/2022 30     Monocytes Relative 01/04/2022 7     Eosinophils Relative 01/04/2022 4     Basophils Relative 01/04/2022 1     Neutrophils Absolute 01/04/2022 3 45     Immature Grans Absolute 01/04/2022 0 02     Lymphocytes Absolute 01/04/2022 1 76     Monocytes Absolute 01/04/2022 0 40     Eosinophils Absolute 01/04/2022 0 26     Basophils Absolute 01/04/2022 0 04     Sodium 01/04/2022 137     Potassium 01/04/2022 4 2     Chloride 01/04/2022 106     CO2 01/04/2022 27     ANION GAP 01/04/2022 4     BUN 01/04/2022 15     Creatinine 01/04/2022 0 97     Glucose, Fasting 01/04/2022 182*    Calcium 01/04/2022 9 0     AST 01/04/2022 13     ALT 01/04/2022 20     Alkaline Phosphatase 01/04/2022 114*    Total Protein 01/04/2022 6 3*    Albumin 01/04/2022 3 8     Total Bilirubin 01/04/2022 0 38     eGFR 01/04/2022 90      Lab Results   Component Value Date    TRIG 247 (H) 01/18/2022    HDL 34 (L) 01/18/2022     Imaging: No results found  Review of Systems:  Review of Systems   Constitutional: Negative for activity change, appetite change, fatigue and fever  HENT: Negative for nosebleeds and sore throat  Eyes: Negative for photophobia and visual disturbance  Respiratory: Negative for cough, chest tightness, shortness of breath and wheezing  Cardiovascular: Positive for chest pain and palpitations  Negative for leg swelling  Gastrointestinal: Negative for abdominal pain, diarrhea, nausea and vomiting  Endocrine: Negative for polyuria  Genitourinary: Negative for dysuria, frequency and hematuria  Musculoskeletal: Negative for arthralgias, back pain and gait problem  Skin: Negative for pallor and rash  Neurological: Negative for dizziness, syncope, speech difficulty and light-headedness  Hematological: Does not bruise/bleed easily  Psychiatric/Behavioral: Negative for agitation, behavioral problems and confusion  Physical Exam:  Physical Exam  Vitals reviewed  Constitutional:       General: He is not in acute distress  Appearance: He is well-developed  He is not diaphoretic  HENT:      Head: Normocephalic and atraumatic  Nose: Nose normal    Eyes:      General: No scleral icterus       Pupils: Pupils are equal, round, and reactive to light  Neck:      Vascular: No JVD  Cardiovascular:      Rate and Rhythm: Normal rate and regular rhythm  Heart sounds: S1 normal and S2 normal  Heart sounds not distant  No murmur heard  No systolic murmur is present  No friction rub  No gallop  No S3 sounds  Pulmonary:      Effort: Pulmonary effort is normal  No respiratory distress  Breath sounds: Normal breath sounds  No wheezing or rales  Abdominal:      General: Bowel sounds are normal  There is no distension  Palpations: Abdomen is soft  Musculoskeletal:         General: No deformity  Cervical back: Normal range of motion and neck supple  Skin:     General: Skin is warm and dry  Findings: No erythema  Neurological:      Mental Status: He is alert and oriented to person, place, and time  Cranial Nerves: No cranial nerve deficit  Psychiatric:         Behavior: Behavior normal        Blood pressure 142/90, pulse 84, height 5' 8" (1 727 m), weight 90 3 kg (199 lb)  EKG:  Normal sinus rhythm  Nonspecific T wave abnormality  Abnormal ECG    Discussion/Summary:  Chest pain: with underlying significant premature CAD and strong family history of CAD  With residual disease in LAD and RCA as well as per patient report  With stent placement in the setting of unstable angina in May 2019 - multiple episodes of in stent thrombosis/restenosis and continued chest pains even with minimal exertion - he has been on Brilinta for over a year and has instent restenosis with this  We referred for repeat cardiac cath and potential CABG - which did reveal disease, but not quite ready for CABG at this time - mild LAD atherosclerosis, 30% pLAD, 75% D2, 50% OM1 with occluded subbranch, 0% pR1 at site of prior stent, 70% mRCA at site of prior stent with eccentric lesion 70% PLV1  We also added Imdur to help with symptom management in the meantime    Continued on ASA, Brilinta, and ARB - we stopped amlodipine since BP is running on low end  There did appear to be a reason for having an ARB with the reported EF of 40-45%  We checked an echo to evaluate LV EF and wall motion as well - which revealed a normal LV function with no wall motion abnormalities  With continued chest pain requiring nitro use, we increased Imdur to 30mg twice daily - which had helped with chest pain  He completed cardiac rehab to help with conditioning and improving fatigue level  For improved control of chest pain and fatigue, we changed Coreg to metoprolol for less alpha-blockade and more beta-specific blockade  He had increased chest pain, sounds like stable angina that seems to be increasing - we increased Imdur to 60mg to see if that will help with chest pain  With increased palpitations as well, we checked another Holter - seems like these episodes feel longer than his PVCs- this revealed common ventricular ectopy (5 4%) with brief runs (longest 4 beats in duration) - changed BB to Toprol XL to help with suppression - which has not helped symptoms of palpitations too much - increased to 100mg, and stable  Since his symptoms have not improved, we repeated cardiac cath revealing Mid LAD 40% stenosis, 1st diagonal branch vessel small 100% stenosis lesion chronically occluded  Second diagonal branch 90% stenosis vessel small  Ramus intermedius 40% stenosis lesion was previously treated using a stent of unknown type  Left circumflex 2nd obtuse marginal branch  80% stenosis lesion is diffuse  Right coronary artery proximal % stenosis lesion is chronically occluded previously treated using a stent of unknown type  Right posterior descending artery filled by collaterals from the distal LAD  2nd obtuse marginal branch balloon angioplasty x2 with drug-eluting stent successfully placed,  Resolute  Elizabeth Rx drug-eluting stent  Angioplasty with Resolute [facility] Rx drug-eluting stent 0% residual stenosis    CP has not improved with stent placement - sometimes associated with PVCs - will refer to EPS for possible PVC ablation  HTN: relatively well controlled  Will continue Imdur and metoprolol for CAD symptom management  We stopped amlodipine in order to allow addition of higher dose of Imdur  Increased Toprol to 100mg at prior visit  HLD: continued on statin  LDL 24 in Feb 2020 - reduced Lipitor to 40mg, perhaps if there is a component of musculoskeletal pain, this will help it  Recheck levels in 3 months after the change in dosing revealed LDL of 78, which is at goal, and improved symptoms  Recheck in June 2021 revealed LDL of 47 and 70 in Jan 2022  Frequent PVCs: as above, perhaps triggered by ischemic disease  Will continue metoprolol for the time being - with frequency of ectopy, changed metoprolol to Toprol XL BID, which he is tolerating - but still extremely symptomatic, so will refer to EPS for possible ablation  Will recheck Zio patch monitor to assess prior to appointment

## 2022-04-11 ENCOUNTER — CONSULT (OUTPATIENT)
Dept: CARDIOLOGY CLINIC | Facility: CLINIC | Age: 52
End: 2022-04-11
Payer: COMMERCIAL

## 2022-04-11 VITALS
BODY MASS INDEX: 29.55 KG/M2 | HEIGHT: 68 IN | WEIGHT: 195 LBS | HEART RATE: 81 BPM | SYSTOLIC BLOOD PRESSURE: 138 MMHG | DIASTOLIC BLOOD PRESSURE: 90 MMHG

## 2022-04-11 DIAGNOSIS — I49.3 PVC (PREMATURE VENTRICULAR CONTRACTION): ICD-10-CM

## 2022-04-11 PROCEDURE — 93000 ELECTROCARDIOGRAM COMPLETE: CPT | Performed by: INTERNAL MEDICINE

## 2022-04-11 PROCEDURE — 99244 OFF/OP CNSLTJ NEW/EST MOD 40: CPT | Performed by: INTERNAL MEDICINE

## 2022-04-11 RX ORDER — PROPRANOLOL HYDROCHLORIDE 160 MG/1
160 CAPSULE, EXTENDED RELEASE ORAL DAILY
Qty: 30 CAPSULE | Refills: 3 | Status: SHIPPED | OUTPATIENT
Start: 2022-04-11

## 2022-04-11 RX ORDER — PROPRANOLOL HYDROCHLORIDE 160 MG/1
160 CAPSULE, EXTENDED RELEASE ORAL DAILY
Qty: 30 CAPSULE | Refills: 3 | Status: SHIPPED | OUTPATIENT
Start: 2022-04-11 | End: 2022-04-11 | Stop reason: SDUPTHER

## 2022-04-25 ENCOUNTER — TELEPHONE (OUTPATIENT)
Dept: CARDIAC REHAB | Facility: HOSPITAL | Age: 52
End: 2022-04-25

## 2022-04-25 NOTE — PROGRESS NOTES
Kala Burnett      Dear Dr Debi Morton,    Thank you for referring your patient to our cardiac rehabilitation program  The patient has completed 5  visits  However, rehab is being discontinued at this time due to:    Nikki Clark has not been in attendance since 3/11/2022 and had limited attendance between his initial evaluation (1/21/2022) and his last session  Staff contacted him and he stated he is getting back surgery soon and would like to be discharged from cardiac rehab  Please contact us at [phone] if you have any questions about this patents case  Thank you for your continued support of cardiac rehabilitation         Sincerely,    Clementine Cortes, MS  Exercise Physiologist

## 2022-04-27 ENCOUNTER — CLINICAL SUPPORT (OUTPATIENT)
Dept: CARDIOLOGY CLINIC | Facility: CLINIC | Age: 52
End: 2022-04-27
Payer: COMMERCIAL

## 2022-04-27 DIAGNOSIS — I49.3 PVC (PREMATURE VENTRICULAR CONTRACTION): ICD-10-CM

## 2022-04-27 PROCEDURE — 93248 EXT ECG>7D<15D REV&INTERPJ: CPT | Performed by: INTERNAL MEDICINE

## 2022-06-09 DIAGNOSIS — I25.118 CORONARY ARTERY DISEASE OF NATIVE ARTERY OF NATIVE HEART WITH STABLE ANGINA PECTORIS (HCC): ICD-10-CM

## 2022-06-13 RX ORDER — TICAGRELOR 90 MG/1
TABLET ORAL
Qty: 180 TABLET | Refills: 0 | Status: SHIPPED | OUTPATIENT
Start: 2022-06-13 | End: 2022-08-08

## 2022-08-05 DIAGNOSIS — I25.118 CORONARY ARTERY DISEASE OF NATIVE ARTERY OF NATIVE HEART WITH STABLE ANGINA PECTORIS (HCC): ICD-10-CM

## 2022-08-08 RX ORDER — TICAGRELOR 90 MG/1
TABLET ORAL
Qty: 180 TABLET | Refills: 0 | Status: SHIPPED | OUTPATIENT
Start: 2022-08-08

## 2022-08-24 ENCOUNTER — HOSPITAL ENCOUNTER (OUTPATIENT)
Facility: HOSPITAL | Age: 52
Setting detail: OBSERVATION
End: 2022-08-25
Attending: EMERGENCY MEDICINE | Admitting: HOSPITALIST
Payer: COMMERCIAL

## 2022-08-24 ENCOUNTER — APPOINTMENT (OUTPATIENT)
Dept: RADIOLOGY | Facility: HOSPITAL | Age: 52
End: 2022-08-24
Payer: COMMERCIAL

## 2022-08-24 DIAGNOSIS — R07.9 CHEST PAIN, UNSPECIFIED: Primary | ICD-10-CM

## 2022-08-24 DIAGNOSIS — R77.8 ELEVATED TROPONIN I LEVEL: ICD-10-CM

## 2022-08-24 PROBLEM — F33.9 MAJOR DEPRESSIVE DISORDER, RECURRENT (HCC): Status: ACTIVE | Noted: 2018-09-07

## 2022-08-24 PROBLEM — E87.6 HYPOKALEMIA: Status: ACTIVE | Noted: 2022-08-24

## 2022-08-24 PROBLEM — Z98.61 HISTORY OF PTCA 2: Status: ACTIVE | Noted: 2017-07-17

## 2022-08-24 PROBLEM — K31.84 GASTROPARESIS: Status: ACTIVE | Noted: 2017-07-17

## 2022-08-24 PROBLEM — R79.89 ELEVATED TROPONIN I LEVEL: Status: ACTIVE | Noted: 2022-08-24

## 2022-08-24 PROBLEM — F17.200 NICOTINE DEPENDENCE: Status: ACTIVE | Noted: 2018-09-07

## 2022-08-24 PROBLEM — E11.9 NON-INSULIN DEPENDENT TYPE 2 DIABETES MELLITUS (HCC): Chronic | Status: ACTIVE | Noted: 2022-08-24

## 2022-08-24 PROBLEM — K21.9 GASTROESOPHAGEAL REFLUX DISEASE: Status: ACTIVE | Noted: 2017-07-17

## 2022-08-24 LAB
2HR DELTA HS TROPONIN: 22 NG/L
4HR DELTA HS TROPONIN: 73 NG/L
ALBUMIN SERPL BCP-MCNC: 3.8 G/DL (ref 3.5–5)
ALP SERPL-CCNC: 108 U/L (ref 34–104)
ALT SERPL W P-5'-P-CCNC: 19 U/L (ref 7–52)
ANION GAP SERPL CALCULATED.3IONS-SCNC: 7 MMOL/L (ref 4–13)
AST SERPL W P-5'-P-CCNC: 15 U/L (ref 13–39)
ATRIAL RATE: 55 BPM
ATRIAL RATE: 62 BPM
BASOPHILS # BLD AUTO: 0.06 THOUSANDS/ΜL (ref 0–0.1)
BASOPHILS NFR BLD AUTO: 1 % (ref 0–1)
BILIRUB SERPL-MCNC: 0.43 MG/DL (ref 0.2–1)
BUN SERPL-MCNC: 12 MG/DL (ref 5–25)
CALCIUM SERPL-MCNC: 9 MG/DL (ref 8.4–10.2)
CARDIAC TROPONIN I PNL SERPL HS: 22 NG/L
CARDIAC TROPONIN I PNL SERPL HS: 44 NG/L
CARDIAC TROPONIN I PNL SERPL HS: 95 NG/L
CHLORIDE SERPL-SCNC: 106 MMOL/L (ref 96–108)
CO2 SERPL-SCNC: 25 MMOL/L (ref 21–32)
CREAT SERPL-MCNC: 0.84 MG/DL (ref 0.6–1.3)
EOSINOPHIL # BLD AUTO: 0.34 THOUSAND/ΜL (ref 0–0.61)
EOSINOPHIL NFR BLD AUTO: 4 % (ref 0–6)
ERYTHROCYTE [DISTWIDTH] IN BLOOD BY AUTOMATED COUNT: 13.9 % (ref 11.6–15.1)
GFR SERPL CREATININE-BSD FRML MDRD: 101 ML/MIN/1.73SQ M
GLUCOSE SERPL-MCNC: 112 MG/DL (ref 65–140)
GLUCOSE SERPL-MCNC: 122 MG/DL (ref 65–140)
GLUCOSE SERPL-MCNC: 131 MG/DL (ref 65–140)
GLUCOSE SERPL-MCNC: 188 MG/DL (ref 65–140)
GLUCOSE SERPL-MCNC: 194 MG/DL (ref 65–140)
HCT VFR BLD AUTO: 37.5 % (ref 36.5–49.3)
HGB BLD-MCNC: 12.4 G/DL (ref 12–17)
IMM GRANULOCYTES # BLD AUTO: 0.03 THOUSAND/UL (ref 0–0.2)
IMM GRANULOCYTES NFR BLD AUTO: 0 % (ref 0–2)
LIPASE SERPL-CCNC: 50 U/L (ref 11–82)
LYMPHOCYTES # BLD AUTO: 1.53 THOUSANDS/ΜL (ref 0.6–4.47)
LYMPHOCYTES NFR BLD AUTO: 20 % (ref 14–44)
MAGNESIUM SERPL-MCNC: 1.9 MG/DL (ref 1.9–2.7)
MCH RBC QN AUTO: 30.1 PG (ref 26.8–34.3)
MCHC RBC AUTO-ENTMCNC: 33.1 G/DL (ref 31.4–37.4)
MCV RBC AUTO: 91 FL (ref 82–98)
MONOCYTES # BLD AUTO: 0.5 THOUSAND/ΜL (ref 0.17–1.22)
MONOCYTES NFR BLD AUTO: 6 % (ref 4–12)
NEUTROPHILS # BLD AUTO: 5.37 THOUSANDS/ΜL (ref 1.85–7.62)
NEUTS SEG NFR BLD AUTO: 69 % (ref 43–75)
NRBC BLD AUTO-RTO: 0 /100 WBCS
P AXIS: 54 DEGREES
P AXIS: 55 DEGREES
PLATELET # BLD AUTO: 153 THOUSANDS/UL (ref 149–390)
PMV BLD AUTO: 9.8 FL (ref 8.9–12.7)
POTASSIUM SERPL-SCNC: 3.3 MMOL/L (ref 3.5–5.3)
PR INTERVAL: 166 MS
PR INTERVAL: 166 MS
PROT SERPL-MCNC: 5.9 G/DL (ref 6.4–8.4)
QRS AXIS: 85 DEGREES
QRS AXIS: 87 DEGREES
QRSD INTERVAL: 96 MS
QRSD INTERVAL: 96 MS
QT INTERVAL: 398 MS
QT INTERVAL: 434 MS
QTC INTERVAL: 403 MS
QTC INTERVAL: 415 MS
RBC # BLD AUTO: 4.12 MILLION/UL (ref 3.88–5.62)
SODIUM SERPL-SCNC: 138 MMOL/L (ref 135–147)
T WAVE AXIS: 102 DEGREES
T WAVE AXIS: 104 DEGREES
VENTRICULAR RATE: 55 BPM
VENTRICULAR RATE: 62 BPM
WBC # BLD AUTO: 7.83 THOUSAND/UL (ref 4.31–10.16)

## 2022-08-24 PROCEDURE — 99285 EMERGENCY DEPT VISIT HI MDM: CPT | Performed by: EMERGENCY MEDICINE

## 2022-08-24 PROCEDURE — 83690 ASSAY OF LIPASE: CPT | Performed by: EMERGENCY MEDICINE

## 2022-08-24 PROCEDURE — 83735 ASSAY OF MAGNESIUM: CPT | Performed by: EMERGENCY MEDICINE

## 2022-08-24 PROCEDURE — 99236 HOSP IP/OBS SAME DATE HI 85: CPT | Performed by: HOSPITALIST

## 2022-08-24 PROCEDURE — 93010 ELECTROCARDIOGRAM REPORT: CPT | Performed by: INTERNAL MEDICINE

## 2022-08-24 PROCEDURE — 83036 HEMOGLOBIN GLYCOSYLATED A1C: CPT | Performed by: PHYSICIAN ASSISTANT

## 2022-08-24 PROCEDURE — 82948 REAGENT STRIP/BLOOD GLUCOSE: CPT

## 2022-08-24 PROCEDURE — 93005 ELECTROCARDIOGRAM TRACING: CPT

## 2022-08-24 PROCEDURE — 85025 COMPLETE CBC W/AUTO DIFF WBC: CPT | Performed by: EMERGENCY MEDICINE

## 2022-08-24 PROCEDURE — 99285 EMERGENCY DEPT VISIT HI MDM: CPT

## 2022-08-24 PROCEDURE — 36415 COLL VENOUS BLD VENIPUNCTURE: CPT | Performed by: EMERGENCY MEDICINE

## 2022-08-24 PROCEDURE — 84484 ASSAY OF TROPONIN QUANT: CPT | Performed by: EMERGENCY MEDICINE

## 2022-08-24 PROCEDURE — 96374 THER/PROPH/DIAG INJ IV PUSH: CPT

## 2022-08-24 PROCEDURE — 71045 X-RAY EXAM CHEST 1 VIEW: CPT

## 2022-08-24 PROCEDURE — 99245 OFF/OP CONSLTJ NEW/EST HI 55: CPT | Performed by: INTERNAL MEDICINE

## 2022-08-24 PROCEDURE — 80053 COMPREHEN METABOLIC PANEL: CPT | Performed by: EMERGENCY MEDICINE

## 2022-08-24 RX ORDER — DULOXETIN HYDROCHLORIDE 30 MG/1
60 CAPSULE, DELAYED RELEASE ORAL DAILY
Status: DISCONTINUED | OUTPATIENT
Start: 2022-08-24 | End: 2022-08-25 | Stop reason: HOSPADM

## 2022-08-24 RX ORDER — ATORVASTATIN CALCIUM 40 MG/1
40 TABLET, FILM COATED ORAL EVERY 24 HOURS
Status: CANCELLED | OUTPATIENT
Start: 2022-08-25

## 2022-08-24 RX ORDER — PROPRANOLOL HYDROCHLORIDE 80 MG/1
160 CAPSULE, EXTENDED RELEASE ORAL DAILY
Status: DISCONTINUED | OUTPATIENT
Start: 2022-08-24 | End: 2022-08-25 | Stop reason: HOSPADM

## 2022-08-24 RX ORDER — ASPIRIN 81 MG/1
81 TABLET ORAL DAILY
Status: CANCELLED | OUTPATIENT
Start: 2022-08-25

## 2022-08-24 RX ORDER — ISOSORBIDE MONONITRATE 30 MG/1
60 TABLET, EXTENDED RELEASE ORAL DAILY
Status: DISCONTINUED | OUTPATIENT
Start: 2022-08-24 | End: 2022-08-25 | Stop reason: HOSPADM

## 2022-08-24 RX ORDER — INSULIN LISPRO 100 [IU]/ML
1-6 INJECTION, SOLUTION INTRAVENOUS; SUBCUTANEOUS
Status: CANCELLED | OUTPATIENT
Start: 2022-08-24

## 2022-08-24 RX ORDER — ACETAMINOPHEN 325 MG/1
650 TABLET ORAL EVERY 4 HOURS PRN
Status: CANCELLED | OUTPATIENT
Start: 2022-08-24

## 2022-08-24 RX ORDER — LOSARTAN POTASSIUM 50 MG/1
100 TABLET ORAL EVERY 24 HOURS
Status: DISCONTINUED | OUTPATIENT
Start: 2022-08-24 | End: 2022-08-25 | Stop reason: HOSPADM

## 2022-08-24 RX ORDER — ONDANSETRON 2 MG/ML
4 INJECTION INTRAMUSCULAR; INTRAVENOUS EVERY 6 HOURS PRN
Status: CANCELLED | OUTPATIENT
Start: 2022-08-24

## 2022-08-24 RX ORDER — DULOXETIN HYDROCHLORIDE 30 MG/1
60 CAPSULE, DELAYED RELEASE ORAL DAILY
Status: CANCELLED | OUTPATIENT
Start: 2022-08-25

## 2022-08-24 RX ORDER — POTASSIUM CHLORIDE 20 MEQ/1
40 TABLET, EXTENDED RELEASE ORAL ONCE
Status: COMPLETED | OUTPATIENT
Start: 2022-08-24 | End: 2022-08-24

## 2022-08-24 RX ORDER — MORPHINE SULFATE 4 MG/ML
4 INJECTION, SOLUTION INTRAMUSCULAR; INTRAVENOUS ONCE
Status: COMPLETED | OUTPATIENT
Start: 2022-08-24 | End: 2022-08-24

## 2022-08-24 RX ORDER — ISOSORBIDE MONONITRATE 30 MG/1
60 TABLET, EXTENDED RELEASE ORAL DAILY
Status: CANCELLED | OUTPATIENT
Start: 2022-08-25

## 2022-08-24 RX ORDER — INSULIN LISPRO 100 [IU]/ML
1-6 INJECTION, SOLUTION INTRAVENOUS; SUBCUTANEOUS
Status: DISCONTINUED | OUTPATIENT
Start: 2022-08-24 | End: 2022-08-25 | Stop reason: HOSPADM

## 2022-08-24 RX ORDER — PANTOPRAZOLE SODIUM 40 MG/1
40 TABLET, DELAYED RELEASE ORAL DAILY
Status: CANCELLED | OUTPATIENT
Start: 2022-08-25

## 2022-08-24 RX ORDER — CHLORAL HYDRATE 500 MG
1000 CAPSULE ORAL 2 TIMES DAILY
Status: CANCELLED | OUTPATIENT
Start: 2022-08-24

## 2022-08-24 RX ORDER — ASPIRIN 81 MG/1
81 TABLET ORAL DAILY
Status: DISCONTINUED | OUTPATIENT
Start: 2022-08-24 | End: 2022-08-25 | Stop reason: HOSPADM

## 2022-08-24 RX ORDER — NITROGLYCERIN 0.4 MG/1
0.4 TABLET SUBLINGUAL ONCE
Status: COMPLETED | OUTPATIENT
Start: 2022-08-24 | End: 2022-08-24

## 2022-08-24 RX ORDER — ATORVASTATIN CALCIUM 40 MG/1
40 TABLET, FILM COATED ORAL EVERY 24 HOURS
Status: DISCONTINUED | OUTPATIENT
Start: 2022-08-24 | End: 2022-08-25 | Stop reason: HOSPADM

## 2022-08-24 RX ORDER — POTASSIUM CHLORIDE 20 MEQ/1
20 TABLET, EXTENDED RELEASE ORAL DAILY
Status: CANCELLED | OUTPATIENT
Start: 2022-08-24

## 2022-08-24 RX ORDER — ONDANSETRON 2 MG/ML
4 INJECTION INTRAMUSCULAR; INTRAVENOUS EVERY 6 HOURS PRN
Status: DISCONTINUED | OUTPATIENT
Start: 2022-08-24 | End: 2022-08-25 | Stop reason: HOSPADM

## 2022-08-24 RX ORDER — PANTOPRAZOLE SODIUM 40 MG/1
40 TABLET, DELAYED RELEASE ORAL DAILY
Status: DISCONTINUED | OUTPATIENT
Start: 2022-08-24 | End: 2022-08-25 | Stop reason: HOSPADM

## 2022-08-24 RX ORDER — ACETAMINOPHEN 325 MG/1
650 TABLET ORAL EVERY 4 HOURS PRN
Status: DISCONTINUED | OUTPATIENT
Start: 2022-08-24 | End: 2022-08-25 | Stop reason: HOSPADM

## 2022-08-24 RX ORDER — PROPRANOLOL HYDROCHLORIDE 80 MG/1
160 CAPSULE, EXTENDED RELEASE ORAL DAILY
Status: CANCELLED | OUTPATIENT
Start: 2022-08-25

## 2022-08-24 RX ORDER — CHLORAL HYDRATE 500 MG
1000 CAPSULE ORAL 2 TIMES DAILY
Status: DISCONTINUED | OUTPATIENT
Start: 2022-08-24 | End: 2022-08-25 | Stop reason: HOSPADM

## 2022-08-24 RX ORDER — LOSARTAN POTASSIUM 50 MG/1
100 TABLET ORAL EVERY 24 HOURS
Status: CANCELLED | OUTPATIENT
Start: 2022-08-25

## 2022-08-24 RX ADMIN — ACETAMINOPHEN 650 MG: 325 TABLET ORAL at 10:26

## 2022-08-24 RX ADMIN — LOSARTAN POTASSIUM 100 MG: 50 TABLET, FILM COATED ORAL at 06:30

## 2022-08-24 RX ADMIN — POTASSIUM CHLORIDE 40 MEQ: 1500 TABLET, EXTENDED RELEASE ORAL at 03:10

## 2022-08-24 RX ADMIN — INSULIN LISPRO 1 UNITS: 100 INJECTION, SOLUTION INTRAVENOUS; SUBCUTANEOUS at 22:51

## 2022-08-24 RX ADMIN — ISOSORBIDE MONONITRATE 60 MG: 30 TABLET, EXTENDED RELEASE ORAL at 08:15

## 2022-08-24 RX ADMIN — OMEGA-3 FATTY ACIDS CAP 1000 MG 1000 MG: 1000 CAP at 22:54

## 2022-08-24 RX ADMIN — ASPIRIN 81 MG: 81 TABLET, COATED ORAL at 08:15

## 2022-08-24 RX ADMIN — PROPRANOLOL HYDROCHLORIDE 160 MG: 80 CAPSULE, EXTENDED RELEASE ORAL at 08:17

## 2022-08-24 RX ADMIN — PANTOPRAZOLE SODIUM 40 MG: 40 TABLET, DELAYED RELEASE ORAL at 08:15

## 2022-08-24 RX ADMIN — ACETAMINOPHEN 650 MG: 325 TABLET ORAL at 16:23

## 2022-08-24 RX ADMIN — DULOXETINE 60 MG: 30 CAPSULE, DELAYED RELEASE ORAL at 08:15

## 2022-08-24 RX ADMIN — TICAGRELOR 90 MG: 90 TABLET ORAL at 06:31

## 2022-08-24 RX ADMIN — NITROGLYCERIN 0.4 MG: 0.4 TABLET SUBLINGUAL at 02:05

## 2022-08-24 RX ADMIN — ATORVASTATIN CALCIUM 40 MG: 40 TABLET, FILM COATED ORAL at 06:31

## 2022-08-24 RX ADMIN — OMEGA-3 FATTY ACIDS CAP 1000 MG 1000 MG: 1000 CAP at 08:15

## 2022-08-24 RX ADMIN — NITROGLYCERIN 1 INCH: 20 OINTMENT TOPICAL at 05:12

## 2022-08-24 RX ADMIN — MORPHINE SULFATE 4 MG: 4 INJECTION, SOLUTION INTRAMUSCULAR; INTRAVENOUS at 03:11

## 2022-08-24 RX ADMIN — TICAGRELOR 90 MG: 90 TABLET ORAL at 17:56

## 2022-08-24 NOTE — ASSESSMENT & PLAN NOTE
· Has resolved  · Patient ruled in for the possibility of an acute coronary syndrome  · High sensitivity troponin as follows 22, 44, 95, with delta differentials of 22, and 73  · Status post a Cardiology evaluation  · Cardiology want the patient transferred to [facility] level of [address] for a cardiac catheterization    · Dr Valerie Moore (cardiology here) coordinated the transfer, he spoke with the cath lab, and the interventional cardiologist will be Dr Malcolm Calles  · Case was accepted in transfer to the [facility] by Dr Deandre Good of the [facility] Internal Medicine Service  · Discharge to [facility]  · Discharge on aspirin, Brilinta, propanolol, losartan, Imdur and atorvastatin  · Further management as per the physicians in [place]

## 2022-08-24 NOTE — ED PROCEDURE NOTE
PROCEDURE  ECG 12 Lead Documentation Only    Date/Time: 8/24/2022 1:53 AM  Performed by: Maral Summers MD  Authorized by: Maral Summers MD     Indications / Diagnosis:  Cp   ECG reviewed by me, the ED Provider: yes    Patient location:  ED  Interpretation:     Interpretation: normal    Rate:     ECG rate:  62    ECG rate assessment: normal    Rhythm:     Rhythm: sinus rhythm    Ectopy:     Ectopy: none    QRS:     QRS axis:  Normal    QRS intervals:  Normal  Conduction:     Conduction: normal    ST segments:     ST segments:  Non-specific  T waves:     T waves: non-specific           Maral Summers MD  08/24/22 0308

## 2022-08-24 NOTE — ASSESSMENT & PLAN NOTE
HS Troponin only minimally elevated at 22, 73 and 95  EKG showing T-Wave inversion and Biphasic T-waves in V 4-5    Only subtle changes, but essentially unchanged from prior  Will make plans to transfer for cardiac cath

## 2022-08-24 NOTE — CONSULTS
[address] 1970, 47 y o  male MRN: [id number]  Unit/Bed#: ED 28 Encounter: [id number]  Primary Care Provider: Sylvia Paredes DO   Date and time admitted to hospital: 8/24/2022  1:41 AM    Inpatient consult to Cardiology  Consult performed by: Abby Michael PA-C  Consult ordered by: Ching Baeza PA-C          Elevated troponin I level  Assessment & Plan  HS Troponin only minimally elevated at 22, 73 and 95  EKG showing T-Wave inversion and Biphasic T-waves in V 4-5    Only subtle changes, but essentially unchanged from prior  Will make plans to transfer for cardiac cath  Mixed hyperlipidemia  Assessment & Plan  Tolerating statin therapy   Continue atorvastatin 40 mg daily     Essential hypertension  Assessment & Plan  Moderately controlled on current medical regimen   Continue OP medications   Losartan, inderal and imdur    * Chest pain  Assessment & Plan  Anginal chest pain that is felt at rest reminiscent of that experienced prior to previous catheterization with PCI   Hx of Multiple MIs   Most recent Cardiac Cath 1/7/22 BEVERLY was placed in the OM2 (balloon angio x2):   40% mid LAD, 100% small 1st Diag, 90% 2nd diag, 40% RI (stented) 80% LCX/OM2, 100% prox RCA (stented), PRDA (colaterals)       Thank you for allowing us to see this pleasant patient in consult  The patient is recommended for transfer in order to undergo cardiac catheterization  Chief Complaint:  Chief Complaint   Patient presents with    Chest Pain     Starting at 2200        History of Present Illness: The patient is a 47year old with a PMH of CAD with multiple MI and prior stenting with the most recent cardiac catheterization in January of this year  A BEVERLY was placed in the OM2  Cardiac Cath showed 40% mid LAD, 100% small 1st Diag, 90% 2nd diag, 40% RI (stented) 80% LCX/OM2, 100% prox RCA (stented), PRDA (colaterals)  The patient also has HTN and HLD     He comes to the ED with chest pain the started at rest and radiated to the right throat region  He states that it was worse with ambulation  It was reminiscent of the pain he had prior to his cardiac catheterization  He states that his pain was alleviated somewhat with rest but fully with nitro  Currently he is pain free  He has no nausea or diaphoresis  He has no edema orthopnea or PND  He has no syncope or near syncope  He had no TIA or CVA symptome  Troponin levels were slightly elevates as stated and EKG showed T-wave inversions with biphasic t-waves in leads v 4-5  Review of Systems: a 10 point review of systems was conducted and is negative except for as mentioned in the HPI or as below  Review of Systems   Constitutional: Negative  HENT: Negative  Eyes: Negative  Cardiovascular: Positive for chest pain  Negative for claudication, cyanosis, dyspnea on exertion, irregular heartbeat, leg swelling, near-syncope, orthopnea, palpitations, paroxysmal nocturnal dyspnea and syncope  Respiratory: Negative  Negative for cough, hemoptysis, shortness of breath, sleep disturbances due to breathing, snoring, sputum production and wheezing  Endocrine: Negative  Hematologic/Lymphatic: Negative  Skin: Negative  Musculoskeletal: Negative  Gastrointestinal: Negative  Genitourinary: Negative  Neurological: Negative  Psychiatric/Behavioral: Negative  Allergic/Immunologic: Negative          Past Medical and Surgical History:  Past Medical History:   Diagnosis Date    Arthritis     Coronary artery disease     MI x 4; cardiac cath with 4 stents    GERD (gastroesophageal reflux disease)     Hypertension     [facility]     4 MIs as of 2/24/2020    Non-insulin dependent type 2 diabetes mellitus ([facility] ) 8/24/2022     Past Surgical History:   Procedure Laterality Date    CARDIAC CATHETERIZATION  1/7/2022    Procedure: Cardiac catheterization;  Surgeon: Aylin Gregory DO; Location: [facility]; Service: Cardiology    CORONARY ANGIOPLASTY WITH STENT PLACEMENT       Social History     Substance and Sexual Activity   Alcohol Use Never     Social History     Substance and Sexual Activity   Drug Use Never     Social History     Tobacco Use   Smoking Status Former Smoker    Packs/day: 1 00    Years: 10 00    Pack years: 10 00    Types: Cigarettes    Start date: 2/24/1998   Richy Connors Quit date: 11/24/2018    Years since quitting: 3 7   Smokeless Tobacco Never Used       Family History:  Family History   Problem Relation Age of Onset    Heart disease Father     Hyperlipidemia Father     Diabetes Sister     Early death Sister     Diabetes Brother     Diabetes Maternal Grandmother        Medication:  (Not in a hospital admission)       Allergies:  No Known Allergies    Physical Exam:  Vitals: Blood pressure 150/70, pulse 65, temperature 97 9 °F (36 6 °C), temperature source Oral, resp  rate 18, height 5' 8" (1 727 m), weight 68 kg (150 lb), SpO2 96 %  , Body mass index is 22 81 kg/m² ,   Orthostatic Blood Pressures    Flowsheet Row Most Recent Value   Blood Pressure 150/70 filed at 08/24/2022 0930   Patient Position - Orthostatic VS Lying filed at 08/24/2022 1648      , Systolic (66DST), HFM:317 , Min:150 , OKD:385   , Diastolic (29SQK), JWI:21, Min:70, Max:121    Physical Exam  Vitals and nursing note reviewed  Constitutional:       Appearance: He is well-developed  HENT:      Head: Normocephalic and atraumatic  Mouth/Throat:      Mouth: Mucous membranes are moist    Eyes:      General: No scleral icterus  Conjunctiva/sclera: Conjunctivae normal    Neck:      Thyroid: No thyromegaly  Vascular: No JVD  Cardiovascular:      Rate and Rhythm: Normal rate and regular rhythm  Heart sounds: Normal heart sounds, S1 normal and S2 normal  No murmur heard  No friction rub  No gallop  Pulmonary:      Effort: No respiratory distress  Breath sounds:  No wheezing or rales  Abdominal:      General: Bowel sounds are normal  There is no distension  Palpations: Abdomen is soft  Tenderness: There is no abdominal tenderness  Comments: Aorta not palpable   Musculoskeletal:         General: No tenderness or deformity  Normal range of motion  Cervical back: Normal range of motion and neck supple  Right lower leg: No edema  Left lower leg: No edema  Skin:     General: Skin is warm and dry  Neurological:      General: No focal deficit present  Mental Status: He is alert and oriented to person, place, and time     Psychiatric:         Mood and Affect: Mood normal          Behavior: Behavior normal          Judgment: Judgment normal            Most Recent Cardiac Imaging:   · Cardiac Cath 1/7/22: 100%  RCA site prior stents, collateralized (known)  · Patent proximal circumflex stents  · Patent Ramus stents  · Small D1 and D2 disease, known (too small PCI or CABG)  · No significant obstructive disease in LAD  · 80% disease in mid circumflex beyond prior stented area, new BEVERLY placed successfully        EKG: Sinus bradycardia  Biphasic T waveV4-5    Normal sinus rhythm  Lateral limb lead T wave inversions      Lab Results:   Troponins:   Results from last 7 days   Lab Units 08/24/22  0630 08/24/22  0425 08/24/22  0201   HS TNI 0HR ng/L  --   --  22   HS TNI 2HR ng/L  --  44  --    HSTNI D2 ng/L  --  22*  --    HS TNI 4HR ng/L 95*  --   --    HSTNI D4 ng/L 73*  --   --       BNP:    CBC with diff:   Results from last 7 days   Lab Units 08/24/22  0201   WBC Thousand/uL 7 83   HEMOGLOBIN g/dL 12 4   HEMATOCRIT % 37 5   PLATELETS Thousands/uL 153   RBC Million/uL 4 12     CMP:  Results from last 7 days   Lab Units 08/24/22  0201   POTASSIUM mmol/L 3 3*   CHLORIDE mmol/L 106   BUN mg/dL 12   CREATININE mg/dL 0 84   CALCIUM mg/dL 9 0   AST U/L 15   ALT U/L 19   ALK PHOS U/L 108*   EGFR ml/min/1 73sq m 101     Lipid Profile:

## 2022-08-24 NOTE — ASSESSMENT & PLAN NOTE
· Placed in observation telemetry  · CHARBEL score 5  · Patient has significant cardiac risk factors to include MI x4 and cardiac stents x7  · Trend cardiac enzymes  · Obtain serial EKGs  · Continue pre-hospital aspirin, Lipitor, Imdur, Cozaar, Inderal and Brilinta  · Consult Cardiology in a m

## 2022-08-24 NOTE — ED NOTES
Wife at bedside  Patient resting comfortably  BG taken 112  No insulin given        Annmarie Salinas RN  08/24/22 9232

## 2022-08-24 NOTE — ASSESSMENT & PLAN NOTE
Moderately controlled on current medical regimen   Continue OP medications   Losartan, inderal and imdur

## 2022-08-24 NOTE — ASSESSMENT & PLAN NOTE
Lab Results   Component Value Date    HGBA1C 8 4 (H) 01/18/2022       No results for input(s): POCGLU in the last 72 hours      Blood Sugar Average: Last 72 hrs:     · Will place on [facility] step 2 diet  · Hold pre-hospital oral antihyperglycemics  · Obtain Accu-Cheks AC and HS with Humalog correction dose a c  and HS

## 2022-08-24 NOTE — EMTALA/ACUTE CARE TRANSFER
[address] [facility] [address]  Dept: [phone]      EMTALA TRANSFER CONSENT    NAME Nathan GARRISON 1970                              MRN [id number]    I have been informed of my rights regarding examination, treatment, and transfer   by Dr Tami Horta MD    Benefits:   cardiac catheterization    Risks:   transportation accident      Consent for Transfer:  I acknowledge that my medical condition has been evaluated and explained to me by the emergency department physician or other qualified medical person and/or my attending physician, who has recommended that I be transferred to the service of    at    The above potential benefits of such transfer, the potential risks associated with such transfer, and the probable risks of not being transferred have been explained to me, and I fully understand them  The doctor has explained that, in my case, the benefits of transfer outweigh the risks  I agree to be transferred  I authorize the performance of emergency medical procedures and treatments upon me in both transit and upon arrival at the receiving facility  Additionally, I authorize the release of any and all medical records to the receiving facility and request they be transported with me, if possible  I understand that the safest mode of transportation during a medical emergency is an ambulance and that the Hospital advocates the use of this mode of transport  Risks of traveling to the receiving facility by car, including absence of medical control, life sustaining equipment, such as oxygen, and medical personnel has been explained to me and I fully understand them  (YAZMIN CORRECT BOX BELOW)  [  ]  I consent to the stated transfer and to be transported by ambulance/helicopter    [  ]  I consent to the stated transfer, but refuse transportation by ambulance and accept full responsibility for my transportation by car  I understand the risks of non-ambulance transfers and I exonerate the Hospital and its staff from any deterioration in my condition that results from this refusal     X___________________________________________    DATE  22  TIME________  Signature of patient or legally responsible individual signing on patient behalf           RELATIONSHIP TO PATIENT_________________________          Provider Certification    NAME Twan Mejias                                         1970                              MRN [id number]    A medical screening exam was performed on the above named patient  Based on the examination:    Condition Necessitating Transfer The primary encounter diagnosis was Chest pain, unspecified  A diagnosis of Elevated troponin I level was also pertinent to this visit  Patient Condition:   stable    Reason for Transfer:   coronary artery disease, need for cardiac catheterization    Transfer Requirements: Facility     · Space available and qualified personnel available for treatment as acknowledged by   the patient John Paul Chisholm  · Agreed to accept transfer and to provide appropriate medical treatment as acknowledged by Dr Lenny Sierra Internal Medicine Service, Dr Juan Lopez - Interventional Cardiology          · Appropriate medical records of the examination and treatment of the patient are provided at the time of transfer   [address] [facility], [address] _______  · Transfer will be performed by qualified personnel from    and appropriate transfer equipment as required, including the use of necessary and appropriate life support measures      Provider Certification: I have examined the patient and explained the following risks and benefits of being transferred/refusing transfer to the patient/family:         Based on these reasonable risks and benefits to the patient and/or the unborn child(armida), and based upon the information available at the time of the patients examination, I certify that the medical benefits reasonably to be expected from the provision of appropriate medical treatments at another medical facility outweigh the increasing risks, if any, to the individuals medical condition, and in the case of labor to the unborn child, from effecting the transfer      X____________________________________________ DATE 08/24/22        TIME_______      ORIGINAL - SEND TO MEDICAL RECORDS   COPY - SEND WITH PATIENT DURING TRANSFER

## 2022-08-24 NOTE — ASSESSMENT & PLAN NOTE
· Mild at 3 3  · Will give K-Dur 20 mEq p o  daily with 1st dose now  · Continue to monitor with repeat labs in a m

## 2022-08-24 NOTE — ASSESSMENT & PLAN NOTE
Anginal chest pain that is felt at rest reminiscent of that experienced prior to previous catheterization with PCI   Hx of Multiple MIs   Most recent Cardiac Cath 1/7/22 BEVERLY was placed in the OM2 (balloon angio x2):   40% mid LAD, 100% small 1st Diag, 90% 2nd diag, 40% RI (stented) 80% LCX/OM2, 100% prox RCA (stented), PRDA (colaterals)

## 2022-08-24 NOTE — H&P
[address] [facility] [address] 1970, 47 y o  male MRN: [id number]  Unit/Bed#: ED 28 Encounter: [id number]  Primary Care Provider: Azul Price DO   Date and time admitted to hospital: 8/24/2022  1:41 AM    * Chest pain  Assessment & Plan  · Placed in observation telemetry  · CHARBEL score 5  · Patient has significant cardiac risk factors to include MI x4 and cardiac stents x7  · Trend cardiac enzymes  · Obtain serial EKGs  · Continue pre-hospital aspirin, Lipitor, Imdur, Cozaar, Inderal and Brilinta  · Consult Cardiology in a m  Elevated troponin I level  Assessment & Plan  · Initial of 22 with repeat of 44  · Being treated as per above    Hypokalemia  Assessment & Plan  · Mild at 3 3  · Will give K-Dur 20 mEq p o  daily with 1st dose now  · Continue to monitor with repeat labs in a m  Non-insulin dependent type 2 diabetes mellitus ([facility] )  Assessment & Plan  Lab Results   Component Value Date    HGBA1C 8 4 (H) 01/18/2022       No results for input(s): POCGLU in the last 72 hours      Blood Sugar Average: Last 72 hrs:     · Will place on [facility] step 2 diet  · Hold pre-hospital oral antihyperglycemics  · Obtain Accu-Cheks AC and HS with Humalog correction dose a c  and HS    Major depressive disorder, recurrent (HCC)  Assessment & Plan  Continue pre-hospital Cymbalta 60 mg p o  daily    Gastroparesis  Assessment & Plan  Continue pre-hospital Protonix 40 mg p o  daily    Gastroesophageal reflux disease  Assessment & Plan  Continue pre-hospital Protonix 40 mg p o  daily    Mixed hyperlipidemia  Assessment & Plan  Continue pre-hospital Lipitor 40 mg p o  daily    Essential hypertension  Assessment & Plan  Continue pre-hospital Inderal  mg p o  daily and Cozaar 100 mg p o  daily     VTE Prophylaxis: Patient is low risk, will ambulate  Code Status:  Level 1  POLST: There is no POLST form on file for this patient (pre-hospital)  Discussion with family:  None present at bedside at time of exam    Anticipated Length of Stay:  Patient will be admitted on an Observation basis with an anticipated length of stay of  < 2 midnights  Justification for Hospital Stay:  Chest pain with elevated troponin requiring further cardiac evaluation    Chief Complaint:   Chest pain X 4 hours    History of Present Illness:    Anne Marie Funes is a 47 y o  male who presents with chest pain x4 hours  Patient with a significant cardiac history to include for MIs in the past with most recent being in 2019 as well as 7 cardiac stents who underwent cardiac catheterization in January and had his last 2 stents placed at that time presents ER for further evaluation treatment of his 4 hour history of chest pain  Patient reports 8/10 midsternal chest pain with radiation into his neck without any accompanying diaphoresis, palpitations, lightheadedness or shortness of breath  Patient states that his pain began while laying in bed and denies any aggravating or alleviating factors  Patient currently follows with Dr Arsalan Ayala from Cardiology we last saw about 3 months ago  Patient states that he used 3 of his nitroglycerin at home which would relieve his pain somewhat only to return  Patient stated the pain was waxing waning and pressure in nature  Review of Systems:  Review of Systems   Constitutional: Negative for chills and fever  Respiratory: Negative for cough, shortness of breath and wheezing  Cardiovascular: Positive for chest pain  Negative for palpitations  Gastrointestinal: Negative for abdominal pain, diarrhea, nausea and vomiting  Genitourinary: Negative for dysuria, frequency, hematuria and urgency  Neurological: Negative for weakness, light-headedness and headaches  All other systems reviewed and are negative        Past Medical and Surgical History:   Past Medical History:   Diagnosis Date    Arthritis     Coronary artery disease     MI x 4; cardiac cath with 4 stents    GERD (gastroesophageal reflux disease)     Hypertension     Myocardial infarct ([facility] )     4 MIs as of 2/24/2020    Non-insulin dependent type 2 diabetes mellitus ([facility] ) 8/24/2022       Past Surgical History:   Procedure Laterality Date    CARDIAC CATHETERIZATION  1/7/2022    Procedure: Cardiac catheterization;  Surgeon: Kevon Fernandez DO;  [facility]: [facility]; Service: Cardiology    CORONARY ANGIOPLASTY WITH STENT PLACEMENT         Meds/Allergies:  Prior to Admission medications    Medication Sig Start Date End Date Taking?  Authorizing Provider   aspirin 81 MG tablet Take 81 mg by mouth daily     Yes Historical Provider, MD   atorvastatin (LIPITOR) 40 mg tablet Take 1 tablet (40 mg total) by mouth every 24 hours 1/6/22  Yes Mitchell Ta MD   Brilinta 90 MG TAKE ONE TABLET BY MOUTH EVERY 12 HOURS 8/8/22  Yes Perez Morgan DO   DULoxetine (CYMBALTA) 60 mg delayed release capsule Take 60 mg by mouth daily   Yes Historical Provider, MD   isosorbide mononitrate (IMDUR) 60 mg 24 hr tablet Take 1 tablet (60 mg total) by mouth daily 1/6/22  Yes Mitchell Ta MD   Jardiance 10 MG TABS Take 10 mg by mouth in the morning 2/25/22  Yes Historical Provider, MD   losartan (COZAAR) 100 MG tablet Take 1 tablet (100 mg total) by mouth every 24 hours 7/19/19  Yes Mitchell Ta MD   nitroglycerin (NITROSTAT) 0 4 mg SL tablet as directed 5/3/19  Yes Historical Provider, MD   Omega-3 Fatty Acids (OMEGA 3 PO) Take 2,000 mg by mouth 2 (two) times a day   Yes Historical Provider, MD   pantoprazole (PROTONIX) 40 mg tablet Take 40 mg by mouth daily 7/11/19  Yes Historical Provider, MD   propranolol (INDERAL LA) 160 mg Take 1 capsule (160 mg total) by mouth daily 4/11/22  Yes Esvin Santos MD   gabapentin (NEURONTIN) 300 mg capsule 1 PO QHS x 4 days, then 1 PO BID x 4 days, then 1 PO TID  Patient not taking: Reported on 4/11/2022  3/11/22   OLEGARIO Ding   metFORMIN (GLUCOPHAGE) 500 mg tablet Take 500 mg by mouth 2 (two) times a day with meals  Patient not taking: Reported on 3/24/2022     Historical Provider, MD     I have reviewed home medications with patient personally  Allergies: No Known Allergies    Social History:  Marital Status: /Civil Union   Occupation:  Disability  Patient Pre-hospital Living Situation:  Resides at home with wife and foster child  Patient Pre-hospital Level of Mobility:  Full without assist  Patient Pre-hospital Diet Restrictions:  Diabetic no added salt  Substance Use History:   Social History     Substance and Sexual Activity   Alcohol Use Never     Social History     Tobacco Use   Smoking Status Former Smoker    Packs/day: 1 00    Years: 10 00    Pack years: 10 00    Types: Cigarettes    Start date: 2/24/1998   Anne Figueroa Quit date: 11/24/2018    Years since quitting: 3 7   Smokeless Tobacco Never Used     Social History     Substance and Sexual Activity   Drug Use Never       Family History:  I have reviewed the patients family history    Physical Exam:   Vitals:   Blood Pressure: 164/81 (08/24/22 0600)  Pulse: 65 (08/24/22 0600)  Temperature: 98 1 °F (36 7 °C) (08/24/22 0148)  Respirations: 16 (08/24/22 0600)  Weight - Scale: 87 kg (191 lb 12 8 oz) (08/24/22 0144)  SpO2: 96 % (08/24/22 0315)    Physical Exam  Vitals and nursing note reviewed  Constitutional:       General: He is not in acute distress  Appearance: Normal appearance  HENT:      Head: Normocephalic and atraumatic  Right Ear: Tympanic membrane normal       Left Ear: Tympanic membrane normal       Nose: Nose normal       Mouth/Throat:      Mouth: Mucous membranes are moist       Pharynx: No oropharyngeal exudate or posterior oropharyngeal erythema  Eyes:      Extraocular Movements: Extraocular movements intact  Pupils: Pupils are equal, round, and reactive to light  Cardiovascular:      Rate and Rhythm: Normal rate and regular rhythm  Pulses: Normal pulses  Heart sounds: Normal heart sounds     Pulmonary: Effort: Pulmonary effort is normal  No respiratory distress  Breath sounds: Normal breath sounds  Abdominal:      General: Abdomen is flat  Bowel sounds are normal       Palpations: Abdomen is soft  Musculoskeletal:         General: Normal range of motion  Cervical back: Normal range of motion and neck supple  Right lower leg: No edema  Left lower leg: No edema  Skin:     General: Skin is warm and dry  Capillary Refill: Capillary refill takes less than 2 seconds  Neurological:      General: No focal deficit present  Mental Status: He is alert and oriented to person, place, and time  Additional Data:   Lab Results: I have personally reviewed pertinent reports  Results from last 7 days   Lab Units 08/24/22  0201   WBC Thousand/uL 7 83   HEMOGLOBIN g/dL 12 4   HEMATOCRIT % 37 5   PLATELETS Thousands/uL 153   NEUTROS PCT % 69   LYMPHS PCT % 20   MONOS PCT % 6   EOS PCT % 4     Results from last 7 days   Lab Units 08/24/22  0201   SODIUM mmol/L 138   POTASSIUM mmol/L 3 3*   CHLORIDE mmol/L 106   CO2 mmol/L 25   BUN mg/dL 12   CREATININE mg/dL 0 84   CALCIUM mg/dL 9 0   ALK PHOS U/L 108*   ALT U/L 19   AST U/L 15                   Imaging: I have personally reviewed pertinent reports  X-ray chest 1 view portable   ED Interpretation by Te Mckeon MD (08/24 0226)   COMPARISON: 2 2020    EXAM PERFORMED/VIEWS:  XR CHEST PA/LAT        FINDINGS:     Cardiomediastinal silhouette appears unremarkable      The lungs are clear  No pneumothorax or pleural effusion      Visualized osseous structures appear unremarkable for the patient's age      IMPRESSION:     No focal consolidation, pleural effusion, or pneumothorax                      EKG, Pathology, and Other Studies Reviewed on Admission:   · EKG:  Normal sinus rhythm rate of 62 without ischemia    Epic Records Reviewed: Yes     ** Please Note: This note has been constructed using a voice recognition system   **

## 2022-08-24 NOTE — ED NOTES
Patient sitting eating dinner tray at this time  No complaints at this time  Call bell within reach        Tonya Randall RN  08/24/22 2527

## 2022-08-24 NOTE — DISCHARGE SUMMARY
Pete U  67   Discharge- [facility] 1970, 47 y o  male MRN: [id number]  Unit/Bed#: ED 28 Encounter: [id number]  Primary Care Provider: Xiomara Yee DO   Date and time admitted to hospital: 8/24/2022  1:41 AM    * Chest pain  Assessment & Plan  · Has resolved  · Patient ruled in for the possibility of an acute coronary syndrome  · High sensitivity troponin as follows 22, 44, 95, with delta differentials of 22, and 73  · Status post a Cardiology evaluation  · Cardiology want the patient transferred to [facility] [address] for a cardiac catheterization    · Dr Shey Sherwood (cardiology here) coordinated the transfer, he spoke with the cath lab, and the interventional cardiologist will be Dr Porsche Tineo  · Case was accepted in transfer to the [facility] by Dr Julio Cesar Monsalve - of the [facility] Internal Medicine Service  · Discharge to [facility]  · Discharge on aspirin, Brilinta, propanolol, losartan, Imdur and atorvastatin  · Further management as per the physicians in [place]    Elevated troponin I level  Assessment & Plan  · See the management as outlined above    Essential hypertension  Assessment & Plan  · Blood pressure stable  · Continue medications as outlined above, propanolol, losartan, and Imdur     Mixed hyperlipidemia  Assessment & Plan  · Continue atorvastatin    Gastroesophageal reflux disease  Assessment & Plan  · Continue Protonix    Hypokalemia  Assessment & Plan  · Repleted prior to discharge    Major depressive disorder, recurrent (HCC)  Assessment & Plan  · Continue pre-hospital Cymbalta 60 mg p o  daily    Non-insulin dependent type 2 diabetes mellitus [facility])  Assessment & Plan  Lab Results   Component Value Date    HGBA1C 8 4 (H) 01/18/2022       Recent Labs     08/24/22  0732 08/24/22  1131 08/24/22  1618   POCGLU 122 112 131       Blood Sugar Average: Last 72 hrs:  (P) 282 [id number]   · Oral hypoglycemics remain on hold  · Target blood sugar for the hospital 140-180  · Continue Accu-Cheks AC and HS with sliding scale,        Discharging Physician / Practitioner: Tameka Villalobos MD  PCP: Radha Mackey DO  Admission Date:   Admission Orders (From admission, onward)     Ordered        08/24/22 0516  Place in Observation  Once                      Discharge Date: 08/25/2022    Medical Problems             Resolved Problems  Date Reviewed: 8/24/2022   None                 Consultations During Hospital Stay:  · Cardiology    Procedures Performed:   · None    Significant Findings / Test Results:   · Chest x-ray-no acute cardiopulmonary disease    Incidental Findings:   · None     Test Results Pending at Discharge (will require follow up): · None     Outpatient Tests Requested:  · None    Complications:     None    Reason for Admission:  Chest pain rule out ACS    Hospital Course:     Prosper Rojas is a 47 y o  male patient who originally presented to the hospital on 8/24/2022 due to chest pain  Please refer to the initial history and physical examination completed by Tomas ALVARADO for the initial presenting features and complaints  In brief, the patient is a pleasant 61-year-old male, with a known history of CAD, status post multiple stents in the past, status post having a history of MIs, and a very positive family history with his dad [id number] being diagnosed with CAD in his 35s, and is a patient that presented to the emergency room with chest pain  He was admitted to [facility] telemetry observation  He unfortunately ruled in for the possibility of an acute coronary syndrome with 3 sets of high sensitivity troponins as outlined above, and ischemic changes manifested by T-wave inversions in V4 and V5 on EKG testing  Patient was treated with the appropriate medications which include aspirin, Brilinta, propanolol, losartan, Imdur, and atorvastatin    Patient was seen in conjunction with Cardiology  Cardiology felt that the patient needs to be transferred for cardiac catheterization to higher level of care  Patient was transferred to the [facility] for cardiac catheterization  Please refer to the assessment/plan portion of this discharge summary as outlined the remainder of the details in regard to his stay  Please see above list of diagnoses and related plan for additional information  Condition at Discharge: good     Discharge Day Visit / Exam:     Subjective:  Patient seen and examined, at this time is currently chest pain-free and has no complaints of any other cardiac related symptoms  Vitals: Blood Pressure: 126/60 (08/24/22 1700)  Pulse: 56 (08/24/22 1700)  Temperature: 97 9 °F (36 6 °C) (08/24/22 0727)  Temp Source: Oral (08/24/22 0727)  Respirations: 18 (08/24/22 1700)  Height: 5' 8" (172 7 cm) (08/24/22 0727)  Weight - Scale: 68 kg (150 lb) (08/24/22 0727)  SpO2: 96 % (08/24/22 1700)  Exam:   Physical Exam  Vitals and nursing note reviewed  Constitutional:       General: He is not in acute distress  Appearance: Normal appearance  He is not ill-appearing  HENT:      Head: Normocephalic and atraumatic  Nose: Nose normal    Eyes:      Extraocular Movements: Extraocular movements intact  Pupils: Pupils are equal, round, and reactive to light  Cardiovascular:      Rate and Rhythm: Normal rate and regular rhythm  Pulses: Normal pulses  Heart sounds: Normal heart sounds  No murmur heard  No friction rub  No gallop  Pulmonary:      Effort: Pulmonary effort is normal       Breath sounds: Normal breath sounds  Abdominal:      General: There is no distension  Palpations: Abdomen is soft  There is no mass  Tenderness: There is no abdominal tenderness  There is no guarding or rebound  Musculoskeletal:         General: No swelling or tenderness  Normal range of motion  Cervical back: Normal range of motion and neck supple   No rigidity  No muscular tenderness  Right lower leg: No edema  Left lower leg: No edema  Skin:     General: Skin is warm  Capillary Refill: Capillary refill takes less than 2 seconds  Findings: No erythema or rash  Neurological:      General: No focal deficit present  Mental Status: He is alert and oriented to person, place, and time  Mental status is at baseline  Psychiatric:         Mood and Affect: Mood normal          Behavior: Behavior normal          Discussion with Family:  Patient's wife Thomas Kamara, was bedside at the time of my discharge evaluation, all questions answered to her satisfaction    Discharge instructions/Information to patient and family:   See after visit summary for information provided to patient and family  Provisions for Follow-Up Care:  See after visit summary for information related to follow-up care and any pertinent home health orders  Disposition:     [address] Transfer to [facility]      Planned Readmission:   [facility]     Discharge Statement:  I spent 40 minutes discharging the patient  This time was spent on the day of discharge  I had direct contact with the patient on the day of discharge  Greater than 50% of the total time was spent examining patient, answering all patient questions, arranging and discussing plan of care with patient as well as directly providing post-discharge instructions  Additional time then spent on discharge activities  Discharge Medications:  See after visit summary for reconciled discharge medications provided to patient and family        ** Please Note: This note has been constructed using a voice recognition system **

## 2022-08-24 NOTE — ASSESSMENT & PLAN NOTE
Lab Results   Component Value Date    HGBA1C 8 4 (H) 01/18/2022       Recent Labs     08/24/22  0732 08/24/22  1131 08/24/22  1618   POCGLU 122 112 131       Blood Sugar Average: Last 72 hrs:  (P) 761 [id number]   · Oral hypoglycemics remain on hold  · Target blood sugar for the hospital 140-180  · Continue Accu-Cheks AC and HS with sliding scale,

## 2022-08-24 NOTE — ED PROVIDER NOTES
History  Chief Complaint   Patient presents with    Chest Pain     Starting at 250       55-YEAR-OLD MALE    PMH:  CAD  HTN  HLD    HE IS HERE FOR SS CP, RADIATING TO LEFT ARM, ASSOC W/ SOB   PAIN IS RATED AS MODERATE TO SEVERE   DESCRIBED AS SHARP    HE DENIES ANY RADIATION OF PAIN TO THE JAW NECK OR THE BACK  INTERVENTIONS:   PT TOOK 3 SLN AND SYMPTOMS IMPROVED       PATIENT DENIES ANY COUGH, NO FEVERS OR CHILLS  NO URI SYMPTOMS        VTE  RISK FACTORS:  NONE   NO HISTORY OF PE OR DVT   NO LONG CAR RIDES OR PLANE RIDES  NO IMMOBILIZATION  NO RECENT SURGERY OR TRAUMA  NO HEMOPTYSIS  OTHER ASSOCIATED SYMPTOMS:    NO ABDOMINAL PAIN  NO NAUSEA OR VOMITING  NO STOOL CHANGES  NO OTHER COMPLAINTS        History provided by:  Patient  Chest Pain  Pain location:  Substernal area  Pain quality: sharp    Pain radiates to:  L arm  Pain radiates to the back: no    Pain severity:  Severe  Onset quality:  Gradual  Relieved by:  Nitroglycerin  Worsened by:  Nothing tried  Ineffective treatments:  None tried  Associated symptoms: shortness of breath    Associated symptoms: no abdominal pain, no back pain, no claudication, no cough, no diaphoresis, no dizziness, no fatigue, no fever, no headache, no lower extremity edema, no nausea, no palpitations, no syncope and not vomiting        Prior to Admission Medications   Prescriptions Last Dose Informant Patient Reported? Taking?    Brilinta 90 MG   No No   Sig: TAKE ONE TABLET BY MOUTH EVERY 12 HOURS   DULoxetine (CYMBALTA) 60 mg delayed release capsule  Self Yes No   Sig: Take 60 mg by mouth daily   Jardiance 10 MG TABS   Yes No   Sig: Take 10 mg by mouth in the morning   Omega-3 Fatty Acids (OMEGA 3 PO)   Yes No   Sig: Take 2,000 mg by mouth 2 (two) times a day   aspirin (ASPIRIN LOW DOSE) 81 MG tablet  Self Yes No   Sig: Take 81 mg by mouth daily     atorvastatin (LIPITOR) 40 mg tablet  Self No No   Sig: Take 1 tablet (40 mg total) by mouth every 24 hours gabapentin (NEURONTIN) 300 mg capsule   No No   Si PO QHS x 4 days, then 1 PO BID x 4 days, then 1 PO TID   Patient not taking: Reported on 2022    isosorbide mononitrate (IMDUR) 60 mg 24 hr tablet  Self No No   Sig: Take 1 tablet (60 mg total) by mouth daily   losartan (COZAAR) 100 MG tablet  Self No No   Sig: Take 1 tablet (100 mg total) by mouth every 24 hours   metFORMIN (GLUCOPHAGE) 500 mg tablet  Self Yes No   Sig: Take 500 mg by mouth 2 (two) times a day with meals   Patient not taking: Reported on 3/24/2022    nitroglycerin (NITROSTAT) 0 4 mg SL tablet  Self Yes No   Sig: as directed   Patient not taking: Reported on 2021   pantoprazole (PROTONIX) 40 mg tablet  Self Yes No   Sig: Take 40 mg by mouth daily   propranolol (INDERAL LA) 160 mg   No No   Sig: Take 1 capsule (160 mg total) by mouth daily      Facility-Administered Medications: None       Past Medical History:   Diagnosis Date    Arthritis     Coronary artery disease     MI x 4; cardiac cath with 4 stents    GERD (gastroesophageal reflux disease)     Hypertension     Myocardial infarct ([facility] [address] )     4 MIs as of 2020       Past Surgical History:   Procedure Laterality Date    CARDIAC CATHETERIZATION  2022    Procedure: Cardiac catheterization;  Surgeon: Raul Fatima DO;  Location: [facility]; Service: Cardiology    CORONARY ANGIOPLASTY WITH STENT PLACEMENT         Family History   Problem Relation Age of Onset    Heart disease Father     Hyperlipidemia Father     Diabetes Sister     Early death Sister     Diabetes Brother     Diabetes Maternal Grandmother      I have reviewed and agree with the history as documented      E-Cigarette/Vaping    E-Cigarette Use Never User      E-Cigarette/Vaping Substances     Social History     Tobacco Use    Smoking status: Former Smoker     Packs/day: 1 00     Years: 10 00     Pack years: 10 00     Types: Cigarettes     Start date: 1998     Quit date: 11/24/2018     Years since quitting: 3 7    Smokeless tobacco: Never Used   Vaping Use    Vaping Use: Never used   Substance Use Topics    Alcohol use: Never    Drug use: Never       Review of Systems   Constitutional: Negative for chills, diaphoresis, fatigue and fever  Respiratory: Positive for shortness of breath  Negative for cough, wheezing and stridor  Cardiovascular: Positive for chest pain  Negative for palpitations, claudication, leg swelling and syncope  Gastrointestinal: Negative for abdominal pain, blood in stool, nausea and vomiting  Genitourinary: Negative for difficulty urinating, dysuria, flank pain and frequency  Musculoskeletal: Negative for arthralgias, back pain, gait problem, joint swelling, myalgias, neck pain and neck stiffness  Skin: Negative for rash and wound  Neurological: Negative for dizziness, light-headedness and headaches  All other systems reviewed and are negative  Physical Exam  Physical Exam  Constitutional:       General: He is not in acute distress  Appearance: He is well-developed  He is not ill-appearing, toxic-appearing or diaphoretic  HENT:      Head: Normocephalic and atraumatic  Nose: Nose normal       Mouth/Throat:      Pharynx: No oropharyngeal exudate  Eyes:      General: No scleral icterus  Right eye: No discharge  Left eye: No discharge  Extraocular Movements: Extraocular movements intact  Conjunctiva/sclera: Conjunctivae normal       Pupils: Pupils are equal, round, and reactive to light  Neck:      Vascular: No JVD  Trachea: No tracheal deviation  Cardiovascular:      Rate and Rhythm: Normal rate and regular rhythm  Pulses:           Radial pulses are 2+ on the right side and 2+ on the left side  Heart sounds: Normal heart sounds  No murmur heard  No friction rub  No gallop  Pulmonary:      Effort: Pulmonary effort is normal  No accessory muscle usage or respiratory distress  Breath sounds: Normal breath sounds  No stridor  No wheezing, rhonchi or rales  Chest:      Chest wall: No tenderness  Abdominal:      General: Bowel sounds are normal  There is no distension  Palpations: Abdomen is soft  There is no mass  Tenderness: There is no abdominal tenderness  There is no guarding or rebound  Hernia: No hernia is present  Musculoskeletal:         General: No tenderness or deformity  Normal range of motion  Cervical back: Normal range of motion and neck supple  Right lower leg: No tenderness  No edema  Left lower leg: No tenderness  No edema  Lymphadenopathy:      Cervical: No cervical adenopathy  Skin:     General: Skin is warm  Capillary Refill: Capillary refill takes less than 2 seconds  Coloration: Skin is not cyanotic or pale  Findings: No ecchymosis, erythema or rash  Neurological:      Mental Status: He is alert and oriented to person, place, and time  Cranial Nerves: No cranial nerve deficit  Sensory: No sensory deficit  Motor: No abnormal muscle tone  Coordination: Coordination normal    Psychiatric:         Behavior: Behavior normal          Thought Content: Thought content normal          Judgment: Judgment normal          Vital Signs  ED Triage Vitals [08/24/22 0144]   Temp Pulse Resp BP SpO2   -- -- -- -- --      Temp src Heart Rate Source Patient Position - Orthostatic VS BP Location FiO2 (%)   -- -- -- -- --      Pain Score       8           There were no vitals filed for this visit        Visual Acuity      ED Medications  Medications - No data to display    Diagnostic Studies  Results Reviewed     Procedure Component Value Units Date/Time    CBC and differential [[id number]]     Lab Status: No result Specimen: Blood     CMP [[id number]]     Lab Status: No result Specimen: Blood     Lipase [[id number]]     Lab Status: No result Specimen: Blood     Magnesium [[id number]]     Lab Status: No result Specimen: Blood     HS Troponin 0hr (reflex protocol) [[id number]]     Lab Status: No result Specimen: Blood                  X-ray chest 1 view portable    (Results Pending)              Procedures  Procedures         ED Course  ED Course as of 08/24/22 0625   Wed Aug 24, 2022   0150 Patient seen and evaluated    Here for chest pain since 10:00 p m  Improved with 3 nitroglycerin sublingual  Pain is coming back now, 3-/0, substernal   0224 CBC and differential   0243 Magnesium: 1 9   0243 hs TnI 0hr: 22   0243 CMP(!)   0243 Lipase: 50   0246 Patient stable  He is requesting something more for pain  Morphine ordered   0500 Delta 2hr hsTnI(!): 22   0501 Tiger texted Rayf Hush for admission for r/o ACS   0515 DW [facility]  Will obs to Dr Beatriz Baumann                                              MDM    Disposition  Final diagnoses:   None     ED Disposition     None      Follow-up Information    None         Patient's Medications   Discharge Prescriptions    No medications on file       No discharge procedures on file      PDMP Review     None          ED Provider  Electronically Signed by           Colletta Bacca, MD  08/24/22 9835

## 2022-08-25 ENCOUNTER — HOSPITAL ENCOUNTER (INPATIENT)
Facility: HOSPITAL | Age: 52
LOS: 2 days | Discharge: HOME | DRG: 251 | End: 2022-08-27
Attending: STUDENT IN AN ORGANIZED HEALTH CARE EDUCATION/TRAINING PROGRAM | Admitting: INTERNAL MEDICINE
Payer: COMMERCIAL

## 2022-08-25 VITALS
OXYGEN SATURATION: 94 % | BODY MASS INDEX: 22.73 KG/M2 | SYSTOLIC BLOOD PRESSURE: 165 MMHG | HEIGHT: 68 IN | RESPIRATION RATE: 19 BRPM | DIASTOLIC BLOOD PRESSURE: 74 MMHG | WEIGHT: 150 LBS | TEMPERATURE: 98 F | HEART RATE: 61 BPM

## 2022-08-25 DIAGNOSIS — R77.8 ELEVATED TROPONIN I LEVEL: Primary | ICD-10-CM

## 2022-08-25 DIAGNOSIS — I20.0 UNSTABLE ANGINA PECTORIS (HCC): ICD-10-CM

## 2022-08-25 DIAGNOSIS — I21.4 NSTEMI (NON-ST ELEVATED MYOCARDIAL INFARCTION) (HCC): ICD-10-CM

## 2022-08-25 LAB
EST. AVERAGE GLUCOSE BLD GHB EST-MCNC: 166 MG/DL
GLUCOSE SERPL-MCNC: 135 MG/DL (ref 65–140)
GLUCOSE SERPL-MCNC: 148 MG/DL (ref 65–140)
GLUCOSE SERPL-MCNC: 160 MG/DL (ref 65–140)
GLUCOSE SERPL-MCNC: 188 MG/DL (ref 65–140)
HBA1C MFR BLD: 7.4 %

## 2022-08-25 PROCEDURE — 99223 1ST HOSP IP/OBS HIGH 75: CPT | Performed by: INTERNAL MEDICINE

## 2022-08-25 PROCEDURE — 82948 REAGENT STRIP/BLOOD GLUCOSE: CPT

## 2022-08-25 PROCEDURE — 99225 PR SBSQ OBSERVATION CARE/DAY 25 MINUTES: CPT | Performed by: HOSPITALIST

## 2022-08-25 RX ORDER — PANTOPRAZOLE SODIUM 40 MG/1
40 TABLET, DELAYED RELEASE ORAL DAILY
Status: DISCONTINUED | OUTPATIENT
Start: 2022-08-26 | End: 2022-08-27 | Stop reason: HOSPADM

## 2022-08-25 RX ORDER — ATORVASTATIN CALCIUM 40 MG/1
40 TABLET, FILM COATED ORAL EVERY 24 HOURS
Status: DISCONTINUED | OUTPATIENT
Start: 2022-08-26 | End: 2022-08-27 | Stop reason: HOSPADM

## 2022-08-25 RX ORDER — ACETAMINOPHEN 325 MG/1
650 TABLET ORAL EVERY 4 HOURS PRN
Status: DISCONTINUED | OUTPATIENT
Start: 2022-08-25 | End: 2022-08-27 | Stop reason: HOSPADM

## 2022-08-25 RX ORDER — INSULIN LISPRO 100 [IU]/ML
1-6 INJECTION, SOLUTION INTRAVENOUS; SUBCUTANEOUS
Status: DISCONTINUED | OUTPATIENT
Start: 2022-08-25 | End: 2022-08-27 | Stop reason: HOSPADM

## 2022-08-25 RX ORDER — HYDRALAZINE HYDROCHLORIDE 20 MG/ML
5 INJECTION INTRAMUSCULAR; INTRAVENOUS ONCE
Status: COMPLETED | OUTPATIENT
Start: 2022-08-26 | End: 2022-08-26

## 2022-08-25 RX ORDER — DULOXETIN HYDROCHLORIDE 60 MG/1
60 CAPSULE, DELAYED RELEASE ORAL DAILY
Status: DISCONTINUED | OUTPATIENT
Start: 2022-08-26 | End: 2022-08-27 | Stop reason: HOSPADM

## 2022-08-25 RX ORDER — ISOSORBIDE MONONITRATE 60 MG/1
60 TABLET, EXTENDED RELEASE ORAL DAILY
Status: DISCONTINUED | OUTPATIENT
Start: 2022-08-26 | End: 2022-08-27 | Stop reason: HOSPADM

## 2022-08-25 RX ORDER — PROPRANOLOL HYDROCHLORIDE 80 MG/1
160 CAPSULE, EXTENDED RELEASE ORAL DAILY
Status: DISCONTINUED | OUTPATIENT
Start: 2022-08-26 | End: 2022-08-27 | Stop reason: HOSPADM

## 2022-08-25 RX ORDER — INSULIN LISPRO 100 [IU]/ML
1-6 INJECTION, SOLUTION INTRAVENOUS; SUBCUTANEOUS
Status: DISCONTINUED | OUTPATIENT
Start: 2022-08-26 | End: 2022-08-27 | Stop reason: HOSPADM

## 2022-08-25 RX ORDER — CHLORAL HYDRATE 500 MG
1000 CAPSULE ORAL 2 TIMES DAILY
Status: DISCONTINUED | OUTPATIENT
Start: 2022-08-25 | End: 2022-08-27 | Stop reason: HOSPADM

## 2022-08-25 RX ORDER — ONDANSETRON 2 MG/ML
4 INJECTION INTRAMUSCULAR; INTRAVENOUS EVERY 6 HOURS PRN
Status: DISCONTINUED | OUTPATIENT
Start: 2022-08-25 | End: 2022-08-27 | Stop reason: HOSPADM

## 2022-08-25 RX ORDER — LOSARTAN POTASSIUM 50 MG/1
100 TABLET ORAL EVERY 24 HOURS
Status: DISCONTINUED | OUTPATIENT
Start: 2022-08-26 | End: 2022-08-26

## 2022-08-25 RX ORDER — ASPIRIN 81 MG/1
81 TABLET ORAL DAILY
Status: DISCONTINUED | OUTPATIENT
Start: 2022-08-26 | End: 2022-08-27 | Stop reason: HOSPADM

## 2022-08-25 RX ADMIN — DULOXETINE 60 MG: 30 CAPSULE, DELAYED RELEASE ORAL at 08:07

## 2022-08-25 RX ADMIN — PROPRANOLOL HYDROCHLORIDE 160 MG: 80 CAPSULE, EXTENDED RELEASE ORAL at 08:13

## 2022-08-25 RX ADMIN — ASPIRIN 81 MG: 81 TABLET, COATED ORAL at 08:06

## 2022-08-25 RX ADMIN — ISOSORBIDE MONONITRATE 60 MG: 30 TABLET, EXTENDED RELEASE ORAL at 08:07

## 2022-08-25 RX ADMIN — TICAGRELOR 90 MG: 90 TABLET ORAL at 18:19

## 2022-08-25 RX ADMIN — ATORVASTATIN CALCIUM 40 MG: 40 TABLET, FILM COATED ORAL at 04:09

## 2022-08-25 RX ADMIN — LOSARTAN POTASSIUM 100 MG: 50 TABLET, FILM COATED ORAL at 04:09

## 2022-08-25 RX ADMIN — INSULIN LISPRO 1 UNITS: 100 INJECTION, SOLUTION INTRAVENOUS; SUBCUTANEOUS at 12:10

## 2022-08-25 RX ADMIN — OMEGA-3 FATTY ACIDS CAP 1000 MG 1000 MG: 1000 CAP at 08:08

## 2022-08-25 RX ADMIN — TICAGRELOR 90 MG: 90 TABLET ORAL at 04:09

## 2022-08-25 RX ADMIN — PANTOPRAZOLE SODIUM 40 MG: 40 TABLET, DELAYED RELEASE ORAL at 08:08

## 2022-08-25 RX ADMIN — INSULIN LISPRO 1 UNITS: 100 INJECTION, SOLUTION INTRAVENOUS; SUBCUTANEOUS at 23:22

## 2022-08-25 NOTE — ED NOTES
Report given to Brandy RN no additional questions at this time        Rickey Smith, BRIANA  08/24/22 1641

## 2022-08-25 NOTE — ED NOTES
Pt eating dinner  Raven LAGUNAS   [address] , [address] [place] [address]  08/25/22 2844

## 2022-08-25 NOTE — ED NOTES
Patient resting, denies any complaints at this time  Call bell within reach        Stormy Rosen RN  08/25/22 9327

## 2022-08-25 NOTE — PROGRESS NOTES
[address]  Progress Note Eliza Licona 1970, 47 y o  male MRN: [id number]  Unit/Bed#: ED 28 Encounter: [id number]  Primary Care Provider: Devin Posadas DO   Date and time admitted to hospital: 8/24/2022  1:41 AM    * Chest pain  Assessment & Plan  · Has resolved  · Patient ruled in for the possibility of an acute coronary syndrome  · High sensitivity troponin as follows 22, 44, 95, with delta differentials of 22, and 73  · Status post a Cardiology evaluation  · Cardiology want the patient transferred to [facility] [address] for a cardiac catheterization    · Dr Brenda Verduzco (cardiology here) coordinated the transfer, he spoke with the cath lab, and the interventional cardiologist will be Dr Dipika Caban  · Case was accepted in transfer to the [facility] by Dr Richards Doing - of the [facility] Internal Medicine Service  · Discharge to [facility]  · Discharge on aspirin, Brilinta, propanolol, losartan, Imdur and atorvastatin  · Further management as per the physicians in [place]    ---------------------------------------  · Update on 08/25/2022  · Patient is still holding at the [facility] in the emergency room due to no bed availability at the [facility]  · Case reviewed with the patient Yakimbi, they are working on securing a bed  · In the interim, from a clinical standpoint patient remains medically stable, denies any recurrent chest pain  · Patient is getting a little restless, and is enquiring about the possibility of following up with an outpatient cardiac catheterization  · Case reviewed with Dr Yohan Saha - continued recommendation is cardiac catheterization transfer  · Patient is willing to stay at this time  · Continue present meds/management    Elevated troponin I level  Assessment & Plan  · See the management as outlined above    Essential hypertension  Assessment & Plan  · Blood pressure stable  · Continue medications as outlined above, propanolol, losartan, and Imdur     Mixed hyperlipidemia  Assessment & Plan  · Continue atorvastatin    Gastroesophageal reflux disease  Assessment & Plan  · Continue Protonix    Gastroparesis  Assessment & Plan  Continue pre-hospital Protonix 40 mg p o  daily    Hypokalemia  Assessment & Plan  · Repleted prior to discharge    Major depressive disorder, recurrent ([facility] [address] )  Assessment & Plan  · Continue pre-hospital Cymbalta 60 mg p o  daily    Non-insulin dependent type 2 diabetes mellitus [facility])  Assessment & Plan  Lab Results   Component Value Date    HGBA1C 7 4 (H) 08/24/2022       Recent Labs     08/24/22  1618 08/24/22  2246 08/25/22  0633 08/25/22  1108   POCGLU 131 188* 135 160*       Blood Sugar Average: Last 72 hrs:  (P) 579 [id number]   · Oral hypoglycemics remain on hold  · Target blood sugar for the hospital 140-180  · Continue Accu-Cheks AC and HS with sliding scale,        VTE Prophylaxis:  Other Medication: Aspirin, Brilinta    Patient Centered Rounds: I have performed bedside rounds with nursing staff today  Discussions with Specialists or Other Care Team Provider:  Cardiology,  colors  Education and Discussions with Family / Patient:  Patient and his wife were both brought up to par with the plan of care for today    Current Length of Stay: 0 day(s)    Current Patient Status: Observation   Certification Statement: The patient will continue to require additional inpatient hospital stay due to The need for bed availability at the [facility]    Discharge Plan:  Awaiting bed availability at the [facility] for transfer for cardiac catheterization    Code Status: Level 1 - Full Code    Subjective:   Patient seen and examined, resting in bed, denies any further chest pain and or any other symptoms  Patient is frustrated about the wait time for transfer to [place]    He is questioning the possibility for an outpatient cardiac catheterization, but at the same time understands why he needs to remain in-house in a controlled setting until a cardiac catheterization has been completed    Objective:     Vitals:   No data recorded  HR:  [51-69] 67  Resp:  [13-19] 16  BP: (126-177)/(60-86) 126/65  SpO2:  [94 %-98 %] 96 %  Body mass index is 22 81 kg/m²  Input and Output Summary (last 24 hours):     No intake or output data in the 24 hours ending 08/25/22 1432    Physical Exam:   Physical Exam  Vitals and nursing note reviewed  Constitutional:       General: He is not in acute distress  Appearance: Normal appearance  He is not ill-appearing  HENT:      Head: Normocephalic and atraumatic  Nose: Nose normal    Eyes:      Extraocular Movements: Extraocular movements intact  Pupils: Pupils are equal, round, and reactive to light  Cardiovascular:      Rate and Rhythm: Normal rate and regular rhythm  Pulses: Normal pulses  Heart sounds: Normal heart sounds  No murmur heard  No friction rub  No gallop  Pulmonary:      Effort: Pulmonary effort is normal       Breath sounds: Normal breath sounds  Abdominal:      General: There is no distension  Palpations: Abdomen is soft  There is no mass  Tenderness: There is no abdominal tenderness  There is no guarding or rebound  Musculoskeletal:         General: No swelling or tenderness  Normal range of motion  Cervical back: Normal range of motion and neck supple  No rigidity  No muscular tenderness  Right lower leg: No edema  Left lower leg: No edema  Skin:     General: Skin is warm  Capillary Refill: Capillary refill takes less than 2 seconds  Findings: No erythema or rash  Neurological:      General: No focal deficit present  Mental Status: He is alert and oriented to person, place, and time  Mental status is at baseline     Psychiatric:         Mood and Affect: Mood normal          Behavior: Behavior normal  Additional Data:     Labs:    Results from last 7 days   Lab Units 08/24/22  0201   WBC Thousand/uL 7 83   HEMOGLOBIN g/dL 12 4   HEMATOCRIT % 37 5   PLATELETS Thousands/uL 153   NEUTROS PCT % 69   LYMPHS PCT % 20   MONOS PCT % 6   EOS PCT % 4     Results from last 7 days   Lab Units 08/24/22  0201   SODIUM mmol/L 138   POTASSIUM mmol/L 3 3*   CHLORIDE mmol/L 106   CO2 mmol/L 25   BUN mg/dL 12   CREATININE mg/dL 0 84   CALCIUM mg/dL 9 0   ALK PHOS U/L 108*   ALT U/L 19   AST U/L 15         Results from last 7 days   Lab Units 08/25/22  1108 08/25/22  0633 08/24/22  2246 08/24/22  1618 08/24/22  1131 08/24/22  0732   POC GLUCOSE mg/dl 160* 135 188* 131 112 122     Results from last 7 days   Lab Units 08/24/22  0201   HEMOGLOBIN A1C % 7 4*       * I Have Reviewed All Lab Data Listed Above  * Additional Pertinent Lab Tests Reviewed:  [address] Admission  Reviewed    Imaging:  Imaging Reports Reviewed Today Include:  None    Recent Cultures (last 7 days):           Last 24 Hours Medication List:   Current Facility-Administered Medications   Medication Dose Route Frequency Provider Last Rate    acetaminophen  650 mg Oral Q4H PRN Marrianne Risk, PA-C      aspirin  81 mg Oral Daily Marrianne Risk, PA-C      atorvastatin  40 mg Oral Q24H Marrianne Risk, PA-C      DULoxetine  60 mg Oral Daily Marrianne Risk, PA-C      fish oil  1,000 mg Oral BID Marrianne Risk, PA-C      insulin lispro  1-6 Units Subcutaneous TID [facility] Marrianne Risk, PA-C      insulin lispro  1-6 Units Subcutaneous HS Marrianne Risk, PA-C      isosorbide mononitrate  60 mg Oral Daily Marrianne Risk, PA-C      losartan  100 mg Oral Q24H Marrianne Risk, PA-C      ondansetron  4 mg Intravenous Q6H PRN Marrianne Risk, PA-C      pantoprazole  40 mg Oral Daily Marrianne Risk, PA-C      propranolol  160 mg Oral Daily Marrianne Risk, PA-C      ticagrelor  90 mg Oral Q12H Marrianne Risk, PA-C          Today, Patient Was Seen By: Renetta Batista MD    ** Please Note: Dictation voice to text software may have been used in the creation of this document   **

## 2022-08-25 NOTE — ED NOTES
Received pt sleeping comfortably  Raven Humphreys Drivers, [facility]  08/25/22 5119

## 2022-08-25 NOTE — ASSESSMENT & PLAN NOTE
Lab Results   Component Value Date    HGBA1C 7 4 (H) 08/24/2022       Recent Labs     08/24/22  1618 08/24/22  2246 08/25/22  0633 08/25/22  1108   POCGLU 131 188* 135 160*       Blood Sugar Average: Last 72 hrs:  (P) 739 [id number]   · Oral hypoglycemics remain on hold  · Target blood sugar for the hospital 140-180  · Continue Accu-Cheks AC and HS with sliding scale,

## 2022-08-25 NOTE — PLAN OF CARE
Problem: PAIN - ADULT  Goal: Verbalizes/displays adequate comfort level or baseline comfort level  Description: Interventions:  - Encourage patient to monitor pain and request assistance  - Assess pain using appropriate pain scale  - Administer analgesics based on type and severity of pain and evaluate response  - Implement non-pharmacological measures as appropriate and evaluate response  - Consider cultural and social influences on pain and pain management  - Notify physician/advanced practitioner if interventions unsuccessful or patient reports new pain  Outcome: Progressing     Problem: INFECTION - ADULT  Goal: Absence or prevention of progression during hospitalization  Description: INTERVENTIONS:  - Assess and monitor for signs and symptoms of infection  - Monitor lab/diagnostic results  - Monitor all insertion sites, i e  indwelling lines, tubes, and drains  - Monitor endotracheal if appropriate and nasal secretions for changes in amount and color  - [place] appropriate cooling/warming therapies per order  - Administer medications as ordered  - Instruct and encourage patient and family to use good hand hygiene technique  - Identify and instruct in appropriate isolation precautions for identified infection/condition  Outcome: Progressing  Goal: Absence of fever/infection during neutropenic period  Description: INTERVENTIONS:  - Monitor WBC    Outcome: Progressing     Problem: SAFETY ADULT  Goal: Patient will remain free of falls  Description: INTERVENTIONS:  - Educate patient/family on patient safety including physical limitations  - Instruct patient to call for assistance with activity   - Consult OT/PT to assist with strengthening/mobility   - Keep Call bell within reach  - Keep bed low and locked with side rails adjusted as appropriate  - Keep care items and personal belongings within reach  - Initiate and maintain comfort rounds  - Make Fall Risk Sign visible to staff  - Offer Toileting every 2 Hours, in advance of need  - Initiate/Maintain bed alarm  - Obtain necessary fall risk management equipment:   - Apply yellow socks and bracelet for high fall risk patients  - Consider moving patient to room near nurses station  Outcome: Progressing  Goal: Maintain or return to baseline ADL function  Description: INTERVENTIONS:  -  Assess patient's ability to carry out ADLs; assess patient's baseline for ADL function and identify physical deficits which impact ability to perform ADLs (bathing, care of mouth/teeth, toileting, grooming, dressing, etc )  - Assess/evaluate cause of self-care deficits   - Assess range of motion  - Assess patient's mobility; develop plan if impaired  - Assess patient's need for assistive devices and provide as appropriate  - Encourage maximum independence but intervene and supervise when necessary  - Involve family in performance of ADLs  - Assess for home care needs following discharge   - Consider OT consult to assist with ADL evaluation and planning for discharge  - Provide patient education as appropriate  Outcome: Progressing  Goal: Maintains/Returns to pre admission functional level  Description: INTERVENTIONS:  - Perform BMAT or MOVE assessment daily    - Set and communicate daily mobility goal to care team and patient/family/caregiver  - Collaborate with rehabilitation services on mobility goals if consulted  - Perform Range of Motion 3 times a day  - Reposition patient every 2 hours    - Dangle patient 3 times a day  - Stand patient 3 times a day  - Ambulate patient 3 times a day  - Out of bed to chair 3 times a day   - Out of bed for meals 3 times a day  - Out of bed for toileting  - Record patient progress and toleration of activity level   Outcome: Progressing     Problem: DISCHARGE PLANNING  Goal: Discharge to home or other facility with appropriate resources  Description: INTERVENTIONS:  - Identify barriers to discharge w/patient and caregiver  - Arrange for needed discharge resources and transportation as appropriate  - Identify discharge learning needs (meds, wound care, etc )  - Arrange for interpretive services to assist at discharge as needed  - Refer to Case Management Department for coordinating discharge planning if the patient needs post-hospital services based on physician/advanced practitioner order or complex needs related to functional status, cognitive ability, or social support system  Outcome: Progressing     Problem: Knowledge Deficit  Goal: Patient/family/caregiver demonstrates understanding of disease process, treatment plan, medications, and discharge instructions  Description: Complete learning assessment and assess knowledge base    Interventions:  - Provide teaching at level of understanding  - Provide teaching via preferred learning methods  Outcome: Progressing

## 2022-08-25 NOTE — ASSESSMENT & PLAN NOTE
· Has resolved  · Patient ruled in for the possibility of an acute coronary syndrome  · High sensitivity troponin as follows 22, 44, 95, with delta differentials of 22, and 73  · Status post a Cardiology evaluation  · Cardiology want the patient transferred to higher level of [address] for a cardiac catheterization    · Dr Steff Bridges (cardiology here) coordinated the transfer, he spoke with the cath lab, and the interventional cardiologist will be Dr Julio Tse  · Case was accepted in transfer to the [facility] by Dr Kate Dow - of the [address] [place] [address] Internal Medicine Service  · Discharge to [facility]  · Discharge on aspirin, Brilinta, propanolol, losartan, Imdur and atorvastatin  · Further management as per the physicians in [facility]    ---------------------------------------  · Update on 08/25/2022  · Patient is still holding at the [facility] in the emergency room due to no bed availability at the [facility]  · Case reviewed with the patient [employer], they are working on securing a bed  · In the interim, from a clinical standpoint patient remains medically stable, denies any recurrent chest pain  · Patient is getting a little restless, and is enquiring about the possibility of following up with an outpatient cardiac catheterization  · Case reviewed with Dr Taj Valdez - continued recommendation is cardiac catheterization transfer  · Patient is willing to stay at this time  · Continue present meds/management

## 2022-08-25 NOTE — ED NOTES
Report given to [facility] AMENA LIMA RN no additional questions at this time        Tati Zaragoza RN  08/25/22 3133

## 2022-08-25 NOTE — DISCHARGE SUMMARY
Notified by the patient [address] that the patient will be transported to the [facility] at 7:30 p m  tonight  Please refer to my progress note from earlier today, and my discharge summary from 08/24/2022 for the full details in regard to the summary of care provided to the patient here at the [facility]

## 2022-08-25 NOTE — ED NOTES
Pt eating lunch   Raven Silver, [facility]  08/25/22 0266

## 2022-08-26 LAB
ANION GAP SERPL CALCULATED.3IONS-SCNC: 4 MMOL/L (ref 4–13)
BUN SERPL-MCNC: 15 MG/DL (ref 5–25)
CALCIUM SERPL-MCNC: 8.8 MG/DL (ref 8.3–10.1)
CHLORIDE SERPL-SCNC: 108 MMOL/L (ref 96–108)
CO2 SERPL-SCNC: 27 MMOL/L (ref 21–32)
CREAT SERPL-MCNC: 0.83 MG/DL (ref 0.6–1.3)
GFR SERPL CREATININE-BSD FRML MDRD: 101 ML/MIN/1.73SQ M
GLUCOSE SERPL-MCNC: 103 MG/DL (ref 65–140)
GLUCOSE SERPL-MCNC: 112 MG/DL (ref 65–140)
GLUCOSE SERPL-MCNC: 124 MG/DL (ref 65–140)
GLUCOSE SERPL-MCNC: 124 MG/DL (ref 65–140)
GLUCOSE SERPL-MCNC: 158 MG/DL (ref 65–140)
KCT BLD-ACNC: 188 SEC (ref 89–137)
KCT BLD-ACNC: 302 SEC (ref 89–137)
POTASSIUM SERPL-SCNC: 3.5 MMOL/L (ref 3.5–5.3)
SODIUM SERPL-SCNC: 139 MMOL/L (ref 135–147)
SPECIMEN SOURCE: ABNORMAL
SPECIMEN SOURCE: ABNORMAL

## 2022-08-26 PROCEDURE — C1725 CATH, TRANSLUMIN NON-LASER: HCPCS | Performed by: INTERNAL MEDICINE

## 2022-08-26 PROCEDURE — C1887 CATHETER, GUIDING: HCPCS | Performed by: INTERNAL MEDICINE

## 2022-08-26 PROCEDURE — 92978 ENDOLUMINL IVUS OCT C 1ST: CPT | Performed by: INTERNAL MEDICINE

## 2022-08-26 PROCEDURE — C1769 GUIDE WIRE: HCPCS | Performed by: INTERNAL MEDICINE

## 2022-08-26 PROCEDURE — 80048 BASIC METABOLIC PNL TOTAL CA: CPT | Performed by: STUDENT IN AN ORGANIZED HEALTH CARE EDUCATION/TRAINING PROGRAM

## 2022-08-26 PROCEDURE — 99153 MOD SED SAME PHYS/QHP EA: CPT | Performed by: INTERNAL MEDICINE

## 2022-08-26 PROCEDURE — C1894 INTRO/SHEATH, NON-LASER: HCPCS | Performed by: INTERNAL MEDICINE

## 2022-08-26 PROCEDURE — 82948 REAGENT STRIP/BLOOD GLUCOSE: CPT

## 2022-08-26 PROCEDURE — 92920 PRQ TRLUML C ANGIOP 1ART&/BR: CPT | Performed by: INTERNAL MEDICINE

## 2022-08-26 PROCEDURE — 99152 MOD SED SAME PHYS/QHP 5/>YRS: CPT | Performed by: INTERNAL MEDICINE

## 2022-08-26 PROCEDURE — 02703ZZ DILATION OF CORONARY ARTERY, ONE ARTERY, PERCUTANEOUS APPROACH: ICD-10-PCS | Performed by: INTERNAL MEDICINE

## 2022-08-26 PROCEDURE — 99232 SBSQ HOSP IP/OBS MODERATE 35: CPT | Performed by: INTERNAL MEDICINE

## 2022-08-26 PROCEDURE — 85347 COAGULATION TIME ACTIVATED: CPT

## 2022-08-26 PROCEDURE — B2111ZZ FLUOROSCOPY OF MULTIPLE CORONARY ARTERIES USING LOW OSMOLAR CONTRAST: ICD-10-PCS | Performed by: INTERNAL MEDICINE

## 2022-08-26 PROCEDURE — C1753 CATH, INTRAVAS ULTRASOUND: HCPCS | Performed by: INTERNAL MEDICINE

## 2022-08-26 PROCEDURE — 4A023N7 MEASUREMENT OF CARDIAC SAMPLING AND PRESSURE, LEFT HEART, PERCUTANEOUS APPROACH: ICD-10-PCS | Performed by: INTERNAL MEDICINE

## 2022-08-26 PROCEDURE — 93458 L HRT ARTERY/VENTRICLE ANGIO: CPT | Performed by: INTERNAL MEDICINE

## 2022-08-26 PROCEDURE — NC001 PR NO CHARGE: Performed by: INTERNAL MEDICINE

## 2022-08-26 PROCEDURE — 99255 IP/OBS CONSLTJ NEW/EST HI 80: CPT | Performed by: INTERNAL MEDICINE

## 2022-08-26 PROCEDURE — 93571 IV DOP VEL&/PRESS C FLO 1ST: CPT | Performed by: INTERNAL MEDICINE

## 2022-08-26 RX ORDER — SODIUM CHLORIDE 9 MG/ML
75 INJECTION, SOLUTION INTRAVENOUS CONTINUOUS
Status: CANCELLED | OUTPATIENT
Start: 2022-08-26 | End: 2022-08-26

## 2022-08-26 RX ORDER — FENTANYL CITRATE 50 UG/ML
INJECTION, SOLUTION INTRAMUSCULAR; INTRAVENOUS AS NEEDED
Status: DISCONTINUED | OUTPATIENT
Start: 2022-08-26 | End: 2022-08-26 | Stop reason: HOSPADM

## 2022-08-26 RX ORDER — NITROGLYCERIN 20 MG/100ML
INJECTION INTRAVENOUS AS NEEDED
Status: DISCONTINUED | OUTPATIENT
Start: 2022-08-26 | End: 2022-08-26 | Stop reason: HOSPADM

## 2022-08-26 RX ORDER — VERAPAMIL HCL 2.5 MG/ML
AMPUL (ML) INTRAVENOUS AS NEEDED
Status: DISCONTINUED | OUTPATIENT
Start: 2022-08-26 | End: 2022-08-26 | Stop reason: HOSPADM

## 2022-08-26 RX ORDER — AMLODIPINE BESYLATE 5 MG/1
5 TABLET ORAL DAILY
Status: DISCONTINUED | OUTPATIENT
Start: 2022-08-26 | End: 2022-08-27 | Stop reason: HOSPADM

## 2022-08-26 RX ORDER — HEPARIN SODIUM 1000 [USP'U]/ML
INJECTION, SOLUTION INTRAVENOUS; SUBCUTANEOUS AS NEEDED
Status: DISCONTINUED | OUTPATIENT
Start: 2022-08-26 | End: 2022-08-26 | Stop reason: HOSPADM

## 2022-08-26 RX ORDER — MIDAZOLAM HYDROCHLORIDE 2 MG/2ML
INJECTION, SOLUTION INTRAMUSCULAR; INTRAVENOUS AS NEEDED
Status: DISCONTINUED | OUTPATIENT
Start: 2022-08-26 | End: 2022-08-26 | Stop reason: HOSPADM

## 2022-08-26 RX ORDER — HYDRALAZINE HYDROCHLORIDE 20 MG/ML
5 INJECTION INTRAMUSCULAR; INTRAVENOUS EVERY 6 HOURS PRN
Status: DISCONTINUED | OUTPATIENT
Start: 2022-08-26 | End: 2022-08-27 | Stop reason: HOSPADM

## 2022-08-26 RX ORDER — SODIUM CHLORIDE 9 MG/ML
100 INJECTION, SOLUTION INTRAVENOUS CONTINUOUS
Status: DISPENSED | OUTPATIENT
Start: 2022-08-26 | End: 2022-08-26

## 2022-08-26 RX ORDER — LIDOCAINE HYDROCHLORIDE 10 MG/ML
INJECTION, SOLUTION EPIDURAL; INFILTRATION; INTRACAUDAL; PERINEURAL AS NEEDED
Status: DISCONTINUED | OUTPATIENT
Start: 2022-08-26 | End: 2022-08-26 | Stop reason: HOSPADM

## 2022-08-26 RX ORDER — HYDRALAZINE HYDROCHLORIDE 20 MG/ML
10 INJECTION INTRAMUSCULAR; INTRAVENOUS ONCE
Status: COMPLETED | OUTPATIENT
Start: 2022-08-26 | End: 2022-08-26

## 2022-08-26 RX ADMIN — HYDRALAZINE HYDROCHLORIDE 5 MG: 20 INJECTION INTRAMUSCULAR; INTRAVENOUS at 00:08

## 2022-08-26 RX ADMIN — ACETAMINOPHEN 650 MG: 325 TABLET ORAL at 18:01

## 2022-08-26 RX ADMIN — LOSARTAN POTASSIUM 100 MG: 50 TABLET, FILM COATED ORAL at 05:39

## 2022-08-26 RX ADMIN — PANTOPRAZOLE SODIUM 40 MG: 40 TABLET, DELAYED RELEASE ORAL at 09:09

## 2022-08-26 RX ADMIN — DULOXETINE HYDROCHLORIDE 60 MG: 60 CAPSULE, DELAYED RELEASE ORAL at 09:09

## 2022-08-26 RX ADMIN — PROPRANOLOL HYDROCHLORIDE 160 MG: 80 CAPSULE, EXTENDED RELEASE ORAL at 09:09

## 2022-08-26 RX ADMIN — TICAGRELOR 90 MG: 90 TABLET ORAL at 05:39

## 2022-08-26 RX ADMIN — ISOSORBIDE MONONITRATE 60 MG: 60 TABLET, EXTENDED RELEASE ORAL at 09:09

## 2022-08-26 RX ADMIN — ASPIRIN 81 MG: 81 TABLET, COATED ORAL at 09:09

## 2022-08-26 RX ADMIN — SODIUM CHLORIDE 100 ML/HR: 0.9 INJECTION, SOLUTION INTRAVENOUS at 18:06

## 2022-08-26 RX ADMIN — INSULIN LISPRO 1 UNITS: 100 INJECTION, SOLUTION INTRAVENOUS; SUBCUTANEOUS at 22:09

## 2022-08-26 RX ADMIN — AMLODIPINE BESYLATE 5 MG: 5 TABLET ORAL at 09:21

## 2022-08-26 RX ADMIN — OMEGA-3 FATTY ACIDS CAP 1000 MG 1000 MG: 1000 CAP at 22:08

## 2022-08-26 RX ADMIN — OMEGA-3 FATTY ACIDS CAP 1000 MG 1000 MG: 1000 CAP at 09:09

## 2022-08-26 RX ADMIN — ACETAMINOPHEN 650 MG: 325 TABLET ORAL at 09:22

## 2022-08-26 RX ADMIN — ATORVASTATIN CALCIUM 40 MG: 40 TABLET, FILM COATED ORAL at 05:39

## 2022-08-26 RX ADMIN — HYDRALAZINE HYDROCHLORIDE 10 MG: 20 INJECTION INTRAMUSCULAR; INTRAVENOUS at 01:33

## 2022-08-26 RX ADMIN — TICAGRELOR 90 MG: 90 TABLET ORAL at 18:05

## 2022-08-26 NOTE — DISCHARGE INSTRUCTIONS
1  Please see the post angioplasty discharge instructions  No heavy lifting, greater than 10 lbs  or strenuous activity for 5 days  Follow angioplasty discharge instructions  2 Remove band aid tomorrow  Shower and wash area- wrist gently with soap and water- beginning tomorrow  Rinse and pat dry  Apply new water seal band aid  Repeat this process for 5 days  No powders, creams lotions or antibiotic ointments  for 5 days  No tub baths, hot tubs or swimming for 5 days  3  Call Kat  Cardiology Office ([phone]) if you develop a fever, redness or drainage at your wrist access site  4  No driving for 2 days    5  Do not stop aspirin or Brilinta (Ticagrelor) any reason without a cardiologists consent, or the stent could block up and cause a heart attack  6  Stent card and book

## 2022-08-26 NOTE — PROGRESS NOTES
[address] [facility]  Progress Note Israel Thomas 1970, 47 y o  male MRN: [id number]  Unit/Bed#: [facility] 408-01 Encounter: [id number]  Primary Care Provider: Yudith Beasley DO   Date and time admitted to hospital: 8/25/2022  8:41 PM    * Chest pain  Assessment & Plan  · CHARBEL 4  · Initially presented to [facility] with chest pain, currently resolved  Noted with rising troponins  · Seen by Cardiology at [address] [place] [address], advised transferred to this hospital for cardiac catheterization  Keep NPO after midnight for potential procedure will appreciate ongoing cardiology recommendations  · Continue ASA, beta-blocker, Brilinta, statin  · Plan for [facility] today    Coronary artery disease involving native coronary artery  Assessment & Plan  CAD with multiple MI and prior stenting with the most recent cardiac catheterization in January of this year  A BEVERLY was placed in the OM2  Cardiac Cath showed 40% mid LAD, 100% small 1st Diag, 90% 2nd diag, 40% RI (stented) 80% LCX/OM2, 100% prox RCA (stented), PRDA (colaterals)    · Workup of new chest pain as above  · Continue ASA, Brilinta, propranolol, losartan, Imdur, atorvastatin    Essential hypertension  Assessment & Plan  · Blood pressure elevated on presentation  · For now continue propranolol, Imdur and adjust as needed  · Started on norvasc 5 mg qd this am   · Losartan on hold in anticipation for [facility] today    Non-insulin dependent type 2 diabetes mellitus [facility])  Assessment & Plan  Lab Results   Component Value Date    HGBA1C 7 4 (H) 08/24/2022       Recent Labs     08/25/22  1504 08/25/22  2320 08/26/22  0554 08/26/22  1051   POCGLU 148* 188* 124 112       Blood Sugar Average: Last 72 hrs:  (P) 992 [id number]   · Continue correctional insulin with Accu-Cheks and continue to hold oral hypoglycemics  · Initiate hypoglycemia protocol    Elevated troponin I level  Assessment & Plan  · Management as per chest pain    Gastroesophageal reflux disease  Assessment & Plan  · Continue PPI    Mixed hyperlipidemia  Assessment & Plan  · Continue statin therapy        VTE Pharmacologic Prophylaxis: VTE Score: 4 Moderate Risk (Score 3-4) - Pharmacological DVT Prophylaxis Ordered: heparin  Patient Centered Rounds: I performed bedside rounds with nursing staff today  Discussions with Specialists or Other Care Team Provider: [facility] today    Education and Discussions with Family / Patient: Updated  (wife) via phone  Time Spent for Care: 20 minutes  More than 50% of total time spent on counseling and coordination of care as described above  Current Length of Stay: 1 day(s)  Current Patient Status: Inpatient   Certification Statement: The patient will continue to require additional inpatient hospital stay due to [facility] today  Discharge Plan: Anticipate discharge later today or tomorrow to home  Code Status: Level 1 - Full Code    Subjective:   No overnight events  Patient seen and examined at bedside  No acute complaints at this time  Chest pain free at this time  [facility] today    Objective:     Vitals:   Temp (24hrs), Av 9 °F (36 6 °C), Min:97 4 °F (36 3 °C), Max:98 1 °F (36 7 °C)    Temp:  [97 4 °F (36 3 °C)-98 1 °F (36 7 °C)] 98 1 °F (36 7 °C)  HR:  [61-79] 63  Resp:  [16-20] 16  BP: (126-190)/(65-97) 128/81  SpO2:  [94 %-98 %] 98 %  Body mass index is 23 13 kg/m²  Input and Output Summary (last 24 hours): Intake/Output Summary (Last 24 hours) at 2022 1222  Last data filed at 2022 4333  Gross per 24 hour   Intake 0 ml   Output 575 ml   Net -575 ml       Physical Exam:   Physical Exam  Vitals and nursing note reviewed  Constitutional:       General: He is not in acute distress  Appearance: He is well-developed and normal weight  HENT:      Head: Normocephalic and atraumatic  Eyes:      Extraocular Movements: Extraocular movements intact        Conjunctiva/sclera: Conjunctivae normal       Pupils: Pupils are equal, round, and reactive to light  Cardiovascular:      Rate and Rhythm: Normal rate and regular rhythm  Heart sounds: No murmur heard  Pulmonary:      Effort: Pulmonary effort is normal  No respiratory distress  Breath sounds: Normal breath sounds  Abdominal:      Palpations: Abdomen is soft  Tenderness: There is no abdominal tenderness  Musculoskeletal:      Cervical back: Neck supple  Right lower leg: No edema  Left lower leg: No edema  Skin:     General: Skin is warm and dry  Neurological:      General: No focal deficit present  Mental Status: He is alert and oriented to person, place, and time            Additional Data:     Labs:  Results from last 7 days   Lab Units 08/24/22  0201   WBC Thousand/uL 7 83   HEMOGLOBIN g/dL 12 4   HEMATOCRIT % 37 5   PLATELETS Thousands/uL 153   NEUTROS PCT % 69   LYMPHS PCT % 20   MONOS PCT % 6   EOS PCT % 4     Results from last 7 days   Lab Units 08/26/22  1049 08/24/22  0201   SODIUM mmol/L 139 138   POTASSIUM mmol/L 3 5 3 3*   CHLORIDE mmol/L 108 106   CO2 mmol/L 27 25   BUN mg/dL 15 12   CREATININE mg/dL 0 83 0 84   ANION GAP mmol/L 4 7   CALCIUM mg/dL 8 8 9 0   ALBUMIN g/dL  --  3 8   TOTAL BILIRUBIN mg/dL  --  0 43   ALK PHOS U/L  --  108*   ALT U/L  --  19   AST U/L  --  15   GLUCOSE RANDOM mg/dL 124 194*         Results from last 7 days   Lab Units 08/26/22  1051 08/26/22  0554 08/25/22  2320 08/25/22  1504 08/25/22  1108 08/25/22  0633 08/24/22  2246 08/24/22  1618 08/24/22  1131 08/24/22  0732   POC GLUCOSE mg/dl 112 124 188* 148* 160* 135 188* 131 112 122     Results from last 7 days   Lab Units 08/24/22  0201   HEMOGLOBIN A1C % 7 4*           Lines/Drains:  Invasive Devices  Report    Peripheral Intravenous Line  Duration           Peripheral IV 08/24/22 Left;Ventral (anterior) Forearm 2 days                  Telemetry:  Telemetry Orders (From admission, onward)             48 Hour Telemetry Monitoring  Continuous x 48 hours        References:    Telemetry Guidelines   Question:  Reason for 48 Hour Telemetry  Answer:  Acute MI, chest pain - R/O MI, or unstable angina                 Telemetry Reviewed: Normal Sinus Rhythm  Indication for Continued Telemetry Use: Acute MI/Unstable Angina/Rule out ACS             Imaging: Reviewed radiology reports from this admission including: chest xray    Recent Cultures (last 7 days):         Last 24 Hours Medication List:   Current Facility-Administered Medications   Medication Dose Route Frequency Provider Last Rate    acetaminophen  650 mg Oral Q4H PRN Bertis Spar, DO      amLODIPine  5 mg Oral Daily Zilphia Carl, DO      aspirin  81 mg Oral Daily Bertis Spar, DO      atorvastatin  40 mg Oral Q24H Bertis Spar, DO      DULoxetine  60 mg Oral Daily Bertis Spar, DO      fish oil  1,000 mg Oral BID Bertis Spar, DO      hydrALAZINE  5 mg Intravenous Q6H PRN Zilphia Carl, DO      insulin lispro  1-6 Units Subcutaneous TID AC Bertis Spar, DO      insulin lispro  1-6 Units Subcutaneous HS Bertis Spar, DO      isosorbide mononitrate  60 mg Oral Daily Bertis Spar, DO      ondansetron  4 mg Intravenous Q6H PRN Bertis Spar, DO      pantoprazole  40 mg Oral Daily Bertis Spar, DO      propranolol  160 mg Oral Daily Bertis Spar, DO      ticagrelor  90 mg Oral Q12H Bertis Spar, DO          Today, Patient Was Seen By: Dasia Rojas DO    **Please Note: This note may have been constructed using a voice recognition system  **

## 2022-08-26 NOTE — ASSESSMENT & PLAN NOTE
· Blood pressure elevated on presentation  · For now continue propranolol, Imdur and adjust as needed  · Started on norvasc 5 mg qd this am   · Losartan on hold in anticipation for [facility] today

## 2022-08-26 NOTE — H&P
[address] [facility]  H&P- Han Backer 1970, 47 y o  male MRN: [id number]  Unit/Bed#: [facility] 408-01 Encounter: [id number]  Primary Care Provider: Gurjit Hercules DO   Date and time admitted to hospital: 8/25/2022  8:41 PM    * Chest pain  Assessment & Plan  · CHARBEL 4  · Initially presented to [facility] with chest pain, currently resolved  Noted with rising troponins  · Seen by Cardiology at [address] [place] [address], advised transferred to this hospital for cardiac catheterization  Keep NPO after midnight for potential procedure will appreciate ongoing cardiology recommendations  · Continue ASA, beta-blocker, Brilinta, statin    Coronary artery disease involving native coronary artery  Assessment & Plan  · S/p multiple prior stents  · Workup of new chest pain as above  · Continue ASA, Brilinta, propranolol, losartan, Imdur, atorvastatin    Elevated troponin I level  Assessment & Plan  · Management as per chest pain    Non-insulin dependent type 2 diabetes mellitus [facility])  Assessment & Plan  Lab Results   Component Value Date    HGBA1C 7 4 (H) 08/24/2022       Recent Labs     08/25/22  0633 08/25/22  1108 08/25/22  1504 08/25/22  2320   POCGLU 135 160* 148* 188*       Blood Sugar Average: Last 72 hrs:  (P) 188   · Continue correctional insulin with Accu-Cheks and continue to hold oral hypoglycemics  · Initiate hypoglycemia protocol    Gastroesophageal reflux disease  Assessment & Plan  · Continue PPI    Mixed hyperlipidemia  Assessment & Plan  · Continue statin therapy    Essential hypertension  Assessment & Plan  · Blood pressure elevated on presentation  · For now continue propranolol, losartan, Imdur and adjust as needed  IV p r n  hydralazine overnight      VTE Prophylaxis: Heparin  / sequential compression device   Code Status: Level 1 - Full Code  POLST: POLST form is not discussed and not completed at this time    Discussion with family:  Offered however patient declines at this time    Anticipated Length of Stay:  Patient will be admitted on an Inpatient basis with an anticipated length of stay of  greater than 2 midnights  Justification for Hospital Stay: Please see detailed plans noted above  Chief Complaint:     Chest pain, transferred here for repeat cardiac catheterization  History of Present Illness:  Prosper Rojas is a 47 y o  male who initially presented to the [address] [place] [address] with recurrent chest pain  Has a past medical history significant for CAD s/p multiple stents, hypertension, hyperlipidemia, type 2 diabetes on oral hypoglycemics, GERD  Describes his chest pain as substernal but radiating into right throat, worsened with ambulation and similar to pain need to prior cardiac catheterization, somewhat alleviated with rest and fully with nitroglycerin  Workup during admission SL carbon was noted with rising troponins however EKG not markedly changed from prior  He was seen by Cardiology, who advised transferred here for repeat cardiac catheterization  Currently, patient is lying in bed in no acute distress and comfortable appearing  Notes no chest pain, shortness a breath, nausea/vomiting, or palpitations at this time      Review of Systems:    Constitutional:  Denies fever or chills   Eyes:  Denies change in visual acuity   HENT:  Denies nasal congestion or sore throat   Respiratory:  Denies cough or shortness of breath   Cardiovascular:  Denies chest pain or edema   GI:  Denies abdominal pain, nausea, vomiting, bloody stools or diarrhea   :  Denies dysuria   Musculoskeletal:  Denies back pain or joint pain   Integument:  Denies rash   Neurologic:  Denies headache, focal weakness or sensory changes   Endocrine:  Denies polyuria or polydipsia   Lymphatic:  Denies swollen glands   Psychiatric:  Denies depression or anxiety     Past Medical and Surgical History:   Past Medical History:   Diagnosis Date    Arthritis     Coronary artery disease     MI x 4; cardiac cath with 4 stents    GERD (gastroesophageal reflux disease)     Hypertension     [facility]ocardial infarct ([facility] [address] )     4 MIs as of 2/24/2020    [facility] 8/24/2022     Past Surgical History:   Procedure Laterality Date    CARDIAC CATHETERIZATION  1/7/2022    Procedure: Cardiac catheterization;  Surgeon: Salena Saravia DO;  Location:  CARDIAC CATH LAB;   Service: Cardiology    CORONARY ANGIOPLASTY WITH STENT PLACEMENT         Meds/Allergies:  Medications Prior to Admission   Medication    aspirin 81 MG tablet    atorvastatin (LIPITOR) 40 mg tablet    Brilinta 90 MG    DULoxetine (CYMBALTA) 60 mg delayed release capsule    isosorbide mononitrate (IMDUR) 60 mg 24 hr tablet    losartan (COZAAR) 100 MG tablet    Omega-3 Fatty Acids (OMEGA 3 PO)    pantoprazole (PROTONIX) 40 mg tablet    propranolol (INDERAL LA) 160 mg    gabapentin (NEURONTIN) 300 mg capsule    Jardiance 10 MG TABS    metFORMIN (GLUCOPHAGE) 500 mg tablet    nitroglycerin (NITROSTAT) 0 4 mg SL tablet       Allergies: No Known Allergies  History:  Marital Status: /Civil Union     Substance Use History:   Social History     Substance and Sexual Activity   Alcohol Use Never     Social History     Tobacco Use   Smoking Status Former Smoker    Packs/day: 1 00    Years: 10 00    Pack years: 10 00    Types: Cigarettes    Start date: 2/24/1998   Tanya Mare Quit date: 11/24/2018    Years since quitting: 3 7   Smokeless Tobacco Never Used     Social History     Substance and Sexual Activity   Drug Use Never       Family History:  Family History   Problem Relation Age of Onset    Heart disease Father     Hyperlipidemia Father     Diabetes Sister     Early death Sister     Diabetes Brother     Diabetes Maternal Grandmother        Physical Exam:     Vitals:   Blood Pressure: (!) 172/90 (08/25/22 2300)  Pulse: 63 (08/25/22 2300)  Temperature: 98 °F (36 7 °C) (08/25/22 2300)  Temp Source: Oral (08/25/22 2300)  Respirations: 16 (08/25/22 2300)  Height: 5' 8" (172 7 cm) (08/25/22 2100)  Weight - Scale: 69 kg (152 lb 1 9 oz) (08/25/22 2100)  SpO2: 96 % (08/25/22 2300)    Constitutional:  Well developed, well nourished, no acute distress, non-toxic appearance   Eyes:  PERRL, conjunctiva normal   HENT:  Atraumatic, external ears normal, nose normal, oropharynx moist, no pharyngeal exudates  Neck- normal range of motion, no tenderness, supple   Respiratory:  No respiratory distress, normal breath sounds, no rales, no wheezing   Cardiovascular:  Normal rate, normal rhythm, no murmurs, no gallops, no rubs   GI:  Soft, nondistended, normal bowel sounds, nontender, no organomegaly, no mass, no rebound, no guarding   :  No costovertebral angle tenderness   Musculoskeletal:  No edema, no tenderness, no deformities  Back- no tenderness  Integument:  Well hydrated, no rash   Lymphatic:  No lymphadenopathy noted   Neurologic:  Alert &awake, communicative, CN 2-12 normal, normal motor function, normal sensory function, no focal deficits noted   Psychiatric:  Speech and behavior appropriate       Lab Results: I have personally reviewed pertinent reports  Results from last 7 days   Lab Units 08/24/22  0201   WBC Thousand/uL 7 83   HEMOGLOBIN g/dL 12 4   HEMATOCRIT % 37 5   PLATELETS Thousands/uL 153   NEUTROS PCT % 69   LYMPHS PCT % 20   MONOS PCT % 6   EOS PCT % 4     Results from last 7 days   Lab Units 08/24/22  0201   POTASSIUM mmol/L 3 3*   CHLORIDE mmol/L 106   CO2 mmol/L 25   BUN mg/dL 12   CREATININE mg/dL 0 84   CALCIUM mg/dL 9 0   ALK PHOS U/L 108*   ALT U/L 19   AST U/L 15           EKG:  Reviewed    Imaging: I have personally reviewed pertinent reports  X-ray chest 1 view portable    Result Date: 8/24/2022  Narrative: CHEST INDICATION:   cp  COMPARISON:  2/24/2020 EXAM PERFORMED/VIEWS:  XR CHEST PORTABLE Single view FINDINGS: Cardiomediastinal silhouette appears unremarkable  The lungs are clear  No pneumothorax or pleural effusion  Osseous structures appear within normal limits for patient age  Impression: No acute cardiopulmonary disease  Findings are stable Workstation performed: [id number]         ** Please Note: Dragon 360 Dictation voice to text software was used in the creation of this document   **

## 2022-08-26 NOTE — ASSESSMENT & PLAN NOTE
CAD with multiple MI and prior stenting with the most recent cardiac catheterization in January of this year  A BEVERLY was placed in the OM2  Cardiac Cath showed 40% mid LAD, 100% small 1st Diag, 90% 2nd diag, 40% RI (stented) 80% LCX/OM2, 100% prox RCA (stented), PRDA (colaterals)    · Workup of new chest pain as above  · Continue ASA, Brilinta, propranolol, losartan, Imdur, atorvastatin

## 2022-08-26 NOTE — ASSESSMENT & PLAN NOTE
Lab Results   Component Value Date    HGBA1C 7 4 (H) 08/24/2022       Recent Labs     08/25/22  0633 08/25/22  1108 08/25/22  1504 08/25/22  2320   POCGLU 135 160* 148* 188*       Blood Sugar Average: Last 72 hrs:  (P) 188   · Continue correctional insulin with Accu-Cheks and continue to hold oral hypoglycemics  · Initiate hypoglycemia protocol

## 2022-08-26 NOTE — PROGRESS NOTES
Cardiac cath performed via the R radial artery  100 % instent occlusion of the Ramus branch - with ultrasound guidance, optimal PCI was performed with balloon angioplasty in the entire stented area  CHARBEL 3 flow was re established  Residual diffuse 20 % disease  Diffuse 70 -80 % in stent restenosis in OM1  The previous placed stents were 2 5 mm 2nd 2 25 mm  The vessel was measured and 3 0 mm and POBA was performed on the entire stented area with a 3 0 NC balloon with an excellent angiographic result

## 2022-08-26 NOTE — PROGRESS NOTES
Transferred by stretcher on monitor to [address] [place] [address]  Received by Kelvin Valadez and care assumed  OP site assessed and remains stable  Safety addressed before leaving bedside

## 2022-08-26 NOTE — ASSESSMENT & PLAN NOTE
· CHARBEL 4  · Initially presented to [facility] with chest pain, currently resolved  Noted with rising troponins  · Seen by Cardiology at [address] [place] [address], advised transferred to this hospital for cardiac catheterization    Keep NPO after midnight for potential procedure will appreciate ongoing cardiology recommendations  · Continue ASA, beta-blocker, Brilinta, statin

## 2022-08-26 NOTE — ASSESSMENT & PLAN NOTE
· Blood pressure elevated on presentation  · For now continue propranolol, losartan, Imdur and adjust as needed    IV p r n  hydralazine overnight

## 2022-08-26 NOTE — CONSULTS
Consultation - General Cardiology Team 2  Twan Mejias 47 y o  male MRN: [id number]  Unit/Bed#: [facility] 408-01 Encounter: [id number]      Consults  PCP: Comfort Burch DO   Outpatient Cardiologist: Dr Caitlyn Baxter     History of Present Illness   Physician Requesting Consult: Rafaela Ramos MD  Reason for Consult / Principal Problem: Chest pain    HPI: Twan Mejias is a 47y o  year old male with a history of CAD s/p multiple stents, hypertension, hyperlipidemia, type 2 diabetes on oral hypoglycemics, GERD  He initially presented to [facility] with recurrent CP at rest  He described his CP as right in the middle of his chest, non-radiating  States that it was the same pain he got with his 4 other M I s so he knew to come in  His troponin trend in the [address] [place] [address] ED was 22, 44, 95  His EDK showed t-wave inversions in V5 and V6  Assessment and Plan:   1  Chest pain  · Presented to [facility] for CP, seen by cardiology in [place] and was recommended to be transferred to [facility] for cardiac catheterization  · Continue ASA, beta-blocker, brilinta, statin   · BB held for CATH   · Plan for CATH today   2  CAD s/p multiple stents placed  · Extensive history of CAD with stent placement   · Most recent CATH was in Jan of 2022 where he had 100% stenosis in 1st diagonal, 40% in RI (stented), 80% in LCX/OM2, and 100% in prox RCA (stented)  3  HTN  · Stable  · Will hold Losartan prior to cardiac CATH, continue propranolol, imdur   4  HLD  · Continue statin therapy   5  Tobacco use  · 20 pack year history - quit in 2018      Subjective:   Pt seen and examined lying in bed  Doing well today  Does not currently have CP  OK with going for CATH today, asks if he can go home after  Stated that he has a history of PVCs for which his cardiologist put him on a holter monitor for which showed abnormal rhythms  He has not yet followed up with cardiologist for this, appointment is in September  Doing well, all questions were answered appropriately  Review of Systems   Constitutional: Negative for chills, diaphoresis, fatigue and fever  HENT: Negative for congestion  Respiratory: Negative for cough, chest tightness, shortness of breath, wheezing and stridor  Cardiovascular: Negative for chest pain, palpitations and leg swelling  Gastrointestinal: Negative for abdominal pain  Neurological: Positive for headaches (states it's from the pillows )  Negative for dizziness and speech difficulty  Psychiatric/Behavioral: Negative for agitation and behavioral problems  ROS as noted above  Historical Information   Past Medical History:   Diagnosis Date    Arthritis     Coronary artery disease     MI x 4; cardiac cath with 4 stents    GERD (gastroesophageal reflux disease)     Hypertension     Myocardial infarct ([facility] )     4 MIs as of 2/24/2020    Non-insulin dependent type 2 diabetes mellitus ([facility] ) 8/24/2022     Past Surgical History:   Procedure Laterality Date    CARDIAC CATHETERIZATION  1/7/2022    Procedure: Cardiac catheterization;  Surgeon: Olayinka Eng DO;  Location: [facility];   Service: Cardiology    CORONARY ANGIOPLASTY WITH STENT PLACEMENT       Social History     Substance and Sexual Activity   Alcohol Use Never     Social History     Substance and Sexual Activity   Drug Use Never     Social History     Tobacco Use   Smoking Status Former Smoker    Packs/day: 1 00    Years: 10 00    Pack years: 10 00    Types: Cigarettes    Start date: 2/24/1998   [facility] Quit date: 11/24/2018    Years since quitting: 3 7   Smokeless Tobacco Never Used     Family History: non-contributory    Meds/Allergies   Hospital Medications:   Current Facility-Administered Medications   Medication Dose Route Frequency    acetaminophen (TYLENOL) tablet 650 mg  650 mg Oral Q4H PRN    amLODIPine (NORVASC) tablet 5 mg  5 mg Oral Daily    aspirin (ECOTRIN LOW STRENGTH) EC tablet 81 mg  81 mg Oral Daily    atorvastatin (LIPITOR) tablet 40 mg  40 mg Oral Q24H    DULoxetine (CYMBALTA) delayed release capsule 60 mg  60 mg Oral Daily    fish oil capsule 1,000 mg  1,000 mg Oral BID    hydrALAZINE (APRESOLINE) injection 5 mg  5 mg Intravenous Q6H PRN    insulin lispro (HumaLOG) 100 units/mL subcutaneous injection 1-6 Units  1-6 Units Subcutaneous TID AC    insulin lispro (HumaLOG) 100 units/mL subcutaneous injection 1-6 Units  1-6 Units Subcutaneous HS    isosorbide mononitrate (IMDUR) 24 hr tablet 60 mg  60 mg Oral Daily    ondansetron (ZOFRAN) injection 4 mg  4 mg Intravenous Q6H PRN    pantoprazole (PROTONIX) EC tablet 40 mg  40 mg Oral Daily    propranolol (INDERAL LA) 24 hr capsule 160 mg  160 mg Oral Daily    ticagrelor (BRILINTA) tablet 90 mg  90 mg Oral Q12H     Home Medications:   Medications Prior to Admission   Medication    aspirin 81 MG tablet    atorvastatin (LIPITOR) 40 mg tablet    Brilinta 90 MG    DULoxetine (CYMBALTA) 60 mg delayed release capsule    isosorbide mononitrate (IMDUR) 60 mg 24 hr tablet    losartan (COZAAR) 100 MG tablet    Omega-3 Fatty Acids (OMEGA 3 PO)    pantoprazole (PROTONIX) 40 mg tablet    propranolol (INDERAL LA) 160 mg    gabapentin (NEURONTIN) 300 mg capsule    Jardiance 10 MG TABS    metFORMIN (GLUCOPHAGE) 500 mg tablet    nitroglycerin (NITROSTAT) 0 4 mg SL tablet       No Known Allergies    Objective   Vitals: Blood pressure (!) 175/90, pulse 64, temperature (!) 97 4 °F (36 3 °C), temperature source Oral, resp  rate 20, height 5' 8" (1 727 m), weight 69 kg (152 lb 1 9 oz), SpO2 98 %  Orthostatic Blood Pressures    Flowsheet Row Most Recent Value   Blood Pressure 175/90 filed at 08/26/2022 0700   Patient Position - Orthostatic VS Lying filed at 08/26/2022 0700            Invasive Devices  Report    Peripheral Intravenous Line  Duration           Peripheral IV 08/24/22 Left;Ventral (anterior) Forearm 2 days                Physical Exam  Vitals reviewed     Constitutional:       General: He is not in acute distress  Appearance: Normal appearance  He is normal weight  He is not ill-appearing  HENT:      Head: Normocephalic and atraumatic  Nose: Nose normal       Mouth/Throat:      Mouth: Mucous membranes are moist       Pharynx: Oropharynx is clear  Eyes:      Conjunctiva/sclera: Conjunctivae normal    Cardiovascular:      Rate and Rhythm: Normal rate and regular rhythm  Heart sounds: No murmur heard  No friction rub  No gallop  Pulmonary:      Effort: Pulmonary effort is normal  No respiratory distress  Breath sounds: Normal breath sounds  No wheezing, rhonchi or rales  Abdominal:      General: Abdomen is flat  Bowel sounds are normal       Palpations: Abdomen is soft  Musculoskeletal:      Cervical back: Normal range of motion  Right lower leg: No edema  Left lower leg: No edema  Skin:     General: Skin is warm and dry  Neurological:      General: No focal deficit present  Mental Status: He is alert  Psychiatric:         Mood and Affect: Mood normal          Behavior: Behavior normal            Lab Results:   I have personally reviewed pertinent lab results      CBC with diff:   Results from last 7 days   Lab Units 08/24/22  0201   WBC Thousand/uL 7 83   RBC Million/uL 4 12   HEMOGLOBIN g/dL 12 4   HEMATOCRIT % 37 5   MCV fL 91   MCH pg 30 1   MCHC g/dL 33 1   RDW % 13 9   MPV fL 9 8   PLATELETS Thousands/uL 153     CMP:   Results from last 7 days   Lab Units 08/24/22  0201   SODIUM mmol/L 138   CHLORIDE mmol/L 106   CO2 mmol/L 25   BUN mg/dL 12   CREATININE mg/dL 0 84   CALCIUM mg/dL 9 0   AST U/L 15   ALT U/L 19   ALK PHOS U/L 108*   EGFR ml/min/1 73sq m 101     HS Troponin:   0   Lab Value Date/Time    HSTNI0 22 08/24/2022 0201    HSTNI2 44 08/24/2022 0425    HSTNI4 95 (H) 08/24/2022 0630     BNP:   Results from last 7 days   Lab Units 08/24/22  0201   POTASSIUM mmol/L 3 3*   CHLORIDE mmol/L 106   CO2 mmol/L 25   BUN mg/dL 12   CREATININE mg/dL 0  84   CALCIUM mg/dL 9 0   EGFR ml/min/1 73sq m 101     Coags:     TSH:     Magnesium:   Results from last 7 days   Lab Units 22  0201   MAGNESIUM mg/dL 1 9     Lipid Profile:         Results from last 7 days   Lab Units 22  0201   POTASSIUM mmol/L 3 3*   CO2 mmol/L 25   CHLORIDE mmol/L 106   BUN mg/dL 12   CREATININE mg/dL 0 84     Results from last 7 days   Lab Units 22  0201   HEMOGLOBIN g/dL 12 4   HEMATOCRIT % 37 5   PLATELETS Thousands/uL 153                 Imaging: I have personally reviewed pertinent reports  Telemetry:       EKG:   Date: 22  Interpretation: Sinus bradycardia  Biphasic T waveV4-5  When compared with ECG of 24-AUG-2022 01:53, (unconfirmed)  Subtle changes are present  Confirmed by Nga Romeo ([phone]) on 2022       Previous STRESS TEST:  No results found for this or any previous visit  No results found for this or any previous visit  No results found for this or any previous visit  Previous Cath/PCI:  Results for orders placed during the hospital encounter of 19    Cardiac catheterization    Narrative  [address]  [phone]    [facility]    Invasive Cardiovascular Lab Complete Report    Patient: Amadou Melton  MR number: [id number]  Account number: [de-identified]  Study date: 2019  Gender: Male  : 1970  Height: 68 1 in  Weight: 165 7 lb  BSA: 1 89 mï¾²    Allergies: NO KNOWN ALLERGIES    Diagnostic Cardiologist:  Viridiana Davis MD    SUMMARY    CORONARY CIRCULATION:  LAD: Angiography showed mild atherosclerosis  Proximal LAD: There was a 30 % stenosis  2nd diagonal: There was a 75 % stenosis  Circumflex: Angiography showed mild atherosclerosis  1st obtuse marginal: There was a diffuse 50 % stenosis  Small subbranch occluded  Proximal ramus intermedius: There was a 0 % stenosis at the site of a prior stent    Mid RCA: There was a diffuse 70 % stenosis at the site of a prior stent  The lesion was eccentric  1st posterolateral segment: There was a 70 % stenosis  PROCEDURES PERFORMED    --  Right coronary angiography  --  Left coronary angiography  --  Outpatient  --  Mod Sedation Same Physician Initial 15min  --  Coronary Catheterization (w/o [facility])  PROCEDURE: The risks and alternatives of the procedures and conscious sedation were explained to the patient and informed consent was obtained  The patient was brought to the cath lab and placed on the table  The planned puncture sites  were prepped and draped in the usual sterile fashion  --  Right radial artery access  After performing an Placido's test to verify adequate ulnar artery supply to the hand, the radial site was prepped  The puncture site was infiltrated with local anesthetic  The vessel was accessed using the  modified Seldinger technique, a wire was advanced into the vessel, and a sheath was advanced over the wire into the vessel  --  Right coronary artery angiography  A catheter was advanced over a guidewire into the aorta and positioned in the right coronary artery ostium under fluoroscopic guidance  Angiography was performed  --  Left coronary artery angiography  A catheter was advanced over a guidewire into the aorta and positioned in the left coronary artery ostium under fluoroscopic guidance  Angiography was performed  --  Outpatient  --  Mod Sedation Same Physician Initial 15min  --  Coronary Catheterization (w/o [facility])  PROCEDURE COMPLETION: The patient tolerated the procedure well and was discharged from the cath lab  TIMING: Test started at 11:16  Test concluded at 11:44  HEMOSTASIS: The sheath was removed  The site was compressed with a Hemoband  device  Hemostasis was obtained  MEDICATIONS GIVEN: Versed (2mg/2ml), 2 mg, IV, at 11:24  Fentanyl (1OOmcg/2 ml), 50 mcg, IV, at 11:24  1% Lidocaine, 1 ml, subcutaneously, at 11:28   Nitroglycerin (200mcg/ml), 200 mcg, at 11:29  Verapamil  (5mg/2ml), 2 5 mg, IV, at 11:29  Heparin 1000 units/ml, 4,000 units, IV, at 11:29  CONTRAST GIVEN: 80 ml Omnipaque (350mg I /ml)  RADIATION EXPOSURE: Fluoroscopy time: 5 13 min  CORONARY VESSELS:   --  The coronary circulation is right dominant  --  LAD: Angiography showed mild atherosclerosis  --  Proximal LAD: There was a 30 % stenosis  --  2nd diagonal: There was a 75 % stenosis  --  Circumflex: Angiography showed mild atherosclerosis  --  1st obtuse marginal: There was a diffuse 50 % stenosis  Small subbranch occluded  --  Proximal ramus intermedius: There was a 0 % stenosis at the site of a prior stent  --  RCA: Angiography showed moderate atherosclerosis  --  Mid RCA: There was a diffuse 70 % stenosis at the site of a prior stent  The lesion was eccentric  --  Distal RCA: There was a 40 % stenosis  --  1st posterolateral segment: There was a 70 % stenosis  Prepared and signed by    Xochilt Suarez MD  Signed 2019 12:02:58    Study diagram    Angiographic findings  Native coronary lesions:  ï¾·Proximal LAD: Lesion 1: 30 % stenosis  ï¾·D2: Lesion 1: 75 % stenosis  ï¾·OM1: Lesion 1: diffuse, 50 % stenosis  ï¾·Proximal RI: Lesion 1: 0 % stenosis, site of prior stent  ï¾·Mid RCA: Lesion 1: diffuse, 70 % stenosis, site of prior stent  ï¾·Distal RCA: Lesion 1: 40 % stenosis  ï¾·RPL1: Lesion 1: 70 % stenosis      Hemodynamic tables    Pressures:  Baseline  Pressures:  - HR: 63  Pressures:  - Rhythm:  Pressures:  -- Aortic Pressure (S/D/M): 105/72/84    Outputs:  Baseline  Outputs:  -- CALCULATIONS: [age]  Outputs:  -- CALCULATIONS: Body Surface Area: 1 89  Outputs:  -- CALCULATIONS: Height in cm: 173 00  Outputs:  -- CALCULATIONS: Sex: Male  Outputs:  -- CALCULATIONS: Weight in k 30      ECHO:  Results for orders placed during the hospital encounter of 19    Echo complete with contrast if indicated    Narrative  [address] [place] [address] [address]    Transthoracic Echocardiogram  2D, M-mode, Doppler, and Color Doppler    Study date:  2019    Patient: Sania Sanders  MR number: [id number]  Account number: [de-identified]  : 1970  Age: 51 years  Gender: Male  Status: Outpatient  Location: [facility]  Height: 68 in  Weight: 168 7 lb  BP: 120/ 60 mmHg    Indications: Chest Pain    Diagnoses: R07 9 - Chest pain, unspecified    Sonographer:  Adriano Vora RDCS  Primary Physician:  Azul Price DO  Referring Physician:  Kimberly Webber MD  Group:  [id number]  Interpreting Physician: MATTHIAS Colvin     SUMMARY    LEFT VENTRICLE:  Systolic function was normal  Ejection fraction was estimated to be 60 %  There were no regional wall motion abnormalities  RIGHT VENTRICLE:  Systolic function was normal     MITRAL VALVE:  There was trace regurgitation  AORTIC VALVE:  There was trace regurgitation  TRICUSPID VALVE:  There was trace regurgitation  PULMONIC VALVE:  There was trace regurgitation  PERICARDIUM:  There was no pericardial effusion  PROCEDURE: The study was performed in the [facility]  This was a routine study  The transthoracic approach was used  The study included complete 2D imaging, M-mode, complete spectral Doppler, and color Doppler  The  heart rate was 73 bpm, at the start of the study  Images were obtained from the parasternal, apical, subcostal, and suprasternal notch acoustic windows  Image quality was adequate  LEFT VENTRICLE: Size was normal  Systolic function was normal  Ejection fraction was estimated to be 60 %  There were no regional wall motion abnormalities  Wall thickness was normal  No evidence of apical thrombus   DOPPLER: Left  ventricular diastolic function parameters were normal     RIGHT VENTRICLE: The size was normal  Systolic function was normal  Wall thickness was normal     LEFT ATRIUM: Size was normal     RIGHT ATRIUM: Size was normal     MITRAL VALVE: Valve structure was normal  There was normal leaflet separation  DOPPLER: The transmitral velocity was within the normal range  There was no evidence for stenosis  There was trace regurgitation  AORTIC VALVE: The valve was trileaflet  Leaflets exhibited normal thickness and normal cuspal separation  DOPPLER: Transaortic velocity was within the normal range  There was no evidence for stenosis  There was trace regurgitation  TRICUSPID VALVE: The valve structure was normal  There was normal leaflet separation  DOPPLER: The transtricuspid velocity was within the normal range  There was no evidence for stenosis  There was trace regurgitation  PULMONIC VALVE: Leaflets exhibited normal thickness, no calcification, and normal cuspal separation  DOPPLER: The transpulmonic velocity was within the normal range  There was trace regurgitation  PERICARDIUM: There was no pericardial effusion  The pericardium was normal in appearance  AORTA: The root exhibited normal size  SYSTEMIC VEINS: IVC: The inferior vena cava was normal in size  SYSTEM MEASUREMENT TABLES    2D  %FS: 44 93 %  Ao Diam: 3 39 cm  EDV(Teich): 125 16 ml  EF(Cube): 83 3 %  EF(Teich): 75 94 %  ESV(Cube): 22 47 ml  ESV(Teich): 30 12 ml  IVSd: 1 03 cm  LA Area: 18 27 cm2  LA Diam: 3 85 cm  LVIDd: 5 12 cm  LVIDs: 2 82 cm  LVPWd: 1 01 cm  RA Area: 20 57 cm2  RVOT Diam: 3 26 cm  SI(Cube): 58 98 ml/m2  SI(Teich): 50 02 ml/m2  SV(Cube): 112 07 ml  SV(Teich): 95 05 ml    CW  AV Env  Ti: 277 26 ms  AV VTI: 26 86 cm  AV Vmax: 1 47 m/s  AV Vmean: 0 97 m/s  AV maxP 64 mmHg  AV meanP 47 mmHg  PV Vmax: 1 09 m/s  PV maxP 78 mmHg  TR Vmax: 2 36 m/s  TR maxP 32 mmHg    MM  TAPSE: 1 99 cm    PW  LVOT Env  Ti: 351 2 ms  LVOT VTI: 28 62 cm  LVOT Vmax: 1 17 m/s  LVOT Vmean: 0 81 m/s  LVOT maxP 51 mmHg  LVOT meanPG: 3 02 mmHg  MV A Michael: 0 65 m/s  MV Dec Gosper: 4 31 m/s2  MV DecT: 214 19 ms  MV E Michael: 0 92 m/s  MV E/A Ratio: 1 44  PVA (Vmax): 6 54 cm2  RVOT Vmax: 0 86 m/s  RVOT maxP 95 mmHg  TV A Michael: 0 6 m/s  TV Dec Gosper: 3 01 m/s2  TV Dec Time: 241 58 ms  TV E Michael: 0 73 m/s  TV E/A Ratio: 1 2    [facility] Accredited Echocardiography Laboratory    Prepared and electronically signed by    MATTHIAS Guzman  Signed 2019 14:07:22    No results found for this or any previous visit  HOLTER  Results for orders placed during the hospital encounter of 21    Holter monitor - 24 hour    Narrative  PT NAME: Biggs Mix  : 1970  AGE: 49 y o  GENDER: male  MRN: [id number]   PROCEDURE: Holter monitor - 24 hour        INDICATIONS:  Palpitations    A Holter Monitor was performed for  24 hours  Impression  1  The basic mechanism was sinus  with rates ranging from  55 beats per minute to 129 beats per minute  2  Supraventricular ectopic beats were rare and there were no runs  3  Ventricular ectopic beats were common including 2 separate 4 beat runs  Nothing more sustained  The total was 5 4% of all beats  4  There were no significant pauses  5  No symptoms were noted during the monitoring phase  Kleber Powers MD        VTE Prophylaxis: Brilinta      Case discussed and reviewed with Dr Gillian Worthy and is pending their agreement of the assessment and plan  Thank you for involving us in the care of your patient  Anupama Tobin DO  PGY-1  [facility] Cardiology Latisha Coleman    ==========================================================================================    Counseling / Coordination of Care  Total floor / unit time spent today 45 minutes minutes  Greater than 50% of total time was spent with the patient and / or family counseling and / or coordination of care  A description of the counseling / coordination of care:           Epic/ Allscripts/Care Everywhere records reviewed:     ** Please Note: Fluency DirectDictation voice to text software may have been used in the creation of this document   **

## 2022-08-26 NOTE — ASSESSMENT & PLAN NOTE
· CHARBEL 4  · Initially presented to [facility] with chest pain, currently resolved  Noted with rising troponins  · Seen by Cardiology at [facility], advised transferred to this hospital for cardiac catheterization    Keep NPO after midnight for potential procedure will appreciate ongoing cardiology recommendations  · Continue ASA, beta-blocker, Brilinta, statin  · Plan for [facility] today

## 2022-08-26 NOTE — ASSESSMENT & PLAN NOTE
Lab Results   Component Value Date    HGBA1C 7 4 (H) 08/24/2022       Recent Labs     08/25/22  1504 08/25/22  2320 08/26/22  0554 08/26/22  1051   POCGLU 148* 188* 124 112       Blood Sugar Average: Last 72 hrs:  (P) 352 [id number]   · Continue correctional insulin with Accu-Cheks and continue to hold oral hypoglycemics  · Initiate hypoglycemia protocol

## 2022-08-26 NOTE — CASE MANAGEMENT
Case Management Assessment & Discharge Planning Note    Patient name Sherman Forrester  Location [address] [place] [address] [facility] [id number]  : 1970 Date 2022       Current Admission Date: 2022  Current Admission Diagnosis:Chest pain   Patient Active Problem List    Diagnosis Date Noted    Elevated troponin I level 2022    Hypokalemia 2022    Non-insulin dependent type 2 diabetes mellitus ([facility] ) 2022    Chronic pain syndrome 2022    Lumbar radiculopathy 2022    Presence of drug coated stent in left circumflex coronary artery 2022    Chronic bilateral low back pain without sciatica 2021    Lumbar degenerative disc disease 2021    Myofascial pain syndrome 2021    Sacroiliitis ([facility] ) 2021    Palpitations 2021    PVC (premature ventricular contraction) 2019    Coronary artery disease involving native coronary artery 2019    Essential hypertension 2019    Mixed hyperlipidemia 2019    Chest pain 2019    Major depressive disorder, recurrent ([facility] ) 2018    Nicotine dependence 2018    Gastroesophageal reflux disease 2017    Gastroparesis 2017    History of PTCA 2 2017    Complex regional pain syndrome type 1 2016    Radiculitis, thoracic 2015    Displacement of thoracic intervertebral disc 2015      LOS (days): 1  Geometric Mean LOS (GMLOS) (days): 1 90  Days to GMLOS:1 2     OBJECTIVE:    Risk of Unplanned Readmission Score: 9 65         Current admission status: Inpatient       Preferred Pharmacy:   [address] [facility]  [address] [place] [address] [address]   Phone: [phone] Fax: [address] [place] [address]  [address]  [address]  Phone: [phone] Fax: [phone]    Primary Care Provider: Jhony Zacarias DO    Primary Insurance: CAPITAL  Secondary Insurance:     ASSESSMENT:  Active Health Care Proxies    There are no active Health Care Proxies on file  Advance Directives  Does patient have a [address]?: Yes  Does patient have Advance Directives?: Yes  Advance Directives: Living will, Power of  for health care  Primary Contact: wife Debra Winn         Readmission Root Cause  30 Day Readmission: No    Patient Information  Admitted from[de-identified] Home  Mental Status: Alert  During Assessment patient was accompanied by: Not accompanied during assessment  Assessment information provided by[de-identified] Patient  Primary Caregiver: Self  Support Systems: Spouse/significant other, [address] of Residence: [facility]  do you live in?: [address] [facility] entry access options   Select all that apply : Stairs  Number of steps to enter home : 2  Do the steps have railings?: Yes  Type of Current Residence: 2 story home, Other (Comment) (first floor setup)  Upon entering residence, is there a bedroom on the main floor (no further steps)?: Yes  Upon entering residence, is there a bathroom on the main floor (no further steps)?: Yes  In the last 12 months, was there a time when you were not able to pay the mortgage or rent on time?: No  In the last 12 months, how many places have you lived?: 1  In the last 12 months, was there a time when you did not have a steady place to sleep or slept in a shelter (including now)?: No  Homeless/housing insecurity resource given?: N/A  Living Arrangements: Lives w/ Spouse/significant other, Other (Comment) (children)  Is patient a ?: No    Activities of Daily Living Prior to Admission  Functional Status: Independent  Completes ADLs independently?: Yes  Ambulates independently?: Yes  Does patient use assisted devices?: No  Does patient currently own DME?: No  Does patient have a history of Outpatient Therapy (PT/OT)?: No  Does the patient have a history of Short-Term Rehab?: No  Does patient have a history of HHC?: No  Does patient currently have [address]?: No         Patient Information Continued  Income Source: Unemployed  Does patient have prescription coverage?: Yes  Within the past 12 months, you worried that your food would run out before you got the money to buy more : Never true  Within the past 12 months, the food you bought just didn't last and you didn't have money to get more : Never true  Food insecurity resource given?: N/A  Does patient receive dialysis treatments?: No  Does patient have a history of substance abuse?: No  Does patient have a history of Mental Health Diagnosis?: Yes  Is patient receiving treatment for mental health?: No  Patient declined treatment information    Has patient received inpatient treatment related to mental health in the last 2 years?: No         Means of Transportation  Means of Transport to Appts[de-identified] Drives Self        DISCHARGE DETAILS:    Discharge planning discussed with[de-identified] pt--does not feel he has any HHC needs at this time  Freedom of Choice: Yes     CM contacted family/caregiver?: No- see comments (alert and oriented)  Were Treatment Team discharge recommendations reviewed with patient/caregiver?: Yes  Did patient/caregiver verbalize understanding of patient care needs?: Yes  Were patient/caregiver advised of the risks associated with not following Treatment Team discharge recommendations?: Yes    Contacts  Patient Contacts: wife Dave Dow  Relationship to Patient[de-identified] Family  Contact Method: Phone  Phone Number: [phone]  Reason/Outcome: Continuity of Care, Emergency Contact, Discharge [address]         Is the patient interested in [address] at discharge?: No    DME Referral Provided  Referral made for DME?: No         Would you like to participate in our [address] service program?  : No - Declined    Treatment Team Recommendation: Home     Transport at Discharge : Family Additional Comments: 53yo male admitted with chest pain, has hx of 4 MI's with stents, awaiting cardiac cath this afternoon  Lives with wife and children, Wife works for [employer], denies any [place] [address] needs  Pt  hopes to discharge after cath

## 2022-08-26 NOTE — UTILIZATION REVIEW
Initial Clinical Review    Admission: Date/Time/Statement:   Admission Orders (From admission, onward)     Ordered        08/25/22 2046  Inpatient Admission  Once                      Orders Placed This Encounter   Procedures    Inpatient Admission     Standing Status:   Standing     Number of Occurrences:   1     Order Specific Question:   Level of Care     Answer:   Med Surg [16]     Order Specific Question:   Bed request comments     Answer:   Tele     Order Specific Question:   Estimated length of stay     Answer:   More than 2 Midnights     Order Specific Question:   Certification     Answer:   I certify that inpatient services are medically necessary for this patient for a duration of greater than two midnights  See H&P and MD Progress Notes for additional information about the patient's course of treatment  Initial Presentation: 47 y o  male with PMHx of CAD with previous multiple stents, NIDDM, HTN, HLD presents to [facility] as a transfer from [facility] where he initially presented with c/o chest pain and noted with rising troponins  Seen by Cardiology at [facility] ED and tx'd to [facility] for cardiac cath  ADMIT INPATIENT to M/S/TELE UNIT with CHEST PAIN -- continue ASA, Brilanta, propranolol, losartain, Imdur, atrovastatin  Tele monitoring  Fingerstick glucose checks w/ ssi  Cardiology consulted  Date: 8/26  Day 2:   Cardiology consult --Extensive h/o CAD with stent placement  Most recent CATH was in Jan of 2022 where he had 100% stenosis in 1st diagonal, 40% in RI (stented), 80% in LCX/OM2, and 100% in prox RCA (stented)  --  Continue ASA, beta-blocker, brilinta, statin  BB held for CATH  Plan for CATH today  CAD s/p multiple stents placed  Hold Losartan prior to cardiac CATH, continue propranolol, imdur   Continue statin therapy     Cardiac cath performed via the R radial artery 8/25 --     100 % instent occlusion of the Ramus branch - with ultrasound guidance, optimal PCI was performed with balloon angioplasty in the entire stented area  CHARBEL 3 flow was re established  Residual diffuse 20 % disease      Diffuse 70 -80 % in stent restenosis in OM1  The previous placed stents were 2 5 mm 2nd 2 25 mm  The vessel was measured and 3 0 mm and POBA was performed on the entire stented area with a 3 0 NC balloon with an excellent angiographic result  Wt Readings from Last 1 Encounters:   08/25/22 69 kg (152 lb 1 9 oz)     Additional Vital Signs:   Date/Time Temp Pulse Resp BP MAP (mmHg) SpO2 O2 Device   08/26/22 0700 97 4 °F (36 3 °C) Abnormal  64 20 175/90 Abnormal  124 98 % --   08/26/22 0540 -- 70 18 190/97 Abnormal   136 -- --   08/26/22 0300 97 9 °F (36 6 °C) 79 18 169/92 123 98 % --   08/26/22 0133 -- -- -- 185/89 Abnormal  -- -- --   08/25/22 2300 98 °F (36 7 °C) 63 16 172/90 Abnormal  110 96 % None (Room air)   08/25/22 2100 97 8 °F (36 6 °C) 68 18 172/95 Abnormal  112 95 % None (Room air)       Pertinent Labs/Diagnostic Test Results:   EKG 8/24:    Interpretation: Sinus bradycardia  Biphasic T waveV4-5  When compared with ECG of 24-AUG-2022 01:53, (unconfirmed)  Subtle changes are present    Results from last 7 days   Lab Units 08/24/22  0201   WBC Thousand/uL 7 83   HEMOGLOBIN g/dL 12 4   HEMATOCRIT % 37 5   PLATELETS Thousands/uL 153   NEUTROS ABS Thousands/µL 5 37     Results from last 7 days   Lab Units 08/24/22  0201   SODIUM mmol/L 138   POTASSIUM mmol/L 3 3*   CHLORIDE mmol/L 106   CO2 mmol/L 25   ANION GAP mmol/L 7   BUN mg/dL 12   CREATININE mg/dL 0 84   EGFR ml/min/1 73sq m 101   CALCIUM mg/dL 9 0   MAGNESIUM mg/dL 1 9     Results from last 7 days   Lab Units 08/24/22  0201   AST U/L 15   ALT U/L 19   ALK PHOS U/L 108*   TOTAL PROTEIN g/dL 5 9*   ALBUMIN g/dL 3 8   TOTAL BILIRUBIN mg/dL 0 43     Results from last 7 days   Lab Units 08/26/22  0554 08/25/22  2320 08/25/22  1504 08/25/22  1108 08/25/22  0633 08/24/22  2246 08/24/22  1618 08/24/22  1131 08/24/22  0732   POC GLUCOSE mg/dl 124 188* 148* 160* 135 188* 131 112 122     Results from last 7 days   Lab Units 08/24/22  0201   HEMOGLOBIN A1C % 7 4*   EAG mg/dl 166     Results from last 7 days   Lab Units 08/24/22  0630 08/24/22  0425 08/24/22  0201   HS TNI 0HR ng/L  --   --  22   HS TNI 2HR ng/L  --  44  --    HSTNI D2 ng/L  --  22*  --    HS TNI 4HR ng/L 95*  --   --    HSTNI D4 ng/L 73*  --   --        Past Medical History:   Diagnosis Date    Arthritis     Coronary artery disease     MI x 4; cardiac cath with 4 stents    GERD (gastroesophageal reflux disease)     Hypertension     Myocardial infarct [facility] )     4 MIs as of 2/24/2020    Non-insulin dependent type 2 diabetes mellitus ([facility] ) 8/24/2022     Present on Admission:   Chest pain   Essential hypertension   Gastroesophageal reflux disease   Non-insulin dependent type 2 diabetes mellitus (HCC)   Coronary artery disease involving native coronary artery   Mixed hyperlipidemia   Elevated troponin I level      Admitting Diagnosis: Chest pain [R07 9]  Age/Sex: 47 y o  male  Admission Orders:  Scheduled Medications:  amLODIPine, 5 mg, Oral, Daily  aspirin, 81 mg, Oral, Daily  atorvastatin, 40 mg, Oral, Q24H  DULoxetine, 60 mg, Oral, Daily  fish oil, 1,000 mg, Oral, BID  insulin lispro, 1-6 Units, Subcutaneous, TID AC  insulin lispro, 1-6 Units, Subcutaneous, HS  isosorbide mononitrate, 60 mg, Oral, Daily  pantoprazole, 40 mg, Oral, Daily  propranolol, 160 mg, Oral, Daily  ticagrelor, 90 mg, Oral, Q12H    PRN Meds:  acetaminophen, 650 mg, Oral, Q4H PRN 8/26 x1  hydrALAZINE, 5 mg, Intravenous, Q6H PRN  ondansetron, 4 mg, Intravenous, Q6H PRN          Network Utilization Review Department  ATTENTION: Please call with any questions or concerns to [phone] and carefully listen to the prompts so that you are directed to the right person   All voicemails are confidential   Farrel Bodily all requests for admission clinical reviews, approved or denied determinations and any other requests to dedicated fax number below belonging to the campus where the patient is receiving treatment   List of dedicated fax numbers for the Facilities:  [address] [place] [address] DENIALS (Administrative/Medical Necessity) [phone]   [address]  [facility] (Maternity/NICU/Pediatrics) [phone]   [address]  [address] [facility] [place] [address] [address] [place] [address] Alf[address] Ramesh [address] [facility] [place] [address] [phone]

## 2022-08-26 NOTE — ASSESSMENT & PLAN NOTE
· S/p multiple prior stents  · Workup of new chest pain as above  · Continue ASA, Brilinta, propranolol, losartan, Imdur, atorvastatin

## 2022-08-27 ENCOUNTER — APPOINTMENT (INPATIENT)
Dept: NON INVASIVE DIAGNOSTICS | Facility: HOSPITAL | Age: 52
DRG: 251 | End: 2022-08-27
Payer: COMMERCIAL

## 2022-08-27 VITALS
WEIGHT: 152 LBS | RESPIRATION RATE: 15 BRPM | DIASTOLIC BLOOD PRESSURE: 79 MMHG | BODY MASS INDEX: 23.04 KG/M2 | HEIGHT: 68 IN | TEMPERATURE: 98.1 F | OXYGEN SATURATION: 97 % | HEART RATE: 68 BPM | SYSTOLIC BLOOD PRESSURE: 143 MMHG

## 2022-08-27 LAB
ANION GAP SERPL CALCULATED.3IONS-SCNC: 6 MMOL/L (ref 4–13)
AORTIC ROOT: 4 CM
AORTIC VALVE MEAN VELOCITY: 9 M/S
APICAL FOUR CHAMBER EJECTION FRACTION: 52 %
ASCENDING AORTA: 3.4 CM
AV LVOT MEAN GRADIENT: 2 MMHG
AV LVOT PEAK GRADIENT: 4 MMHG
AV MEAN GRADIENT: 4 MMHG
AV PEAK GRADIENT: 8 MMHG
AV VELOCITY RATIO: 0.66
BUN SERPL-MCNC: 15 MG/DL (ref 5–25)
CALCIUM SERPL-MCNC: 9 MG/DL (ref 8.3–10.1)
CHLORIDE SERPL-SCNC: 107 MMOL/L (ref 96–108)
CO2 SERPL-SCNC: 24 MMOL/L (ref 21–32)
CREAT SERPL-MCNC: 0.81 MG/DL (ref 0.6–1.3)
DOP CALC AO PEAK VEL: 1.43 M/S
DOP CALC AO VTI: 22.09 CM
DOP CALC LVOT PEAK VEL VTI: 17.71 CM
DOP CALC LVOT PEAK VEL: 0.95 M/S
E WAVE DECELERATION TIME: 251 MS
ERYTHROCYTE [DISTWIDTH] IN BLOOD BY AUTOMATED COUNT: 14.1 % (ref 11.6–15.1)
FRACTIONAL SHORTENING: 30 % (ref 28–44)
GFR SERPL CREATININE-BSD FRML MDRD: 102 ML/MIN/1.73SQ M
GLUCOSE SERPL-MCNC: 168 MG/DL (ref 65–140)
GLUCOSE SERPL-MCNC: 172 MG/DL (ref 65–140)
GLUCOSE SERPL-MCNC: 176 MG/DL (ref 65–140)
HCT VFR BLD AUTO: 42.1 % (ref 36.5–49.3)
HGB BLD-MCNC: 13.6 G/DL (ref 12–17)
INTERVENTRICULAR SEPTUM IN DIASTOLE (PARASTERNAL SHORT AXIS VIEW): 1.2 CM
INTERVENTRICULAR SEPTUM: 1.2 CM (ref 0.6–1.1)
LAAS-AP2: 20.1 CM2
LAAS-AP4: 16.2 CM2
LEFT ATRIUM SIZE: 3.6 CM
LEFT INTERNAL DIMENSION IN SYSTOLE: 3 CM (ref 2.1–4)
LEFT VENTRICLE DIASTOLIC VOLUME (MOD BIPLANE): 116 ML
LEFT VENTRICLE SYSTOLIC VOLUME (MOD BIPLANE): 58 ML
LEFT VENTRICULAR INTERNAL DIMENSION IN DIASTOLE: 4.3 CM (ref 3.5–6)
LEFT VENTRICULAR POSTERIOR WALL IN END DIASTOLE: 1.1 CM
LEFT VENTRICULAR STROKE VOLUME: 50 ML
LV EF: 50 %
LVSV (TEICH): 50 ML
MCH RBC QN AUTO: 28.9 PG (ref 26.8–34.3)
MCHC RBC AUTO-ENTMCNC: 32.3 G/DL (ref 31.4–37.4)
MCV RBC AUTO: 90 FL (ref 82–98)
MV E'TISSUE VEL-LAT: 8 CM/S
MV E'TISSUE VEL-SEP: 7 CM/S
MV PEAK A VEL: 1.01 M/S
MV PEAK E VEL: 61 CM/S
MV STENOSIS PRESSURE HALF TIME: 73 MS
MV VALVE AREA P 1/2 METHOD: 3.01 CM2
PLATELET # BLD AUTO: 170 THOUSANDS/UL (ref 149–390)
PMV BLD AUTO: 10.7 FL (ref 8.9–12.7)
POTASSIUM SERPL-SCNC: 3.3 MMOL/L (ref 3.5–5.3)
RBC # BLD AUTO: 4.7 MILLION/UL (ref 3.88–5.62)
RIGHT ATRIAL 2D VOLUME: 43 ML
RIGHT ATRIUM AREA SYSTOLE A4C: 16.5 CM2
RIGHT VENTRICLE ID DIMENSION: 3.5 CM
SL CV LEFT ATRIUM LENGTH A2C: 5.6 CM
SL CV LV EF: 45
SL CV PED ECHO LEFT VENTRICLE DIASTOLIC VOLUME (MOD BIPLANE) 2D: 85 ML
SL CV PED ECHO LEFT VENTRICLE SYSTOLIC VOLUME (MOD BIPLANE) 2D: 35 ML
SODIUM SERPL-SCNC: 137 MMOL/L (ref 135–147)
TR MAX PG: 30 MMHG
TR PEAK VELOCITY: 2.7 M/S
TRICUSPID VALVE PEAK REGURGITATION VELOCITY: 2.73 M/S
WBC # BLD AUTO: 8.52 THOUSAND/UL (ref 4.31–10.16)

## 2022-08-27 PROCEDURE — 80048 BASIC METABOLIC PNL TOTAL CA: CPT

## 2022-08-27 PROCEDURE — 93306 TTE W/DOPPLER COMPLETE: CPT

## 2022-08-27 PROCEDURE — 85027 COMPLETE CBC AUTOMATED: CPT

## 2022-08-27 PROCEDURE — 99239 HOSP IP/OBS DSCHRG MGMT >30: CPT | Performed by: INTERNAL MEDICINE

## 2022-08-27 PROCEDURE — 99232 SBSQ HOSP IP/OBS MODERATE 35: CPT | Performed by: INTERNAL MEDICINE

## 2022-08-27 PROCEDURE — 82948 REAGENT STRIP/BLOOD GLUCOSE: CPT

## 2022-08-27 PROCEDURE — 93306 TTE W/DOPPLER COMPLETE: CPT | Performed by: INTERNAL MEDICINE

## 2022-08-27 RX ORDER — POTASSIUM CHLORIDE 20 MEQ/1
40 TABLET, EXTENDED RELEASE ORAL ONCE
Status: COMPLETED | OUTPATIENT
Start: 2022-08-27 | End: 2022-08-27

## 2022-08-27 RX ORDER — LOSARTAN POTASSIUM 50 MG/1
100 TABLET ORAL DAILY
Status: DISCONTINUED | OUTPATIENT
Start: 2022-08-27 | End: 2022-08-27 | Stop reason: HOSPADM

## 2022-08-27 RX ADMIN — OMEGA-3 FATTY ACIDS CAP 1000 MG 1000 MG: 1000 CAP at 08:59

## 2022-08-27 RX ADMIN — HYDRALAZINE HYDROCHLORIDE 5 MG: 20 INJECTION INTRAMUSCULAR; INTRAVENOUS at 03:21

## 2022-08-27 RX ADMIN — INSULIN LISPRO 1 UNITS: 100 INJECTION, SOLUTION INTRAVENOUS; SUBCUTANEOUS at 06:14

## 2022-08-27 RX ADMIN — DULOXETINE HYDROCHLORIDE 60 MG: 60 CAPSULE, DELAYED RELEASE ORAL at 08:59

## 2022-08-27 RX ADMIN — TICAGRELOR 90 MG: 90 TABLET ORAL at 06:12

## 2022-08-27 RX ADMIN — PANTOPRAZOLE SODIUM 40 MG: 40 TABLET, DELAYED RELEASE ORAL at 08:59

## 2022-08-27 RX ADMIN — INSULIN LISPRO 1 UNITS: 100 INJECTION, SOLUTION INTRAVENOUS; SUBCUTANEOUS at 11:01

## 2022-08-27 RX ADMIN — ASPIRIN 81 MG: 81 TABLET, COATED ORAL at 08:59

## 2022-08-27 RX ADMIN — ATORVASTATIN CALCIUM 40 MG: 40 TABLET, FILM COATED ORAL at 06:12

## 2022-08-27 RX ADMIN — ISOSORBIDE MONONITRATE 60 MG: 60 TABLET, EXTENDED RELEASE ORAL at 08:59

## 2022-08-27 RX ADMIN — PERFLUTREN 0.4 ML/MIN: 6.52 INJECTION, SUSPENSION INTRAVENOUS at 10:18

## 2022-08-27 RX ADMIN — POTASSIUM CHLORIDE 40 MEQ: 1500 TABLET, EXTENDED RELEASE ORAL at 11:00

## 2022-08-27 RX ADMIN — PROPRANOLOL HYDROCHLORIDE 160 MG: 80 CAPSULE, EXTENDED RELEASE ORAL at 08:58

## 2022-08-27 RX ADMIN — AMLODIPINE BESYLATE 5 MG: 5 TABLET ORAL at 08:59

## 2022-08-27 RX ADMIN — LOSARTAN POTASSIUM 100 MG: 50 TABLET, FILM COATED ORAL at 11:00

## 2022-08-27 NOTE — ASSESSMENT & PLAN NOTE
· CAD with multiple MI and prior stenting   · Continue ASA, Brilinta, propranolol, losartan, Imdur, atorvastatin

## 2022-08-27 NOTE — ASSESSMENT & PLAN NOTE
Lab Results   Component Value Date    HGBA1C 7 4 (H) 08/24/2022       Recent Labs     08/26/22  1810 08/26/22  2042 08/27/22  0534 08/27/22  1035   POCGLU 103 158* 168* 176*       Blood Sugar Average: Last 72 hrs:  (P) 147   · Continue correctional insulin with Accu-Cheks   · Initiate hypoglycemia protocol  · Resume oral meds after discharge

## 2022-08-27 NOTE — PROGRESS NOTES
Progress Note - Cardiology   Wendy Bautista 47 y o  male MRN: [id number]  Unit/Bed#: [facility] 408-01 Encounter: [id number]    Assessment/Plan:  #  ACS/NSTEMI  #  CAD with multiple prior Mis  #  PVCs  #  Hypertension  #  Hyperlipidemia    100% in stent occlusion Ramus s/p IVUS-guided POBA, CHARBEL 3 flow  70-80% in stent restenosis in OM1 with iFR 0 82 s/p POBA  TTE with LVEF 45% down from 60% (2019)     Feels well with no acute complaints  Continue aspirin, statin, isosorbide mononitrate  Discussed reasoning for switching to prasugrel but he was concerned about not being able to obtain over the weekend  He has aspirin and Brilinta at home and we will continue these until follow up in the office, at which time we can discuss switching  He was on metoprolol succinate 100 mg BID in the past but still had frequent symptomatic PVCs  He was evaluated by EP and switched to propranolol which has been controlling his symptoms  For now, he can continue propranolol  Discussed plan with patient & wife at bedside  Subjective/Objective   Chief Complaint: Please see initial consult note dated 08/24/2022 for full details  Briefly, he is a 72-year-old male with a history of prior MIs and coronary stenting  He presented with chest pain very similar to his prior MIs  His high sensitivity troponin level was mildly elevated  He does have some nonspecific T-wave changes on his ECG compared with prior but nothing remarkable  He was transferred from [address] [place] [address] for cardiac catheterization  His most recent cardiac catheterization was in January 2022 at which time he had a drug-eluting stent placed in OM 2  His cardiac catheterization at that time revealed mid LAD 40%, small D1 100%/D2 90% (not amenable), patent ramus intermedius stent, patent proximal circumflex stent,  RCA with collaterals  He has frequent PVCs and associated symptoms and had been referred to EP for evaluation   Toprol  mg BID was switched to propranolol       Interval history: No acute events overnight  He denies lightheadedness, chest pain, palpitations, shortness of breath, orthopnea  Objective:   Vitals: /79 (BP Location: Left arm)   Pulse 68   Temp 98 1 °F (36 7 °C) (Oral)   Resp 15   Ht 5' 8" (1 727 m)   Wt 68 9 kg (152 lb)   SpO2 97%   BMI 23 11 kg/m²   Vitals:    08/25/22 2100 08/27/22 0859   Weight: 69 kg (152 lb 1 9 oz) 68 9 kg (152 lb)     Orthostatic Blood Pressures    Flowsheet Row Most Recent Value   Blood Pressure 143/79 filed at 08/27/2022 1100   Patient Position - Orthostatic VS Lying filed at 08/27/2022 1100            Intake/Output Summary (Last 24 hours) at 8/27/2022 1556  Last data filed at 8/27/2022 0615  Gross per 24 hour   Intake --   Output 1900 ml   Net -1900 ml       Invasive Devices  Report    None                 Review of Systems:  All other systems reviewed and negative except for that noted above    Physical Exam: General appearance: alert and oriented, in no acute distress  Neck: no JVD  Lungs: clear to auscultation bilaterally  Chest wall: no tenderness  Heart: regular rate and rhythm, S1, S2 normal, no murmur, click, rub or gallop  Abdomen: soft, non-tender; bowel sounds normal; no masses,  no organomegaly  Extremities: extremities normal, warm and well-perfused; no cyanosis, clubbing, or edema and wrist site without hematoma and normal distal perfusion  Skin: Skin color, texture, turgor normal  No rashes or lesions    Lab Results: I have personally reviewed pertinent lab results  Imaging: I have personally reviewed pertinent reports  EKG: Personally reviewed    Counseling / Coordination of Care  Total time spent today 30 minutes  Greater than 50% of total time was spent with the patient and / or family counseling and / or coordination of care

## 2022-08-27 NOTE — UTILIZATION REVIEW
Inpatient Admission Authorization Request   NOTIFICATION OF INPATIENT ADMISSION/INPATIENT AUTHORIZATION REQUEST   SERVICING FACILITY:   [facility]  Address: [address] [facility], [address] [place] [address]  Tax ID: [id number]  NPI: [id number]  Place of Service: Inpatient [address] [facility] Code: 24     ATTENDING PROVIDER:  Attending Name and NPI#: LorraineFatemeh Varghesema [[id number]]  Address: [address] [place] [address], [address] [place], [place] [id number]  Phone: [phone]     UTILIZATION REVIEW CONTACT:  Jelena Peters Utilization   Network Utilization Review Department  Phone: [phone]  Fax: [phone]  Email: [email or web address]     PHYSICIAN ADVISORY SERVICES:  FOR CQXG-NP-ANCS REVIEW - MEDICAL NECESSITY DENIAL  Phone: [phone]  Fax: [phone]  Email: [email or web address]     TYPE OF REQUEST:  Inpatient Status     ADMISSION INFORMATION:  ADMISSION DATE/TIME: 8/25/22  8:41 PM  PATIENT DIAGNOSIS CODE/DESCRIPTION:  Chest pain [R07 9]  DISCHARGE DATE/TIME: No discharge date for patient encounter  IMPORTANT INFORMATION:  Please contact Jelena Peters directly with any questions or concerns regarding this request  Department voicemails are confidential     Send requests for admission clinical reviews, concurrent reviews, approvals, and administrative denials due to lack of clinical to fax [phone]

## 2022-08-27 NOTE — ASSESSMENT & PLAN NOTE
· CHARBEL 4  · Initially presented to [facility] with chest pain, currently resolved  Noted with rising troponins  · Seen by Cardiology at [address] [place] [address], advised transferred to this hospital for cardiac catheterization  · Status post cardiac catheterization on 08/26 with  100 % instent occlusion of the Ramus branch status post PCI angioplasty  Diffuse 70 -80 % in stent restenosis in OM1    Status post angioplasty  · Currently patient asymptomatic  · Continue ASA, beta-blocker, Brilinta, statin  · Cleared by cardiology for discharge home today

## 2022-08-27 NOTE — DISCHARGE SUMMARY
[address] [facility]  Discharge- Maggi Ga 1970, 47 y o  male MRN: [id number]  Unit/Bed#: [facility] 408-01 Encounter: [id number]  Primary Care Provider: Johan Newton DO   Date and time admitted to hospital: 8/25/2022  8:41 PM    * Chest pain  Assessment & Plan  · CHARBEL 4  · Initially presented to [facility] with chest pain, currently resolved  Noted with rising troponins  · Seen by Cardiology at [facility], advised transferred to this hospital for cardiac catheterization  · Status post cardiac catheterization on 08/26 with  100 % instent occlusion of the Ramus branch status post PCI angioplasty  Diffuse 70 -80 % in stent restenosis in OM1    Status post angioplasty  · Currently patient asymptomatic  · Continue ASA, beta-blocker, Brilinta, statin  · Cleared by cardiology for discharge home today    Coronary artery disease involving native coronary artery  Assessment & Plan  · CAD with multiple MI and prior stenting   · Continue ASA, Brilinta, propranolol, losartan, Imdur, atorvastatin    Non-insulin dependent type 2 diabetes mellitus [facility])  Assessment & Plan  Lab Results   Component Value Date    HGBA1C 7 4 (H) 08/24/2022       Recent Labs     08/26/22  1810 08/26/22  2042 08/27/22  0534 08/27/22  1035   POCGLU 103 158* 168* 176*       Blood Sugar Average: Last 72 hrs:  (P) 147   · Continue correctional insulin with Accu-Cheks   · Initiate hypoglycemia protocol  · Resume oral meds after discharge    Gastroesophageal reflux disease  Assessment & Plan  · Continue PPI    Essential hypertension  Assessment & Plan  · Blood pressure elevated on presentation  · Continue home meds  · Monitor    Medical Problems             Resolved Problems  Date Reviewed: 8/27/2022   None               Discharging Physician / Practitioner: Taran Veronica DO  PCP: Johan Newton DO  Admission Date:   Admission Orders (From admission, onward)     Ordered        08/25/22 2046  Inpatient Admission  Once Discharge Date: 08/27/22    Consultations During Hospital Stay:  · Cardiology    Procedures Performed:   · Cardiac catheterization  100 % instent occlusion of the Ramus branch - with ultrasound guidance, optimal PCI was performed with balloon angioplasty in the entire stented area  CHARBEL 3 flow was re established  Residual diffuse 20 % disease    Diffuse 70 -80 % in stent restenosis in OM1  The previous placed stents were 2 5 mm 2nd 2 25 mm  The vessel was measured and 3 0 mm and POBA was performed on the entire stented area with a 3 0 NC balloon with an excellent angiographic result  Cardiac echo with EF 45%  Significant Findings / Test Results:   · As above    Incidental Findings:   · none    Test Results Pending at Discharge (will require follow up):   · none     Outpatient Tests Requested:  · none    Complications:  none    Reason for Admission:   Chest pain  CAD    Hospital Course:   Zoya Harper is a 47 y o  male patient who originally presented to the hospital on 8/25/2022 due to chest pain  Patient was transferred for cardiac catheterization  Patient initially presented to the [address] [place] [address] with recurrent chest pain  Has a past medical history significant for CAD s/p multiple stents, hypertension, hyperlipidemia, type 2 diabetes on oral hypoglycemics, GERD  Describes his chest pain as substernal but radiating into right throat, worsened with ambulation and similar to pain need to prior cardiac catheterization, somewhat alleviated with rest and fully with nitroglycerin  Workup during admission SL carbon was noted with rising troponins however EKG not markedly changed from prior  He was seen by Cardiology, who advised transferred here for repeat cardiac catheterization      Patient was transferred to [address] [place] [address] where he underwent cardiac catheterization without complication  Results as above    Currently is asymptomatic, cleared by Cardiology to be discharged home today to follow up with his PCP and Cardiology as an outpatient      Please see above list of diagnoses and related plan for additional information  Condition at Discharge: stable    Discharge Day Visit / Exam:   Subjective:    Patient is comfortable in bed  No chest pain or shortness breast  Tolerating oral diet  Anxious to go home  Vitals: Blood Pressure: 143/79 (08/27/22 1100)  Pulse: 68 (08/27/22 1100)  Temperature: 98 1 °F (36 7 °C) (08/27/22 1100)  Temp Source: Oral (08/27/22 1100)  Respirations: 15 (08/27/22 1100)  Height: 5' 8" (172 7 cm) (08/27/22 0859)  Weight - Scale: 68 9 kg (152 lb) (08/27/22 0859)  SpO2: 97 % (08/27/22 1100)  Exam:   Physical Exam     Patient is awake alert oriented no acute distress  Comfortable in bed  Lung clear to auscultation bilateral  Heart positive S1-S2 no murmur  Abdomen soft nontender  Lower extremities no edema    Discussion with Family: Updated  (wife) at bedside  Discharge instructions/Information to patient and family:   See after visit summary for information provided to patient and family  Provisions for Follow-Up Care:  See after visit summary for information related to follow-up care and any pertinent home health orders  Disposition:   Home    Planned Readmission: no     Discharge Statement:  I spent 40  minutes discharging the patient  This time was spent on the day of discharge  I had direct contact with the patient on the day of discharge  Greater than 50% of the total time was spent examining patient, answering all patient questions, arranging and discussing plan of care with patient as well as directly providing post-discharge instructions  Additional time then spent on discharge activities  Discharge Medications:  See after visit summary for reconciled discharge medications provided to patient and/or family        **Please Note: This note may have been constructed using a voice recognition system**

## 2022-08-29 NOTE — UTILIZATION REVIEW
Notification of Discharge   This is a Notification of Discharge from our facility [address]  Please be advised that this patient has been discharge from our facility  Below you will find the admission and discharge date and time including the patients disposition  UTILIZATION REVIEW CONTACT:  Bryce So  Utilization   Network Utilization Review Department  Phone: [phone] x carefully listen to the prompts  All voicemails are confidential   Email: [email or web address]     PHYSICIAN ADVISORY SERVICES:  FOR [id number] REVIEW - MEDICAL NECESSITY DENIAL  Phone: [phone]  Fax: [phone]  Email: [email or web address]     PRESENTATION DATE: 8/25/2022  8:41 PM  OBERVATION ADMISSION DATE:   INPATIENT ADMISSION DATE: 8/25/22  8:41 PM   DISCHARGE DATE: 8/27/2022  2:53 PM  DISPOSITION: Home/Self Care Home/Self Care      IMPORTANT INFORMATION:  Send all requests for admission clinical reviews, approved or denied determinations and any other requests to dedicated fax number below belonging to the campus where the patient is receiving treatment   List of dedicated fax numbers:  [address] DENIALS (Administrative/Medical Necessity) [phone]   [address] (Maternity/NICU/Pediatrics) [phone]   Yessica Salcedo [phone]   130 [facility] [phone]   [address] [place] [address] [phone]   2000 [facility] [address] [place] [address],[address] [phone]   [facility]  [phone]   [address] [place] [address], [address]  2401 [facility][note 3] 1000 [facility] [phone]

## 2022-09-06 ENCOUNTER — OFFICE VISIT (OUTPATIENT)
Dept: CARDIOLOGY CLINIC | Facility: MEDICAL CENTER | Age: 52
End: 2022-09-06
Payer: COMMERCIAL

## 2022-09-06 VITALS
HEIGHT: 68 IN | BODY MASS INDEX: 28.49 KG/M2 | DIASTOLIC BLOOD PRESSURE: 70 MMHG | SYSTOLIC BLOOD PRESSURE: 122 MMHG | OXYGEN SATURATION: 98 % | WEIGHT: 188 LBS | HEART RATE: 82 BPM

## 2022-09-06 DIAGNOSIS — R00.2 PALPITATIONS: ICD-10-CM

## 2022-09-06 DIAGNOSIS — I25.118 CORONARY ARTERY DISEASE OF NATIVE ARTERY OF NATIVE HEART WITH STABLE ANGINA PECTORIS (HCC): ICD-10-CM

## 2022-09-06 DIAGNOSIS — Z95.5 PRESENCE OF DRUG COATED STENT IN LEFT CIRCUMFLEX CORONARY ARTERY: ICD-10-CM

## 2022-09-06 DIAGNOSIS — I10 ESSENTIAL HYPERTENSION: ICD-10-CM

## 2022-09-06 DIAGNOSIS — Z98.61 HISTORY OF PTCA 2: ICD-10-CM

## 2022-09-06 DIAGNOSIS — I49.3 PVC (PREMATURE VENTRICULAR CONTRACTION): Primary | ICD-10-CM

## 2022-09-06 DIAGNOSIS — E78.2 MIXED HYPERLIPIDEMIA: ICD-10-CM

## 2022-09-06 PROCEDURE — 99214 OFFICE O/P EST MOD 30 MIN: CPT | Performed by: INTERNAL MEDICINE

## 2022-09-06 NOTE — PROGRESS NOTES
Cardiology Consultation     Han Daly  [id number]  1970  [facility] [address] [address] [phone]    1  PVC (premature ventricular contraction)     2  Essential hypertension     3  Coronary artery disease of native artery of native heart with stable angina pectoris ([address] )     4  Mixed hyperlipidemia     5  History of PTCA 2     6  Presence of drug coated stent in left circumflex coronary artery     7  Palpitations       Chief Complaint   Patient presents with    Follow-up     hosp f/u for NSTEMI     HPI: Patient is here for management of cardiac issues  He had 4 prior MIs resulting in multiple stent placements  He had an MI in May 2019 with a late in-stent thrombosis in the ramus requiring placement of 2 BEVERLY  Now with another NSTEMI in Aug 2022 s/p PCI and angioplasty to R1 and OM1 at sites of prior stents that had re-stenosed  Continues with palpitations, but has noted some improvement of symptoms with change from Toprol to propranolol  No reported chest pain, shortness of breath, lightheadedness, syncope, LE edema, orthopnea, PND, or significant weight changes  Patient remains active without any increased fatigue out of the ordinary             Patient Active Problem List   Diagnosis    Coronary artery disease involving native coronary artery    Essential hypertension    Mixed hyperlipidemia    Chest pain    PVC (premature ventricular contraction)    Palpitations    Chronic bilateral low back pain without sciatica    Lumbar degenerative disc disease    Myofascial pain syndrome    Sacroiliitis (HCC)    Presence of drug coated stent in left circumflex coronary artery    Chronic pain syndrome    Lumbar radiculopathy    Complex regional pain syndrome type 1    Gastroesophageal reflux disease    Gastroparesis    History of PTCA 2    Major depressive disorder, recurrent ([facility] )    Nicotine dependence    Radiculitis, thoracic    Displacement of thoracic intervertebral disc    Elevated troponin I level    Hypokalemia    Non-insulin dependent type 2 diabetes mellitus (HCC)     Past Medical History:   Diagnosis Date    Arthritis     Coronary artery disease     MI x 4; cardiac cath with 4 stents    GERD (gastroesophageal reflux disease)     Hypertension     Myocardial infarct (HCC)     4 MIs as of 2/24/2020    Non-insulin dependent type 2 diabetes mellitus ([facility] ) 8/24/2022     Social History     Socioeconomic History    Marital status: /Civil Union     Spouse name: Not on file    Number of children: Not on file    Years of education: Not on file    Highest education level: Not on file   Occupational History    Not on file   Tobacco Use    Smoking status: Former Smoker     Packs/day: 1 00     Years: 10 00     Pack years: 10 00     Types: Cigarettes     Start date: 2/24/1998     Quit date: 11/24/2018     Years since quitting: 3 7    Smokeless tobacco: Never Used   Vaping Use    Vaping Use: Never used   Substance and Sexual Activity    Alcohol use: Never    Drug use: Never    Sexual activity: Yes     Partners: Female     Birth control/protection: None   Other Topics Concern    Not on file   Social History Narrative    Not on file     Social Determinants of Health     Financial Resource Strain: Not on file   Food Insecurity: No Food Insecurity    Worried About Running Out of Food in the Last Year: Never true    Li of Food in the Last Year: Never true   Transportation Needs: Not on file   Physical Activity: Not on file   Stress: Not on file   Social Connections: Not on file   Intimate Partner Violence: Not on file   Housing Stability: [address] Unable to Pay for Housing in the Last Year: No    Number of [place] in the Last Year: 1    Unstable Housing in the Last Year: No      Family History   Problem Relation Age of Onset    Heart disease Father     Hyperlipidemia Father     Diabetes Sister     Early death Sister     Diabetes Brother     Diabetes Maternal Grandmother      Past Surgical History:   Procedure Laterality Date    CARDIAC CATHETERIZATION  1/7/2022    Procedure: Cardiac catheterization;  Surgeon: Sudha Worthy DO;  [facility]: [facility]; Service: Cardiology    CARDIAC CATHETERIZATION N/A 8/26/2022    Procedure: Cardiac catheterization;  Surgeon: Marilyn Posada MD;  [facility]; Service: Cardiology    CARDIAC CATHETERIZATION N/A 8/26/2022    Procedure: Cardiac pci;  Surgeon: Marilyn Posada MD;  [facility]; Service: Cardiology    CARDIAC CATHETERIZATION Left 8/26/2022    Procedure: Cardiac Left Heart Cath;  Surgeon: Marilyn Posada MD;  [facility]; Service: Cardiology    CARDIAC CATHETERIZATION N/A 8/26/2022    Procedure: Cardiac Coronary Angiogram;  Surgeon: Marilyn Posada MD;  [facility];   Service: Cardiology    CORONARY ANGIOPLASTY WITH STENT PLACEMENT         Current Outpatient Medications:     aspirin 81 MG tablet, Take 81 mg by mouth daily  , Disp: , Rfl:     atorvastatin (LIPITOR) 40 mg tablet, Take 1 tablet (40 mg total) by mouth every 24 hours, Disp: 90 tablet, Rfl: 1    Brilinta 90 MG, TAKE ONE TABLET BY MOUTH EVERY 12 HOURS, Disp: 180 tablet, Rfl: 0    DULoxetine (CYMBALTA) 60 mg delayed release capsule, Take 60 mg by mouth daily, Disp: , Rfl:     gabapentin (NEURONTIN) 300 mg capsule, 1 PO QHS x 4 days, then 1 PO BID x 4 days, then 1 PO TID, Disp: 90 capsule, Rfl: 1    isosorbide mononitrate (IMDUR) 60 mg 24 hr tablet, Take 1 tablet (60 mg total) by mouth daily, Disp: 90 tablet, Rfl: 1    Jardiance 10 MG TABS, Take 10 mg by mouth in the morning, Disp: , Rfl:     losartan (COZAAR) 100 MG tablet, Take 1 tablet (100 mg total) by mouth every 24 hours, Disp: 30 tablet, Rfl: 3    metFORMIN (GLUCOPHAGE) 500 mg tablet, Take 500 mg by mouth 2 (two) times a day with meals, Disp: , Rfl:     nitroglycerin (NITROSTAT) 0 4 mg SL tablet, as directed, Disp: , Rfl:     Omega-3 Fatty Acids (OMEGA 3 PO), Take 2,000 mg by mouth 2 (two) times a day, Disp: , Rfl:     pantoprazole (PROTONIX) 40 mg tablet, Take 40 mg by mouth daily, Disp: , Rfl: 3    propranolol (INDERAL LA) 160 mg, Take 1 capsule (160 mg total) by mouth daily, Disp: 30 capsule, Rfl: 3  No Known Allergies  Vitals:    09/06/22 1052   BP: 122/70   BP Location: Left arm   Patient Position: Sitting   Cuff Size: Adult   Pulse: 82   SpO2: 98%   Weight: 85 3 kg (188 lb)   Height: 5' 8" (1 727 m)       Labs:  Admission on 08/25/2022, Discharged on 08/27/2022   Component Date Value    POC Glucose 08/25/2022 188 (A)    POC Glucose 08/26/2022 124     Sodium 08/26/2022 139     Potassium 08/26/2022 3 5     Chloride 08/26/2022 108     CO2 08/26/2022 27     ANION GAP 08/26/2022 4     BUN 08/26/2022 15     Creatinine 08/26/2022 0 83     Glucose 08/26/2022 124     Calcium 08/26/2022 8 8     eGFR 08/26/2022 101     POC Glucose 08/26/2022 112     Activated Clotting Time,* 08/26/2022 302 (A)    Specimen Type 08/26/2022 VENOUS     Activated Clotting Time,* 08/26/2022 188 (A)    Specimen Type 08/26/2022 VENOUS     POC Glucose 08/26/2022 103     LA size 08/27/2022 3 6     Aortic valve mean veloci* 08/27/2022 9 00     Triscuspid Valve Regurgi* 08/27/2022 30 0     Tricuspid valve peak reg* 08/27/2022 2 73     LVPWd 08/27/2022 1 10     Left Atrium Area-systoli* 08/27/2022 20 1     Left Atrium Area-systoli* 08/27/2022 16 2     MV E' Tissue Velocity Se* 08/27/2022 7     MV E' Tissue Velocity La* 08/27/2022 8     RA 2D Volume 08/27/2022 43 0     TR Peak Michael 08/27/2022 2 7     IVSd 08/27/2022 3 61     LV DIASTOLIC VOLUME (MOD* 86/44/7406 85     LEFT VENTRICLE SYSTOLIC * 31/81/2167 35     Left ventricular stroke * 08/27/2022 50 00     EF 08/27/2022 50     A4C EF 08/27/2022 52     LA length (A2C) 08/27/2022 5 60     LVIDd 08/27/2022 4 30     IVS 08/27/2022 1 2     LVIDS 08/27/2022 3 00     FS 08/27/2022 30     Asc Ao 08/27/2022 3 4     Ao root 08/27/2022 4 00     RVID d 08/27/2022 3 5     LVOT mn grad 08/27/2022 2 0     AV mean gradient 08/27/2022 4     AV LVOT peak gradient 08/27/2022 4     MV valve area p 1/2 meth* 08/27/2022 3 01     E wave deceleration time 08/27/2022 251     LVOT peak michael 08/27/2022 0 95     LVOT peak VTI 08/27/2022 17 71     Aortic valve peak veloci* 08/27/2022 1 43     Ao VTI 08/27/2022 22 09     AV peak gradient 08/27/2022 8     MV Peak E Michael 08/27/2022 61     MV Peak A Michael 08/27/2022 0 38     LV Systolic Volume (BP) 21/36/7479 58     LV Diastolic Volume (BP) 43/44/7192 116     RAA A4C 08/27/2022 16 5     MV stenosis pressure 1/2* 08/27/2022 73     LVSV, 2D 08/27/2022 50     Dimensionless velociy in* 08/27/2022 0 66     LV EF 08/27/2022 45     POC Glucose 08/26/2022 158 (A)    WBC 08/27/2022 8 52     RBC 08/27/2022 4 70     Hemoglobin 08/27/2022 13 6     Hematocrit 08/27/2022 42 1     MCV 08/27/2022 90     MCH 08/27/2022 28 9     MCHC 08/27/2022 32 3     RDW 08/27/2022 14 1     Platelets 62/20/5010 170     MPV 08/27/2022 10 7     Sodium 08/27/2022 137     Potassium 08/27/2022 3 3 (A)    Chloride 08/27/2022 107     CO2 08/27/2022 24     ANION GAP 08/27/2022 6     BUN 08/27/2022 15     Creatinine 08/27/2022 0 81     Glucose 08/27/2022 172 (A)    Calcium 08/27/2022 9 0     eGFR 08/27/2022 102     POC Glucose 08/27/2022 168 (A)    POC Glucose 08/27/2022 176 (A)   Admission on 08/24/2022, Discharged on 08/25/2022   Component Date Value    WBC 08/24/2022 7 83     RBC 08/24/2022 4 12     Hemoglobin 08/24/2022 12 4     Hematocrit 08/24/2022 37 5     MCV 08/24/2022 91     [address] 08/24/2022 30 1     MCHC 08/24/2022 33 1     RDW 08/24/2022 13 9     MPV 08/24/2022 9 8     Platelets 50/10/8651 153  nRBC 08/24/2022 0     Neutrophils Relative 08/24/2022 69     Immat GRANS % 08/24/2022 0     Lymphocytes Relative 08/24/2022 20     Monocytes Relative 08/24/2022 6     Eosinophils Relative 08/24/2022 4     Basophils Relative 08/24/2022 1     Neutrophils Absolute 08/24/2022 5 37     Immature Grans Absolute 08/24/2022 0 03     Lymphocytes Absolute 08/24/2022 1 53     Monocytes Absolute 08/24/2022 0 50     Eosinophils Absolute 08/24/2022 0 34     Basophils Absolute 08/24/2022 0 06     Sodium 08/24/2022 138     Potassium 08/24/2022 3 3 (A)    Chloride 08/24/2022 106     CO2 08/24/2022 25     ANION GAP 08/24/2022 7     BUN 08/24/2022 12     Creatinine 08/24/2022 0 84     Glucose 08/24/2022 194 (A)    Calcium 08/24/2022 9 0     AST 08/24/2022 15     ALT 08/24/2022 19     Alkaline Phosphatase 08/24/2022 108 (A)    Total Protein 08/24/2022 5 9 (A)    Albumin 08/24/2022 3 8     Total Bilirubin 08/24/2022 0 43     eGFR 08/24/2022 101     Lipase 08/24/2022 50     Magnesium 08/24/2022 1 9     hs TnI 0hr 08/24/2022 22     hs TnI 2hr 08/24/2022 44     Delta 2hr hsTnI 08/24/2022 22 (A)    hs TnI 4hr 08/24/2022 95 (A)    Delta 4hr hsTnI 08/24/2022 73 (A)    Hemoglobin A1C 08/24/2022 7 4 (A)    EAG 08/24/2022 166     POC Glucose 08/24/2022 122     Ventricular Rate 08/24/2022 55     Atrial Rate 08/24/2022 55     AZ Interval 08/24/2022 166     QRSD Interval 08/24/2022 96     QT Interval 08/24/2022 434     QTC Interval 08/24/2022 415     P Axis 08/24/2022 55     QRS Axis 08/24/2022 87     T Wave Axis 08/24/2022 104     Ventricular Rate 08/24/2022 62     Atrial Rate 08/24/2022 62     AZ Interval 08/24/2022 166     QRSD Interval 08/24/2022 96     QT Interval 08/24/2022 398     QTC Interval 08/24/2022 403     P Axis 08/24/2022 54     QRS Axis 08/24/2022 85     T Wave Axis 08/24/2022 102     POC Glucose 08/24/2022 112     POC Glucose 08/24/2022 131     POC Glucose 08/24/2022 188 (A)    POC Glucose 08/25/2022 135     POC Glucose 08/25/2022 160 (A)    POC Glucose 08/25/2022 148 (A)     Lab Results   Component Value Date    TRIG 247 (H) 01/18/2022    HDL 34 (L) 01/18/2022     Imaging: No results found  Review of Systems:  Review of Systems   Constitutional: Negative for activity change, appetite change, fatigue and fever  HENT: Negative for nosebleeds and sore throat  Eyes: Negative for photophobia and visual disturbance  Respiratory: Negative for cough, chest tightness, shortness of breath and wheezing  Cardiovascular: Positive for palpitations  Negative for chest pain and leg swelling  Gastrointestinal: Negative for abdominal pain, diarrhea, nausea and vomiting  Endocrine: Negative for polyuria  Genitourinary: Negative for dysuria, frequency and hematuria  Musculoskeletal: Negative for arthralgias, back pain and gait problem  Skin: Negative for pallor and rash  Neurological: Negative for dizziness, syncope, speech difficulty and light-headedness  Hematological: Does not bruise/bleed easily  Psychiatric/Behavioral: Negative for agitation, behavioral problems and confusion  Physical Exam:  Physical Exam  Vitals reviewed  Constitutional:       General: He is not in acute distress  Appearance: He is well-developed  He is not diaphoretic  HENT:      Head: Normocephalic and atraumatic  Nose: Nose normal    Eyes:      General: No scleral icterus  Pupils: Pupils are equal, round, and reactive to light  Neck:      Vascular: No JVD  Cardiovascular:      Rate and Rhythm: Normal rate and regular rhythm  Heart sounds: S1 normal and S2 normal  Heart sounds not distant  No murmur heard  No systolic murmur is present  No friction rub  No gallop  No S3 sounds  Pulmonary:      Effort: Pulmonary effort is normal  No respiratory distress  Breath sounds: Normal breath sounds  No wheezing or rales     Abdominal:      General: Bowel sounds are normal  There is no distension  Palpations: Abdomen is soft  Musculoskeletal:         General: No deformity  Cervical back: Normal range of motion and neck supple  Skin:     General: Skin is warm and dry  Findings: No erythema  Neurological:      Mental Status: He is alert and oriented to person, place, and time  Cranial Nerves: No cranial nerve deficit  Psychiatric:         Behavior: Behavior normal        Blood pressure 122/70, pulse 82, height 5' 8" (1 727 m), weight 85 3 kg (188 lb), SpO2 98 %  EKG:  Normal sinus rhythm  Nonspecific T wave abnormality  Abnormal ECG    Discussion/Summary:  Chest pain: with underlying significant premature CAD and strong family history of CAD  With residual disease in LAD and RCA as well as per patient report  With stent placement in the setting of unstable angina in May 2019 - multiple episodes of in stent thrombosis/restenosis and continued chest pains even with minimal exertion - he has been on Brilinta for over a year and has instent restenosis with this  We referred for repeat cardiac cath and potential CABG - which did reveal disease, but not quite ready for CABG at this time - mild LAD atherosclerosis, 30% pLAD, 75% D2, 50% OM1 with occluded subbranch, 0% pR1 at site of prior stent, 70% mRCA at site of prior stent with eccentric lesion 70% PLV1  We also added Imdur to help with symptom management in the meantime  Continued on ASA, Brilinta, and ARB - we stopped amlodipine since BP is running on low end  There did appear to be a reason for having an ARB with the reported EF of 40-45%  We checked an echo to evaluate LV EF and wall motion as well - which revealed a normal LV function with no wall motion abnormalities  With continued chest pain requiring nitro use, we increased Imdur to 30mg twice daily - which had helped with chest pain  He completed cardiac rehab to help with conditioning and improving fatigue level    For improved control of chest pain and fatigue, we changed Coreg to metoprolol for less alpha-blockade and more beta-specific blockade  He had increased chest pain, sounds like stable angina that seems to be increasing - we increased Imdur to 60mg to see if that will help with chest pain  With increased palpitations as well, we checked another Holter - seems like these episodes feel longer than his PVCs- this revealed common ventricular ectopy (5 4%) with brief runs (longest 4 beats in duration) - changed BB to Toprol XL to help with suppression - which has not helped symptoms of palpitations too much - increased to 100mg, and was stable  Since his symptoms have not improved, we repeated cardiac cath revealing Mid LAD 40% stenosis, 1st diagonal branch vessel small 100% stenosis lesion chronically occluded  Second diagonal branch 90% stenosis vessel small  Ramus intermedius 40% stenosis lesion was previously treated using a stent of unknown type  Left circumflex 2nd obtuse marginal branch  80% stenosis lesion is diffuse  Right coronary artery proximal % stenosis lesion is chronically occluded previously treated using a stent of unknown type  Right posterior descending artery filled by collaterals from the distal LAD  2nd obtuse marginal branch balloon angioplasty x2 with drug-eluting stent successfully placed,  Resolute  Luis Rx drug-eluting stent  Angioplasty with Resolute Luis Rx drug-eluting stent 0% residual stenosis  CP has not improved with stent placement - sometimes associated with PVCs - we referred to [facility] for possible PVC ablation - they recommended changing Toprol to propranolol for improved suppression  Now s/p PCI to 100% in stent occlusion of R1 and angioplasty to in stent restenosis of OM1  HTN: relatively well controlled  Will continue Imdur and B-blocker for CAD symptom management  We stopped amlodipine in order to allow addition of higher dose of Imdur    Increased Toprol to 100mg at prior visit - now changed to propranolol  HLD: continued on statin  LDL 24 in Feb 2020 - reduced Lipitor to 40mg, perhaps if there is a component of musculoskeletal pain, this will help it  Recheck levels in 3 months after the change in dosing revealed LDL of 78, which is at goal, and improved symptoms  Recheck in June 2021 revealed LDL of 47 and 70 in Jan 2022  Frequent PVCs: as above, perhaps triggered by ischemic disease  Will continue metoprolol for the time being - with frequency of ectopy, changed metoprolol to Toprol XL BID, which he was tolerating - but still extremely symptomatic, so referred to [facility] for possible ablation - they recommended changing to propranolol

## 2022-11-21 DIAGNOSIS — R07.9 CHEST PAIN, UNSPECIFIED TYPE: ICD-10-CM

## 2022-11-21 DIAGNOSIS — E78.2 MIXED HYPERLIPIDEMIA: ICD-10-CM

## 2022-11-21 DIAGNOSIS — I25.118 CORONARY ARTERY DISEASE OF NATIVE ARTERY OF NATIVE HEART WITH STABLE ANGINA PECTORIS (HCC): ICD-10-CM

## 2022-11-21 RX ORDER — ISOSORBIDE MONONITRATE 60 MG/1
TABLET, EXTENDED RELEASE ORAL
Qty: 90 TABLET | Refills: 0 | Status: SHIPPED | OUTPATIENT
Start: 2022-11-21

## 2022-11-21 RX ORDER — TICAGRELOR 90 MG/1
TABLET ORAL
Qty: 180 TABLET | Refills: 0 | Status: SHIPPED | OUTPATIENT
Start: 2022-11-21 | End: 2022-11-22 | Stop reason: SDUPTHER

## 2022-11-21 RX ORDER — ATORVASTATIN CALCIUM 40 MG/1
TABLET, FILM COATED ORAL
Qty: 90 TABLET | Refills: 0 | Status: SHIPPED | OUTPATIENT
Start: 2022-11-21

## 2022-11-22 DIAGNOSIS — I25.118 CORONARY ARTERY DISEASE OF NATIVE ARTERY OF NATIVE HEART WITH STABLE ANGINA PECTORIS (HCC): ICD-10-CM

## 2022-12-29 NOTE — PROGRESS NOTES
HEART AND VASCULAR  CARDIAC [address]    Outpatient New Consult    Today's Date: 04/11/22        Patient name: Jay Hale  YOB: 1970  Sex: male         Chief Complaint: Referred by Dr Ofelia Hassan foR PVCs      ASSESSMENT:  Problem List Items Addressed This Visit        Cardiovascular and Mediastinum    PVC (premature ventricular contraction)    Relevant Medications    propranolol (INDERAL LA) 160 mg    Other Relevant Orders    POCT ECG          46 yo male  1) PVC's 5% burden, multiofocal morphologies on holter,  Some couples  Symptomatic feels heart stop/skip beats  Daily  Started after last stent  On Toprol XL 100mg bid  No 12 lead available showing PVC morphology  2) CAD h/o MI's last 2019 and in stent thrombus of ramus, had 2 BEVERLY placed on brilinta, asa, metop, imdur  NO angnia      PLAN:  1  Very difficult to ablate PVC's of relatively low burden and also multiple moprhologies, will try different betablocker to see if helps, Propranolol 160mg daily to replace metoprolol  If this doesn't work we could try ablation but since fairly benign rhythm reassurance and hopefully avoid procedure  Orders Placed This Encounter   Procedures    POCT ECG     Medications Discontinued During This Encounter   Medication Reason    metoprolol succinate (TOPROL-XL) 100 mg 24 hr tablet     propranolol (INDERAL LA) 160 mg Reorder             HPI/Subjective:  46 yo male  1) PVC's 5% burden, multiofocal morphologies on holter,  Some couples  Symptomatic feels heart stop/skip beats  Daily  Started after last stent  On Toprol XL 100mg bid  No 12 lead available showing PVC morphology  Normal EF  2) CAD h/o MI's last 2019 and in stent thrombus of ramus, had 2 BEVERLY placed on brilinta, asa, metop, imdur   NO angnia    Fiorella Torres is doing well , he feels daily Heart palpations & weakness symptoms mostly at rest at night of heart skipping beats, , no CP/SOB/syncope  Has had it for past 2 years since last MI  Please note HPI is listed by problem with with update following it, it is copied again in the assessment above and reflects medical decision making as well  Complete 12 point ROS reviewed and otherwise non pertinent or negative except as per HPI pertinent positives in Cardiovascular and Respiratory emphasized  Please see paper chart for outpatient clinic patients where the patient completed the 12 point ROS survey  Past Medical History:   Diagnosis Date    Arthritis     Coronary artery disease     MI x 4; cardiac cath with 4 stents    GERD (gastroesophageal reflux disease)     Hypertension     Myocardial infarct ([facility] [address] )     4 MIs as of 2/24/2020       No Known Allergies  I reviewed the Home Medication list and Allergies in the chart     Scheduled Meds:  Current Outpatient Medications   Medication Sig Dispense Refill    aspirin (ASPIRIN LOW DOSE) 81 MG tablet Take 81 mg by mouth daily        atorvastatin (LIPITOR) 40 mg tablet Take 1 tablet (40 mg total) by mouth every 24 hours 90 tablet 1    DULoxetine (CYMBALTA) 60 mg delayed release capsule Take 60 mg by mouth daily      isosorbide mononitrate (IMDUR) 60 mg 24 hr tablet Take 1 tablet (60 mg total) by mouth daily 90 tablet 1    Jardiance 10 MG TABS Take 10 mg by mouth in the morning      losartan (COZAAR) 100 MG tablet Take 1 tablet (100 mg total) by mouth every 24 hours 30 tablet 3    Omega-3 Fatty Acids (OMEGA 3 PO) Take 2,000 mg by mouth 2 (two) times a day      pantoprazole (PROTONIX) 40 mg tablet Take 40 mg by mouth daily  3    ticagrelor (Brilinta) 90 MG Take 1 tablet (90 mg total) by mouth every 12 (twelve) hours 180 tablet 3    gabapentin (NEURONTIN) 300 mg capsule 1 PO QHS x 4 days, then 1 PO BID x 4 days, then 1 PO TID (Patient not taking: Reported on 4/11/2022 ) 90 capsule 1    metFORMIN (GLUCOPHAGE) 500 mg tablet Take 500 mg by mouth 2 (two) times a day with meals (Patient not taking: Reported on 3/24/2022 )      nitroglycerin (NITROSTAT) 0 4 mg SL tablet as directed (Patient not taking: Reported on 12/17/2021)      propranolol (INDERAL LA) 160 mg Take 1 capsule (160 mg total) by mouth daily 30 capsule 3     No current facility-administered medications for this visit       PRN Meds:         Family History   Problem Relation Age of Onset    Heart disease Father     Hyperlipidemia Father     Diabetes Sister     Early death Sister     Diabetes Brother     Diabetes Maternal Grandmother        Social History     Socioeconomic History    Marital status: /Civil Union     Spouse name: Not on file    Number of children: Not on file    Years of education: Not on file    Highest education level: Not on file   Occupational History    Not on file   Tobacco Use    Smoking status: Former Smoker     Packs/day: 1 00     Years: 10 00     Pack years: 10 00     Types: Cigarettes     Start date: 2/24/1998     Quit date: 11/24/2018     Years since quitting: 3 3    Smokeless tobacco: Never Used   Vaping Use    Vaping Use: Never used   Substance and Sexual Activity    Alcohol use: Never    Drug use: Never    Sexual activity: Yes     Partners: Female     Birth control/protection: None   Other Topics Concern    Not on file   Social History Narrative    Not on file     Social Determinants of Health     Financial Resource Strain: Not on file   Food Insecurity: Not on file   Transportation Needs: Not on file   Physical Activity: Not on file   Stress: Not on file   Social Connections: Not on file   Intimate Partner Violence: Not on file   Housing Stability: Not on file       OBJECTIVE:    /90 (BP Location: Right arm, Patient Position: Sitting, Cuff Size: Large)   Pulse 81   Ht 5' 8" (1 727 m)   Wt 88 5 kg (195 lb)   BMI 29 65 kg/m²   Vitals:    04/11/22 1408   Weight: 88 5 kg (195 lb) GEN: No acute distress, Alert and oriented, well appearing  HEENT:External ears normal, oral pharynx clear, mucous membranes moist  EYES: Pupils equal, sclera anicteric, midline, normal conjuctiva  NECK: No JVD, supple, no obvious masses or thryomegaly or goiter  CARDIOVASCULAR: RRR, No murmur, rub, gallops S1,S2  LUNGS: Clear To auscultation bilaterally, normal effort, no rales, rhonchi, crackles   ABDOMEN:  nondistended,  without obvious organomegaly or ascites  EXTREMITIES/VASCULAR:  No edema  warm an well perfused  PSYCH: Normal Affect,  linear speech pattern without evidence of psychosis  NEURO: Grossly intact, moving all extremiteis equal, face symmetric, alert and responsive, no obvious focal defecits   GAIT:  Ambulates normally without difficulty  HEME: No bleeding, bruising, petechia, purpura   SKIN: No significant rashes on visibile skin, warm, no diaphoresis or pallor  Lab Results:       LABS:      Chemistry        Component Value Date/Time    K 4 3 01/18/2022 0705     01/18/2022 0705    CO2 29 01/18/2022 0705    BUN 12 01/18/2022 0705    CREATININE 0 92 01/18/2022 0705        Component Value Date/Time    CALCIUM 9 5 01/18/2022 0705    ALKPHOS 131 (H) 01/18/2022 0705    AST 15 01/18/2022 0705    ALT 25 01/18/2022 0705            No results found for: CHOL  Lab Results   Component Value Date    HDL 34 (L) 01/18/2022    HDL 29 (L) 06/11/2021    HDL 36 (L) 10/13/2020     Lab Results   Component Value Date    LDLCALC 70 01/18/2022    LDLCALC 47 06/11/2021    LDLCALC 78 10/13/2020     Lab Results   Component Value Date    TRIG 247 (H) 01/18/2022    TRIG 257 (H) 06/11/2021    TRIG 123 10/13/2020     No results found for: CHOLHDL    IMAGING: No results found       Cardiac testing:   Results for orders placed during the hospital encounter of 07/26/19    Echo complete with contrast if indicated    [address] [address]    Transthoracic Echocardiogram  2D, M-mode, Doppler, and Color Doppler    Study date:  2019    Patient: Kamran Raymond  MR number: [id number]  Account number: [de-identified]  : 1970  Age: 51 years  Gender: Male  Status: Outpatient  Location: [facility]  Height: 68 in  Weight: 168 7 lb  BP: 120/ 60 mmHg    Indications: Chest Pain    Diagnoses: R07 9 - Chest pain, unspecified    Sonographer:  Anjel Mendoza RDCS  Primary Physician:  John Tam DO  Referring Physician:  Ronda Thomas MD  Group:  [id number]  Interpreting Physician: MATTHIAS Sibley     SUMMARY    LEFT VENTRICLE:  Systolic function was normal  Ejection fraction was estimated to be 60 %  There were no regional wall motion abnormalities  RIGHT VENTRICLE:  Systolic function was normal     MITRAL VALVE:  There was trace regurgitation  AORTIC VALVE:  There was trace regurgitation  TRICUSPID VALVE:  There was trace regurgitation  PULMONIC VALVE:  There was trace regurgitation  PERICARDIUM:  There was no pericardial effusion  PROCEDURE: The study was performed in the [facility]  This was a routine study  The transthoracic approach was used  The study included complete 2D imaging, M-mode, complete spectral Doppler, and color Doppler  The  heart rate was 73 bpm, at the start of the study  Images were obtained from the parasternal, apical, subcostal, and suprasternal notch acoustic windows  Image quality was adequate  LEFT VENTRICLE: Size was normal  Systolic function was normal  Ejection fraction was estimated to be 60 %  There were no regional wall motion abnormalities  Wall thickness was normal  No evidence of apical thrombus   DOPPLER: Left  ventricular diastolic function parameters were normal     RIGHT VENTRICLE: The size was normal  Systolic function was normal  Wall thickness was normal     LEFT ATRIUM: Size was normal     RIGHT ATRIUM: Size was normal     MITRAL VALVE: Valve structure was normal  There was normal leaflet separation  DOPPLER: The transmitral velocity was within the normal range  There was no evidence for stenosis  There was trace regurgitation  AORTIC VALVE: The valve was trileaflet  Leaflets exhibited normal thickness and normal cuspal separation  DOPPLER: Transaortic velocity was within the normal range  There was no evidence for stenosis  There was trace regurgitation  TRICUSPID VALVE: The valve structure was normal  There was normal leaflet separation  DOPPLER: The transtricuspid velocity was within the normal range  There was no evidence for stenosis  There was trace regurgitation  PULMONIC VALVE: Leaflets exhibited normal thickness, no calcification, and normal cuspal separation  DOPPLER: The transpulmonic velocity was within the normal range  There was trace regurgitation  PERICARDIUM: There was no pericardial effusion  The pericardium was normal in appearance  AORTA: The root exhibited normal size  SYSTEMIC VEINS: IVC: The inferior vena cava was normal in size  SYSTEM MEASUREMENT TABLES    2D  %FS: 44 93 %  Ao Diam: 3 39 cm  EDV(Teich): 125 16 ml  EF(Cube): 83 3 %  EF(Teich): 75 94 %  ESV(Cube): 22 47 ml  ESV(Teich): 30 12 ml  IVSd: 1 03 cm  LA Area: 18 27 cm2  LA Diam: 3 85 cm  LVIDd: 5 12 cm  LVIDs: 2 82 cm  LVPWd: 1 01 cm  RA Area: 20 57 cm2  RVOT Diam: 3 26 cm  SI(Cube): 58 98 ml/m2  SI(Teich): 50 02 ml/m2  SV(Cube): 112 07 ml  SV(Teich): 95 05 ml    CW  AV Env  Ti: 277 26 ms  AV VTI: 26 86 cm  AV Vmax: 1 47 m/s  AV Vmean: 0 97 m/s  AV maxP 64 mmHg  AV meanP 47 mmHg  PV Vmax: 1 09 m/s  PV maxP 78 mmHg  TR Vmax: 2 36 m/s  TR maxP 32 mmHg    MM  TAPSE: 1 99 cm    PW  LVOT Env  Ti: 351 2 ms  LVOT VTI: 28 62 cm  LVOT Vmax: 1 17 m/s  LVOT Vmean: 0 81 m/s  LVOT maxP 51 mmHg  LVOT meanPG: 3 02 mmHg  MV A Michael: 0 65 m/s  MV Dec Clay: 4 31 m/s2  MV DecT: 214 19 ms  MV E Michael: 0 92 m/s  MV E/A Ratio: 1 44  PVA (Vmax): 6 54 cm2  RVOT Vmax: 0 86 m/s  RVOT maxP 95 mmHg  TV A Michael: 0 6 m/s  TV Dec McKean: 3 01 m/s2  TV Dec Time: 241 58 ms  TV E Michael: 0 73 m/s  TV E/A Ratio: 1 2    [facility]    Prepared and electronically signed by    MATTHIAS Horton  Signed 2019 14:07:22    No results found for this or any previous visit  No results found for this or any previous visit  No results found for this or any previous visit            I reviewed and interpreted the following LABS/EKG/TELE/IMAGING and below is summary of my interpretation (if data available):      Current EKG and Rhythm Strip: NSR normal EKG     HOLTER/EVENT Monitor:Reviewed strips, unifocal PVC's, variable morpholgoies some couplets up to 4 beat nsvt

## 2023-03-03 DIAGNOSIS — I25.118 CORONARY ARTERY DISEASE OF NATIVE ARTERY OF NATIVE HEART WITH STABLE ANGINA PECTORIS (HCC): ICD-10-CM

## 2023-03-03 DIAGNOSIS — R07.9 CHEST PAIN, UNSPECIFIED TYPE: ICD-10-CM

## 2023-03-03 DIAGNOSIS — E78.2 MIXED HYPERLIPIDEMIA: ICD-10-CM

## 2023-03-03 RX ORDER — ISOSORBIDE MONONITRATE 60 MG/1
TABLET, EXTENDED RELEASE ORAL
Qty: 90 TABLET | Refills: 0 | Status: SHIPPED | OUTPATIENT
Start: 2023-03-03

## 2023-03-03 RX ORDER — ATORVASTATIN CALCIUM 40 MG/1
TABLET, FILM COATED ORAL
Qty: 90 TABLET | Refills: 0 | Status: SHIPPED | OUTPATIENT
Start: 2023-03-03

## 2023-03-10 ENCOUNTER — APPOINTMENT (OUTPATIENT)
Dept: RADIOLOGY | Facility: CLINIC | Age: 53
End: 2023-03-10

## 2023-03-10 ENCOUNTER — OFFICE VISIT (OUTPATIENT)
Dept: PAIN MEDICINE | Facility: CLINIC | Age: 53
End: 2023-03-10

## 2023-03-10 VITALS
DIASTOLIC BLOOD PRESSURE: 81 MMHG | HEIGHT: 68 IN | BODY MASS INDEX: 29.4 KG/M2 | SYSTOLIC BLOOD PRESSURE: 143 MMHG | WEIGHT: 194 LBS | HEART RATE: 71 BPM

## 2023-03-10 DIAGNOSIS — M51.36 LUMBAR DEGENERATIVE DISC DISEASE: ICD-10-CM

## 2023-03-10 DIAGNOSIS — M47.816 LUMBAR SPONDYLOSIS: ICD-10-CM

## 2023-03-10 DIAGNOSIS — G89.29 CHRONIC BILATERAL LOW BACK PAIN WITHOUT SCIATICA: ICD-10-CM

## 2023-03-10 DIAGNOSIS — M46.1 SACROILIITIS (HCC): ICD-10-CM

## 2023-03-10 DIAGNOSIS — G89.4 CHRONIC PAIN SYNDROME: Primary | ICD-10-CM

## 2023-03-10 DIAGNOSIS — M54.50 CHRONIC BILATERAL LOW BACK PAIN WITHOUT SCIATICA: ICD-10-CM

## 2023-03-10 DIAGNOSIS — G89.4 CHRONIC PAIN SYNDROME: ICD-10-CM

## 2023-03-10 RX ORDER — METHYLPREDNISOLONE 4 MG/1
TABLET ORAL
Qty: 1 EACH | Refills: 0 | Status: SHIPPED | OUTPATIENT
Start: 2023-03-10

## 2023-03-10 RX ORDER — METHYLPREDNISOLONE 4 MG/1
TABLET ORAL
Qty: 1 EACH | Refills: 0 | Status: SHIPPED | OUTPATIENT
Start: 2023-03-10 | End: 2023-03-10 | Stop reason: SDUPTHER

## 2023-03-10 NOTE — PROGRESS NOTES
Assessment:  1  Chronic pain syndrome    2  Chronic bilateral low back pain without sciatica    3  Lumbar degenerative disc disease    4  Lumbar spondylosis    5  Sacroiliitis ([address] )        Plan:  While the patient was in the office today, I did have a thorough conversation regarding their chronic pain syndrome, medication management, and treatment plan options  Patient is being seen for follow-up visit  He was last seen here on 3/11/2022 at which time he was referred for neurosurgical evaluation  Evaluated by [facility]  Surgery was not recommended  Dr Karl Cline did suggest that he may benefit from right L4-5 epidural steroid injection  Patient previously underwent sacroiliac joint injections which provided him with up to 90% improvement for 1 week  He underwent an L5-S1 interlaminar epidural steroid injection which failed to provide him with any relief  At this point, patient is complaining of low back pain which is nonradiating  Patient states that he does movement in his back when sitting or adjusting positions  As such, we will order a flexion-extension x-ray of the lumbar spine to rule out instability  We will also order x-rays of the sacroiliac joints  Patient has been on multiple anti-inflammatories in the past without relief  He tried Voltaren gel without relief  He was previously trialed on gabapentin which was not effective  He previously participated in physical therapy and states that he does exercises at home for core strengthening on a regular basis  He sees a chiropractor weekly  I discussed with the patient that at this point time since I feel that there is a significant inflammatory component to their pain symptoms, that they would benefit from a titrating dose of oral steroids over the next 7 days  I advised the patient that while on the steroids, they should not take any other oral NSAIDs except for acetaminophen or Tylenol until they have completed the steroid taper   I also advised them that once they have completed the steroid taper, they are to give our office a follow up phone call to let us know how they are doing and if there is any improvement  The patient was agreeable and verbalized an understanding  The patient will follow-up in 2 weeks for reevaluation  The patient was advised to contact the office should their symptoms worsen in the interim  The patient was agreeable and verbalized an understanding  History of Present Illness: The patient is a 47 y o  male who presents for a follow up office visit in regards to Back Pain  The patient’s current symptoms include plaints of low back pain, nonradiating  Current pain level is an 8/10  Quality pain is described as dull and aching  Current pain medications includes: Cymbalta 60 mg daily as prescribed by his PCP  The patient reports that this regimen is providing 0% pain relief  The patient is reporting no side effects from this pain medication regimen  I have personally reviewed and/or updated the patient's past medical history, past surgical history, family history, social history, current medications, allergies, and vital signs today  Review of Systems  Review of Systems   Constitutional: Negative for unexpected weight change  HENT: Negative for hearing loss  Eyes: Negative for visual disturbance  Respiratory: Negative for shortness of breath  Cardiovascular: Negative for leg swelling  Gastrointestinal: Negative for constipation  Endocrine: Negative for polyuria  Genitourinary: Negative for difficulty urinating  Musculoskeletal: Positive for gait problem  Negative for joint swelling and myalgias  Skin: Negative for rash  Neurological: Negative for weakness and headaches  Psychiatric/Behavioral: Negative for decreased concentration  All other systems reviewed and are negative          Past Medical History:   Diagnosis Date   • Arthritis    • Coronary artery disease     MI x 4; cardiac cath with 4 stents   • GERD (gastroesophageal reflux disease)    • Hypertension    • Myocardial infarct ([facility] [address] )     4 MIs as of 2020   • Non-insulin dependent type 2 diabetes mellitus ([facility] [address] ) 2022       Past Surgical History:   Procedure Laterality Date   • CARDIAC CATHETERIZATION  2022    Procedure: Cardiac catheterization;  Surgeon: Merline Shaw, DO;  Location: [facility]ARD[facility]ATH [facility]; Service: Cardiology   • CARDIAC CATHETERIZATION N/A 2022    Procedure: Cardiac catheterization;  Surgeon: Cecilia Raya MD;  Location: [facility]ARDI[facility]; Service: Cardiology   • CARDIAC CATHETERIZATION N/A 2022    Procedure: Cardiac pci;  Surgeon: Cecilia Raya MD;  Location: [facility]ARD[facility]; Service: Cardiology   • CARDIAC CATHETERIZATION Left 2022    Procedure: Cardiac Left Heart Cath;  Surgeon: Cecilia Raya MD;  Location: [facility]ARD[facility]; Service: Cardiology   • CARDIAC CATHETERIZATION N/A 2022    Procedure: Cardiac Coronary Angiogram;  Surgeon: Cecilia Raya MD;  Location: [facility]ARDI[facility] CATH [facility];   Service: Cardiology   • CORONARY ANGIOPLASTY WITH STENT PLACEMENT         Family History   Problem Relation Age of Onset   • Heart disease Father    • Hyperlipidemia Father    • Diabetes Sister    • Early death Sister    • Diabetes Brother    • Diabetes Maternal Grandmother        Social History     Occupational History   • Not on file   Tobacco Use   • Smoking status: Former     Packs/day: 1 00     Years: 10 00     Pack years: 10 00     Types: Cigarettes     Start date: 1998     Quit date: 2018     Years since quittin 2   • Smokeless tobacco: Never   Vaping Use   • Vaping Use: Never used   Substance and Sexual Activity   • Alcohol use: Never   • Drug use: Never   • Sexual activity: Yes     Partners: Female     Birth control/protection: None         Current Outpatient Medications:   •  aspirin 81 MG tablet, Take 81 mg by mouth daily  , Disp: , Rfl:   • atorvastatin (LIPITOR) 40 mg tablet, TAKE ONE TABLET BY MOUTH EVERY 24 HOURS, Disp: 90 tablet, Rfl: 0  •  DULoxetine (CYMBALTA) 60 mg delayed release capsule, Take 60 mg by mouth daily, Disp: , Rfl:   •  isosorbide mononitrate (IMDUR) 60 mg 24 hr tablet, TAKE ONE TABLET BY MOUTH EVERY DAY, Disp: 90 tablet, Rfl: 0  •  Jardiance 10 MG TABS, Take 10 mg by mouth in the morning, Disp: , Rfl:   •  losartan (COZAAR) 100 MG tablet, Take 1 tablet (100 mg total) by mouth every 24 hours, Disp: 30 tablet, Rfl: 3  •  metFORMIN (GLUCOPHAGE) 500 mg tablet, Take 500 mg by mouth 2 (two) times a day with meals, Disp: , Rfl:   •  methylPREDNISolone 4 MG tablet therapy pack, Use as directed on package, Disp: 1 each, Rfl: 0  •  nitroglycerin (NITROSTAT) 0 4 mg SL tablet, as directed, Disp: , Rfl:   •  Omega-3 Fatty Acids (OMEGA 3 PO), Take 2,000 mg by mouth 2 (two) times a day, Disp: , Rfl:   •  pantoprazole (PROTONIX) 40 mg tablet, Take 40 mg by mouth daily, Disp: , Rfl: 3  •  propranolol (INDERAL LA) 160 mg, Take 1 capsule (160 mg total) by mouth daily, Disp: 30 capsule, Rfl: 3  •  ticagrelor (Brilinta) 90 MG, Take 1 tablet (90 mg total) by mouth every 12 (twelve) hours, Disp: 180 tablet, Rfl: 3    No Known Allergies    Physical Exam:    /81   Pulse 71   Ht 5' 8" (1 727 m)   Wt 88 kg (194 lb)   BMI 29 50 kg/m²     Constitutional:normal, well developed, well nourished, alert, in no distress and non-toxic and no overt pain behavior  Eyes:anicteric  HEENT:grossly intact  Neck:supple, symmetric, trachea midline and no masses   Pulmonary:even and unlabored  Cardiovascular:No edema or pitting edema present  Skin:Normal without rashes or lesions and well hydrated  Psychiatric:Mood and affect appropriate  Neurologic:Cranial Nerves II-XII grossly intact  Musculoskeletal:Range of motion of the lumbar spine  Increased pain with flexion and to a lesser extent extension    There is tenderness over the left sacroiliac joint and over the sacrum      Imaging  No orders to display       Orders Placed This Encounter   Procedures   • X-ray lumbar spine complete with bending   • XR sacroiliac joints 3+ views

## 2023-03-16 ENCOUNTER — TELEPHONE (OUTPATIENT)
Dept: PAIN MEDICINE | Facility: MEDICAL CENTER | Age: 53
End: 2023-03-16

## 2023-03-16 NOTE — TELEPHONE ENCOUNTER
Caller: patient    Doctor: Jadiel Villagomez    Reason for call: returning missed call    Call back#:

## 2023-03-16 NOTE — TELEPHONE ENCOUNTER
----- Message from Rex Marks, [address] sent at 3/16/2023 12:19 PM EDT -----  Please call patient and let him know that recent flexion extension x-ray of the lumbar spine did not reveal instability on flexion or extension views  Multilevel degenerative changes of the lumbar spine were noted  Bilateral SI joint x-ray was unremarkable

## 2023-03-24 ENCOUNTER — OFFICE VISIT (OUTPATIENT)
Dept: PAIN MEDICINE | Facility: CLINIC | Age: 53
End: 2023-03-24

## 2023-03-24 VITALS
HEIGHT: 68 IN | HEART RATE: 79 BPM | SYSTOLIC BLOOD PRESSURE: 102 MMHG | WEIGHT: 194 LBS | BODY MASS INDEX: 29.4 KG/M2 | DIASTOLIC BLOOD PRESSURE: 65 MMHG

## 2023-03-24 DIAGNOSIS — M47.816 LUMBAR SPONDYLOSIS: ICD-10-CM

## 2023-03-24 DIAGNOSIS — G89.29 CHRONIC BILATERAL LOW BACK PAIN WITHOUT SCIATICA: ICD-10-CM

## 2023-03-24 DIAGNOSIS — M46.1 SACROILIITIS (HCC): ICD-10-CM

## 2023-03-24 DIAGNOSIS — G89.4 CHRONIC PAIN SYNDROME: Primary | ICD-10-CM

## 2023-03-24 DIAGNOSIS — M54.50 CHRONIC BILATERAL LOW BACK PAIN WITHOUT SCIATICA: ICD-10-CM

## 2023-03-24 DIAGNOSIS — M79.18 MYOFASCIAL PAIN SYNDROME: ICD-10-CM

## 2023-03-24 RX ORDER — VENLAFAXINE HYDROCHLORIDE 75 MG/1
CAPSULE, EXTENDED RELEASE ORAL
COMMUNITY
Start: 2023-03-03

## 2023-03-24 NOTE — PROGRESS NOTES
Assessment:  1  Chronic pain syndrome    2  Chronic bilateral low back pain without sciatica    3  Lumbar spondylosis    4  Sacroiliitis ([address] )    5  Myofascial pain syndrome        Plan:  While the patient was in the office today, I did have a thorough conversation regarding their chronic pain syndrome, medication management, and treatment plan options  Patient is being seen for a follow-up visit  He was last seen here on 3/10/2023 at which time flexion-extension x-rays of the lumbar spine were ordered as well as bilateral SI joint x-rays  Flexion-extension x-rays did not reveal instability on flexion or extension views  There is multilevel degenerative changes of the lumbar spine noted  Bilateral SI joint x-rays were unremarkable  X-ray results were reviewed with patient during today's visit  He continues with low back pain, nonradiating, left worse than right  Pain is always worse with sitting, especially driving  On examination, he demonstrates findings consistent with sacroiliitis, left worse than right  He previously underwent bilateral SI joint injections which provided him with up to 90% improvement  At this point, we will plan to repeat the bilateral SI joint injections  I provided him with a prescription for an SI belt  Continue chiropractic adjustments  Follow-up 1 month after the injection  History of Present Illness: The patient is a 47 y o  male who presents for a follow up office visit in regards to Back Pain  The patient’s current symptoms include complaints of low back pain, nonradiating, left worse than right  Current pain level is an 8/10  Quality pain is described as dull, aching, sharp  Current pain medications includes: None  I have personally reviewed and/or updated the patient's past medical history, past surgical history, family history, social history, current medications, allergies, and vital signs today           Review of Systems  Review of Systems Constitutional: Negative for unexpected weight change  HENT: Negative for hearing loss  Eyes: Negative for visual disturbance  Respiratory: Negative for shortness of breath  Cardiovascular: Negative for leg swelling  Gastrointestinal: Negative for constipation  Endocrine: Negative for polyuria  Genitourinary: Negative for difficulty urinating  Musculoskeletal: Negative for gait problem, joint swelling and myalgias  Skin: Negative for rash  Neurological: Negative for weakness and headaches  Psychiatric/Behavioral: Negative for decreased concentration  All other systems reviewed and are negative  Past Medical History:   Diagnosis Date   • Arthritis    • Coronary artery disease     MI x 4; cardiac cath with 4 stents   • GERD (gastroesophageal reflux disease)    • Hypertension    • Myocardial infarct ([facility] )     4 MIs as of 2/24/2020   • [facility] dependent [facility] ) 8/24/2022       Past Surgical History:   Procedure Laterality Date   • CARDIAC CATHETERIZATION  1/7/2022    Procedure: Cardiac catheterization;  Surgeon: Francesco Flores DO;  [facility]; Service: Cardiology   • CARDIAC CATHETERIZATION N/A 8/26/2022    Procedure: Cardiac catheterization;  Surgeon: Brendan Turner MD;  [facility]; Service: Cardiology   • CARDIAC CATHETERIZATION N/A 8/26/2022    Procedure: Cardiac pci;  Surgeon: Brendan Turner MD;  [facility]; Service: Cardiology   • CARDIAC CATHETERIZATION Left 8/26/2022    Procedure: Cardiac Left Heart Cath;  Surgeon: Brendan Turner MD;  [facility]; Service: Cardiology   • CARDIAC CATHETERIZATION N/A 8/26/2022    Procedure: Cardiac Coronary Angiogram;  Surgeon: Brendan Turner MD;  [facility];   Service: Cardiology   • CORONARY ANGIOPLASTY WITH STENT PLACEMENT         Family History   Problem Relation Age of Onset   • Heart disease Father    • Hyperlipidemia Father    • Diabetes Sister    • Early death Sister    • Diabetes Brother    • Diabetes Maternal Grandmother        Social History     Occupational History   • Not on file   Tobacco Use   • Smoking status: Former     Packs/day:  00     Years: 10 00     Pack years: 10 00     Types: Cigarettes     Start date: 1998     Quit date: 2018     Years since quittin 3   • Smokeless tobacco: Never   Vaping Use   • Vaping Use: Never used   Substance and Sexual Activity   • Alcohol use: Never   • Drug use: Never   • Sexual activity: Yes     Partners: Female     Birth control/protection: None         Current Outpatient Medications:   •  aspirin 81 MG tablet, Take 81 mg by mouth daily  , Disp: , Rfl:   •  atorvastatin (LIPITOR) 40 mg tablet, TAKE ONE TABLET BY MOUTH EVERY 24 HOURS, Disp: 90 tablet, Rfl: 0  •  DULoxetine (CYMBALTA) 60 mg delayed release capsule, Take 60 mg by mouth daily, Disp: , Rfl:   •  isosorbide mononitrate (IMDUR) 60 mg 24 hr tablet, TAKE ONE TABLET BY MOUTH EVERY DAY, Disp: 90 tablet, Rfl: 0  •  Jardiance 10 MG TABS, Take 10 mg by mouth in the morning, Disp: , Rfl:   •  losartan (COZAAR) 100 MG tablet, Take 1 tablet (100 mg total) by mouth every 24 hours, Disp: 30 tablet, Rfl: 3  •  metFORMIN (GLUCOPHAGE) 500 mg tablet, Take 500 mg by mouth 2 (two) times a day with meals, Disp: , Rfl:   •  methylPREDNISolone 4 MG tablet therapy pack, Use as directed on package, Disp: 1 each, Rfl: 0  •  nitroglycerin (NITROSTAT) 0 4 mg SL tablet, as directed, Disp: , Rfl:   •  Omega-3 Fatty Acids (OMEGA 3 PO), Take 2,000 mg by mouth 2 (two) times a day, Disp: , Rfl:   •  pantoprazole (PROTONIX) 40 mg tablet, Take 40 mg by mouth daily, Disp: , Rfl: 3  •  propranolol (INDERAL LA) 160 mg, Take 1 capsule (160 mg total) by mouth daily, Disp: 30 capsule, Rfl: 3  •  ticagrelor (Brilinta) 90 MG, Take 1 tablet (90 mg total) by mouth every 12 (twelve) hours, Disp: 180 tablet, Rfl: 3  •  venlafaxine (EFFEXOR-XR) 75 mg 24 hr capsule, , Disp: , Rfl:     No Known Allergies    Physical Exam:    /65   Pulse 79   Ht 5' 8" (1 727 m)   Wt 88 kg (194 lb)   BMI 29 50 kg/m²     Constitutional:normal, well developed, well nourished, alert, in no distress and non-toxic and no overt pain behavior  Eyes:anicteric  HEENT:grossly intact  Neck:supple, symmetric, trachea midline and no masses   Pulmonary:even and unlabored  Cardiovascular:No edema or pitting edema present  Skin:Normal without rashes or lesions and well hydrated  Psychiatric:Mood and affect appropriate  Neurologic:Cranial Nerves II-XII grossly intact  Musculoskeletal:Full range of motion of the lumbar spine without complaints of pain  There is tenderness over bilateral SI joints, left worse than right  Compression test, distraction test, Gaenslen's tests are positive bilaterally      Imaging  FL spine and pain procedure    (Results Pending)       Orders Placed This Encounter   Procedures   • Si Belt   • [place] spine and pain procedure

## 2023-03-24 NOTE — H&P (VIEW-ONLY)
Assessment:  1  Chronic pain syndrome    2  Chronic bilateral low back pain without sciatica    3  Lumbar spondylosis    4  Sacroiliitis ([address] )    5  Myofascial pain syndrome        Plan:  While the patient was in the office today, I did have a thorough conversation regarding their chronic pain syndrome, medication management, and treatment plan options  Patient is being seen for a follow-up visit  He was last seen here on 3/10/2023 at which time flexion-extension x-rays of the lumbar spine were ordered as well as bilateral SI joint x-rays  Flexion-extension x-rays did not reveal instability on flexion or extension views  There is multilevel degenerative changes of the lumbar spine noted  Bilateral SI joint x-rays were unremarkable  X-ray results were reviewed with patient during today's visit  He continues with low back pain, nonradiating, left worse than right  Pain is always worse with sitting, especially driving  On examination, he demonstrates findings consistent with sacroiliitis, left worse than right  He previously underwent bilateral SI joint injections which provided him with up to 90% improvement  At this point, we will plan to repeat the bilateral SI joint injections  I provided him with a prescription for an SI belt  Continue chiropractic adjustments  Follow-up 1 month after the injection  History of Present Illness: The patient is a 47 y o  male who presents for a follow up office visit in regards to Back Pain  The patient’s current symptoms include complaints of low back pain, nonradiating, left worse than right  Current pain level is an 8/10  Quality pain is described as dull, aching, sharp  Current pain medications includes: None  I have personally reviewed and/or updated the patient's past medical history, past surgical history, family history, social history, current medications, allergies, and vital signs today           Review of Systems  Review of Systems Constitutional: Negative for unexpected weight change  HENT: Negative for hearing loss  Eyes: Negative for visual disturbance  Respiratory: Negative for shortness of breath  Cardiovascular: Negative for leg swelling  Gastrointestinal: Negative for constipation  Endocrine: Negative for polyuria  Genitourinary: Negative for difficulty urinating  Musculoskeletal: Negative for gait problem, joint swelling and myalgias  Skin: Negative for rash  Neurological: Negative for weakness and headaches  Psychiatric/Behavioral: Negative for decreased concentration  All other systems reviewed and are negative  Past Medical History:   Diagnosis Date   • Arthritis    • Coronary artery disease     MI x 4; cardiac cath with 4 stents   • GERD (gastroesophageal reflux disease)    • Hypertension    • Myocardial infarct ([facility] [address] )     4 MIs as of 2/24/2020   • [facility] 8/24/2022       Past Surgical History:   Procedure Laterality Date   • CARDIAC CATHETERIZATION  1/7/2022    Procedure: Cardiac catheterization;  Surgeon: Lemuel Mcdonald DO;  [facility]; Service: Cardiology   • CARDIAC CATHETERIZATION N/A 8/26/2022    Procedure: Cardiac catheterization;  Surgeon: Sintia Calles MD;  [facility]; Service: Cardiology   • CARDIAC CATHETERIZATION N/A 8/26/2022    Procedure: Cardiac pci;  Surgeon: Sintia Calles MD;  [facility]; Service: Cardiology   • CARDIAC CATHETERIZATION Left 8/26/2022    Procedure: Cardiac Left Heart Cath;  Surgeon: Sintia Calles MD;  [facility]; Service: Cardiology   • CARDIAC CATHETERIZATION N/A 8/26/2022    Procedure: Cardiac Coronary Angiogram;  Surgeon: Sintia Calles MD;  [facility];   Service: Cardiology   • CORONARY ANGIOPLASTY WITH STENT PLACEMENT         Family History   Problem Relation Age of Onset   • Heart disease Father    • Hyperlipidemia Father    • Diabetes Sister    • Early death Sister    • Diabetes Brother    • Diabetes Maternal Grandmother        Social History     Occupational History   • Not on file   Tobacco Use   • Smoking status: Former     Packs/day:  00     Years: 10 00     Pack years: 10 00     Types: Cigarettes     Start date: 1998     Quit date: 2018     Years since quittin 3   • Smokeless tobacco: Never   Vaping Use   • Vaping Use: Never used   Substance and Sexual Activity   • Alcohol use: Never   • Drug use: Never   • Sexual activity: Yes     Partners: Female     Birth control/protection: None         Current Outpatient Medications:   •  aspirin 81 MG tablet, Take 81 mg by mouth daily  , Disp: , Rfl:   •  atorvastatin (LIPITOR) 40 mg tablet, TAKE ONE TABLET BY MOUTH EVERY 24 HOURS, Disp: 90 tablet, Rfl: 0  •  DULoxetine (CYMBALTA) 60 mg delayed release capsule, Take 60 mg by mouth daily, Disp: , Rfl:   •  isosorbide mononitrate (IMDUR) 60 mg 24 hr tablet, TAKE ONE TABLET BY MOUTH EVERY DAY, Disp: 90 tablet, Rfl: 0  •  Jardiance 10 MG TABS, Take 10 mg by mouth in the morning, Disp: , Rfl:   •  losartan (COZAAR) 100 MG tablet, Take 1 tablet (100 mg total) by mouth every 24 hours, Disp: 30 tablet, Rfl: 3  •  metFORMIN (GLUCOPHAGE) 500 mg tablet, Take 500 mg by mouth 2 (two) times a day with meals, Disp: , Rfl:   •  methylPREDNISolone 4 MG tablet therapy pack, Use as directed on package, Disp: 1 each, Rfl: 0  •  nitroglycerin (NITROSTAT) 0 4 mg SL tablet, as directed, Disp: , Rfl:   •  Omega-3 Fatty Acids (OMEGA 3 PO), Take 2,000 mg by mouth 2 (two) times a day, Disp: , Rfl:   •  pantoprazole (PROTONIX) 40 mg tablet, Take 40 mg by mouth daily, Disp: , Rfl: 3  •  propranolol (INDERAL LA) 160 mg, Take 1 capsule (160 mg total) by mouth daily, Disp: 30 capsule, Rfl: 3  •  ticagrelor (Brilinta) 90 MG, Take 1 tablet (90 mg total) by mouth every 12 (twelve) hours, Disp: 180 tablet, Rfl: 3  •  venlafaxine "(EFFEXOR-XR) 75 mg 24 hr capsule, , Disp: , Rfl:     No Known Allergies    Physical Exam:    /65   Pulse 79   Ht 5' 8\" (1 727 m)   Wt 88 kg (194 lb)   BMI 29 50 kg/m²     Constitutional:normal, well developed, well nourished, alert, in no distress and non-toxic and no overt pain behavior  Eyes:anicteric  HEENT:grossly intact  Neck:supple, symmetric, trachea midline and no masses   Pulmonary:even and unlabored  Cardiovascular:No edema or pitting edema present  Skin:Normal without rashes or lesions and well hydrated  Psychiatric:Mood and affect appropriate  Neurologic:Cranial Nerves II-XII grossly intact  Musculoskeletal:Full range of motion of the lumbar spine without complaints of pain  There is tenderness over bilateral SI joints, left worse than right  Compression test, distraction test, Gaenslen's tests are positive bilaterally      Imaging  FL spine and pain procedure    (Results Pending)       Orders Placed This Encounter   Procedures   • Si Belt   • FL spine and pain procedure       "

## 2023-04-21 ENCOUNTER — HOSPITAL ENCOUNTER (OUTPATIENT)
Dept: RADIOLOGY | Facility: HOSPITAL | Age: 53
Discharge: HOME | End: 2023-04-21

## 2023-04-21 VITALS
SYSTOLIC BLOOD PRESSURE: 97 MMHG | HEART RATE: 79 BPM | TEMPERATURE: 97.4 F | RESPIRATION RATE: 20 BRPM | DIASTOLIC BLOOD PRESSURE: 61 MMHG | OXYGEN SATURATION: 97 %

## 2023-04-21 DIAGNOSIS — G89.29 CHRONIC BILATERAL LOW BACK PAIN WITHOUT SCIATICA: ICD-10-CM

## 2023-04-21 DIAGNOSIS — M54.50 CHRONIC BILATERAL LOW BACK PAIN WITHOUT SCIATICA: ICD-10-CM

## 2023-04-21 DIAGNOSIS — G89.4 CHRONIC PAIN SYNDROME: ICD-10-CM

## 2023-04-21 RX ORDER — BUPIVACAINE HCL/PF 2.5 MG/ML
2 VIAL (ML) INJECTION ONCE
Status: COMPLETED | OUTPATIENT
Start: 2023-04-21 | End: 2023-04-21

## 2023-04-21 RX ORDER — LIDOCAINE HYDROCHLORIDE 10 MG/ML
5 INJECTION, SOLUTION EPIDURAL; INFILTRATION; INTRACAUDAL; PERINEURAL ONCE
Status: COMPLETED | OUTPATIENT
Start: 2023-04-21 | End: 2023-04-21

## 2023-04-21 RX ORDER — METHYLPREDNISOLONE ACETATE 40 MG/ML
80 INJECTION, SUSPENSION INTRA-ARTICULAR; INTRALESIONAL; INTRAMUSCULAR; PARENTERAL; SOFT TISSUE ONCE
Status: COMPLETED | OUTPATIENT
Start: 2023-04-21 | End: 2023-04-21

## 2023-04-21 RX ADMIN — METHYLPREDNISOLONE ACETATE 80 MG: 40 INJECTION, SUSPENSION INTRA-ARTICULAR; INTRALESIONAL; INTRAMUSCULAR; PARENTERAL; SOFT TISSUE at 09:57

## 2023-04-21 RX ADMIN — Medication 2 ML: at 09:57

## 2023-04-21 RX ADMIN — IOHEXOL 2 ML: 300 INJECTION, SOLUTION INTRAVENOUS at 09:57

## 2023-04-21 RX ADMIN — LIDOCAINE HYDROCHLORIDE 5 ML: 10 INJECTION, SOLUTION EPIDURAL; INFILTRATION; INTRACAUDAL; PERINEURAL at 09:56

## 2023-04-21 NOTE — DISCHARGE INSTRUCTIONS
Steroid Joint Injection   WHAT YOU NEED TO KNOW:   A steroid joint injection is a procedure to inject steroid medicine into a joint  Steroid medicine decreases pain and inflammation  The injection may also contain an anesthetic (numbing medicine) to decrease pain  It may be done to treat conditions such as arthritis, gout, or carpal tunnel syndrome  The injections may be given in your knee, ankle, shoulder, elbow, wrist, ankle or sacroiliac joint  Do not apply heat to any area that is numb  If you have discomfort or soreness at the injection site, you may apply ice today, 20 minutes on and 20 minutes off  Tomorrow you may use ice or warm, moist heat  Do not apply ice or heat directly to the skin  You may have an increase or change in the discomfort for 36-48 hours after your treatment  Apply ice and continue with any pain medicine you have been prescribed  Do not do anything strenuous today  You may shower, but no tub baths or hot tubs today  You may resume your normal activities tomorrow, but do not “overdo it”  Resume normal activities slowly when you are feeling better  If you experience redness, drainage or swelling at the injection site, or if you develop a fever above 100 degrees, please call [facility] at [phone] or go to the Emergency Room  Continue to take all routine medicines prescribed by your primary care physician unless otherwise instructed by our staff  Most blood thinners should be started again according to your regularly scheduled dosing  If you have any questions, please give our office a call  As no general anesthesia was used in today's procedure, you should not experience any side effects related to anesthesia  If you are diabetic, the steroids used in today's injection may temporarily increase your blood sugar levels after the first few days after your injection   Please keep a close eye on your sugars and alert the doctor who manages your diabetes if your sugars are significantly high from your baseline or you are symptomatic  If you have a problem specifically related to your procedure, please call our office at [phone]  Problems not related to your procedure should be directed to your primary care physician

## 2023-04-21 NOTE — INTERVAL H&P NOTE
Update: (This section must be completed if the H&P was completed greater than 24 hrs to procedure or admission)    H&P reviewed  After examining the patient, I find no changed to the H&P since it had been written  Patient re-evaluated   Accept as history and physical     Dony Maciel MD/April 21, 2023/9:46 AM

## 2023-04-28 ENCOUNTER — TELEPHONE (OUTPATIENT)
Dept: PAIN MEDICINE | Facility: CLINIC | Age: 53
End: 2023-04-28

## 2023-05-24 ENCOUNTER — OFFICE VISIT (OUTPATIENT)
Dept: PAIN MEDICINE | Facility: CLINIC | Age: 53
End: 2023-05-24

## 2023-05-24 VITALS
DIASTOLIC BLOOD PRESSURE: 88 MMHG | HEIGHT: 68 IN | HEART RATE: 66 BPM | BODY MASS INDEX: 29.25 KG/M2 | WEIGHT: 193 LBS | SYSTOLIC BLOOD PRESSURE: 150 MMHG

## 2023-05-24 DIAGNOSIS — G89.29 CHRONIC BILATERAL LOW BACK PAIN WITHOUT SCIATICA: ICD-10-CM

## 2023-05-24 DIAGNOSIS — M47.816 LUMBAR SPONDYLOSIS: ICD-10-CM

## 2023-05-24 DIAGNOSIS — M54.50 CHRONIC BILATERAL LOW BACK PAIN WITHOUT SCIATICA: ICD-10-CM

## 2023-05-24 DIAGNOSIS — M79.18 MYOFASCIAL PAIN SYNDROME: ICD-10-CM

## 2023-05-24 DIAGNOSIS — G89.4 CHRONIC PAIN SYNDROME: Primary | ICD-10-CM

## 2023-05-24 DIAGNOSIS — M46.1 SACROILIITIS (HCC): ICD-10-CM

## 2023-05-24 NOTE — PROGRESS NOTES
Assessment:  1  Chronic pain syndrome    2  Chronic bilateral low back pain without sciatica    3  Lumbar spondylosis    4  Sacroiliitis ([address] )    5  Myofascial pain syndrome        Plan:  While the patient was in the office today, I did have a thorough conversation regarding their chronic pain syndrome, medication management, and treatment plan options  Patient is being seen for a follow-up visit  He recently underwent bilateral SI joint injections on 4/21/2023  His pain has improved by about 90% overall  He has very little pain with most activities except with ambulation  He states that pain still increases with ambulation, but quickly improves if he sits down  We will repeat bilateral SI joint injections as needed  Previously prescribed an SI belt for patient  He states that he never received a call about the belt  We will resubmit the order for DME person  Continue chiropractic care as needed    Follow-up in 6 weeks  History of Present Illness: The patient is a 47 y o  male who presents for a follow up office visit in regards to Back Pain  The patient’s current symptoms include complaints of low back pain, predominantly left-sided  Current pain level is a 2/10  Quality pain is described as sharp  Current pain medications includes: None  I have personally reviewed and/or updated the patient's past medical history, past surgical history, family history, social history, current medications, allergies, and vital signs today  Review of Systems  Review of Systems   Constitutional: Negative for unexpected weight change  HENT: Negative for hearing loss  Eyes: Negative for visual disturbance  Respiratory: Negative for shortness of breath  Cardiovascular: Negative for leg swelling  Gastrointestinal: Negative for constipation  Endocrine: Negative for polyuria  Genitourinary: Negative for difficulty urinating  Musculoskeletal: Positive for gait problem   Negative for joint swelling and myalgias  Skin: Negative for rash  Neurological: Negative for weakness and headaches  Psychiatric/Behavioral: Negative for decreased concentration  All other systems reviewed and are negative  Past Medical History:   Diagnosis Date   • Arthritis    • Coronary artery disease     MI x 4; cardiac cath with 4 stents   • GERD (gastroesophageal reflux disease)    • Hypertension    • Myocardial infarct ([facility] [address] )     4 MIs as of 2020   • [facility] 20[facility]       Past Surgical History:   Procedure Laterality Date   • CARDIAC CATHETERIZATION  2022    Procedure: Cardiac catheterization;  Surgeon: Jace Mayo DO;  [facility]; Service: Cardiology   • CARDIAC CATHETERIZATION N/A 2022    Procedure: Cardiac catheterization;  Surgeon: Farhan Reveles MD;  [facility]; Service: Cardiology   • CARDIAC CATHETERIZATION N/A 2022    Procedure: Cardiac pci;  Surgeon: Farhan Reveles MD;  [facility]: [facility]; Service: Cardiology   • CARDIAC CATHETERIZATION Left 2022    Procedure: Cardiac Left Heart Cath;  Surgeon: Farhan Reveles MD;  [facility]: [facility]; Service: Cardiology   • CARDIAC CATHETERIZATION N/A 2022    Procedure: Cardiac Coronary Angiogram;  Surgeon: Farhan Reveles MD;  [facility]: [facility];   Service: Cardiology   • CORONARY ANGIOPLASTY WITH STENT PLACEMENT         Family History   Problem Relation Age of Onset   • Heart disease Father    • Hyperlipidemia Father    • Diabetes Sister    • Early death Sister    • Diabetes Brother    • Diabetes Maternal Grandmother        Social History     Occupational History   • Not on file   Tobacco Use   • Smoking status: Former     Packs/day: 1 00     Years: 10 00     Total pack years: 10 00     Types: Cigarettes     Start date: 1998     Quit date: 2018     Years since quittin 4   • Smokeless "tobacco: Never   Vaping Use   • Vaping Use: Never used   Substance and Sexual Activity   • Alcohol use: Never   • Drug use: Never   • Sexual activity: Yes     Partners: Female     Birth control/protection: None         Current Outpatient Medications:   •  aspirin 81 MG tablet, Take 81 mg by mouth daily  , Disp: , Rfl:   •  atorvastatin (LIPITOR) 40 mg tablet, TAKE ONE TABLET BY MOUTH EVERY 24 HOURS, Disp: 90 tablet, Rfl: 0  •  DULoxetine (CYMBALTA) 60 mg delayed release capsule, Take 60 mg by mouth daily, Disp: , Rfl:   •  isosorbide mononitrate (IMDUR) 60 mg 24 hr tablet, TAKE ONE TABLET BY MOUTH EVERY DAY, Disp: 90 tablet, Rfl: 0  •  Jardiance 10 MG TABS, Take 10 mg by mouth in the morning, Disp: , Rfl:   •  losartan (COZAAR) 100 MG tablet, Take 1 tablet (100 mg total) by mouth every 24 hours, Disp: 30 tablet, Rfl: 3  •  metFORMIN (GLUCOPHAGE) 500 mg tablet, Take 500 mg by mouth 2 (two) times a day with meals, Disp: , Rfl:   •  methylPREDNISolone 4 MG tablet therapy pack, Use as directed on package, Disp: 1 each, Rfl: 0  •  nitroglycerin (NITROSTAT) 0 4 mg SL tablet, as directed, Disp: , Rfl:   •  Omega-3 Fatty Acids (OMEGA 3 PO), Take 2,000 mg by mouth 2 (two) times a day, Disp: , Rfl:   •  pantoprazole (PROTONIX) 40 mg tablet, Take 40 mg by mouth daily, Disp: , Rfl: 3  •  propranolol (INDERAL LA) 160 mg, Take 1 capsule (160 mg total) by mouth daily, Disp: 30 capsule, Rfl: 3  •  ticagrelor (Brilinta) 90 MG, Take 1 tablet (90 mg total) by mouth every 12 (twelve) hours, Disp: 180 tablet, Rfl: 3  •  venlafaxine (EFFEXOR-XR) 75 mg 24 hr capsule, , Disp: , Rfl:     No Known Allergies    Physical Exam:    /88   Pulse 66   Ht 5' 8\" (1 727 m)   Wt 87 5 kg (193 lb)   BMI 29 35 kg/m²     Constitutional:normal, well developed, well nourished, alert, in no distress and non-toxic and no overt pain behavior    Eyes:anicteric  HEENT:grossly intact  Neck:supple, symmetric, trachea midline and no masses   Pulmonary:even " and unlabored  Cardiovascular:No edema or pitting edema present  Skin:Normal without rashes or lesions and well hydrated  Psychiatric:Mood and affect appropriate  Neurologic:Cranial Nerves II-XII grossly intact  Musculoskeletal:normal    Imaging  No orders to display       No orders of the defined types were placed in this encounter

## 2023-06-05 DIAGNOSIS — R07.9 CHEST PAIN, UNSPECIFIED TYPE: ICD-10-CM

## 2023-06-05 DIAGNOSIS — E78.2 MIXED HYPERLIPIDEMIA: ICD-10-CM

## 2023-06-05 DIAGNOSIS — I25.118 CORONARY ARTERY DISEASE OF NATIVE ARTERY OF NATIVE HEART WITH STABLE ANGINA PECTORIS (HCC): ICD-10-CM

## 2023-06-05 RX ORDER — ISOSORBIDE MONONITRATE 60 MG/1
TABLET, EXTENDED RELEASE ORAL
Qty: 90 TABLET | Refills: 3 | Status: SHIPPED | OUTPATIENT
Start: 2023-06-05

## 2023-06-05 RX ORDER — ATORVASTATIN CALCIUM 40 MG/1
TABLET, FILM COATED ORAL
Qty: 90 TABLET | Refills: 3 | Status: SHIPPED | OUTPATIENT
Start: 2023-06-05

## 2023-07-05 ENCOUNTER — OFFICE VISIT (OUTPATIENT)
Dept: PAIN MEDICINE | Facility: CLINIC | Age: 53
End: 2023-07-05
Payer: COMMERCIAL

## 2023-07-05 VITALS
HEART RATE: 79 BPM | HEIGHT: 68 IN | WEIGHT: 188.4 LBS | SYSTOLIC BLOOD PRESSURE: 126 MMHG | BODY MASS INDEX: 28.55 KG/M2 | DIASTOLIC BLOOD PRESSURE: 84 MMHG

## 2023-07-05 DIAGNOSIS — G89.4 CHRONIC PAIN SYNDROME: Primary | ICD-10-CM

## 2023-07-05 DIAGNOSIS — M79.18 MYOFASCIAL PAIN SYNDROME: ICD-10-CM

## 2023-07-05 DIAGNOSIS — M54.16 LUMBAR RADICULOPATHY: ICD-10-CM

## 2023-07-05 DIAGNOSIS — M54.50 CHRONIC BILATERAL LOW BACK PAIN WITHOUT SCIATICA: ICD-10-CM

## 2023-07-05 DIAGNOSIS — M46.1 SACROILIITIS (HCC): ICD-10-CM

## 2023-07-05 DIAGNOSIS — G89.29 CHRONIC BILATERAL LOW BACK PAIN WITHOUT SCIATICA: ICD-10-CM

## 2023-07-05 DIAGNOSIS — M47.816 LUMBAR SPONDYLOSIS: ICD-10-CM

## 2023-07-05 DIAGNOSIS — M51.36 LUMBAR DEGENERATIVE DISC DISEASE: ICD-10-CM

## 2023-07-05 PROCEDURE — 99213 OFFICE O/P EST LOW 20 MIN: CPT | Performed by: NURSE PRACTITIONER

## 2023-07-05 NOTE — PROGRESS NOTES
Assessment:  1. Chronic pain syndrome    2. Chronic bilateral low back pain without sciatica    3. Lumbar degenerative disc disease    4. Lumbar radiculopathy    5. Lumbar spondylosis    6. Sacroiliitis ([address])    7. Myofascial pain syndrome        Plan:  While the patient was in the office today, I did have a thorough conversation regarding their chronic pain syndrome, medication management, and treatment plan options. Is being seen for a follow-up visit. Overall, his low back pain remains reasonably controlled following bilateral SI joint injections that were performed on 4/21/2023. We will repeat SI joint injections in the future if needed. Patient received the SI belt. He is not really sure if it is helpful. The patient will follow-up in 3 months for reevaluation. The patient was advised to contact the office should their symptoms worsen in the interim. The patient was agreeable and verbalized an understanding. History of Present Illness: The patient is a 47 y.o. male who presents for a follow up office visit in regards to Hip Pain. The patient’s current symptoms include complaints of low back pain. Current pain level is a 3/10. Quality pain is described as burning, dull, aching. Current pain medications includes: None. I have personally reviewed and/or updated the patient's past medical history, past surgical history, family history, social history, current medications, allergies, and vital signs today. Review of Systems  Review of Systems   Eyes: Negative for visual disturbance. Respiratory: Negative for shortness of breath and wheezing. Cardiovascular: Negative for chest pain and palpitations. Gastrointestinal: Negative for constipation and nausea. Endocrine: Negative for polydipsia. Musculoskeletal: Positive for gait problem. Negative for joint swelling and myalgias. Skin: Negative for rash. Neurological: Negative for dizziness and headaches. Psychiatric/Behavioral: Negative for decreased concentration. Past Medical History:   Diagnosis Date   • Arthritis    • Coronary artery disease     MI x 4; cardiac cath with 4 stents   • GERD (gastroesophageal reflux disease)    • Hypertension    • Myocardial infarct ([address])     4 MIs as of 2020   • Non-insulin dependent type 2 diabetes mellitus ([address]) 2022       Past Surgical History:   Procedure Laterality Date   • CARDIAC CATHETERIZATION  2022    Procedure: Cardiac catheterization;  Surgeon: Lalo Cano DO;  [facility]; Service: Cardiology   • CARDIAC CATHETERIZATION N/A 2022    Procedure: Cardiac catheterization;  Surgeon: Elda Gamino MD;  [facility]; Service: Cardiology   • CARDIAC CATHETERIZATION N/A 2022    Procedure: Cardiac pci;  Surgeon: Elda Gamino MD;  [facility]; Service: Cardiology   • CARDIAC CATHETERIZATION Left 2022    Procedure: Cardiac Left Heart Cath;  Surgeon: Elda Gamino MD;  [facility]; Service: Cardiology   • CARDIAC CATHETERIZATION N/A 2022    Procedure: Cardiac Coronary Angiogram;  Surgeon: Elda Gamino MD;  [facility];   Service: Cardiology   • CORONARY ANGIOPLASTY WITH STENT PLACEMENT         Family History   Problem Relation Age of Onset   • Heart disease Father    • Hyperlipidemia Father    • Diabetes Sister    • Early death Sister    • Diabetes Brother    • Diabetes Maternal Grandmother        Social History     Occupational History   • Not on file   Tobacco Use   • Smoking status: Former     Packs/day: 1.00     Years: 10.00     Total pack years: 10.00     Types: Cigarettes     Start date: 1998     Quit date: 2018     Years since quittin.6   • Smokeless tobacco: Never   Vaping Use   • Vaping Use: Never used   Substance and Sexual Activity   • Alcohol use: Never   • Drug use: Never   • Sexual activity: Yes Partners: Female     Birth control/protection: None         Current Outpatient Medications:   •  aspirin 81 MG tablet, Take 81 mg by mouth daily  , Disp: , Rfl:   •  atorvastatin (LIPITOR) 40 mg tablet, TAKE ONE TABLET BY MOUTH EVERY 24 HOURS, Disp: 90 tablet, Rfl: 3  •  DULoxetine (CYMBALTA) 60 mg delayed release capsule, Take 60 mg by mouth daily, Disp: , Rfl:   •  isosorbide mononitrate (IMDUR) 60 mg 24 hr tablet, TAKE ONE TABLET BY MOUTH EVERY DAY, Disp: 90 tablet, Rfl: 3  •  Jardiance 10 MG TABS, Take 10 mg by mouth in the morning, Disp: , Rfl:   •  losartan (COZAAR) 100 MG tablet, Take 1 tablet (100 mg total) by mouth every 24 hours, Disp: 30 tablet, Rfl: 3  •  nitroglycerin (NITROSTAT) 0.4 mg SL tablet, as directed, Disp: , Rfl:   •  Omega-3 Fatty Acids (OMEGA 3 PO), Take 2,000 mg by mouth 2 (two) times a day, Disp: , Rfl:   •  pantoprazole (PROTONIX) 40 mg tablet, Take 40 mg by mouth daily, Disp: , Rfl: 3  •  propranolol (INDERAL LA) 160 mg, Take 1 capsule (160 mg total) by mouth daily, Disp: 30 capsule, Rfl: 3  •  ticagrelor (Brilinta) 90 MG, Take 1 tablet (90 mg total) by mouth every 12 (twelve) hours, Disp: 180 tablet, Rfl: 3  •  venlafaxine (EFFEXOR-XR) 75 mg 24 hr capsule, , Disp: , Rfl:   •  metFORMIN (GLUCOPHAGE) 500 mg tablet, Take 500 mg by mouth 2 (two) times a day with meals (Patient not taking: Reported on 7/5/2023), Disp: , Rfl:   •  methylPREDNISolone 4 MG tablet therapy pack, Use as directed on package (Patient not taking: Reported on 7/5/2023), Disp: 1 each, Rfl: 0    No Known Allergies    Physical Exam:    /84   Pulse 79   Ht 5' 8" (1.727 m)   Wt 85.5 kg (188 lb 6.4 oz)   BMI 28.65 kg/m²     Constitutional:normal, well developed, well nourished, alert, in no distress and non-toxic and no overt pain behavior.   Eyes:anicteric  HEENT:grossly intact  Neck:supple, symmetric, trachea midline and no masses   Pulmonary:even and unlabored  Cardiovascular:No edema or pitting edema present  Skin:Normal without rashes or lesions and well hydrated  Psychiatric:Mood and affect appropriate  Neurologic:Cranial Nerves II-XII grossly intact  Musculoskeletal:normal    Imaging  No orders to display       No orders of the defined types were placed in this encounter.

## 2023-07-26 NOTE — Clinical Note
Intravascular ultrasound catheter inserted for high resolution imaging  The patient is a 16y Female complaining of syncope.

## 2023-09-05 ENCOUNTER — APPOINTMENT (OUTPATIENT)
Dept: LAB | Facility: HOSPITAL | Age: 53
End: 2023-09-05

## 2023-09-05 DIAGNOSIS — Z00.8 HEALTH EXAMINATION IN POPULATION SURVEY: ICD-10-CM

## 2023-09-05 LAB
CHOLEST SERPL-MCNC: 134 MG/DL
HDLC SERPL-MCNC: 31 MG/DL
LDLC SERPL CALC-MCNC: 62 MG/DL (ref 0–100)
NONHDLC SERPL-MCNC: 103 MG/DL
TRIGL SERPL-MCNC: 206 MG/DL

## 2023-09-05 PROCEDURE — 36415 COLL VENOUS BLD VENIPUNCTURE: CPT

## 2023-09-05 PROCEDURE — 83036 HEMOGLOBIN GLYCOSYLATED A1C: CPT

## 2023-09-05 PROCEDURE — 80061 LIPID PANEL: CPT

## 2023-09-06 LAB
EST. AVERAGE GLUCOSE BLD GHB EST-MCNC: 223 MG/DL
HBA1C MFR BLD: 9.4 %

## 2023-10-13 ENCOUNTER — TELEPHONE (OUTPATIENT)
Dept: SLEEP CENTER | Facility: CLINIC | Age: 53
End: 2023-10-13

## 2023-10-13 NOTE — TELEPHONE ENCOUNTER
Patient called and is interested in sleep clinic appt, suggested he call his primary dr for referral to be faxed in.

## 2023-12-18 DIAGNOSIS — I25.118 CORONARY ARTERY DISEASE OF NATIVE ARTERY OF NATIVE HEART WITH STABLE ANGINA PECTORIS (HCC): ICD-10-CM

## 2023-12-18 RX ORDER — TICAGRELOR 90 MG/1
90 TABLET ORAL EVERY 12 HOURS
Qty: 180 TABLET | Refills: 0 | Status: SHIPPED | OUTPATIENT
Start: 2023-12-18

## 2023-12-19 DIAGNOSIS — I25.118 CORONARY ARTERY DISEASE OF NATIVE ARTERY OF NATIVE HEART WITH STABLE ANGINA PECTORIS (HCC): ICD-10-CM

## 2024-02-23 ENCOUNTER — OFFICE VISIT (OUTPATIENT)
Dept: PAIN MEDICINE | Facility: CLINIC | Age: 54
End: 2024-02-23
Payer: COMMERCIAL

## 2024-02-23 ENCOUNTER — APPOINTMENT (OUTPATIENT)
Dept: LAB | Facility: MEDICAL CENTER | Age: 54
End: 2024-02-23
Payer: COMMERCIAL

## 2024-02-23 VITALS
RESPIRATION RATE: 16 BRPM | BODY MASS INDEX: 28.16 KG/M2 | HEIGHT: 68 IN | WEIGHT: 185.8 LBS | HEART RATE: 85 BPM | SYSTOLIC BLOOD PRESSURE: 105 MMHG | DIASTOLIC BLOOD PRESSURE: 72 MMHG

## 2024-02-23 DIAGNOSIS — E11.9 NON-INSULIN DEPENDENT TYPE 2 DIABETES MELLITUS (HCC): Chronic | ICD-10-CM

## 2024-02-23 DIAGNOSIS — M46.1 SACROILIITIS (HCC): ICD-10-CM

## 2024-02-23 DIAGNOSIS — M54.50 CHRONIC BILATERAL LOW BACK PAIN WITHOUT SCIATICA: ICD-10-CM

## 2024-02-23 DIAGNOSIS — G89.29 CHRONIC BILATERAL LOW BACK PAIN WITHOUT SCIATICA: ICD-10-CM

## 2024-02-23 DIAGNOSIS — G89.4 CHRONIC PAIN SYNDROME: Primary | ICD-10-CM

## 2024-02-23 LAB
EST. AVERAGE GLUCOSE BLD GHB EST-MCNC: 174 MG/DL
HBA1C MFR BLD: 7.7 %

## 2024-02-23 PROCEDURE — 36415 COLL VENOUS BLD VENIPUNCTURE: CPT

## 2024-02-23 PROCEDURE — 83036 HEMOGLOBIN GLYCOSYLATED A1C: CPT

## 2024-02-23 PROCEDURE — 99213 OFFICE O/P EST LOW 20 MIN: CPT | Performed by: NURSE PRACTITIONER

## 2024-02-23 RX ORDER — PERPHENAZINE 16 MG/1
TABLET, FILM COATED ORAL
COMMUNITY
Start: 2023-12-18

## 2024-02-23 RX ORDER — TRAZODONE HYDROCHLORIDE 50 MG/1
TABLET ORAL
COMMUNITY
Start: 2023-12-18

## 2024-02-23 RX ORDER — LANCETS
EACH MISCELLANEOUS
COMMUNITY
Start: 2023-12-18

## 2024-02-23 NOTE — H&P (VIEW-ONLY)
Assessment:  1. Chronic pain syndrome    2. Chronic bilateral low back pain without sciatica    3. Sacroiliitis ([facility])    4. Non-insulin dependent type 2 diabetes mellitus ([facility])        Plan:  While the patient was in the office today, I did have a thorough conversation regarding their chronic pain syndrome, medication management, and treatment plan options.  Patient is being seen for a follow-up visit.  He was last seen here on 7/5/2023 and was doing well following bilateral SI joint injections that were performed on 4/21/2023.  Pain was more than 50% improved, but has been increasing over past several months.    Unfortunately, patient's most recent hemoglobin A1c is greater than 9.  It was performed in September.  We will recheck his hemoglobin A1c.  He understands that he is not a candidate for steroid injections until his sugars have normalized.  He tells me that his medications were adjusted after the last blood work.    If blood sugars have improved, we will plan to repeat bilateral SI joint injections.        History of Present Illness:  The patient is a 54 y.o. male who presents for a follow up office visit in regards to Back Pain.   The patient’s current symptoms include complaints of low back pain.  Current pain level is an 8/10.  Quality pain is described as sharp.    Current pain medications includes: None.     I have personally reviewed and/or updated the patient's past medical history, past surgical history, family history, social history, current medications, allergies, and vital signs today.         Review of Systems  Review of Systems   Musculoskeletal:  Positive for back pain and gait problem.   All other systems reviewed and are negative.          Past Medical History:   Diagnosis Date    Arthritis     Coronary artery disease     MI x 4; cardiac cath with 4 stents    GERD (gastroesophageal reflux disease)     Hypertension     Myocardial infarct ([facility])     4 MIs as of 2/24/2020    Non-insulin  dependent type 2 diabetes mellitus ([facility]) 2022       Past Surgical History:   Procedure Laterality Date    CARDIAC CATHETERIZATION  2022    Procedure: Cardiac catheterization;  Surgeon: Jasiel Padilla DO;  [facility];  Service: Cardiology    CARDIAC CATHETERIZATION N/A 2022    Procedure: Cardiac catheterization;  Surgeon: Kirt Lezama MD;  [facility];  Service: Cardiology    CARDIAC CATHETERIZATION N/A 2022    Procedure: Cardiac pci;  Surgeon: Kirt Lezama MD;  [facility];  Service: Cardiology    CARDIAC CATHETERIZATION Left 2022    Procedure: Cardiac Left Heart Cath;  Surgeon: Kirt Lezama MD;  [facility];  Service: Cardiology    CARDIAC CATHETERIZATION N/A 2022    Procedure: Cardiac Coronary Angiogram;  Surgeon: Kirt Lezama MD;  [facility];  Service: Cardiology    CORONARY ANGIOPLASTY WITH STENT PLACEMENT         Family History   Problem Relation Age of Onset    Heart disease Father     Hyperlipidemia Father     Diabetes Sister     Early death Sister     Diabetes Brother     Diabetes Maternal Grandmother        Social History     Occupational History    Not on file   Tobacco Use    Smoking status: Former     Current packs/day: 0.00     Average packs/day: 1 pack/day for 20.7 years (20.7 ttl pk-yrs)     Types: Cigarettes     Start date: 1998     Quit date: 2018     Years since quittin.2    Smokeless tobacco: Never   Vaping Use    Vaping status: Never Used   Substance and Sexual Activity    Alcohol use: Never    Drug use: Never    Sexual activity: Yes     Partners: Female     Birth control/protection: None         Current Outpatient Medications:     aspirin 81 MG tablet, Take 81 mg by mouth daily  , Disp: , Rfl:     atorvastatin (LIPITOR) 40 mg tablet, TAKE ONE TABLET BY MOUTH EVERY 24 HOURS, Disp: 90 tablet, Rfl: 3    Brilinta 90 MG, TAKE ONE TABLET BY  "MOUTH EVERY 12 HOURS, Disp: 180 tablet, Rfl: 0    Contour Next Test test strip, , Disp: , Rfl:     DULoxetine (CYMBALTA) 60 mg delayed release capsule, Take 60 mg by mouth daily, Disp: , Rfl:     isosorbide mononitrate (IMDUR) 60 mg 24 hr tablet, TAKE ONE TABLET BY MOUTH EVERY DAY, Disp: 90 tablet, Rfl: 3    Jardiance 10 MG TABS, Take 10 mg by mouth in the morning, Disp: , Rfl:     losartan (COZAAR) 100 MG tablet, Take 1 tablet (100 mg total) by mouth every 24 hours, Disp: 30 tablet, Rfl: 3    Microlet Lancets MISC, , Disp: , Rfl:     nitroglycerin (NITROSTAT) 0.4 mg SL tablet, as directed, Disp: , Rfl:     Omega-3 Fatty Acids (OMEGA 3 PO), Take 2,000 mg by mouth 2 (two) times a day, Disp: , Rfl:     pantoprazole (PROTONIX) 40 mg tablet, Take 40 mg by mouth daily, Disp: , Rfl: 3    propranolol (INDERAL LA) 160 mg, Take 1 capsule (160 mg total) by mouth daily, Disp: 30 capsule, Rfl: 3    traZODone (DESYREL) 50 mg tablet, , Disp: , Rfl:     venlafaxine (EFFEXOR-XR) 75 mg 24 hr capsule, , Disp: , Rfl:     metFORMIN (GLUCOPHAGE) 500 mg tablet, Take 500 mg by mouth 2 (two) times a day with meals (Patient not taking: Reported on 7/5/2023), Disp: , Rfl:     methylPREDNISolone 4 MG tablet therapy pack, Use as directed on package (Patient not taking: Reported on 7/5/2023), Disp: 1 each, Rfl: 0    No Known Allergies    Physical Exam:    /72   Pulse 85   Resp 16   Ht 5' 8\" (1.727 m)   Wt 84.3 kg (185 lb 12.8 oz)   BMI 28.25 kg/m²     Constitutional:normal, well developed, well nourished, alert, in no distress and non-toxic and no overt pain behavior.  Eyes:anicteric  HEENT:grossly intact  Neck:supple, symmetric, trachea midline and no masses   Pulmonary:even and unlabored  Cardiovascular:No edema or pitting edema present  Skin:Normal without rashes or lesions and well hydrated  Psychiatric:Mood and affect appropriate  Neurologic:Cranial Nerves II-XII grossly intact  Musculoskeletal: Tenderness over bilateral SI " joints.  Compression test, distraction test, Gaenslen's tests are positive bilaterally..    Imaging      Study Result    Narrative & Impression   LUMBAR SPINE     INDICATION:   G89.4: Chronic pain syndrome  M54.50: Low back pain, unspecified  G89.29: Other chronic pain  M51.36: Other intervertebral disc degeneration, lumbar region  M47.816: Spondylosis without myelopathy or radiculopathy, lumbar region.       COMPARISON:  L-spine x-ray 6/11/2021     VIEWS:  XR SPINE LUMBAR COMPLETE W BENDING MINIMUM 6 VIEWS  Images: 6     FINDINGS:     There are 5 non rib bearing lumbar vertebral bodies.      There is no evidence of acute fracture or destructive osseous lesion.     Alignment is unremarkable.      Intervertebral disc space narrowing at all levels with small endplate osteophytes and facet arthropathy.     The pedicles appear intact. No instability on flexion/extension views.     Atherosclerotic calcifications.     IMPRESSION:     Multilevel degenerative changes of lumbar spine.        Workstation performed: [id number]          No orders to display       Orders Placed This Encounter   Procedures    Hemoglobin A1C

## 2024-02-23 NOTE — PROGRESS NOTES
Assessment:  1. Chronic pain syndrome    2. Chronic bilateral low back pain without sciatica    3. Sacroiliitis (HCC)    4. Non-insulin dependent type 2 diabetes mellitus (HCC)        Plan:  While the patient was in the office today, I did have a thorough conversation regarding their chronic pain syndrome, medication management, and treatment plan options.  Patient is being seen for a follow-up visit.  He was last seen here on 7/5/2023 and was doing well following bilateral SI joint injections that were performed on 4/21/2023.  Pain was more than 50% improved, but has been increasing over past several months.    Unfortunately, patient's most recent hemoglobin A1c is greater than 9.  It was performed in September.  We will recheck his hemoglobin A1c.  He understands that he is not a candidate for steroid injections until his sugars have normalized.  He tells me that his medications were adjusted after the last blood work.    If blood sugars have improved, we will plan to repeat bilateral SI joint injections.        History of Present Illness:  The patient is a 54 y.o. male who presents for a follow up office visit in regards to Back Pain.   The patient’s current symptoms include complaints of low back pain.  Current pain level is an 8/10.  Quality pain is described as sharp.    Current pain medications includes: None.     I have personally reviewed and/or updated the patient's past medical history, past surgical history, family history, social history, current medications, allergies, and vital signs today.         Review of Systems  Review of Systems   Musculoskeletal:  Positive for back pain and gait problem.   All other systems reviewed and are negative.          Past Medical History:   Diagnosis Date    Arthritis     Coronary artery disease     MI x 4; cardiac cath with 4 stents    GERD (gastroesophageal reflux disease)     Hypertension     Myocardial infarct ([facility]CC)     4 MIs as of 2/24/2020    Non-insulin  dependent type 2 diabetes mellitus (HCC) 2022       Past Surgical History:   Procedure Laterality Date    CARDIAC CATHETERIZATION  2022    Procedure: Cardiac catheterization;  Surgeon: Jasiel Padilla DO;  [facility];  Service: Cardiology    CARDIAC CATHETERIZATION N/A 2022    Procedure: Cardiac catheterization;  Surgeon: Kirt Lezama MD;  [facility];  Service: Cardiology    CARDIAC CATHETERIZATION N/A 2022    Procedure: Cardiac pci;  Surgeon: Kirt Lezama MD;  [facility];  Service: Cardiology    CARDIAC CATHETERIZATION Left 2022    Procedure: Cardiac Left Heart Cath;  Surgeon: Kirt Lezama MD;  [facility];  Service: Cardiology    CARDIAC CATHETERIZATION N/A 2022    Procedure: Cardiac Coronary Angiogram;  Surgeon: Kirt Lezama MD;  [facility];  Service: Cardiology    CORONARY ANGIOPLASTY WITH STENT PLACEMENT         Family History   Problem Relation Age of Onset    Heart disease Father     Hyperlipidemia Father     Diabetes Sister     Early death Sister     Diabetes Brother     Diabetes Maternal Grandmother        Social History     Occupational History    Not on file   Tobacco Use    Smoking status: Former     Current packs/day: 0.00     Average packs/day: 1 pack/day for 20.7 years (20.7 ttl pk-yrs)     Types: Cigarettes     Start date: 1998     Quit date: 2018     Years since quittin.2    Smokeless tobacco: Never   Vaping Use    Vaping status: Never Used   Substance and Sexual Activity    Alcohol use: Never    Drug use: Never    Sexual activity: Yes     Partners: Female     Birth control/protection: None         Current Outpatient Medications:     aspirin 81 MG tablet, Take 81 mg by mouth daily  , Disp: , Rfl:     atorvastatin (LIPITOR) 40 mg tablet, TAKE ONE TABLET BY MOUTH EVERY 24 HOURS, Disp: 90 tablet, Rfl: 3    Brilinta 90 MG, TAKE ONE TABLET BY  "MOUTH EVERY 12 HOURS, Disp: 180 tablet, Rfl: 0    Contour Next Test test strip, , Disp: , Rfl:     DULoxetine (CYMBALTA) 60 mg delayed release capsule, Take 60 mg by mouth daily, Disp: , Rfl:     isosorbide mononitrate (IMDUR) 60 mg 24 hr tablet, TAKE ONE TABLET BY MOUTH EVERY DAY, Disp: 90 tablet, Rfl: 3    Jardiance 10 MG TABS, Take 10 mg by mouth in the morning, Disp: , Rfl:     losartan (COZAAR) 100 MG tablet, Take 1 tablet (100 mg total) by mouth every 24 hours, Disp: 30 tablet, Rfl: 3    Microlet Lancets MISC, , Disp: , Rfl:     nitroglycerin (NITROSTAT) 0.4 mg SL tablet, as directed, Disp: , Rfl:     Omega-3 Fatty Acids (OMEGA 3 PO), Take 2,000 mg by mouth 2 (two) times a day, Disp: , Rfl:     pantoprazole (PROTONIX) 40 mg tablet, Take 40 mg by mouth daily, Disp: , Rfl: 3    propranolol (INDERAL LA) 160 mg, Take 1 capsule (160 mg total) by mouth daily, Disp: 30 capsule, Rfl: 3    traZODone (DESYREL) 50 mg tablet, , Disp: , Rfl:     venlafaxine (EFFEXOR-XR) 75 mg 24 hr capsule, , Disp: , Rfl:     metFORMIN (GLUCOPHAGE) 500 mg tablet, Take 500 mg by mouth 2 (two) times a day with meals (Patient not taking: Reported on 7/5/2023), Disp: , Rfl:     methylPREDNISolone 4 MG tablet therapy pack, Use as directed on package (Patient not taking: Reported on 7/5/2023), Disp: 1 each, Rfl: 0    No Known Allergies    Physical Exam:    /72   Pulse 85   Resp 16   Ht 5' 8\" (1.727 m)   Wt 84.3 kg (185 lb 12.8 oz)   BMI 28.25 kg/m²     Constitutional:normal, well developed, well nourished, alert, in no distress and non-toxic and no overt pain behavior.  Eyes:anicteric  HEENT:grossly intact  Neck:supple, symmetric, trachea midline and no masses   Pulmonary:even and unlabored  Cardiovascular:No edema or pitting edema present  Skin:Normal without rashes or lesions and well hydrated  Psychiatric:Mood and affect appropriate  Neurologic:Cranial Nerves II-XII grossly intact  Musculoskeletal: Tenderness over bilateral SI " joints.  Compression test, distraction test, Gaenslen's tests are positive bilaterally..    Imaging      Study Result    Narrative & Impression   LUMBAR SPINE     INDICATION:   G89.4: Chronic pain syndrome  M54.50: Low back pain, unspecified  G89.29: Other chronic pain  M51.36: Other intervertebral disc degeneration, lumbar region  M47.816: Spondylosis without myelopathy or radiculopathy, lumbar region.       COMPARISON:  L-spine x-ray 6/11/2021     VIEWS:  XR SPINE LUMBAR COMPLETE W BENDING MINIMUM 6 VIEWS  Images: 6     FINDINGS:     There are 5 non rib bearing lumbar vertebral bodies.      There is no evidence of acute fracture or destructive osseous lesion.     Alignment is unremarkable.      Intervertebral disc space narrowing at all levels with small endplate osteophytes and facet arthropathy.     The pedicles appear intact. No instability on flexion/extension views.     Atherosclerotic calcifications.     IMPRESSION:     Multilevel degenerative changes of lumbar spine.        Workstation performed: [id number]          No orders to display       Orders Placed This Encounter   Procedures    Hemoglobin A1C

## 2024-03-08 ENCOUNTER — HOSPITAL ENCOUNTER (OUTPATIENT)
Dept: RADIOLOGY | Facility: HOSPITAL | Age: 54
End: 2024-03-08
Payer: COMMERCIAL

## 2024-03-08 VITALS
OXYGEN SATURATION: 98 % | RESPIRATION RATE: 18 BRPM | HEART RATE: 60 BPM | SYSTOLIC BLOOD PRESSURE: 107 MMHG | TEMPERATURE: 97.4 F | DIASTOLIC BLOOD PRESSURE: 76 MMHG

## 2024-03-08 DIAGNOSIS — M25.551 BILATERAL HIP PAIN: Primary | ICD-10-CM

## 2024-03-08 DIAGNOSIS — M46.1 SACROILIITIS (HCC): ICD-10-CM

## 2024-03-08 DIAGNOSIS — M25.552 BILATERAL HIP PAIN: Primary | ICD-10-CM

## 2024-03-08 PROCEDURE — 27096 INJECT SACROILIAC JOINT: CPT | Performed by: ANESTHESIOLOGY

## 2024-03-08 RX ORDER — BUPIVACAINE HCL/PF 2.5 MG/ML
2 VIAL (ML) INJECTION ONCE
Status: DISCONTINUED | OUTPATIENT
Start: 2024-03-08 | End: 2024-03-08

## 2024-03-08 RX ORDER — METHYLPREDNISOLONE ACETATE 40 MG/ML
80 INJECTION, SUSPENSION INTRA-ARTICULAR; INTRALESIONAL; INTRAMUSCULAR; PARENTERAL; SOFT TISSUE ONCE
Status: DISCONTINUED | OUTPATIENT
Start: 2024-03-08 | End: 2024-03-12 | Stop reason: HOSPADM

## 2024-03-08 RX ORDER — METHYLPREDNISOLONE ACETATE 40 MG/ML
80 INJECTION, SUSPENSION INTRA-ARTICULAR; INTRALESIONAL; INTRAMUSCULAR; PARENTERAL; SOFT TISSUE ONCE
Status: COMPLETED | OUTPATIENT
Start: 2024-03-08 | End: 2024-03-08

## 2024-03-08 RX ORDER — LIDOCAINE HYDROCHLORIDE 10 MG/ML
5 INJECTION, SOLUTION EPIDURAL; INFILTRATION; INTRACAUDAL; PERINEURAL ONCE
Status: COMPLETED | OUTPATIENT
Start: 2024-03-08 | End: 2024-03-08

## 2024-03-08 RX ORDER — LIDOCAINE HYDROCHLORIDE 10 MG/ML
5 INJECTION, SOLUTION EPIDURAL; INFILTRATION; INTRACAUDAL; PERINEURAL ONCE
Status: DISCONTINUED | OUTPATIENT
Start: 2024-03-08 | End: 2024-03-12 | Stop reason: HOSPADM

## 2024-03-08 RX ORDER — ROPIVACAINE HYDROCHLORIDE 2 MG/ML
2 INJECTION, SOLUTION EPIDURAL; INFILTRATION; PERINEURAL ONCE
Status: COMPLETED | OUTPATIENT
Start: 2024-03-08 | End: 2024-03-08

## 2024-03-08 RX ADMIN — LIDOCAINE HYDROCHLORIDE 5 ML: 10 INJECTION, SOLUTION EPIDURAL; INFILTRATION; INTRACAUDAL; PERINEURAL at 08:59

## 2024-03-08 RX ADMIN — ROPIVACAINE HYDROCHLORIDE 2 ML: 2 INJECTION, SOLUTION EPIDURAL; INFILTRATION; PERINEURAL at 09:01

## 2024-03-08 RX ADMIN — IOHEXOL 2 ML: 300 INJECTION, SOLUTION INTRAVENOUS at 09:01

## 2024-03-08 RX ADMIN — METHYLPREDNISOLONE ACETATE 80 MG: 40 INJECTION, SUSPENSION INTRA-ARTICULAR; INTRALESIONAL; INTRAMUSCULAR; PARENTERAL; SOFT TISSUE at 09:01

## 2024-03-08 NOTE — DISCHARGE INSTRUCTIONS

## 2024-03-08 NOTE — INTERVAL H&P NOTE
Update: (This section must be completed if the H&P was completed greater than 24 hrs to procedure or admission)    H&P reviewed. After examining the patient, I find no changed to the H&P since it had been written.    Patient re-evaluated. Accept as history and physical.    Dony Maciel MD/March 8, 2024/8:48 AM

## 2024-03-08 NOTE — DISCHARGE INSTR - LAB

## 2024-03-15 ENCOUNTER — TELEPHONE (OUTPATIENT)
Dept: PAIN MEDICINE | Facility: CLINIC | Age: 54
End: 2024-03-15

## 2024-03-20 DIAGNOSIS — I25.118 CORONARY ARTERY DISEASE OF NATIVE ARTERY OF NATIVE HEART WITH STABLE ANGINA PECTORIS (HCC): ICD-10-CM

## 2024-03-20 RX ORDER — TICAGRELOR 90 MG/1
90 TABLET ORAL EVERY 12 HOURS
Qty: 180 TABLET | Refills: 0 | Status: SHIPPED | OUTPATIENT
Start: 2024-03-20

## 2024-03-20 NOTE — TELEPHONE ENCOUNTER
Requested medication(s) are due for refill today: yes  Patient has already received a courtesy refill: no    Other reason request has been forwarded to provider:

## 2024-03-22 ENCOUNTER — HOSPITAL ENCOUNTER (OUTPATIENT)
Dept: RADIOLOGY | Facility: HOSPITAL | Age: 54
End: 2024-03-22
Payer: COMMERCIAL

## 2024-03-22 DIAGNOSIS — M25.552 BILATERAL HIP PAIN: ICD-10-CM

## 2024-03-22 DIAGNOSIS — M25.551 BILATERAL HIP PAIN: ICD-10-CM

## 2024-03-22 PROCEDURE — 73522 X-RAY EXAM HIPS BI 3-4 VIEWS: CPT

## 2024-05-01 ENCOUNTER — OFFICE VISIT (OUTPATIENT)
Dept: SLEEP CENTER | Facility: CLINIC | Age: 54
End: 2024-05-01
Payer: COMMERCIAL

## 2024-05-01 VITALS
HEART RATE: 78 BPM | TEMPERATURE: 97.8 F | DIASTOLIC BLOOD PRESSURE: 82 MMHG | WEIGHT: 188.4 LBS | SYSTOLIC BLOOD PRESSURE: 140 MMHG | BODY MASS INDEX: 28.55 KG/M2 | OXYGEN SATURATION: 98 % | HEIGHT: 68 IN

## 2024-05-01 DIAGNOSIS — I10 ESSENTIAL HYPERTENSION: ICD-10-CM

## 2024-05-01 DIAGNOSIS — I25.118 CORONARY ARTERY DISEASE OF NATIVE ARTERY OF NATIVE HEART WITH STABLE ANGINA PECTORIS (HCC): ICD-10-CM

## 2024-05-01 DIAGNOSIS — G47.00 FREQUENT NOCTURNAL AWAKENING: ICD-10-CM

## 2024-05-01 DIAGNOSIS — R06.83 SNORING: Primary | ICD-10-CM

## 2024-05-01 DIAGNOSIS — F51.04 CHRONIC INSOMNIA: ICD-10-CM

## 2024-05-01 DIAGNOSIS — R00.2 PALPITATIONS: ICD-10-CM

## 2024-05-01 PROCEDURE — 99204 OFFICE O/P NEW MOD 45 MIN: CPT | Performed by: STUDENT IN AN ORGANIZED HEALTH CARE EDUCATION/TRAINING PROGRAM

## 2024-05-01 NOTE — PROGRESS NOTES
[facility]  Sleep Medicine New Patient Evaluation    PATIENT NAME: Kai Breen  DATE OF SERVICE: May 1, 2024    CONSULTING PROVIDER:  Nellie Worley DO  [address]  [place] [address]      Assessment/Plan:  1. Snoring  Home Study      2. Frequent nocturnal awakening  Home Study      3. Coronary artery disease of native artery of native heart with stable angina pectoris (HCC)  Home Study      4. Essential hypertension  Home Study      5. Palpitations  Home Study      6. Chronic insomnia  Home Study    Ambulatory referral to Psych Services         Kai is a pleasant 54-year-old gentleman with PMHx of CAD s/p ~4-5 Mis and stenting, HTN, GERD, T2DM, complex regional pain syndrome type I, sacroiliitis, MDD, HLD who presents for sleep disturbances.  He does endorse several symptoms as well as comorbidities associated with sleep disordered breathing/obstructive sleep apnea, thus evaluation for in treatment of this if present is merited.  He does also appear to have a significant chronic insomnia.    Discussed with patient the pathophysiology of OSAS and medical conditions associated with OSAS such as DM, HTN, CAD, Depression, Stroke, Headache.  Treatment options including surgery, Dental appliances, positional therapy, and CPAP were discussed.  I have ordered a Home Sleep Apnea Test (HSAT) to evaluate for sleep-disordered breathing. Should this be negative/inconclusive, due to the known limitations of HSAT, I will order an in lab polysomnogram (PSG) to ensure that sleep apnea is not present.  Advised patient to avoid activities that could harm self or others when tired/sleepy, including driving.  Encouraged maintaining normal weight  Discussed importance of good sleep hygiene.  Pending the results of the sleep study, we will plan to order CPAP with a subsequent compliance follow-up.  He did endorse a preference for a nasal mask (N30i,) and will likely need  "recommendations for CPAP pillows if PAP therapy is to be pursued.  Discussed the pathophysiology behind chronic insomnia, including the 3-P model  Discussed that CBT-I is first-line for treatment of chronic insomnia, and has the best data supporting its efficacy  Discussed the combination of sleep restriction and stimulus control at length with the patient today.  Referral placed for formal Cognitive Behavioral Therapy for Insomnia (CBT-I)  Information provided regarding the [facility] Sleep program through [facility], should the patient be interested in pursuing this.  He will Return for Follow-up pending results of sleep study..  Of note, he does prefer a phone call rather than a MyChart message to review next steps.    Thank you for allowing me to participate in the care of your patient.  If there are any questions regarding evaluation please feel free to reach out.       ________________________________________________________________________________________________    HPI: Kai Breen is a 54 y.o. male with PMHx of CAD s/p ~4-5 Mis and stenting, HTN, GERD, T2DM, complex regional pain syndrome type I, sacroiliitis, MDD, HLD who presents for sleep disturbances.    Sleep-Related History:     \"My wife made me [come in]\" due to snoring. Endorses having had a sleep study in the past (>10 years ago), diagnosed with insomnia and sleep apnea, however \"because I couldn't sleep, I couldn't use the machine.\" Was told to sleep in a recliner, which he cannot do.     Was told that he kicks in his sleep, no RLS symptoms.     [place] Sleepiness Scale:  What are your chances of dozing?   0= no chance  1= slight chance  2= moderate chance  3= high chance    Sitting and readin   Watching TV: 1  Sitting, inactive in a public place (e.g. a theatre or a meeting):0  As a passenger in a car for an hour without a break: 3  Lying down to rest in the afternoon when circumstances permit: 0   Sitting and talking to someone: " "0  Sitting quietly after a lunch without alcohol: 0  In a car, while stopped for a few minutes in the traffic: 0       TOTAL  4/24; does endorse actively fighting sleep during the day (\"it'll be a waste of the day and I won't sleep at night\")  Greater or equal to 10 is positive for excessive daytime sleepiness        SLEEP-WAKE SCHEDULE  Sleep Schedule:  Weekdays:  Bedtime: 2200   Asleep in ~30 minutes (trazodone, been on for little less than a year). Without trazodone, can take 6 hours. During that time, will walk, watch TV in living room, sit there and toss/turn and get frustrated.  Nighttime awakenings: ~4 on average     Wake: 0600    Naps: 0    Average total sleep time (in a 24 hour period): ~6-7 hours.    Weekends:   Same    Sleep-Related Details:  Preferred Sleep Position: prone  SDB Symptoms: snoring, gasping/choking, and multiple awakenings  Bruxism: No  Nocturnal GERD: No  Nocturnal Palpitations: No  Anxiety/Rumination: \"My mind doesn't stop.\" Saw a counselor after wife passed, but noth after that.   Sleep-Related Hallucinations: No   Sleep Paralysis: No     He denies any parasomnia activity.    Wake-Related Details:  Daytime Sleepiness: Yes, if he doesn't stay busy. Refuses to sleep during the day    Work Schedule: Disability (heart attacks); worked 3rd shift ~1 year.  Caffeine: Yes, \"about a gallon\" of iced tea per day  Alcohol Use: No  Substance Use: No  Tobacco Use: No; quit in 2018    He does not have difficulty with memory or concentration.      PAST TREATMENTS:  See above    PRIOR SLEEP STUDIES:  -One prior, >10 years ago    OTHER RELEVANT LABS AND STUDIES:  -None    Past Medical History:   Diagnosis Date    Arthritis     Coronary artery disease     MI x 4; cardiac cath with 4 stents    GERD (gastroesophageal reflux disease)     Hypertension     Myocardial infarct (HCC)     4 MIs as of 2/24/2020    Non-insulin dependent type 2 diabetes mellitus (HCC) 8/24/2022      Past Surgical History:   Procedure " Laterality Date    CARDIAC CATHETERIZATION  1/7/2022    Procedure: Cardiac catheterization;  Surgeon: Jasiel Padilla DO;  Location: [facility];  Service: Cardiology    CARDIAC CATHETERIZATION N/A 8/26/2022    Procedure: Cardiac catheterization;  Surgeon: Kirt Lezama MD;  Location: [facility];  Service: Cardiology    CARDIAC CATHETERIZATION N/A 8/26/2022    Procedure: Cardiac pci;  Surgeon: Kirt Lezama MD;  Location: [facility];  Service: Cardiology    CARDIAC CATHETERIZATION Left 8/26/2022    Procedure: Cardiac Left Heart Cath;  Surgeon: Kirt Lezama MD;  Location: [facility];  Service: Cardiology    CARDIAC CATHETERIZATION N/A 8/26/2022    Procedure: Cardiac Coronary Angiogram;  Surgeon: Kirt Lezama MD;  Location: [facility];  Service: Cardiology    CORONARY ANGIOPLASTY WITH STENT PLACEMENT        Patient Active Problem List   Diagnosis    Coronary artery disease involving native coronary artery    Essential hypertension    Mixed hyperlipidemia    Chest pain    PVC (premature ventricular contraction)    Palpitations    Chronic bilateral low back pain without sciatica    Lumbar degenerative disc disease    Myofascial pain syndrome    Sacroiliitis (HCC)    Presence of drug coated stent in left circumflex coronary artery    Chronic pain syndrome    Lumbar radiculopathy    Complex regional pain syndrome type 1    Gastroesophageal reflux disease    Gastroparesis    History of PTCA 2    Major depressive disorder, recurrent (HCC)    Nicotine dependence    Radiculitis, thoracic    Displacement of thoracic intervertebral disc    Elevated troponin I level    Hypokalemia    Non-insulin dependent type 2 diabetes mellitus (HCC)    Lumbar spondylosis      Allergies as of 05/01/2024    (No Known Allergies)         Review of Symptoms:    Review of Systems  10-point system review completed, all of which are negative except as mentioned above.    CURRENT  "MEDICATIONS:  Current Outpatient Medications   Medication Instructions    aspirin 81 mg, Oral, Daily    atorvastatin (LIPITOR) 40 mg tablet TAKE ONE TABLET BY MOUTH EVERY 24 HOURS    Brilinta 90 mg, Oral, Every 12 hours    Contour Next Test test strip     DULoxetine (CYMBALTA) 60 mg, Daily    isosorbide mononitrate (IMDUR) 60 mg 24 hr tablet TAKE ONE TABLET BY MOUTH EVERY DAY    Jardiance 10 mg, Daily    Jardiance 25 mg, Oral    losartan (COZAAR) 100 mg, Oral, Every 24 hours    metFORMIN (GLUCOPHAGE) 500 mg, Oral, 2 times daily with meals    Microlet Lancets MISC     nitroglycerin (NITROSTAT) 0.4 mg SL tablet as directed    Omega-3 Fatty Acids (OMEGA 3 PO) 2,000 mg, Oral, 2 times daily    pantoprazole (PROTONIX) 40 mg, Oral, Daily    propranolol (INDERAL LA) 160 mg, Oral, Daily    traZODone (DESYREL) 50 mg tablet     venlafaxine (EFFEXOR-XR) 75 mg 24 hr capsule No dose, route, or frequency recorded.    venlafaxine 150 MG TB24 24 hr tablet          SOCIAL HISTORY:  Social History     Tobacco Use    Smoking status: Former     Current packs/day: 0.00     Average packs/day: 1 pack/day for 20.7 years (20.7 ttl pk-yrs)     Types: Cigarettes     Start date: 1998     Quit date: 2018     Years since quittin.4    Smokeless tobacco: Never   Vaping Use    Vaping status: Never Used   Substance Use Topics    Alcohol use: Never    Drug use: Never       FAMILY HISTORY:  Family History   Problem Relation Age of Onset    Heart disease Father     Hyperlipidemia Father     Diabetes Sister     Early death Sister     Diabetes Brother     Diabetes Maternal Grandmother             PHYSICAL EXAMINATION:  Vital Signs:  /82 (BP Location: Right arm, Patient Position: Sitting, Cuff Size: Large)   Pulse 78   Temp 97.8 °F (36.6 °C)   Ht 5' 8\" (1.727 m)   Wt 85.5 kg (188 lb 6.4 oz)   SpO2 98%   BMI 28.65 kg/m²  Body mass index is 28.65 kg/m².    Constitutional: NAD, well appearing   Mental Status: AAOx3  Neck " Circumference: Neck Circumference: 17.5 inches  Skin: Warm, dry, no rashes noted   Eyes: PERRL, normal conjunctiva  ENT: Nasal congestion absent  Posterior Airspace:   [place] Tongue Position: 4  Retrognathia: absent  Overbite: absent  Chest: No evidence of respiratory distress, no accessory muscle use; no evidence of peripheral cyanosis   Abdomen: Soft, NT/ND  Extremities: No digital clubbing or pedal edema    NEUROLOGICAL EXAM:  General: Awake, alert, speech fluent, comprehension, naming, repetition intact. Short and long term memory intact.  CN: PERRL, EOMI without nystagmus, facial sensation and strength are normal and symmetric, hearing is intact to conversational tone, palate and tongue movements are intact and symmetric.  Motor: Normal tone, bulk and strength bilaterally.   Sensation: LT intact throughout.  Gait: Able to walk without difficulty. Stance normal.      I have spent a total time of 40-50 minutes on 05/01/24 in caring for this patient including Diagnostic results, Prognosis, Risks and benefits of tx options, Instructions for management, Patient and family education, Importance of tx compliance, Risk factor reductions, Documenting in the medical record, Reviewing / ordering tests, medicine, procedures  , and Obtaining or reviewing history  .        Electronically signed by:    Cody Valdez DO  Board-certified Neurology and Sleep Medicine  [facility]  05/01/24

## 2024-05-01 NOTE — PATIENT INSTRUCTIONS
ADDISON Evaluation:    I suspect you may have sleep apnea.  Sleep apnea is a serious sleep disorder that occurs when a person's breathing is interrupted during sleep. People with untreated sleep apnea stop breathing repeatedly during their sleep, sometimes hundreds of times during the night.  The most common type of sleep apnea is obstructive sleep apnea.  If left untreated, obstructive sleep apnea can result in a number of health problems including hypertension, stroke, arrhythmias, cardiomyopathy (enlargement of the muscle tissue of the heart), heart failure, diabetes, obesity, and heart attacks. It has also been found to make chronic medical conditions (such as high blood pressure, migraine headaches, and seizures (more difficult to manage.  Treatment options for obstructive sleep apnea may include positive airway pressure (PAP) therapy, oral appliance, hypoglossal nerve stimulator, nose/throat surgery, weight loss, side sleeping, or avoidance of medications or substances that can relax the airway muscles (alcohol, benzodiazepines, and opioids).  I have ordered a Home Sleep Apnea Test (HSAT) to evaluate for sleep apnea. If this is negative, I will order an in-lab polysomnogram (PSG) as discussed to be sure that sleep apnea is not present.  This test should be scheduled at your earliest convenience.   Sleep Clinic: This should be scheduled at the  before you leave today.  Avoid driving if drowsy. We recommend that if you are dozing off while driving, that you do not drive until your sleepiness is appropriately treated.  We encourage a healthy lifestyle with adequate sleep (7-9 hours per night), diet and exercise.  Recommend good sleep hygiene, as outlined below    Myself and/or my team will reach out to you via MyChart and/or phone call with the results and next steps.      For Your Insomnia:    The gold standard of treatment for insomnia is cognitive behavioral therapy for insomnia (CBTI}. Medications come  with side effects and once stopped, may no longer help the underlying sleep problem. Data on CBTI shows lasting effects of this intervention.     PLAN:  1. Practice good Sleep Hygiene, as outlined below.  2. I am also placing a referral for formal Cognitive Behavioral Therapy for Insomnia (CBT-I). Examples of what maybe to expect are below:  A. To help re-train your brain and allow it to re-associate your bed with sleep, we utilize an approach known as Sleep Restriction:  -Keep the same wake time every day, and do not go to bed until you're tired. The idea behind this, particularly in conjunction with Stimulus control therapy, is to get your body to re-recognize your drive for sleep.  B. At the same time, practice Stimulus Control as outlined below:    - Do not go to bed until you are sleepy  - Keep bed primarily for sleep and intimacy ONLY (not for reading, watching television, eating, or worrying).   - You should not spend more than 20 minutes in bed awake. If you are awake after 20 minutes, leave the bedroom and engage in a relaxing activity, such as reading or listening to soothing music. DO NOT engage in activities that stimulate you or reward you for being awake in the middle of the night, such as eating or watching television.   - Do not return to bed until you are tired and feel ready to sleep.   - If you return to bed and still cannot sleep within 20 minutes, the process should be repeated.   C. An alarm clock should be set to wake the patient at the same time every morning (see Sleep Restriction above), including weekends.  D. The idea behind this is that patients with insomnia commonly associate their bed and bedroom with the fear of not sleeping, or it has simply become a stimulating environment and their brain no longer associates it with sleep. The longer one stays in bed trying to sleep, the stronger the association becomes, which then perpetuates the difficulty falling asleep.  E. Stimulus control  therapy then is a strategy whose purpose is to disrupt this association and re-train your brain to associate your bedroom with sleep, thus enhancing the likelihood of sleep.      Good Sleep Hygiene    Wake up at the same time every day, even on the weekends.  Use your bed for sleep and intimacy only.  If you have been in bed awake for 30 minutes, get up and leave the bedroom. Choose a dull activity not involving a blue screen (TV, computer, handheld devices). Go back to bed when you feel sleepy.  Avoid caffeine, nicotine and alcohol before you go to bed.  Avoid large meals before you go to bed.  Avoid using screens (computers, tablets, smartphones, etc.) for at least 1 hour before bedtime  Exercise regularly, but do not exercise right before you go to bed.  Avoid daytime naps. If you do take a nap, sleep for 20-40 minutes, and not after dinner.

## 2024-05-06 ENCOUNTER — HOSPITAL ENCOUNTER (OUTPATIENT)
Dept: SLEEP CENTER | Facility: HOSPITAL | Age: 54
Discharge: HOME | End: 2024-05-06
Attending: STUDENT IN AN ORGANIZED HEALTH CARE EDUCATION/TRAINING PROGRAM
Payer: COMMERCIAL

## 2024-05-06 DIAGNOSIS — R00.2 PALPITATIONS: ICD-10-CM

## 2024-05-06 DIAGNOSIS — I25.118 CORONARY ARTERY DISEASE OF NATIVE ARTERY OF NATIVE HEART WITH STABLE ANGINA PECTORIS (HCC): ICD-10-CM

## 2024-05-06 DIAGNOSIS — R06.83 SNORING: ICD-10-CM

## 2024-05-06 DIAGNOSIS — F51.04 CHRONIC INSOMNIA: ICD-10-CM

## 2024-05-06 DIAGNOSIS — G47.00 FREQUENT NOCTURNAL AWAKENING: ICD-10-CM

## 2024-05-06 DIAGNOSIS — I10 ESSENTIAL HYPERTENSION: ICD-10-CM

## 2024-05-06 PROCEDURE — G0399 HOME SLEEP TEST/TYPE 3 PORTA: HCPCS

## 2024-05-06 NOTE — PROGRESS NOTES
Home Sleep Study Documentation    HOME STUDY DEVICE: Noxturnal no                                           Aure G3 yes      Pre-Sleep Home Study:    Set-up and instructions performed by: YONI Shelley    Technician performed demonstration for Patient: yes    Return demonstration performed by Patient: yes    Written instructions provided to Patient: yes    Patient signed consent form: yes        Post-Sleep Home Study:    Additional comments by Patient: N/A    Home Sleep Study Failed:no:    Failure reason: N/A    Reported or Detected: N/A    Scored by: YONI Alex

## 2024-05-08 PROBLEM — G47.33 OSA (OBSTRUCTIVE SLEEP APNEA): Status: ACTIVE | Noted: 2024-05-08

## 2024-05-12 PROBLEM — R06.83 SNORING: Status: ACTIVE | Noted: 2024-05-12

## 2024-05-12 PROBLEM — F51.04 CHRONIC INSOMNIA: Status: ACTIVE | Noted: 2024-05-12

## 2024-05-12 PROBLEM — G47.00 FREQUENT NOCTURNAL AWAKENING: Status: ACTIVE | Noted: 2024-05-12

## 2024-05-12 PROCEDURE — 95806 SLEEP STUDY UNATT&RESP EFFT: CPT | Performed by: STUDENT IN AN ORGANIZED HEALTH CARE EDUCATION/TRAINING PROGRAM

## 2024-05-17 ENCOUNTER — TELEPHONE (OUTPATIENT)
Dept: BEHAVIORAL/MENTAL HEALTH CLINIC | Facility: CLINIC | Age: 54
End: 2024-05-17

## 2024-05-17 NOTE — TELEPHONE ENCOUNTER
Writer spoke to patient and informed him of wait list.  Patient would like to stay on wait list until availability

## 2024-06-10 ENCOUNTER — TELEMEDICINE (OUTPATIENT)
Age: 54
End: 2024-06-10
Payer: COMMERCIAL

## 2024-06-10 DIAGNOSIS — F43.21 UNRESOLVED GRIEF: Primary | ICD-10-CM

## 2024-06-10 DIAGNOSIS — F51.04 INSOMNIA, PSYCHOPHYSIOLOGICAL: ICD-10-CM

## 2024-06-10 PROCEDURE — 90791 PSYCH DIAGNOSTIC EVALUATION: CPT | Performed by: SOCIAL WORKER

## 2024-06-11 NOTE — PSYCH
Virtual Regular Visit    Verification of patient location:    Patient is located at Home in the following state in which I hold an active license [place]      Assessment/Plan:    Problem List Items Addressed This Visit    None      Goals addressed in session:          Reason for visit is   Chief Complaint   Patient presents with    Virtual Regular Visit          Encounter provider Radha Escudero LCSW      Recent Visits  No visits were found meeting these conditions.  Showing recent visits within past 7 days and meeting all other requirements  Today's Visits  Date Type Provider Dept   06/10/24 Telemedicine Radha Escudero LCSW [facility]   Showing today's visits and meeting all other requirements  Future Appointments  No visits were found meeting these conditions.  Showing future appointments within next 150 days and meeting all other requirements       The patient was identified by name and date of birth. Kai Breen was informed that this is a telemedicine visit and that the visit is being conducted throughthe Epic Embedded platform. He agrees to proceed..  My office door was closed. No one else was in the room.  He acknowledged consent and understanding of privacy and security of the video platform. The patient has agreed to participate and understands they can discontinue the visit at any time.    Patient is aware this is a billable service.     Subjective  Kai Breen is a 54 y.o. male  .      HPI     Past Medical History:   Diagnosis Date    Arthritis     Coronary artery disease     MI x 4; cardiac cath with 4 stents    GERD (gastroesophageal reflux disease)     Hypertension     Myocardial infarct (HCC)     4 MIs as of 2/24/2020    Non-insulin dependent type 2 diabetes mellitus (HCC) 8/24/2022       Past Surgical History:   Procedure Laterality Date    CARDIAC CATHETERIZATION  1/7/2022    Procedure: Cardiac catheterization;  Surgeon: Jasiel Padilla DO;   [facility];  Service: Cardiology    CARDIAC CATHETERIZATION N/A 8/26/2022    Procedure: Cardiac catheterization;  Surgeon: Kirt Lezama MD;  [facility];  Service: Cardiology    CARDIAC CATHETERIZATION N/A 8/26/2022    Procedure: Cardiac pci;  Surgeon: Kirt Lezama MD;  [facility];  Service: Cardiology    CARDIAC CATHETERIZATION Left 8/26/2022    Procedure: Cardiac Left Heart Cath;  Surgeon: Kirt Lezama MD;  [facility];  Service: Cardiology    CARDIAC CATHETERIZATION N/A 8/26/2022    Procedure: Cardiac Coronary Angiogram;  Surgeon: Kirt Lezama MD;  [facility];  Service: Cardiology    CORONARY ANGIOPLASTY WITH STENT PLACEMENT         Current Outpatient Medications   Medication Sig Dispense Refill    aspirin 81 MG tablet Take 81 mg by mouth daily        atorvastatin (LIPITOR) 40 mg tablet TAKE ONE TABLET BY MOUTH EVERY 24 HOURS 90 tablet 3    Brilinta 90 MG TAKE ONE TABLET BY MOUTH EVERY 12 HOURS 180 tablet 0    Contour Next Test test strip       DULoxetine (CYMBALTA) 60 mg delayed release capsule Take 60 mg by mouth daily (Patient not taking: Reported on 5/1/2024)      isosorbide mononitrate (IMDUR) 60 mg 24 hr tablet TAKE ONE TABLET BY MOUTH EVERY DAY 90 tablet 3    Jardiance 10 MG TABS Take 10 mg by mouth in the morning (Patient not taking: Reported on 5/1/2024)      Jardiance 25 MG TABS Take 25 mg by mouth      losartan (COZAAR) 100 MG tablet Take 1 tablet (100 mg total) by mouth every 24 hours 30 tablet 3    metFORMIN (GLUCOPHAGE) 500 mg tablet Take 500 mg by mouth 2 (two) times a day with meals      Microlet Lancets MISC       nitroglycerin (NITROSTAT) 0.4 mg SL tablet as directed      Omega-3 Fatty Acids (OMEGA 3 PO) Take 2,000 mg by mouth 2 (two) times a day      pantoprazole (PROTONIX) 40 mg tablet Take 40 mg by mouth daily  3    propranolol (INDERAL LA) 160 mg Take 1 capsule (160 mg total) by mouth  daily 30 capsule 3    traZODone (DESYREL) 50 mg tablet       venlafaxine (EFFEXOR-XR) 75 mg 24 hr capsule  (Patient not taking: Reported on 2024)      venlafaxine 150 MG TB24 24 hr tablet        No current facility-administered medications for this visit.        No Known Allergies    Review of Systems   Psychiatric/Behavioral:  The patient has insomnia.        Video Exam    There were no vitals filed for this visit.    Physical Exam  Psychiatric:         Behavior: Behavior is cooperative.          Visit Time    Visit Start Time: 1505   Visit Stop Time: 1550   Total Visit Duration: 45 minutes      Behavioral Health Psychotherapy Assessment    Date of Initial Psychotherapy Assessment: 06/10/24  Referral Source: Cody Valdez DO  Has a release of information been signed for the referral source? No    Preferred Name: Kai Breen  Preferred Pronouns: He/him  YOB: 1970 Age: 54 y.o.  Sex assigned at birth: male   Gender Identity:   Race:   Preferred Language: English    Emergency Contact:  Full Name:   Relationship to Client:   Contact information:     Primary Care Physician:  Nellie Worley DO   [facility] [address]  [facility] [id number]  [phone]  Has a release of information been signed? No    Physical Health History:  Past surgical procedures:   Do you have a history of any of the following: heart/cardiac issues  Do you have any mobility issues? No    Relevant Family History:  First wife  in ; Mr. Breen initially had a severe major depressive episode and upon remission began to keep himself extremely active with projects; likely function of avoiding further depression but this now likely contributes to irritability to sleep and related health problems. He is on SSDI due to multiple heart attacks and expresses ambivalence about improving health due to michelle/belief that upon his death he will be reunited with first wife. He is remarried and expresses frustration with  new spouse related to finances and parenting of a foster child.     Presenting Problem (What brings you in?)  Poor sleep including mult-day periods of inabilty to sleep. Description does not appear to represent josh (ie. No grandiosity, pressured speech, high risk behavior etc.). Mr. Breen does demonstrate some rigid all-or-nothing thinking that will be incorporated into treatment over time, as will addressing grief reaction; however we will initially embark on CBT-I including completion of a Sleep Log between now and next session     Mental Health Advance Directive:  Do you currently have a Mental Health Advance Directive?no    Diagnosis:  No diagnosis found.    Initial Assessment:     Current Mental Status:    Appearance: appropriate      Behavior/Manner: cooperative      Affect/Mood:  Blue    Speech:  Normal    Sleep:  Insomnia    Oriented to: oriented to self, oriented to place and oriented to time       Clinical Symptoms    Depression: yes      Depression Symptoms: depressed mood, serious loss of interest in things, insomnia and irritable      Anxiety Symptoms: irritable      Have you ever been assaultive to others or the environment: No      Have you ever been self-injurious: No      Counseling History:  Previous Counseling or Treatment  (Mental Health or Drug & Alcohol): Yes    Previous Counseling Details:  Saw a counselor for a year after first wife's death to cancer in 2011  Have you previously taken psychiatric medications: Yes    Previous Medications Attempted:  Various    Suicide Risk Assessment  Have you ever had a suicide attempt: No    Have you had incidents of suicidal ideation: No    Are you currently experiencing suicidal thoughts: No      Social Determinants of Health:    SDOH:  Social isolation and stress    Trauma and Abuse History:    Have you ever been abused: No      Relationship History:    Current marital status:       Employment History    Sources of income/financial support:   Social Security Disability (SSDI)     History:      Status: no history of  duty    Recommended Treatment:     Psychotherapy:  Individual sessions    Frequency:  1 time    Session frequency:  Weekly      Visit start and stop times:    06/10/24  Start Time: 1505  Stop Time: 1550  Total Visit Time: 45 minutes   5-7x/week

## 2024-06-21 ENCOUNTER — SOCIAL WORK (OUTPATIENT)
Age: 54
End: 2024-06-21
Payer: COMMERCIAL

## 2024-06-21 DIAGNOSIS — E78.2 MIXED HYPERLIPIDEMIA: ICD-10-CM

## 2024-06-21 DIAGNOSIS — R07.9 CHEST PAIN, UNSPECIFIED TYPE: ICD-10-CM

## 2024-06-21 DIAGNOSIS — I25.118 CORONARY ARTERY DISEASE OF NATIVE ARTERY OF NATIVE HEART WITH STABLE ANGINA PECTORIS (HCC): ICD-10-CM

## 2024-06-21 DIAGNOSIS — F51.04 INSOMNIA, PSYCHOPHYSIOLOGICAL: Primary | ICD-10-CM

## 2024-06-21 PROCEDURE — 90834 PSYTX W PT 45 MINUTES: CPT | Performed by: SOCIAL WORKER

## 2024-06-21 NOTE — PSYCH
"Behavioral Health Psychotherapy Progress Note    Psychotherapy Provided: Individual Psychotherapy     1. Insomnia, psychophysiological            Goals addressed in session: Will write Tx plan in an upcoming session     DATA:   Mr. Breen maintained a sleep log and appears to gain 6.5-7 hours sleep nightly with 90% SE. He does wish to discontinue Trazodone and maintain same sleep ability; I requested that he discuss this with his prescriber (PCP [place]) but delineated a SRT to follow once doing so that includes a 11 p.m. bedtime and natural waketime around 6 a.m. He tends to watch TV with his eyes closed and we will determine if this interferes with buildup of sleep urge prior to bedtime; he mentions several instances in which his spouse will be angry with him if he makes changes such as being more active prior to bedtime. During this session, this clinician used the following therapeutic modalities: Cognitive Behavioral Therapy    Substance Abuse was not addressed during this session. If the client is diagnosed with a co-occurring substance use disorder, please indicate any changes in the frequency or amount of use: . Stage of change for addressing substance use diagnoses: No substance use/Not applicable    ASSESSMENT:  Kai Breen presents with a Euthymic/ normal mood.     his affect is Normal range and intensity, which is congruent, with his mood and the content of the session. The client has made progress on their goals.     Kai Breen presents with a none risk of suicide, none risk of self-harm, and none risk of harm to others.    For any risk assessment that surpasses a \"low\" rating, a safety plan must be developed.    A safety plan was indicated: no  If yes, describe in detail     PLAN: Between sessions, Kai Breen will maintain and record the above SRT. At the next session, the therapist will use Cognitive Behavioral Therapy to address the results of this effort.    Behavioral Health Treatment Plan and " Discharge Planning: Kai Breen is aware of and agrees to continue to work on their treatment plan. They have identified and are working toward their discharge goals. yes    Visit start and stop times:    06/21/24  Start Time: 1503  Stop Time: 1545  Total Visit Time: 42 minutes

## 2024-06-24 RX ORDER — ISOSORBIDE MONONITRATE 60 MG/1
TABLET, EXTENDED RELEASE ORAL
Qty: 90 TABLET | Refills: 0 | Status: SHIPPED | OUTPATIENT
Start: 2024-06-24

## 2024-06-24 RX ORDER — ATORVASTATIN CALCIUM 40 MG/1
TABLET, FILM COATED ORAL
Qty: 90 TABLET | Refills: 0 | Status: SHIPPED | OUTPATIENT
Start: 2024-06-24

## 2024-06-28 ENCOUNTER — SOCIAL WORK (OUTPATIENT)
Age: 54
End: 2024-06-28
Payer: COMMERCIAL

## 2024-06-28 DIAGNOSIS — F51.04 INSOMNIA, PSYCHOPHYSIOLOGICAL: Primary | ICD-10-CM

## 2024-06-28 PROCEDURE — 90834 PSYTX W PT 45 MINUTES: CPT | Performed by: SOCIAL WORKER

## 2024-06-28 NOTE — PSYCH
"Behavioral Health Psychotherapy Progress Note    Psychotherapy Provided: Individual Psychotherapy     1. Insomnia, psychophysiological            Goals addressed in session: Goal 1     DATA:   During this session, this clinician used the following therapeutic modalities: Cognitive Behavioral Therapy to learn that client's sleep is about the same (11:30- 6 a.m. with one middle awakening) and he has not discussed medication with PCP due to her vacation; therefore we will wait two weeks before having another appt. I did provide him with the message from mid-May from his MD requesting that he schedule an in-office polysomnography.     Substance Abuse was not addressed during this session. If the client is diagnosed with a co-occurring substance use disorder, please indicate any changes in the frequency or amount of use: . Stage of change for addressing substance use diagnoses: No substance use/Not applicable    ASSESSMENT:  Kai Breen presents with a Euthymic/ normal mood.     his affect is Normal range and intensity, which is congruent, with his mood and the content of the session. The client has made progress on their goals.     Kai Breen presents with a none risk of suicide, none risk of self-harm, and none risk of harm to others.    For any risk assessment that surpasses a \"low\" rating, a safety plan must be developed.    A safety plan was indicated: no  If yes, describe in detail     PLAN: Between sessions, Kai Breen will continue to adhere to SRT and keep log. At the next session, the therapist will use Cognitive Behavioral Therapy to address the results of this effort; may introduce a thought log.    Behavioral Health Treatment Plan and Discharge Planning: Kai Breen is aware of and agrees to continue to work on their treatment plan. They have identified and are working toward their discharge goals. yes    Visit start and stop times:    06/28/24  Start Time: 1305  Stop Time: 1344  Total Visit Time: 39 " minutes

## 2024-06-28 NOTE — BH TREATMENT PLAN
Outpatient Behavioral Health Psychotherapy Treatment Plan    Kai Breen  1970     Date of Initial Psychotherapy Assessment: 6/10/2024  Date of Current Treatment Plan: 06/28/24  Treatment Plan Target Date: 09/28/2024  Treatment Plan Expiration Date: 06/28/2025    Diagnosis:   No diagnosis found.    Area(s) of Need: Poor sleep especially if not using medication; hard to wind down at bedtime     Long Term Goal 1 (in the client's own words): I'll receive more consistent and refreshing sleep     Stage of Change: Action    Target Date for completion: 09/28/2024     Anticipated therapeutic modalities: CBT-I     People identified to complete this goal:       Objective 1: (identify the means of measuring success in meeting the objective): Record sleep using a log; make changes and addition to TST if possible; discuss medication discontinuation with PCP      Objective 2: (identify the means of measuring success in meeting the objective): Provide education about sleep to prevent/address future concerns       Long Term Goal 2 (in the client's own words):     Stage of Change:     Target Date for completion:      Anticipated therapeutic modalities:      People identified to complete this goal:       Objective 1: (identify the means of measuring success in meeting the objective):       Objective 2: (identify the means of measuring success in meeting the objective):      Long Term Goal 3 (in the client's own words):     Stage of Change:     Target Date for completion:      Anticipated therapeutic modalities:      People identified to complete this goal:       Objective 1: (identify the means of measuring success in meeting the objective):       Objective 2: (identify the means of measuring success in meeting the objective):      I am currently under the care of a [facility] psychiatric provider: no    My [facility] psychiatric provider is:     I am currently taking psychiatric medications: Yes, as prescribed    I feel that I  will be ready for discharge from mental health care when I reach the following (measurable goal/objective): Able to sleep well without Trazadone     For children and adults who have a legal guardian:   Has there been any change to custody orders and/or guardianship status? NA. If yes, attach updated documentation.    I have created my Crisis Plan and have been offered a copy of this plan    Behavioral Health Treatment Plan  Luke: Diagnosis and Treatment Plan explained to Kai Breen acknowledges an understanding of their diagnosis. Kai Breen agrees to this treatment plan.    I have been offered a copy of this Treatment Plan. yes

## 2024-07-15 ENCOUNTER — TELEMEDICINE (OUTPATIENT)
Age: 54
End: 2024-07-15
Payer: COMMERCIAL

## 2024-07-15 DIAGNOSIS — F51.04 INSOMNIA, PSYCHOPHYSIOLOGICAL: Primary | ICD-10-CM

## 2024-07-15 PROCEDURE — 90832 PSYTX W PT 30 MINUTES: CPT | Performed by: SOCIAL WORKER

## 2024-07-25 ENCOUNTER — TELEMEDICINE (OUTPATIENT)
Age: 54
End: 2024-07-25
Payer: COMMERCIAL

## 2024-07-25 DIAGNOSIS — F51.04 INSOMNIA, PSYCHOPHYSIOLOGICAL: Primary | ICD-10-CM

## 2024-07-25 PROCEDURE — 90832 PSYTX W PT 30 MINUTES: CPT | Performed by: SOCIAL WORKER

## 2024-07-25 NOTE — PSYCH
"Behavioral Health Psychotherapy Progress Note    Psychotherapy Provided: Individual Psychotherapy     1. Insomnia, psychophysiological            Goals addressed in session: Goal 1     DATA:   During this session, this clinician used the following therapeutic modalities: Cognitive Behavioral Therapy to learn that Mr. Breen has generally been following SRT and sleep hygiene; some nights does not sleep well and we reviewed importance of not only arising when this is the case (which he does appear to do) but also avoiding sleeping in later in the morning, and/or delaying his bedtime the next night in order to build up approximately 18 hours of sleep pressure. He reports that his daytime energy and mood are both good.     Substance Abuse was not addressed during this session. If the client is diagnosed with a co-occurring substance use disorder, please indicate any changes in the frequency or amount of use: . Stage of change for addressing substance use diagnoses: No substance use/Not applicable    ASSESSMENT:  Kai Breen presents with a Euthymic/ normal mood.     his affect is Normal range and intensity, which is congruent, with his mood and the content of the session. The client has made progress on their goals.     Kai Breen presents with a none risk of suicide, none risk of self-harm, and none risk of harm to others.    For any risk assessment that surpasses a \"low\" rating, a safety plan must be developed.    A safety plan was indicated: no  If yes, describe in detail     PLAN: Between sessions, Kai Breen will follow above guidelines.  At the next session, the therapist will use Cognitive Behavioral Therapy to address the results of these efforts; any remain concerns, and tentatively plan for discharge.    Behavioral Health Treatment Plan and Discharge Planning: Kai Breen is aware of and agrees to continue to work on their treatment plan. They have identified and are working toward their discharge goals. " yes    Visit start and stop times:    07/25/24  Start Time: 1707  Stop Time: 1733  Total Visit Time: 26 minutes  Virtual Regular Visit    Verification of patient location:    Patient is located at Home in the following state in which I hold an active license [place]      Assessment/Plan:    Problem List Items Addressed This Visit    None  Visit Diagnoses       Insomnia, psychophysiological    -  Primary            Goals addressed in session: Goal 1          Reason for visit is   Chief Complaint   Patient presents with    Virtual Regular Visit          Encounter provider Radha Escudero LCSW      Recent Visits  No visits were found meeting these conditions.  Showing recent visits within past 7 days and meeting all other requirements  Today's Visits  Date Type Provider Dept   07/25/24 Telemedicine Radha Escudero LCSW [facility]   Showing today's visits and meeting all other requirements  Future Appointments  No visits were found meeting these conditions.  Showing future appointments within next 150 days and meeting all other requirements       The patient was identified by name and date of birth. Kai Breen was informed that this is a telemedicine visit and that the visit is being conducted throughthe Epic Embedded platform. He agrees to proceed..  My office door was closed. No one else was in the room.  He acknowledged consent and understanding of privacy and security of the video platform. The patient has agreed to participate and understands they can discontinue the visit at any time.    Patient is aware this is a billable service.     Subjective  Kai Breen is a 54 y.o. male  .      HPI     Past Medical History:   Diagnosis Date    Arthritis     Coronary artery disease     MI x 4; cardiac cath with 4 stents    GERD (gastroesophageal reflux disease)     Hypertension     Myocardial infarct (HCC)     4 MIs as of 2/24/2020    Non-insulin dependent type 2 diabetes mellitus  ([facility]) 8/24/2022       Past Surgical History:   Procedure Laterality Date    CARDIAC CATHETERIZATION  1/7/2022    Procedure: Cardiac catheterization;  Surgeon: Jasiel Padilla DO;  [facility];  Service: Cardiology    CARDIAC CATHETERIZATION N/A 8/26/2022    Procedure: Cardiac catheterization;  Surgeon: Kirt Lezama MD;  [facility];  Service: Cardiology    CARDIAC CATHETERIZATION N/A 8/26/2022    Procedure: Cardiac pci;  Surgeon: Kirt Lezama MD;  [facility];  Service: Cardiology    CARDIAC CATHETERIZATION Left 8/26/2022    Procedure: Cardiac Left Heart Cath;  Surgeon: Kirt Lezama MD;  [facility];  Service: Cardiology    CARDIAC CATHETERIZATION N/A 8/26/2022    Procedure: Cardiac Coronary Angiogram;  Surgeon: Kirt Lezama MD;  [facility];  Service: Cardiology    CORONARY ANGIOPLASTY WITH STENT PLACEMENT         Current Outpatient Medications   Medication Sig Dispense Refill    aspirin 81 MG tablet Take 81 mg by mouth daily        atorvastatin (LIPITOR) 40 mg tablet TAKE ONE TABLET BY MOUTH EVERY 24 HOURS 90 tablet 0    Brilinta 90 MG TAKE ONE TABLET BY MOUTH EVERY 12 HOURS 180 tablet 0    Contour Next Test test strip       DULoxetine (CYMBALTA) 60 mg delayed release capsule Take 60 mg by mouth daily (Patient not taking: Reported on 5/1/2024)      isosorbide mononitrate (IMDUR) 60 mg 24 hr tablet TAKE ONE TABLET BY MOUTH EVERY DAY 90 tablet 0    Jardiance 10 MG TABS Take 10 mg by mouth in the morning (Patient not taking: Reported on 5/1/2024)      Jardiance 25 MG TABS Take 25 mg by mouth      losartan (COZAAR) 100 MG tablet Take 1 tablet (100 mg total) by mouth every 24 hours 30 tablet 3    metFORMIN (GLUCOPHAGE) 500 mg tablet Take 500 mg by mouth 2 (two) times a day with meals      Microlet Lancets MISC       nitroglycerin (NITROSTAT) 0.4 mg SL tablet as directed      Omega-3 Fatty Acids (OMEGA 3 PO)  Take 2,000 mg by mouth 2 (two) times a day      pantoprazole (PROTONIX) 40 mg tablet Take 40 mg by mouth daily  3    propranolol (INDERAL LA) 160 mg Take 1 capsule (160 mg total) by mouth daily 30 capsule 3    traZODone (DESYREL) 50 mg tablet       venlafaxine (EFFEXOR-XR) 75 mg 24 hr capsule  (Patient not taking: Reported on 5/1/2024)      venlafaxine 150 MG TB24 24 hr tablet        No current facility-administered medications for this visit.        No Known Allergies    Review of Systems    Video Exam    There were no vitals filed for this visit.    Physical Exam     Visit Time    Visit Start Time: 1707  Visit Stop Time: 1733  Total Visit Duration:  26 minutes

## 2024-09-09 ENCOUNTER — APPOINTMENT (OUTPATIENT)
Dept: LAB | Facility: HOSPITAL | Age: 54
End: 2024-09-09

## 2024-09-09 DIAGNOSIS — Z00.8 HEALTH EXAMINATION IN POPULATION SURVEY: ICD-10-CM

## 2024-09-09 LAB
CHOLEST SERPL-MCNC: 126 MG/DL
EST. AVERAGE GLUCOSE BLD GHB EST-MCNC: 194 MG/DL
HBA1C MFR BLD: 8.4 %
HDLC SERPL-MCNC: 30 MG/DL
LDLC SERPL CALC-MCNC: 26 MG/DL (ref 0–100)
NONHDLC SERPL-MCNC: 96 MG/DL
TRIGL SERPL-MCNC: 350 MG/DL

## 2024-09-09 PROCEDURE — 83036 HEMOGLOBIN GLYCOSYLATED A1C: CPT

## 2024-09-09 PROCEDURE — 36415 COLL VENOUS BLD VENIPUNCTURE: CPT

## 2024-09-09 PROCEDURE — 80061 LIPID PANEL: CPT

## 2024-09-24 DIAGNOSIS — I25.118 CORONARY ARTERY DISEASE OF NATIVE ARTERY OF NATIVE HEART WITH STABLE ANGINA PECTORIS (HCC): ICD-10-CM

## 2024-09-24 DIAGNOSIS — E78.2 MIXED HYPERLIPIDEMIA: ICD-10-CM

## 2024-09-24 DIAGNOSIS — R07.9 CHEST PAIN, UNSPECIFIED TYPE: ICD-10-CM

## 2024-09-24 RX ORDER — ATORVASTATIN CALCIUM 40 MG/1
TABLET, FILM COATED ORAL
Qty: 90 TABLET | Refills: 0 | Status: SHIPPED | OUTPATIENT
Start: 2024-09-24

## 2024-09-24 RX ORDER — ISOSORBIDE MONONITRATE 60 MG/1
TABLET, EXTENDED RELEASE ORAL
Qty: 90 TABLET | Refills: 0 | Status: SHIPPED | OUTPATIENT
Start: 2024-09-24

## 2024-10-15 ENCOUNTER — DOCUMENTATION (OUTPATIENT)
Dept: BEHAVIORAL/MENTAL HEALTH CLINIC | Facility: CLINIC | Age: 54
End: 2024-10-15

## 2024-10-16 NOTE — PROGRESS NOTES
Psychotherapy Discharge Summary    Preferred Name: Kai Breen  YOB: 1970    Admission date to psychotherapy: 6/10/2024    Referred by: Cody Valdez DO    Presenting Problem: Insomnia precipitated by grief     Course of treatment included : individual therapy     Progress/Outcome of Treatment Goals (brief summary of course of treatment) Benefited from the supportive environment of therapy as well as CBT-I intervention     Treatment Complications (if any): Cancellations and no-shows    Treatment Progress: good    Current [facility] Psychiatric Provider:     Discharge Medications include:     Discharge Date: 10/16/2024    Discharge Diagnosis: No diagnosis found.    Criteria for Discharge: completed treatment goals and objectives and is no longer in need of services  Aftercare recommendations include (include specific referral names and phone numbers, if appropriate):     Prognosis: good

## 2024-11-14 NOTE — ASSESSMENT & PLAN NOTE
· Continue statin therapy Ftsg Text: The defect edges were debeveled with a #15 scalpel blade.  Given the location of the defect, shape of the defect and the proximity to free margins a full thickness skin graft was deemed most appropriate.  Using a sterile surgical marker, the primary defect shape was transferred to the donor site. The area thus outlined was incised deep to adipose tissue with a #15 scalpel blade.  The harvested graft was then trimmed of adipose tissue until only dermis and epidermis was left.  The skin margins of the secondary defect were undermined to an appropriate distance in all directions utilizing iris scissors.  The secondary defect was closed with interrupted buried subcutaneous sutures.  The skin edges were then re-apposed with running  sutures.  The skin graft was then placed in the primary defect and oriented appropriately.

## 2024-12-19 ENCOUNTER — APPOINTMENT (EMERGENCY)
Dept: RADIOLOGY | Facility: HOSPITAL | Age: 54
End: 2024-12-19
Payer: COMMERCIAL

## 2024-12-19 ENCOUNTER — HOSPITAL ENCOUNTER (OUTPATIENT)
Facility: HOSPITAL | Age: 54
Setting detail: OBSERVATION
Discharge: HOME | End: 2024-12-20
Attending: EMERGENCY MEDICINE | Admitting: INTERNAL MEDICINE
Payer: COMMERCIAL

## 2024-12-19 DIAGNOSIS — R07.9 CHEST PAIN: Primary | ICD-10-CM

## 2024-12-19 DIAGNOSIS — I25.10 CAD (CORONARY ARTERY DISEASE): ICD-10-CM

## 2024-12-19 DIAGNOSIS — I20.0 UNSTABLE ANGINA (HCC): ICD-10-CM

## 2024-12-19 DIAGNOSIS — E78.2 MIXED HYPERLIPIDEMIA: ICD-10-CM

## 2024-12-19 PROBLEM — E11.9 TYPE 2 DIABETES MELLITUS (HCC): Status: ACTIVE | Noted: 2022-08-24

## 2024-12-19 LAB
2HR DELTA HS TROPONIN: -2 NG/L
4HR DELTA HS TROPONIN: -1 NG/L
ALBUMIN SERPL BCG-MCNC: 4.3 G/DL (ref 3.5–5)
ALP SERPL-CCNC: 137 U/L (ref 34–104)
ALT SERPL W P-5'-P-CCNC: 25 U/L (ref 7–52)
ANION GAP SERPL CALCULATED.3IONS-SCNC: 9 MMOL/L (ref 4–13)
APTT PPP: 23 SECONDS (ref 23–34)
APTT PPP: 47 SECONDS (ref 23–34)
AST SERPL W P-5'-P-CCNC: 20 U/L (ref 13–39)
ATRIAL RATE: 86 BPM
BASOPHILS # BLD AUTO: 0.05 THOUSANDS/ΜL (ref 0–0.1)
BASOPHILS NFR BLD AUTO: 1 % (ref 0–1)
BILIRUB SERPL-MCNC: 0.71 MG/DL (ref 0.2–1)
BUN SERPL-MCNC: 16 MG/DL (ref 5–25)
CALCIUM SERPL-MCNC: 9.3 MG/DL (ref 8.4–10.2)
CARDIAC TROPONIN I PNL SERPL HS: 4 NG/L (ref ?–50)
CARDIAC TROPONIN I PNL SERPL HS: 5 NG/L (ref ?–50)
CARDIAC TROPONIN I PNL SERPL HS: 6 NG/L (ref ?–50)
CHLORIDE SERPL-SCNC: 104 MMOL/L (ref 96–108)
CO2 SERPL-SCNC: 23 MMOL/L (ref 21–32)
CREAT SERPL-MCNC: 1.02 MG/DL (ref 0.6–1.3)
EOSINOPHIL # BLD AUTO: 0.12 THOUSAND/ΜL (ref 0–0.61)
EOSINOPHIL NFR BLD AUTO: 2 % (ref 0–6)
ERYTHROCYTE [DISTWIDTH] IN BLOOD BY AUTOMATED COUNT: 13.6 % (ref 11.6–15.1)
EST. AVERAGE GLUCOSE BLD GHB EST-MCNC: 192 MG/DL
GFR SERPL CREATININE-BSD FRML MDRD: 83 ML/MIN/1.73SQ M
GLUCOSE SERPL-MCNC: 143 MG/DL (ref 65–140)
GLUCOSE SERPL-MCNC: 211 MG/DL (ref 65–140)
GLUCOSE SERPL-MCNC: 222 MG/DL (ref 65–140)
HBA1C MFR BLD: 8.3 %
HCT VFR BLD AUTO: 51.8 % (ref 36.5–49.3)
HGB BLD-MCNC: 17.2 G/DL (ref 12–17)
IMM GRANULOCYTES # BLD AUTO: 0.04 THOUSAND/UL (ref 0–0.2)
IMM GRANULOCYTES NFR BLD AUTO: 1 % (ref 0–2)
INR PPP: 0.95 (ref 0.85–1.19)
LYMPHOCYTES # BLD AUTO: 1.47 THOUSANDS/ΜL (ref 0.6–4.47)
LYMPHOCYTES NFR BLD AUTO: 19 % (ref 14–44)
MAGNESIUM SERPL-MCNC: 2.4 MG/DL (ref 1.9–2.7)
MCH RBC QN AUTO: 30.3 PG (ref 26.8–34.3)
MCHC RBC AUTO-ENTMCNC: 33.2 G/DL (ref 31.4–37.4)
MCV RBC AUTO: 91 FL (ref 82–98)
MONOCYTES # BLD AUTO: 0.53 THOUSAND/ΜL (ref 0.17–1.22)
MONOCYTES NFR BLD AUTO: 7 % (ref 4–12)
NEUTROPHILS # BLD AUTO: 5.63 THOUSANDS/ΜL (ref 1.85–7.62)
NEUTS SEG NFR BLD AUTO: 70 % (ref 43–75)
NRBC BLD AUTO-RTO: 0 /100 WBCS
P AXIS: 33 DEGREES
PLATELET # BLD AUTO: 230 THOUSANDS/UL (ref 149–390)
PMV BLD AUTO: 9.4 FL (ref 8.9–12.7)
POTASSIUM SERPL-SCNC: 4 MMOL/L (ref 3.5–5.3)
PR INTERVAL: 168 MS
PROT SERPL-MCNC: 7.4 G/DL (ref 6.4–8.4)
PROTHROMBIN TIME: 12.9 SECONDS (ref 12.3–15)
QRS AXIS: 58 DEGREES
QRSD INTERVAL: 90 MS
QT INTERVAL: 352 MS
QTC INTERVAL: 421 MS
RBC # BLD AUTO: 5.68 MILLION/UL (ref 3.88–5.62)
SODIUM SERPL-SCNC: 136 MMOL/L (ref 135–147)
T WAVE AXIS: 73 DEGREES
VENTRICULAR RATE: 86 BPM
WBC # BLD AUTO: 7.84 THOUSAND/UL (ref 4.31–10.16)

## 2024-12-19 PROCEDURE — 93005 ELECTROCARDIOGRAM TRACING: CPT

## 2024-12-19 PROCEDURE — 36415 COLL VENOUS BLD VENIPUNCTURE: CPT

## 2024-12-19 PROCEDURE — 93010 ELECTROCARDIOGRAM REPORT: CPT | Performed by: INTERNAL MEDICINE

## 2024-12-19 PROCEDURE — 85610 PROTHROMBIN TIME: CPT | Performed by: STUDENT IN AN ORGANIZED HEALTH CARE EDUCATION/TRAINING PROGRAM

## 2024-12-19 PROCEDURE — 99223 1ST HOSP IP/OBS HIGH 75: CPT | Performed by: INTERNAL MEDICINE

## 2024-12-19 PROCEDURE — 84484 ASSAY OF TROPONIN QUANT: CPT | Performed by: EMERGENCY MEDICINE

## 2024-12-19 PROCEDURE — 85025 COMPLETE CBC W/AUTO DIFF WBC: CPT | Performed by: EMERGENCY MEDICINE

## 2024-12-19 PROCEDURE — 71046 X-RAY EXAM CHEST 2 VIEWS: CPT

## 2024-12-19 PROCEDURE — 83036 HEMOGLOBIN GLYCOSYLATED A1C: CPT | Performed by: INTERNAL MEDICINE

## 2024-12-19 PROCEDURE — 99215 OFFICE O/P EST HI 40 MIN: CPT | Performed by: INTERNAL MEDICINE

## 2024-12-19 PROCEDURE — 80053 COMPREHEN METABOLIC PANEL: CPT | Performed by: EMERGENCY MEDICINE

## 2024-12-19 PROCEDURE — 99285 EMERGENCY DEPT VISIT HI MDM: CPT

## 2024-12-19 PROCEDURE — 83735 ASSAY OF MAGNESIUM: CPT | Performed by: EMERGENCY MEDICINE

## 2024-12-19 PROCEDURE — 84484 ASSAY OF TROPONIN QUANT: CPT | Performed by: INTERNAL MEDICINE

## 2024-12-19 PROCEDURE — 99285 EMERGENCY DEPT VISIT HI MDM: CPT | Performed by: EMERGENCY MEDICINE

## 2024-12-19 PROCEDURE — 82948 REAGENT STRIP/BLOOD GLUCOSE: CPT

## 2024-12-19 PROCEDURE — 85730 THROMBOPLASTIN TIME PARTIAL: CPT | Performed by: STUDENT IN AN ORGANIZED HEALTH CARE EDUCATION/TRAINING PROGRAM

## 2024-12-19 PROCEDURE — 85730 THROMBOPLASTIN TIME PARTIAL: CPT | Performed by: INTERNAL MEDICINE

## 2024-12-19 RX ORDER — PROPRANOLOL HYDROCHLORIDE 80 MG/1
160 CAPSULE, EXTENDED RELEASE ORAL DAILY
Status: DISCONTINUED | OUTPATIENT
Start: 2024-12-20 | End: 2024-12-20 | Stop reason: HOSPADM

## 2024-12-19 RX ORDER — VENLAFAXINE HYDROCHLORIDE 150 MG/1
150 CAPSULE, EXTENDED RELEASE ORAL
Status: DISCONTINUED | OUTPATIENT
Start: 2024-12-20 | End: 2024-12-20 | Stop reason: HOSPADM

## 2024-12-19 RX ORDER — INSULIN LISPRO 100 [IU]/ML
1-6 INJECTION, SOLUTION INTRAVENOUS; SUBCUTANEOUS
Status: DISCONTINUED | OUTPATIENT
Start: 2024-12-19 | End: 2024-12-20 | Stop reason: HOSPADM

## 2024-12-19 RX ORDER — LOSARTAN POTASSIUM 50 MG/1
100 TABLET ORAL EVERY 24 HOURS
Status: DISCONTINUED | OUTPATIENT
Start: 2024-12-20 | End: 2024-12-20 | Stop reason: HOSPADM

## 2024-12-19 RX ORDER — HEPARIN SODIUM 1000 [USP'U]/ML
4000 INJECTION, SOLUTION INTRAVENOUS; SUBCUTANEOUS ONCE
Status: COMPLETED | OUTPATIENT
Start: 2024-12-19 | End: 2024-12-19

## 2024-12-19 RX ORDER — ASPIRIN 81 MG/1
324 TABLET, CHEWABLE ORAL ONCE
Status: CANCELLED | OUTPATIENT
Start: 2024-12-19 | End: 2024-12-19

## 2024-12-19 RX ORDER — NITROGLYCERIN 0.4 MG/1
0.4 TABLET SUBLINGUAL
Status: DISCONTINUED | OUTPATIENT
Start: 2024-12-19 | End: 2024-12-20 | Stop reason: HOSPADM

## 2024-12-19 RX ORDER — PANTOPRAZOLE SODIUM 40 MG/1
40 TABLET, DELAYED RELEASE ORAL
Status: DISCONTINUED | OUTPATIENT
Start: 2024-12-20 | End: 2024-12-20 | Stop reason: HOSPADM

## 2024-12-19 RX ORDER — SODIUM CHLORIDE 9 MG/ML
75 INJECTION, SOLUTION INTRAVENOUS CONTINUOUS
Status: CANCELLED | OUTPATIENT
Start: 2024-12-20 | End: 2024-12-20

## 2024-12-19 RX ORDER — ATORVASTATIN CALCIUM 40 MG/1
40 TABLET, FILM COATED ORAL
Status: DISCONTINUED | OUTPATIENT
Start: 2024-12-19 | End: 2024-12-20 | Stop reason: HOSPADM

## 2024-12-19 RX ORDER — HEPARIN SODIUM 10000 [USP'U]/100ML
3-20 INJECTION, SOLUTION INTRAVENOUS
Status: DISCONTINUED | OUTPATIENT
Start: 2024-12-19 | End: 2024-12-20

## 2024-12-19 RX ORDER — ATORVASTATIN CALCIUM 40 MG/1
40 TABLET, FILM COATED ORAL EVERY 24 HOURS
Qty: 90 TABLET | Refills: 0 | Status: SHIPPED | OUTPATIENT
Start: 2024-12-19

## 2024-12-19 RX ORDER — ISOSORBIDE MONONITRATE 60 MG/1
120 TABLET, EXTENDED RELEASE ORAL DAILY
Status: DISCONTINUED | OUTPATIENT
Start: 2024-12-19 | End: 2024-12-20 | Stop reason: HOSPADM

## 2024-12-19 RX ORDER — ISOSORBIDE MONONITRATE 60 MG/1
120 TABLET, EXTENDED RELEASE ORAL DAILY
Status: DISCONTINUED | OUTPATIENT
Start: 2024-12-20 | End: 2024-12-19

## 2024-12-19 RX ORDER — HEPARIN SODIUM 1000 [USP'U]/ML
2000 INJECTION, SOLUTION INTRAVENOUS; SUBCUTANEOUS EVERY 6 HOURS PRN
Status: DISCONTINUED | OUTPATIENT
Start: 2024-12-19 | End: 2024-12-20

## 2024-12-19 RX ORDER — ASPIRIN 81 MG/1
81 TABLET ORAL DAILY
Status: DISCONTINUED | OUTPATIENT
Start: 2024-12-20 | End: 2024-12-20

## 2024-12-19 RX ORDER — ACETAMINOPHEN 325 MG/1
650 TABLET ORAL EVERY 6 HOURS PRN
Status: DISCONTINUED | OUTPATIENT
Start: 2024-12-19 | End: 2024-12-20 | Stop reason: HOSPADM

## 2024-12-19 RX ORDER — PROPRANOLOL HYDROCHLORIDE 80 MG/1
160 CAPSULE, EXTENDED RELEASE ORAL DAILY
Status: DISCONTINUED | OUTPATIENT
Start: 2024-12-20 | End: 2024-12-19

## 2024-12-19 RX ORDER — TRAZODONE HYDROCHLORIDE 50 MG/1
50 TABLET, FILM COATED ORAL
Status: DISCONTINUED | OUTPATIENT
Start: 2024-12-19 | End: 2024-12-20 | Stop reason: HOSPADM

## 2024-12-19 RX ORDER — ISOSORBIDE MONONITRATE 60 MG/1
60 TABLET, EXTENDED RELEASE ORAL DAILY
Status: DISCONTINUED | OUTPATIENT
Start: 2024-12-20 | End: 2024-12-19

## 2024-12-19 RX ORDER — HEPARIN SODIUM 1000 [USP'U]/ML
4000 INJECTION, SOLUTION INTRAVENOUS; SUBCUTANEOUS EVERY 6 HOURS PRN
Status: DISCONTINUED | OUTPATIENT
Start: 2024-12-19 | End: 2024-12-20

## 2024-12-19 RX ADMIN — HEPARIN SODIUM 2000 UNITS: 1000 INJECTION INTRAVENOUS; SUBCUTANEOUS at 23:59

## 2024-12-19 RX ADMIN — HEPARIN SODIUM 11.8 UNITS/KG/HR: 10000 INJECTION, SOLUTION INTRAVENOUS at 17:22

## 2024-12-19 RX ADMIN — TRAZODONE HYDROCHLORIDE 50 MG: 50 TABLET ORAL at 21:10

## 2024-12-19 RX ADMIN — TICAGRELOR 90 MG: 90 TABLET ORAL at 17:22

## 2024-12-19 RX ADMIN — HEPARIN SODIUM 4000 UNITS: 1000 INJECTION INTRAVENOUS; SUBCUTANEOUS at 17:30

## 2024-12-19 NOTE — ASSESSMENT & PLAN NOTE
Patient has a history of 4 prior MIs resulting in multiple stent placements, hypertension, CAD with stable angina, and hyperlipidemia presenting for chest pain for the past 2 months which has worsened from stable to unstable angina, is localized, and is occurring every day. Troponin x 2 negative and EKG showed sinus rhythm and PVCs. In August 2022, he had a NSTEMIs/p PCI and angioplasty to R1 and OM1 at sites of prior stents that had re-stenosed    Plan for cardiac catheterization on 12/20  N.p.o. at midnight  Heparin drip  Telemetry  Continuous pulse ox and cardiac monitoring

## 2024-12-19 NOTE — ASSESSMENT & PLAN NOTE
"Lab Results   Component Value Date    HGBA1C 8.4 (H) 09/09/2024       No results for input(s): \"POCGLU\" in the last 72 hours.    Blood Sugar Average: Last 72 hrs:  On Ozempic and Januvia  Start insulin sliding scale    "

## 2024-12-19 NOTE — Clinical Note
Left heart catheterization: A 5 FR Tiger catheter was advanced over a guidewire into the ascending aorta.  After recording ascending aortic pressure, the catheter was advanced across the aortic valve and left ventricular pressure was recorded.   The catheter was   pulled back across the aortic valve and into the ascending aorta and pullback pressures were obtained.

## 2024-12-19 NOTE — CONSULTS
"Consultation - General Cardiology Team   Kai Breen 54 y.o. male MRN: [id number]  Unit/Bed#: [facility] 529-01 Encounter: [id number]  Assessment & Plan  Chest pain  Patient has a history of 4 prior MIs resulting in multiple stent placements, hypertension, CAD with stable angina, and hyperlipidemia presenting for chest pain for the past 2 months which has worsened from stable to unstable angina, is localized, and is occurring every day. Troponin x 2 negative and EKG showed sinus rhythm and PVCs. In August 2022, he had a NSTEMIs/p PCI and angioplasty to R1 and OM1 at sites of prior stents that had re-stenosed    Plan for cardiac catheterization on 12/20  N.p.o. at midnight  Heparin drip  Telemetry  Continuous pulse ox and cardiac monitoring  Coronary artery disease involving native coronary artery  Continue aspirin, Brilinta, statin, Imdur      Essential hypertension  Continue losartan and propranolol       Mixed hyperlipidemia    PVC (premature ventricular contraction)  Continue propranolol      Major depressive disorder, recurrent (HCC)  Continue venlafaxine and trazodone      Type 2 diabetes mellitus (HCC)  Lab Results   Component Value Date    HGBA1C 8.3 (H) 12/19/2024       No results for input(s): \"POCGLU\" in the last 72 hours.    Blood Sugar Average: Last 72 hrs:    On Ozempic and Januvia at home  SSI while inpatient              Inpatient consult to Cardiology  Consult performed by: Christofer Walden DO  Consult ordered by: Nisha Mccarthy MD        PCP: Nellie Worley DO     History of Present Illness   Physician Requesting Consult: Nisha Mccarthy MD  Reason for Consult / Principal Problem: Chest pain    Assessment & Plan     Current Problem List   Principal Problem:    Chest pain  Active Problems:    Coronary artery disease involving native coronary artery    Essential hypertension    Mixed hyperlipidemia    PVC (premature ventricular contraction)    Major depressive disorder, recurrent (HCC)    Type 2 " diabetes mellitus (HCC)        HPI: Kai Breen is a 54 y.o. year old male with a past medical history of 4 prior MIs resulting in multiple stent placements, diabetes, PVCs, essential hypertension, CAD with stable angina, and mixed hyperlipidemia presenting for chest pain that occurs at rest and has worsened over the past week.  He states that he has had this chest pain for the past 2 months however it only occurred with walking and not at rest.  Over the past week, he says that he is having angina at rest now that the pain has increased from a 3 out of 10 two months ago to a 4 out of 10 now.  Patient states that the chest pain is localized substernally and he denies any radiation, nausea/vomiting, or diaphoresis.  He also denies smoking. Of note, he had an MI in May 2019 with a late in-stent thrombosis in the ramus requiring placement of 2 BEVERLY.  Now with another NSTEMI in Aug 2022 s/p PCI and angioplasty to R1 and OM1 at sites of prior stents that had re-stenosed    In the ER, his vitals were stable and he was saturating well on room air.  His troponin was negative and his labs were unremarkable.  EKG showed a sinus rhythm with frequent premature ventricular complexes and possible left atrial enlargement.  Chest x-ray is pending.        Review of Systems   Constitutional:  Negative for chills and fever.   HENT:  Negative for ear pain and sore throat.    Eyes:  Negative for pain and visual disturbance.   Respiratory:  Negative for cough and shortness of breath.    Cardiovascular:  Positive for chest pain. Negative for palpitations.   Gastrointestinal:  Negative for abdominal pain and vomiting.   Genitourinary:  Negative for dysuria and hematuria.   Musculoskeletal:  Negative for arthralgias and back pain.   Skin:  Negative for color change and rash.   Neurological:  Negative for seizures and syncope.   All other systems reviewed and are negative.    Review of system was conducted and was negative except for as  stated in the HPI.      Historical Information   Past Medical History:   Diagnosis Date    Arthritis     Coronary artery disease     MI x 4; cardiac cath with 4 stents    GERD (gastroesophageal reflux disease)     Hypertension     Myocardial infarct (HCC)     4 MIs as of 2020    Non-insulin dependent type 2 diabetes mellitus (HCC) 2022     Past Surgical History:   Procedure Laterality Date    CARDIAC CATHETERIZATION  2022    Procedure: Cardiac catheterization;  Surgeon: Jasiel Padilla DO;  Location: [facility];  Service: Cardiology    CARDIAC CATHETERIZATION N/A 2022    Procedure: Cardiac catheterization;  Surgeon: Kirt Lezama MD;  [facility]: [facility];  Service: Cardiology    CARDIAC CATHETERIZATION N/A 2022    Procedure: Cardiac pci;  Surgeon: Kirt Lezama MD;  [facility];  Service: Cardiology    CARDIAC CATHETERIZATION Left 2022    Procedure: Cardiac Left Heart Cath;  Surgeon: Kirt Lezama MD;  [facility]: [facility];  Service: Cardiology    CARDIAC CATHETERIZATION N/A 2022    Procedure: Cardiac Coronary Angiogram;  Surgeon: Kirt Lezama MD;  [facility]: [facility];  Service: Cardiology    CORONARY ANGIOPLASTY WITH STENT PLACEMENT       Social History     Substance and Sexual Activity   Alcohol Use Never     Social History     Substance and Sexual Activity   Drug Use Never     Social History     Tobacco Use   Smoking Status Former    Current packs/day: 0.00    Average packs/day: 1 pack/day for 20.7 years (20.7 ttl pk-yrs)    Types: Cigarettes    Start date: 1998    Quit date: 2018    Years since quittin.0   Smokeless Tobacco Never     Family History:   Family History   Problem Relation Age of Onset    Heart disease Father     Hyperlipidemia Father     Diabetes Sister     Early death Sister     Diabetes Brother     Diabetes Maternal Grandmother        Meds/Allergies   Hospital Medications:    Current Facility-Administered Medications:     acetaminophen (TYLENOL) tablet 650 mg, Q6H PRN    [START ON 12/20/2024] aspirin (ECOTRIN LOW STRENGTH) EC tablet 81 mg, Daily    atorvastatin (LIPITOR) tablet 40 mg, Daily With Dinner    heparin (porcine) 25,000 units in 0.45% NaCl 250 mL infusion (premix), Titrated    heparin (porcine) injection 2,000 Units, Q6H PRN    heparin (porcine) injection 4,000 Units, Once    heparin (porcine) injection 4,000 Units, Q6H PRN    insulin lispro (HumALOG/ADMELOG) 100 units/mL subcutaneous injection 1-6 Units, TID AC **AND** Fingerstick Glucose (POCT), TID AC    isosorbide mononitrate (IMDUR) 24 hr tablet 120 mg, Daily    [START ON 12/20/2024] losartan (COZAAR) tablet 100 mg, Q24H    nitroglycerin (NITROSTAT) SL tablet 0.4 mg, Q5 Min PRN    [START ON 12/20/2024] pantoprazole (PROTONIX) EC tablet 40 mg, Early Morning    [START ON 12/20/2024] propranolol (INDERAL LA) 24 hr capsule 160 mg, Daily    ticagrelor (BRILINTA) tablet 90 mg, Q12H    traZODone (DESYREL) tablet 50 mg, HS PRN    [START ON 12/20/2024] venlafaxine 24 hr tablet 150 mg, Daily With Breakfast  Home Medications:   Medications Prior to Admission:     aspirin 81 MG tablet    atorvastatin (LIPITOR) 40 mg tablet    Brilinta 90 MG    Contour Next Test test strip    DULoxetine (CYMBALTA) 60 mg delayed release capsule    isosorbide mononitrate (IMDUR) 60 mg 24 hr tablet    Jardiance 10 MG TABS    Jardiance 25 MG TABS    losartan (COZAAR) 100 MG tablet    metFORMIN (GLUCOPHAGE) 500 mg tablet    Microlet Lancets MISC    nitroglycerin (NITROSTAT) 0.4 mg SL tablet    Omega-3 Fatty Acids (OMEGA 3 PO)    pantoprazole (PROTONIX) 40 mg tablet    propranolol (INDERAL LA) 160 mg    traZODone (DESYREL) 50 mg tablet    venlafaxine (EFFEXOR-XR) 75 mg 24 hr capsule    venlafaxine 150 MG TB24 24 hr tablet    No Known Allergies    Objective   Vitals: Blood pressure 124/81, pulse 74, temperature 98.1 °F (36.7 °C), resp. rate 17, SpO2  97%.  Orthostatic Blood Pressures      Flowsheet Row Most Recent Value   Blood Pressure 124/81 filed at 12/19/2024 1636   Patient Position - Orthostatic VS Sitting filed at 12/19/2024 0934              Invasive Devices       Peripheral Intravenous Line  Duration             Peripheral IV 12/19/24 Right Antecubital <1 day                    Physical Exam  Vitals reviewed.   Constitutional:       General: He is not in acute distress.     Appearance: Normal appearance. He is not ill-appearing.   HENT:      Head: Normocephalic and atraumatic.      Nose: Nose normal.   Eyes:      Conjunctiva/sclera: Conjunctivae normal.   Cardiovascular:      Rate and Rhythm: Normal rate and regular rhythm.      Pulses: Normal pulses.      Heart sounds: Normal heart sounds. No murmur heard.  Pulmonary:      Effort: Pulmonary effort is normal. No respiratory distress.      Breath sounds: Normal breath sounds.   Abdominal:      Palpations: Abdomen is soft.      Tenderness: There is no abdominal tenderness.   Musculoskeletal:         General: No swelling. Normal range of motion.      Cervical back: Normal range of motion. No rigidity.      Right lower leg: No edema.      Left lower leg: No edema.   Neurological:      General: No focal deficit present.      Mental Status: He is alert.   Psychiatric:         Mood and Affect: Mood normal.         Behavior: Behavior normal.         Thought Content: Thought content normal.         Judgment: Judgment normal.         Lab Results: I have personally reviewed pertinent lab results.    Results from last 7 days   Lab Units 12/19/24  1224 12/19/24  1014   HS TNI 0HR ng/L  --  6   HS TNI 2HR ng/L 4  --          Results from last 7 days   Lab Units 12/19/24  1014   POTASSIUM mmol/L 4.0   CO2 mmol/L 23   CHLORIDE mmol/L 104   BUN mg/dL 16   CREATININE mg/dL 1.02     Results from last 7 days   Lab Units 12/19/24  1014   HEMOGLOBIN g/dL 17.2*   HEMATOCRIT % 51.8*   PLATELETS Thousands/uL 230     Results from  last 7 days   Lab Units 24  1014   HEMOGLOBIN A1C % 8.3*         Imaging: Results Review Statement: I reviewed radiology reports from this admission including: chest xray.      EKG:   Date: 2024  Interpretation: Sinus rhythm with frequent Premature ventricular complexes. Possible Left atrial enlargement      Previous STRESS TEST:  No results found for this or any previous visit.     No results found for this or any previous visit.    No results found for this or any previous visit.      Previous Cath/PCI:  Results for orders placed during the hospital encounter of 19    Cardiac catheterization    Narrative  [address] [address] [phone]    ([place])    Invasive Cardiovascular Lab Complete Report    Patient: YVES VANCE  MR number: [id number]  Account number: [id number]  Study date: 2019  Gender: Male  : 1970  Height: 68.1 in  Weight: 165.7 lb  BSA: 1.89 mï¾²    Allergies: NO KNOWN ALLERGIES    Diagnostic Cardiologist:  Silvano David MD    SUMMARY    CORONARY CIRCULATION:  LAD: Angiography showed mild atherosclerosis.  Proximal LAD: There was a 30 % stenosis.  2nd diagonal: There was a 75 % stenosis.  Circumflex: Angiography showed mild atherosclerosis.  1st obtuse marginal: There was a diffuse 50 % stenosis. Small subbranch occluded.  Proximal ramus intermedius: There was a 0 % stenosis at the site of a prior stent.  Mid RCA: There was a diffuse 70 % stenosis at the site of a prior stent. The lesion was eccentric.  1st posterolateral segment: There was a 70 % stenosis.    PROCEDURES PERFORMED    --  Right coronary angiography.  --  Left coronary angiography.  --  Outpatient.  --  Mod Sedation Same Physician Initial 15min.  --  Coronary Catheterization (w/o [facility]).    PROCEDURE: The risks and alternatives of the procedures and conscious sedation were explained to the patient and informed consent was obtained. The  patient was brought to the cath lab and placed on the table. The planned puncture sites  were prepped and draped in the usual sterile fashion.    --  Right radial artery access. After performing an Placido's test to verify adequate ulnar artery supply to the hand, the radial site was prepped. The puncture site was infiltrated with local anesthetic. The vessel was accessed using the  modified Seldinger technique, a wire was advanced into the vessel, and a sheath was advanced over the wire into the vessel.    --  Right coronary artery angiography. A catheter was advanced over a guidewire into the aorta and positioned in the right coronary artery ostium under fluoroscopic guidance. Angiography was performed.    --  Left coronary artery angiography. A catheter was advanced over a guidewire into the aorta and positioned in the left coronary artery ostium under fluoroscopic guidance. Angiography was performed.    --  Outpatient.    --  Mod Sedation Same Physician Initial 15min.    --  Coronary Catheterization (w/o [facility]).    PROCEDURE COMPLETION: The patient tolerated the procedure well and was discharged from the cath lab. TIMING: Test started at 11:16. Test concluded at 11:44. HEMOSTASIS: The sheath was removed. The site was compressed with a Hemoband  device. Hemostasis was obtained. MEDICATIONS GIVEN: Versed (2mg/2ml), 2 mg, IV, at 11:24. Fentanyl (1OOmcg/2 ml), 50 mcg, IV, at 11:24. 1% Lidocaine, 1 ml, subcutaneously, at 11:28. Nitroglycerin (200mcg/ml), 200 mcg, at 11:29. Verapamil  (5mg/2ml), 2.5 mg, IV, at 11:29. Heparin 1000 units/ml, 4,000 units, IV, at 11:29. CONTRAST GIVEN: 80 ml Omnipaque (350mg I /ml). RADIATION EXPOSURE: Fluoroscopy time: 5.13 min.    CORONARY VESSELS:   --  The coronary circulation is right dominant.  --  LAD: Angiography showed mild atherosclerosis.  --  Proximal LAD: There was a 30 % stenosis.  --  2nd diagonal: There was a 75 % stenosis.  --  Circumflex: Angiography showed mild  atherosclerosis.  --  1st obtuse marginal: There was a diffuse 50 % stenosis. Small subbranch occluded.  --  Proximal ramus intermedius: There was a 0 % stenosis at the site of a prior stent.  --  RCA: Angiography showed moderate atherosclerosis.  --  Mid RCA: There was a diffuse 70 % stenosis at the site of a prior stent. The lesion was eccentric.  --  Distal RCA: There was a 40 % stenosis.  --  1st posterolateral segment: There was a 70 % stenosis.    Prepared and signed by    Silvano David MD  Signed 2019 12:02:58    Study diagram    Angiographic findings  Native coronary lesions:  ï¾·Proximal LAD: Lesion 1: 30 % stenosis.  ï¾·D2: Lesion 1: 75 % stenosis.  ï¾·OM1: Lesion 1: diffuse, 50 % stenosis.  ï¾·Proximal RI: Lesion 1: 0 % stenosis, site of prior stent.  ï¾·Mid RCA: Lesion 1: diffuse, 70 % stenosis, site of prior stent.  ï¾·Distal RCA: Lesion 1: 40 % stenosis.  ï¾·RPL1: Lesion 1: 70 % stenosis.    Hemodynamic tables    Pressures:  Baseline  Pressures:  - HR: 63  Pressures:  - Rhythm:  Pressures:  -- Aortic Pressure (S/D/M): 105/72/84    Outputs:  Baseline  Outputs:  -- CALCULATIONS: [age] 48.42  Outputs:  -- CALCULATIONS: Body Surface Area: 1.89  Outputs:  -- CALCULATIONS: Height in cm: 173.00  Outputs:  -- CALCULATIONS: Sex: Male  Outputs:  -- CALCULATIONS: Weight in k.30    No results found for this or any previous visit.    No results found for this or any previous visit.      ECHO:  Results for orders placed during the hospital encounter of 19    Echo complete with contrast if indicated    Narrative  [address] [address]    Transthoracic Echocardiogram  2D, M-mode, Doppler, and Color Doppler    Study date:  2019    Patient: YVES VANCE  MR number: [id number]  Account number: [id number]  : 1970  Age: 49 years  Gender: Male  Status: Outpatient  Location: [facility] [place]  Height: 68  in  Weight: 168.7 lb  BP: 120/ 60 mmHg    Indications: Chest Pain    Diagnoses: R07.9 - Chest pain, unspecified    Sonographer:  Kashmir Ríos RDCS  Primary Physician:  Nellie Worley DO  Referring Physician:  Robert Lynn MD  Group:  [facility]  Interpreting Physician:  Lazarus Saunders D.O.    SUMMARY    LEFT VENTRICLE:  Systolic function was normal. Ejection fraction was estimated to be 60 %.  There were no regional wall motion abnormalities.    RIGHT VENTRICLE:  Systolic function was normal.    MITRAL VALVE:  There was trace regurgitation.    AORTIC VALVE:  There was trace regurgitation.    TRICUSPID VALVE:  There was trace regurgitation.    PULMONIC VALVE:  There was trace regurgitation.    PERICARDIUM:  There was no pericardial effusion.    PROCEDURE: The study was performed in the [facility]. This was a routine study. The transthoracic approach was used. The study included complete 2D imaging, M-mode, complete spectral Doppler, and color Doppler. The  heart rate was 73 bpm, at the start of the study. Images were obtained from the parasternal, apical, subcostal, and suprasternal notch acoustic windows. Image quality was adequate.    LEFT VENTRICLE: Size was normal. Systolic function was normal. Ejection fraction was estimated to be 60 %. There were no regional wall motion abnormalities. Wall thickness was normal. No evidence of apical thrombus. DOPPLER: Left  ventricular diastolic function parameters were normal.    RIGHT VENTRICLE: The size was normal. Systolic function was normal. Wall thickness was normal.    LEFT ATRIUM: Size was normal.    RIGHT ATRIUM: Size was normal.    MITRAL VALVE: Valve structure was normal. There was normal leaflet separation. DOPPLER: The transmitral velocity was within the normal range. There was no evidence for stenosis. There was trace regurgitation.    AORTIC VALVE: The valve was trileaflet. Leaflets exhibited normal  thickness and normal cuspal separation. DOPPLER: Transaortic velocity was within the normal range. There was no evidence for stenosis. There was trace regurgitation.    TRICUSPID VALVE: The valve structure was normal. There was normal leaflet separation. DOPPLER: The transtricuspid velocity was within the normal range. There was no evidence for stenosis. There was trace regurgitation.    PULMONIC VALVE: Leaflets exhibited normal thickness, no calcification, and normal cuspal separation. DOPPLER: The transpulmonic velocity was within the normal range. There was trace regurgitation.    PERICARDIUM: There was no pericardial effusion. The pericardium was normal in appearance.    AORTA: The root exhibited normal size.    SYSTEMIC VEINS: IVC: The inferior vena cava was normal in size.    SYSTEM MEASUREMENT TABLES    2D  %FS: 44.93 %  Ao Diam: 3.39 cm  EDV(Teich): 125.16 ml  EF(Cube): 83.3 %  EF(Teich): 75.94 %  ESV(Cube): 22.47 ml  ESV(Teich): 30.12 ml  IVSd: 1.03 cm  LA Area: 18.27 cm2  LA Diam: 3.85 cm  LVIDd: 5.12 cm  LVIDs: 2.82 cm  LVPWd: 1.01 cm  RA Area: 20.57 cm2  RVOT Diam: 3.26 cm  SI(Cube): 58.98 ml/m2  SI(Teich): 50.02 ml/m2  SV(Cube): 112.07 ml  SV(Teich): 95.05 ml    CW  AV Env.Ti: 277.26 ms  AV VTI: 26.86 cm  AV Vmax: 1.47 m/s  AV Vmean: 0.97 m/s  AV maxP.64 mmHg  AV meanP.47 mmHg  PV Vmax: 1.09 m/s  PV maxP.78 mmHg  TR Vmax: 2.36 m/s  TR maxP.32 mmHg    MM  TAPSE: 1.99 cm    PW  LVOT Env.Ti: 351.2 ms  LVOT VTI: 28.62 cm  LVOT Vmax: 1.17 m/s  LVOT Vmean: 0.81 m/s  LVOT maxP.51 mmHg  LVOT meanPG: 3.02 mmHg  MV A Michael: 0.65 m/s  MV Dec [place]: 4.31 m/s2  MV DecT: 214.19 ms  MV E Michael: 0.92 m/s  MV E/A Ratio: 1.44  PVA (Vmax): 6.54 cm2  RVOT Vmax: 0.86 m/s  RVOT maxP.95 mmHg  TV A Michael: 0.6 m/s  TV Dec [place]: 3.01 m/s2  TV Dec Time: 241.58 ms  TV E Michael: 0.73 m/s  TV E/A Ratio: 1.2    [facility] Accredited Echocardiography Laboratory    Prepared and electronically signed  by    Lazarus Saunders D.O.  Signed 2019 14:07:22    Results for orders placed during the hospital encounter of 22    Echo complete w/ contrast if indicated    Interpretation Summary    Left Ventricle: Left ventricular cavity size is normal. Wall thickness is mildly increased. There is mild concentric hypertrophy. The left ventricular ejection fraction is 45%. Systolic function is mildly reduced. Distal septal and apical akinesis. Diastolic function is normal.    Mitral Valve: There is trace regurgitation.    Tricuspid Valve: There is mild regurgitation. The right ventricular systolic pressure is mildly elevated.    Pulmonic Valve: There is mild regurgitation.    Aorta: The aortic root is mildly dilated. The aortic root is 4.00 cm.      SIVAKUMAR:  No results found for this or any previous visit.    No results found for this or any previous visit.      CMR:  No results found for this or any previous visit.    No results found for this or any previous visit.    No results found for this or any previous visit.      HOLTER  Results for orders placed during the hospital encounter of 21    Holter monitor - 24 hour    Narrative  PT NAME: Kai Breen  : 1970  AGE: 51 y.o.  GENDER: male  MRN: [id number]   PROCEDURE: Holter monitor - 24 hour        INDICATIONS:  Palpitations    A Holter Monitor was performed for  24 hours.    Impression  1. The basic mechanism was sinus  with rates ranging from  55 beats per minute to 129 beats per minute.  2. Supraventricular ectopic beats were rare and there were no runs.  3. Ventricular ectopic beats were common including 2 separate 4 beat runs. Nothing more sustained.The total was 5.4% of all beats.  4. There were no significant pauses.  5. No symptoms were noted during the monitoring phase.          Juan José Spann MD    No results found for this or any previous visit.        VTE Prophylaxis: VTE covered by:  heparin (porcine), Intravenous  heparin (porcine),  Intravenous  heparin (porcine), Intravenous  heparin (porcine), Intravenous         Christofer Walden, DO   PGY-1, [facility]       ==========================================================================================    Counseling / Coordination of Care  Total floor / unit time spent today 30 minutes minutes.  Greater than 50% of total time was spent with the patient and / or family counseling and / or coordination of care.      [employer]/Care Everywhere records reviewed:     ** Please Note: Fluency DirectDictation voice to text software may have been used in the creation of this document. **

## 2024-12-19 NOTE — H&P
"H&P - Hospitalist   Name: Kai Breen 54 y.o. male I MRN: [id number]  Unit/Bed#: [facility] I Date of Admission: 12/19/2024   Date of Service: 12/19/2024 I Hospital Day: 0     Assessment & Plan  Chest pain  Patient is a 54-year-old male with a past medical history of coronary artery disease status post MI, last PCI in August 2022, diabetes, GERD, hypertension, hyperlipidemia who presented to the hospital with chest pain  For the past 2 months patient has chest pain with exertion and rest, midsternal, nonradiating--resolves on its own without nitro, lasts about a couple of minutes  Troponin x 2 negative  EKG showed sinus rhythm, PVCs, nonspecific ST-T abnormalities  ED discussed with cardiology and recommended admission for observation  Continue telemetry  Discussed with cardiology, keep n.p.o. after midnight, they will decide on nuclear stress test versus left heart cath  Coronary artery disease involving native coronary artery  History of coronary artery disease, history of MI, last NSTEMI was August 2022, underwent cardiac catheterization with 100% in-stent occlusion of the ramus branch status post PCI angioplasty, diffuse 70 to 80% in-stent restenosis in OM1, s/p angioplasty  Continue aspirin, Brilinta, statin, Imdur  Essential hypertension  Continue losartan, propranolol, Imdur  Mixed hyperlipidemia  Continue statin  PVC (premature ventricular contraction)  History of PVCs, also PVCs on EKG  Continue propranolol  Major depressive disorder, recurrent (HCC)  Continue venlafaxine and trazodone  Type 2 diabetes mellitus (HCC)  Lab Results   Component Value Date    HGBA1C 8.4 (H) 09/09/2024       No results for input(s): \"POCGLU\" in the last 72 hours.    Blood Sugar Average: Last 72 hrs:  On Ozempic and Januvia  Start insulin sliding scale        VTE Pharmacologic Prophylaxis: VTE Score: 2 Low Risk (Score 0-2) - Encourage Ambulation.  Code Status: Level 1 - Full Code   Discussion with family: Patient declined call to " .     Anticipated Length of Stay: Patient will be admitted on an observation basis with an anticipated length of stay of less than 2 midnights secondary to chest pain rule out ACS.    History of Present Illness   Chief Complaint: Chest pain    Kai Breen is a 54 y.o. male with a PMH of hypertension, hyperlipidemia, diabetes, coronary artery disease, depression who presents with chest pain.  Patient had 5 MIs in the past, last NSTEMI in August 2022 status post PCI and angioplasty to R1 and OM1 at sites of prior stents and restenosis.  He has a history of palpitations and PVCs, improved after Toprol was switched to propranolol.  In the past 2 months he has midsternal dull chest discomfort at rest and exertion, nonradiating, did not take nitro, resolves on its own, lasts about 2 minutes.  Came to the ED for further evaluation    Review of Systems   Constitutional:  Negative for activity change, appetite change, chills and fever.   HENT: Negative.     Respiratory:  Negative for shortness of breath.    Cardiovascular:  Positive for chest pain and palpitations.   Gastrointestinal:  Negative for abdominal pain, constipation, diarrhea, nausea and vomiting.   Genitourinary:  Negative for dysuria.   Musculoskeletal: Negative.    Neurological: Negative.    Hematological: Negative.    Psychiatric/Behavioral: Negative.         Historical Information   Past Medical History:   Diagnosis Date    Arthritis     Coronary artery disease     MI x 4; cardiac cath with 4 stents    GERD (gastroesophageal reflux disease)     Hypertension     Myocardial infarct (HCC)     4 MIs as of 2/24/2020    Non-insulin dependent type 2 diabetes mellitus (HCC) 8/24/2022     Past Surgical History:   Procedure Laterality Date    CARDIAC CATHETERIZATION  1/7/2022    Procedure: Cardiac catheterization;  Surgeon: Jasiel Padilla DO;  [facility];  Service: Cardiology    CARDIAC CATHETERIZATION N/A 8/26/2022     Procedure: Cardiac catheterization;  Surgeon: Krit Lezama MD;  [facility];  Service: Cardiology    CARDIAC CATHETERIZATION N/A 2022    Procedure: Cardiac pci;  Surgeon: Kirt Lezama MD;  [facility];  Service: Cardiology    CARDIAC CATHETERIZATION Left 2022    Procedure: Cardiac Left Heart Cath;  Surgeon: Kirt Lezama MD;  [facility];  Service: Cardiology    CARDIAC CATHETERIZATION N/A 2022    Procedure: Cardiac Coronary Angiogram;  Surgeon: Kirt Lezama MD;  [facility];  Service: Cardiology    CORONARY ANGIOPLASTY WITH STENT PLACEMENT       Social History     Tobacco Use    Smoking status: Former     Current packs/day: 0.00     Average packs/day: 1 pack/day for 20.7 years (20.7 ttl pk-yrs)     Types: Cigarettes     Start date: 1998     Quit date: 2018     Years since quittin.0    Smokeless tobacco: Never   Vaping Use    Vaping status: Never Used   Substance and Sexual Activity    Alcohol use: Never    Drug use: Never    Sexual activity: Yes     Partners: Female     Birth control/protection: None     E-Cigarette/Vaping    E-Cigarette Use Never User      E-Cigarette/Vaping Substances     Family history non-contributory  Social History:  Marital Status: /Civil Union     Meds/Allergies   I have reviewed home medications with patient personally.  Prior to Admission medications    Medication Sig Start Date End Date Taking? Authorizing Provider   aspirin 81 MG tablet Take 81 mg by mouth daily      Historical Provider, MD   atorvastatin (LIPITOR) 40 mg tablet Take 1 tablet (40 mg total) by mouth every 24 hours 24   Robert Lynn MD   Brilinta 90 MG TAKE ONE TABLET BY MOUTH EVERY 12 HOURS 3/20/24   Robert Lynn MD   Contour Next Test test strip  23   Historical Provider, MD   DULoxetine (CYMBALTA) 60 mg delayed release capsule Take 60 mg by mouth daily  Patient not taking:  Reported on 5/1/2024    Historical Provider, MD   isosorbide mononitrate (IMDUR) 60 mg 24 hr tablet TAKE ONE TABLET BY MOUTH EVERY DAY 9/24/24   Robert Lynn MD   Jardiance 10 MG TABS Take 10 mg by mouth in the morning  Patient not taking: Reported on 5/1/2024 2/25/22   Historical Provider, MD   Jardiance 25 MG TABS Take 25 mg by mouth 3/20/24   Historical Provider, MD   losartan (COZAAR) 100 MG tablet Take 1 tablet (100 mg total) by mouth every 24 hours 7/19/19   Robert Lynn MD   metFORMIN (GLUCOPHAGE) 500 mg tablet Take 500 mg by mouth 2 (two) times a day with meals    Historical Provider, MD   Microlet Lancets MISC  12/18/23   Historical Provider, MD   nitroglycerin (NITROSTAT) 0.4 mg SL tablet as directed 5/3/19   Historical Provider, MD   Omega-3 Fatty Acids (OMEGA 3 PO) Take 2,000 mg by mouth 2 (two) times a day    Historical Provider, MD   pantoprazole (PROTONIX) 40 mg tablet Take 40 mg by mouth daily 7/11/19   Historical Provider, MD   propranolol (INDERAL LA) 160 mg Take 1 capsule (160 mg total) by mouth daily 4/11/22   Diaz Hutton MD   traZODone (DESYREL) 50 mg tablet  12/18/23   Historical Provider, MD   venlafaxine (EFFEXOR-XR) 75 mg 24 hr capsule  3/3/23   Historical Provider, MD   venlafaxine 150 MG TB24 24 hr tablet  3/20/24   Historical Provider, MD   atorvastatin (LIPITOR) 40 mg tablet TAKE ONE TABLET BY MOUTH EVERY 24 HOURS 9/24/24 12/19/24  Robert Lynn MD     No Known Allergies    Objective :  Temp:  [97.7 °F (36.5 °C)] 97.7 °F (36.5 °C)  HR:  [97] 97  BP: (116)/(72) 116/72  Resp:  [20] 20  SpO2:  [98 %] 98 %  O2 Device: None (Room air)    Physical Exam  Constitutional:       General: He is not in acute distress.  HENT:      Head: Atraumatic.   Cardiovascular:      Rate and Rhythm: Normal rate and regular rhythm.      Heart sounds: No murmur heard.  Pulmonary:      Effort: Pulmonary effort is normal. No respiratory distress.      Breath sounds: Normal breath sounds. No wheezing.    Abdominal:      General: Bowel sounds are normal. There is no distension.      Palpations: Abdomen is soft.      Tenderness: There is no abdominal tenderness.   Musculoskeletal:         General: No swelling.      Cervical back: Neck supple.   Skin:     General: Skin is warm and dry.   Neurological:      General: No focal deficit present.      Mental Status: He is alert.   Psychiatric:         Mood and Affect: Mood normal.          Lines/Drains:            Lab Results: I have reviewed the following results:  Results from last 7 days   Lab Units 12/19/24  1014   WBC Thousand/uL 7.84   HEMOGLOBIN g/dL 17.2*   HEMATOCRIT % 51.8*   PLATELETS Thousands/uL 230   SEGS PCT % 70   LYMPHO PCT % 19   MONO PCT % 7   EOS PCT % 2     Results from last 7 days   Lab Units 12/19/24  1014   SODIUM mmol/L 136   POTASSIUM mmol/L 4.0   CHLORIDE mmol/L 104   CO2 mmol/L 23   BUN mg/dL 16   CREATININE mg/dL 1.02   ANION GAP mmol/L 9   CALCIUM mg/dL 9.3   ALBUMIN g/dL 4.3   TOTAL BILIRUBIN mg/dL 0.71   ALK PHOS U/L 137*   ALT U/L 25   AST U/L 20   GLUCOSE RANDOM mg/dL 222*             Lab Results   Component Value Date    HGBA1C 8.4 (H) 09/09/2024    HGBA1C 7.7 (H) 02/23/2024    HGBA1C 9.4 (H) 09/05/2023           Imaging Results Review: I reviewed radiology reports from this admission including: chest xray.  Other Study Results Review: EKG was reviewed.     Administrative Statements       ** Please Note: This note has been constructed using a voice recognition system. **

## 2024-12-19 NOTE — ASSESSMENT & PLAN NOTE
Patient is a 54-year-old male with a past medical history of coronary artery disease status post MI, last PCI in August 2022, diabetes, GERD, hypertension, hyperlipidemia who presented to the hospital with chest pain  For the past 2 months patient has chest pain with exertion and rest, midsternal, nonradiating--resolves on its own without nitro, lasts about a couple of minutes  Troponin x 2 negative  EKG showed sinus rhythm, PVCs, nonspecific ST-T abnormalities  ED discussed with cardiology and recommended admission for observation  Continue telemetry  Discussed with cardiology, keep n.p.o. after midnight, they will decide on nuclear stress test versus left heart cath   Stroke Acute right MCA stroke

## 2024-12-19 NOTE — ASSESSMENT & PLAN NOTE
"Lab Results   Component Value Date    HGBA1C 8.3 (H) 12/19/2024       No results for input(s): \"POCGLU\" in the last 72 hours.    Blood Sugar Average: Last 72 hrs:    On Ozempic and Januvia at home  SSI while inpatient        "

## 2024-12-19 NOTE — ED PROVIDER NOTES
"  ED Disposition       None          Assessment & Plan       Medical Decision Making  Patient presented with chest pain.  He states that he has been having chest pain for several days.  His wife \"made me come\".  Patient states that he has 4 heart attacks in the past and that his chest pain is consistent with his prior cardiac events.  He has multiple stents.  Patient had an EKG that was performed in triage with multiple PVCs and nonspecific findings.  Laboratory data has been reviewed.  Initial troponin at 6.  Repeat troponin is pending.  Magnesium added to laboratory data.  Chest x-ray without any obvious pulmonary disease.    Currently patient states he is pain-free.    Cardiology contacted for possible admission.  Awaiting reply.    Amount and/or Complexity of Data Reviewed  Labs: ordered.  Radiology: ordered.        ED Course as of 12/20/24 2107   Thu Dec 19, 2024   1337 Discussed with medicine service, [facility] internal medicine will admit.       Medications - No data to display    ED Risk Strat Scores                          SBIRT 22yo+      Flowsheet Row Most Recent Value   Initial Alcohol Screen: US AUDIT-C     1. How often do you have a drink containing alcohol? 0 Filed at: 12/19/2024 0935   2. How many drinks containing alcohol do you have on a typical day you are drinking?  0 Filed at: 12/19/2024 0935   3a. Male UNDER 65: How often do you have five or more drinks on one occasion? 0 Filed at: 12/19/2024 0935   3b. FEMALE Any Age, or MALE 65+: How often do you have 4 or more drinks on one occassion? 0 Filed at: 12/19/2024 0935   Audit-C Score 0 Filed at: 12/19/2024 0935   ANDRZEJ: How many times in the past year have you...    Used an illegal drug or used a prescription medication for non-medical reasons? Never Filed at: 12/19/2024 0935                            History of Present Illness       Chief Complaint   Patient presents with   • Chest Pain     Pt coming in with chest pain that started 2 months " ago. Pt has history of MI.         Past Medical History:   Diagnosis Date   • Arthritis    • Coronary artery disease     MI x 4; cardiac cath with 4 stents   • GERD (gastroesophageal reflux disease)    • Hypertension    • Myocardial infarct (HCC)     4 MIs as of 2020   • Non-insulin dependent type 2 diabetes mellitus (HCC) 2022      Past Surgical History:   Procedure Laterality Date   • CARDIAC CATHETERIZATION  2022    Procedure: Cardiac catheterization;  Surgeon: Jasiel Padilla DO;  [facility];  Service: Cardiology   • CARDIAC CATHETERIZATION N/A 2022    Procedure: Cardiac catheterization;  Surgeon: Kirt Lezama MD;  [facility];  Service: Cardiology   • CARDIAC CATHETERIZATION N/A 2022    Procedure: Cardiac pci;  Surgeon: Kirt Lezama MD;  [facility];  Service: Cardiology   • CARDIAC CATHETERIZATION Left 2022    Procedure: Cardiac Left Heart Cath;  Surgeon: Kirt Lezama MD;  [facility];  Service: Cardiology   • CARDIAC CATHETERIZATION N/A 2022    Procedure: Cardiac Coronary Angiogram;  Surgeon: Kirt Lezama MD;  [facility];  Service: Cardiology   • CORONARY ANGIOPLASTY WITH STENT PLACEMENT        Family History   Problem Relation Age of Onset   • Heart disease Father    • Hyperlipidemia Father    • Diabetes Sister    • Early death Sister    • Diabetes Brother    • Diabetes Maternal Grandmother       Social History     Tobacco Use   • Smoking status: Former     Current packs/day: 0.00     Average packs/day: 1 pack/day for 20.7 years (20.7 ttl pk-yrs)     Types: Cigarettes     Start date: 1998     Quit date: 2018     Years since quittin.0   • Smokeless tobacco: Never   Vaping Use   • Vaping status: Never Used   Substance Use Topics   • Alcohol use: Never   • Drug use: Never      E-Cigarette/Vaping   • E-Cigarette Use Never User       E-Cigarette/Vaping  Substances      I have reviewed and agree with the history as documented.     Patient presents with chest pain.  Patient describes his chest pain as midsternal with occasional radiation to his neck and into posterior left shoulder.  He states that he has had similar pain in the past when he has had heart attacks.  He has had 4 heart attacks in the past with multiple stents placed.  His last cardiac catheterization was in August 2022.  Patient reports he is normally very active and he noticed recently when he is ambulating or doing activity he does have chest pain that starts described as more of a pressure.  He said is very similar to his prior cardiac related chest pain.  Patient denies having nausea or vomiting he denies having diaphoresis.  He is taking his aspirin and Brilinta.  He sees Dr. Lynn from cardiology on a regular basis.    Patient denies any leg swelling or weight gain or weight loss recently.      As per last cardiology note:  May 2019 with a late in-stent thrombosis in the ramus requiring placement of 2 BEVERLY.  Now with another NSTEMI in Aug 2022 s/p PCI and angioplasty to R1 and OM1 at sites of prior stents that had re-stenosed        Review of Systems   Constitutional:  Negative for fatigue and fever.   Respiratory:  Positive for chest tightness. Negative for shortness of breath.    Cardiovascular:  Positive for chest pain. Negative for palpitations and leg swelling.   Gastrointestinal:  Negative for abdominal pain, diarrhea, nausea and vomiting.   Musculoskeletal:  Negative for back pain and neck pain.   Neurological:  Negative for dizziness and weakness.   All other systems reviewed and are negative.          Objective       ED Triage Vitals [12/19/24 0934]   Temperature Pulse Blood Pressure Respirations SpO2 Patient Position - Orthostatic VS   97.7 °F (36.5 °C) 97 116/72 20 98 % Sitting      Temp Source Heart Rate Source BP Location FiO2 (%) Pain Score    Temporal Monitor Left arm -- --       Vitals      Date and Time Temp Pulse SpO2 Resp BP Pain Score FACES Pain Rating User   12/19/24 0934 97.7 °F (36.5 °C) 97 98 % 20 116/72 -- -- MW            Physical Exam  Vitals and nursing note reviewed.   HENT:      Head: Normocephalic and atraumatic.      Mouth/Throat:      Mouth: Mucous membranes are moist.   Eyes:      Conjunctiva/sclera: Conjunctivae normal.      Pupils: Pupils are equal, round, and reactive to light.   Cardiovascular:      Rate and Rhythm: Normal rate and regular rhythm.      Heart sounds: Normal heart sounds. No murmur heard.  Pulmonary:      Effort: Pulmonary effort is normal. No respiratory distress.      Breath sounds: Normal breath sounds. No rales.   Abdominal:      General: Bowel sounds are normal.      Palpations: Abdomen is soft.      Tenderness: There is no guarding or rebound.   Musculoskeletal:         General: No deformity. Normal range of motion.      Cervical back: Normal range of motion and neck supple.      Right lower leg: No edema.      Left lower leg: No edema.   Skin:     General: Skin is warm and dry.      Findings: No erythema or rash.   Neurological:      Mental Status: He is alert.      Cranial Nerves: No cranial nerve deficit.         Results Reviewed       Procedure Component Value Units Date/Time    HS Troponin I 4hr [[id number]]     Lab Status: No result Specimen: Blood     HS Troponin 0hr (reflex protocol) [[id number]]  (Normal) Collected: 12/19/24 1014    Lab Status: Final result Specimen: Blood from Arm, Left Updated: 12/19/24 1058     hs TnI 0hr 6 ng/L     HS Troponin I 2hr [[id number]]     Lab Status: No result Specimen: Blood     Comprehensive metabolic panel [[id number]]  (Abnormal) Collected: 12/19/24 1014    Lab Status: Final result Specimen: Blood from Arm, Left Updated: 12/19/24 1055     Sodium 136 mmol/L      Potassium 4.0 mmol/L      Chloride 104 mmol/L      CO2 23 mmol/L      ANION GAP 9 mmol/L      BUN 16 mg/dL      Creatinine 1.02 mg/dL       Glucose 222 mg/dL      Calcium 9.3 mg/dL      AST 20 U/L      ALT 25 U/L      Alkaline Phosphatase 137 U/L      Total Protein 7.4 g/dL      Albumin 4.3 g/dL      Total Bilirubin 0.71 mg/dL      eGFR 83 ml/min/1.73sq m     Narrative:      National Kid[employer] Disease Foundation guidelines for Chronic Kidney Disease (CKD):   •  Stage 1 with normal or high GFR (GFR > 90 mL/min/1.73 square meters)  •  Stage 2 Mild CKD (GFR = 60-89 mL/min/1.73 square meters)  •  Stage 3A Moderate CKD (GFR = 45-59 mL/min/1.73 square meters)  •  Stage 3B Moderate CKD (GFR = 30-44 mL/min/1.73 square meters)  •  Stage 4 Severe CKD (GFR = 15-29 mL/min/1.73 square meters)  •  Stage 5 End Stage CKD (GFR <15 mL/min/1.73 square meters)  Note: GFR calculation is accurate only with a steady state creatinine    CBC and differential [[id number]]  (Abnormal) Collected: 12/19/24 1014    Lab Status: Final result Specimen: Blood from Arm, Left Updated: 12/19/24 1028     WBC 7.84 Thousand/uL      RBC 5.68 Million/uL      Hemoglobin 17.2 g/dL      Hematocrit 51.8 %      MCV 91 fL      MCH 30.3 pg      MCHC 33.2 g/dL      RDW 13.6 %      MPV 9.4 fL      Platelets 230 Thousands/uL      nRBC 0 /100 WBCs      Segmented % 70 %      Immature Grans % 1 %      Lymphocytes % 19 %      Monocytes % 7 %      Eosinophils Relative 2 %      Basophils Relative 1 %      Absolute Neutrophils 5.63 Thousands/µL      Absolute Immature Grans 0.04 Thousand/uL      Absolute Lymphocytes 1.47 Thousands/µL      Absolute Monocytes 0.53 Thousand/µL      Eosinophils Absolute 0.12 Thousand/µL      Basophils Absolute 0.05 Thousands/µL             XR chest 1 view portable    (Results Pending)       ECG 12 Lead Documentation Only    Date/Time: 12/19/2024 12:45 PM    Performed by: Carolyn Jenkins DO  Authorized by: Carolyn Jenkins DO    ECG reviewed by me, the ED Provider: yes    Patient location:  ED  Previous ECG:     Previous ECG:  Compared to current    Comparison ECG info:  August  24, 2022    Comparison to cardiac monitor: Yes    Interpretation:     Interpretation: non-specific    Rate:     ECG rate:  86    ECG rate assessment: normal    Rhythm:     Rhythm: sinus rhythm    Ectopy:     Ectopy: PVCs    QRS:     QRS axis:  Normal  Conduction:     Conduction: normal    ST segments:     ST segments:  Non-specific  Comments:      Epic EKG without any obvious ischemic changes.  Multiple PVCs.      ED Medication and Procedure Management   Prior to Admission Medications   Prescriptions Last Dose Informant Patient Reported? Taking?   Brilinta 90 MG  Self No No   Sig: TAKE ONE TABLET BY MOUTH EVERY 12 HOURS   Contour Next Test test strip  Self Yes No   DULoxetine (CYMBALTA) 60 mg delayed release capsule  Self Yes No   Sig: Take 60 mg by mouth daily   Patient not taking: Reported on 5/1/2024   Jardiance 10 MG TABS  Self Yes No   Sig: Take 10 mg by mouth in the morning   Patient not taking: Reported on 5/1/2024   Jardiance 25 MG TABS  Self Yes No   Sig: Take 25 mg by mouth   Microlet Lancets MISC  Self Yes No   Omega-3 Fatty Acids (OMEGA 3 PO)  Self Yes No   Sig: Take 2,000 mg by mouth 2 (two) times a day   aspirin 81 MG tablet  Self Yes No   Sig: Take 81 mg by mouth daily     atorvastatin (LIPITOR) 40 mg tablet   No No   Sig: Take 1 tablet (40 mg total) by mouth every 24 hours   isosorbide mononitrate (IMDUR) 60 mg 24 hr tablet   No No   Sig: TAKE ONE TABLET BY MOUTH EVERY DAY   losartan (COZAAR) 100 MG tablet  Self No No   Sig: Take 1 tablet (100 mg total) by mouth every 24 hours   metFORMIN (GLUCOPHAGE) 500 mg tablet  Self Yes No   Sig: Take 500 mg by mouth 2 (two) times a day with meals   nitroglycerin (NITROSTAT) 0.4 mg SL tablet  Self Yes No   Sig: as directed   pantoprazole (PROTONIX) 40 mg tablet  Self Yes No   Sig: Take 40 mg by mouth daily   propranolol (INDERAL LA) 160 mg  Self No No   Sig: Take 1 capsule (160 mg total) by mouth daily   traZODone (DESYREL) 50 mg tablet  Self Yes No    venlafaxine (EFFEXOR-XR) 75 mg 24 hr capsule  Self Yes No   Patient not taking: Reported on 5/1/2024   venlafaxine 150 MG TB24 24 hr tablet  Self Yes No      Facility-Administered Medications: None     Patient's Medications   Discharge Prescriptions    No medications on file     No discharge procedures on file.  ED SEPSIS DOCUMENTATION            Carolyn Jenkins,   12/20/24 0404

## 2024-12-19 NOTE — ASSESSMENT & PLAN NOTE
History of coronary artery disease, history of MI, last NSTEMI was August 2022, underwent cardiac catheterization with 100% in-stent occlusion of the ramus branch status post PCI angioplasty, diffuse 70 to 80% in-stent restenosis in OM1, s/p angioplasty  Continue aspirin, Brilinta, statin, Imdur

## 2024-12-20 VITALS
TEMPERATURE: 98.1 F | DIASTOLIC BLOOD PRESSURE: 82 MMHG | SYSTOLIC BLOOD PRESSURE: 120 MMHG | OXYGEN SATURATION: 97 % | RESPIRATION RATE: 18 BRPM | HEART RATE: 74 BPM

## 2024-12-20 LAB
ANION GAP SERPL CALCULATED.3IONS-SCNC: 10 MMOL/L (ref 4–13)
APTT PPP: 60 SECONDS (ref 23–34)
BUN SERPL-MCNC: 21 MG/DL (ref 5–25)
CALCIUM SERPL-MCNC: 9.1 MG/DL (ref 8.4–10.2)
CHLORIDE SERPL-SCNC: 106 MMOL/L (ref 96–108)
CO2 SERPL-SCNC: 21 MMOL/L (ref 21–32)
CREAT SERPL-MCNC: 0.99 MG/DL (ref 0.6–1.3)
ERYTHROCYTE [DISTWIDTH] IN BLOOD BY AUTOMATED COUNT: 13.6 % (ref 11.6–15.1)
GFR SERPL CREATININE-BSD FRML MDRD: 86 ML/MIN/1.73SQ M
GLUCOSE SERPL-MCNC: 170 MG/DL (ref 65–140)
GLUCOSE SERPL-MCNC: 191 MG/DL (ref 65–140)
GLUCOSE SERPL-MCNC: 195 MG/DL (ref 65–140)
GLUCOSE SERPL-MCNC: 196 MG/DL (ref 65–140)
HCT VFR BLD AUTO: 45.2 % (ref 36.5–49.3)
HGB BLD-MCNC: 15.2 G/DL (ref 12–17)
MCH RBC QN AUTO: 30.3 PG (ref 26.8–34.3)
MCHC RBC AUTO-ENTMCNC: 33.6 G/DL (ref 31.4–37.4)
MCV RBC AUTO: 90 FL (ref 82–98)
PLATELET # BLD AUTO: 184 THOUSANDS/UL (ref 149–390)
PMV BLD AUTO: 9.8 FL (ref 8.9–12.7)
POTASSIUM SERPL-SCNC: 3.8 MMOL/L (ref 3.5–5.3)
RBC # BLD AUTO: 5.02 MILLION/UL (ref 3.88–5.62)
SODIUM SERPL-SCNC: 137 MMOL/L (ref 135–147)
WBC # BLD AUTO: 6.97 THOUSAND/UL (ref 4.31–10.16)

## 2024-12-20 PROCEDURE — C1769 GUIDE WIRE: HCPCS | Performed by: INTERNAL MEDICINE

## 2024-12-20 PROCEDURE — 93799 UNLISTED CV SVC/PROCEDURE: CPT | Performed by: INTERNAL MEDICINE

## 2024-12-20 PROCEDURE — 99214 OFFICE O/P EST MOD 30 MIN: CPT | Performed by: INTERNAL MEDICINE

## 2024-12-20 PROCEDURE — 99239 HOSP IP/OBS DSCHRG MGMT >30: CPT | Performed by: FAMILY MEDICINE

## 2024-12-20 PROCEDURE — 85730 THROMBOPLASTIN TIME PARTIAL: CPT | Performed by: INTERNAL MEDICINE

## 2024-12-20 PROCEDURE — 93458 L HRT ARTERY/VENTRICLE ANGIO: CPT | Performed by: INTERNAL MEDICINE

## 2024-12-20 PROCEDURE — 85027 COMPLETE CBC AUTOMATED: CPT | Performed by: INTERNAL MEDICINE

## 2024-12-20 PROCEDURE — 99152 MOD SED SAME PHYS/QHP 5/>YRS: CPT | Performed by: INTERNAL MEDICINE

## 2024-12-20 PROCEDURE — 80048 BASIC METABOLIC PNL TOTAL CA: CPT | Performed by: INTERNAL MEDICINE

## 2024-12-20 PROCEDURE — C1887 CATHETER, GUIDING: HCPCS | Performed by: INTERNAL MEDICINE

## 2024-12-20 PROCEDURE — 93571 IV DOP VEL&/PRESS C FLO 1ST: CPT | Performed by: INTERNAL MEDICINE

## 2024-12-20 PROCEDURE — 82948 REAGENT STRIP/BLOOD GLUCOSE: CPT

## 2024-12-20 PROCEDURE — C1894 INTRO/SHEATH, NON-LASER: HCPCS | Performed by: INTERNAL MEDICINE

## 2024-12-20 PROCEDURE — 99153 MOD SED SAME PHYS/QHP EA: CPT | Performed by: INTERNAL MEDICINE

## 2024-12-20 RX ORDER — ISOSORBIDE MONONITRATE 120 MG/1
120 TABLET, EXTENDED RELEASE ORAL DAILY
Qty: 30 TABLET | Refills: 0 | Status: SHIPPED | OUTPATIENT
Start: 2024-12-21

## 2024-12-20 RX ORDER — HEPARIN SODIUM 1000 [USP'U]/ML
INJECTION, SOLUTION INTRAVENOUS; SUBCUTANEOUS CODE/TRAUMA/SEDATION MEDICATION
Status: DISCONTINUED | OUTPATIENT
Start: 2024-12-20 | End: 2024-12-20 | Stop reason: HOSPADM

## 2024-12-20 RX ORDER — CLOPIDOGREL BISULFATE 75 MG/1
75 TABLET ORAL DAILY
Status: DISCONTINUED | OUTPATIENT
Start: 2024-12-21 | End: 2024-12-20

## 2024-12-20 RX ORDER — ASPIRIN 81 MG/1
81 TABLET, CHEWABLE ORAL DAILY
Status: DISCONTINUED | OUTPATIENT
Start: 2024-12-21 | End: 2024-12-20 | Stop reason: HOSPADM

## 2024-12-20 RX ORDER — SODIUM CHLORIDE 9 MG/ML
125 INJECTION, SOLUTION INTRAVENOUS CONTINUOUS
Status: DISPENSED | OUTPATIENT
Start: 2024-12-20 | End: 2024-12-20

## 2024-12-20 RX ORDER — MIDAZOLAM HYDROCHLORIDE 2 MG/2ML
INJECTION, SOLUTION INTRAMUSCULAR; INTRAVENOUS CODE/TRAUMA/SEDATION MEDICATION
Status: DISCONTINUED | OUTPATIENT
Start: 2024-12-20 | End: 2024-12-20 | Stop reason: HOSPADM

## 2024-12-20 RX ORDER — FENTANYL CITRATE 50 UG/ML
INJECTION, SOLUTION INTRAMUSCULAR; INTRAVENOUS CODE/TRAUMA/SEDATION MEDICATION
Status: DISCONTINUED | OUTPATIENT
Start: 2024-12-20 | End: 2024-12-20 | Stop reason: HOSPADM

## 2024-12-20 RX ORDER — LIDOCAINE HYDROCHLORIDE 10 MG/ML
INJECTION, SOLUTION EPIDURAL; INFILTRATION; INTRACAUDAL; PERINEURAL CODE/TRAUMA/SEDATION MEDICATION
Status: DISCONTINUED | OUTPATIENT
Start: 2024-12-20 | End: 2024-12-20 | Stop reason: HOSPADM

## 2024-12-20 RX ORDER — NITROGLYCERIN 20 MG/100ML
INJECTION INTRAVENOUS CODE/TRAUMA/SEDATION MEDICATION
Status: DISCONTINUED | OUTPATIENT
Start: 2024-12-20 | End: 2024-12-20 | Stop reason: HOSPADM

## 2024-12-20 RX ORDER — VERAPAMIL HYDROCHLORIDE 2.5 MG/ML
INJECTION, SOLUTION INTRAVENOUS CODE/TRAUMA/SEDATION MEDICATION
Status: DISCONTINUED | OUTPATIENT
Start: 2024-12-20 | End: 2024-12-20 | Stop reason: HOSPADM

## 2024-12-20 RX ADMIN — TICAGRELOR 90 MG: 90 TABLET ORAL at 05:27

## 2024-12-20 RX ADMIN — PANTOPRAZOLE SODIUM 40 MG: 40 TABLET, DELAYED RELEASE ORAL at 05:27

## 2024-12-20 RX ADMIN — INSULIN LISPRO 2 UNITS: 100 INJECTION, SOLUTION INTRAVENOUS; SUBCUTANEOUS at 06:16

## 2024-12-20 RX ADMIN — ASPIRIN 81 MG: 81 TABLET, COATED ORAL at 08:26

## 2024-12-20 RX ADMIN — PROPRANOLOL HYDROCHLORIDE 160 MG: 80 CAPSULE, EXTENDED RELEASE ORAL at 10:00

## 2024-12-20 RX ADMIN — SODIUM CHLORIDE 125 ML/HR: 0.9 INJECTION, SOLUTION INTRAVENOUS at 09:58

## 2024-12-20 RX ADMIN — VENLAFAXINE HYDROCHLORIDE 150 MG: 150 CAPSULE, EXTENDED RELEASE ORAL at 09:59

## 2024-12-20 RX ADMIN — ISOSORBIDE MONONITRATE 120 MG: 60 TABLET, EXTENDED RELEASE ORAL at 10:03

## 2024-12-20 NOTE — ASSESSMENT & PLAN NOTE
Lab Results   Component Value Date    HGBA1C 8.3 (H) 12/19/2024       Recent Labs     12/19/24  1734 12/19/24  2046 12/20/24  0609 12/20/24  1039   POCGLU 143* 211* 196* 191*       Blood Sugar Average: Last 72 hrs:  (P) 185.25On Ozempic and Jardiance 25 mg at home

## 2024-12-20 NOTE — DISCHARGE INSTR - AVS FIRST PAGE
1. Please see the post cardiac catheterization dishcarge instructions.   No heavy lifting, greater than 10 lbs. or strenuous  activity for 48 hrs.    2.Remove band aid tomorrow.  Shower and wash area- wrist gently with soap and water- beginning tomorrow. Rinse and pat dry.  Apply new water seal band aid.  Repeat this process for 5 days. No powders, creams lotions or antibiotic ointments  for 5 days.  No tub baths, hot tubs or swimming for 5 days.     3. Please call our office ([phone]) if you have any fever, redness, swelling, discharge from your wrist access site.    4.No driving for 1 day

## 2024-12-20 NOTE — ASSESSMENT & PLAN NOTE
History of coronary artery disease, history of MI, last NSTEMI was August 2022, underwent cardiac catheterization with 100% in-stent occlusion of the ramus branch status post PCI angioplasty, diffuse 70 to 80% in-stent restenosis in OM1, s/p angioplasty  Continue aspirin, Brilinta, statin, Imdur his Imdur was increased to 120 mg daily

## 2024-12-20 NOTE — PROGRESS NOTES
Progress Note - Cardiology   Name: Kai Breen 54 y.o. male I MRN: [id number]  Unit/Bed#: [facility] 529-01 I Date of Admission: 12/19/2024   Date of Service: 12/20/2024 I Hospital Day: 0     Assessment & Plan  Chest pain  Patient has a history of 4 prior MIs resulting in multiple stent placements, hypertension, CAD with stable angina, and hyperlipidemia presenting for chest pain for the past 2 months which has worsened from stable to unstable angina, is localized, and is occurring every day. Troponin x 2 negative and EKG showed sinus rhythm and PVCs. In August 2022, he had a NSTEMIs/p PCI and angioplasty to R1 and OM1 at sites of prior stents that had re-stenosed.    Cardiac Cath Complete on 12/20 with below findings  Chronically occluded RCA, ramus and tiny first diagonal branch, collateralized     Moderate ISR obtuse marginal/circumflex stents with insignificant pressure wire measurement     No significant obstructive LAD disease.     Prox LAD lesion is 30% stenosed.  Optimization of current medication regiment   Imdur 120 mg daily. Increased from before of 60 mg daily dose.   Atorvastatin 40 mg daily  Losartan 100 mg daily  C/w ASA, Ticagrelor 90 mg BID  Can add Ranexa in future if has recurrent pain.   C/w home Propanolol (on it due to PVCs by EP).   Ready for discharge.  Message sent to our clinic for follow up.   Will refer to cardiac rehab.   Coronary artery disease involving native coronary artery  Continue aspirin, Brilinta, statin, Imdur      Essential hypertension  Continue losartan and propranolol       Mixed hyperlipidemia    PVC (premature ventricular contraction)  Continue propranolol      Major depressive disorder, recurrent (HCC)  Continue venlafaxine and trazodone      Type 2 diabetes mellitus (HCC)  Lab Results   Component Value Date    HGBA1C 8.3 (H) 12/19/2024       Recent Labs     12/19/24  1734 12/19/24  2046 12/20/24  0609 12/20/24  1039   POCGLU 143* 211* 196* 191*       Blood Sugar Average:  Last 72 hrs:  (P) 185.25  On Ozempic and Januvia at home  SSI while inpatient          Subjective   Chief Complaint: Kai says that he feels well following his cardiac catheterization. He denies chest pain or shortness of breath. He says that he is ready to go home.       Objective :  Temp:  [98 °F (36.7 °C)-99 °F (37.2 °C)] 98.1 °F (36.7 °C)  HR:  [65-83] 71  BP: ()/(57-81) 105/57  Resp:  [16-17] 17  SpO2:  [95 %-98 %] 97 %  O2 Device: Nasal cannula  Nasal Cannula O2 Flow Rate (L/min):  [2 L/min] 2 L/min  Orthostatic Blood Pressures      Flowsheet Row Most Recent Value   Blood Pressure 105/57 filed at 12/20/2024 1040   Patient Position - Orthostatic VS Lying filed at 12/20/2024 0753          First Weight:  There were no vitals filed for this visit.  Physical Exam  Constitutional:       General: He is awake.   HENT:      Head: Normocephalic.   Cardiovascular:      Rate and Rhythm: Regular rhythm.      Heart sounds: Normal heart sounds, S1 normal and S2 normal. No murmur heard.  Pulmonary:      Effort: Pulmonary effort is normal.      Breath sounds: Normal breath sounds.   Abdominal:      Palpations: Abdomen is soft.   Musculoskeletal:      Right lower leg: No edema.      Left lower leg: No edema.   Skin:     General: Skin is warm and dry.   Neurological:      Mental Status: He is alert.   Psychiatric:         Behavior: Behavior is cooperative.         Lab Results: I have reviewed the following results:  Results from last 7 days   Lab Units 12/20/24  0610 12/19/24  1014   WBC Thousand/uL 6.97 7.84   HEMOGLOBIN g/dL 15.2 17.2*   HEMATOCRIT % 45.2 51.8*   PLATELETS Thousands/uL 184 230     Results from last 7 days   Lab Units 12/20/24  0610 12/19/24  1014   POTASSIUM mmol/L 3.8 4.0   CHLORIDE mmol/L 106 104   CO2 mmol/L 21 23   BUN mg/dL 21 16   CREATININE mg/dL 0.99 1.02   CALCIUM mg/dL 9.1 9.3     Results from last 7 days   Lab Units 12/20/24  0619 12/19/24  2326 12/19/24  1734   INR   --   --  0.95   PTT  seconds 60* 47* 23     Lab Results   Component Value Date    HGBA1C 8.3 (H) 12/19/2024     Lab Results   Component Value Date    TROPONINI <0.03 02/25/2020

## 2024-12-20 NOTE — ASSESSMENT & PLAN NOTE
Patient is a 54-year-old male with a past medical history of coronary artery disease status post MI, last PCI in August 2022, diabetes, GERD, hypertension, hyperlipidemia who presented to the hospital with chest pain  For the past 2 months patient has chest pain with exertion and rest, midsternal, nonradiating--resolves on its own without nitro, lasts about a couple of minutes  Troponin x 2 negative  EKG showed sinus rhythm, PVCs, nonspecific ST-T abnormalities  ED discussed with cardiology and recommended admission for observation  Continue telemetry  Status post cardiac catheterizationHis cath showed chronically occluded diseas discussed with cardiology cleared from their standpoint to be discharged increased his Imdur to 120 mg daily patient has no chest pain after catheterization

## 2024-12-20 NOTE — DISCHARGE SUMMARY
Discharge Summary - Hospitalist   Name: Kai Breen 54 y.o. male I MRN: [id number]  Unit/Bed#: [facility] 529-01 I Date of Admission: 12/19/2024   Date of Service: 12/20/2024 I Hospital Day: 0     Assessment & Plan  Chest pain  Patient is a 54-year-old male with a past medical history of coronary artery disease status post MI, last PCI in August 2022, diabetes, GERD, hypertension, hyperlipidemia who presented to the hospital with chest pain  For the past 2 months patient has chest pain with exertion and rest, midsternal, nonradiating--resolves on its own without nitro, lasts about a couple of minutes  Troponin x 2 negative  EKG showed sinus rhythm, PVCs, nonspecific ST-T abnormalities  ED discussed with cardiology and recommended admission for observation  Continue telemetry  Status post cardiac catheterizationHis cath showed chronically occluded diseas discussed with cardiology cleared from their standpoint to be discharged increased his Imdur to 120 mg daily patient has no chest pain after catheterization  Coronary artery disease involving native coronary artery  History of coronary artery disease, history of MI, last NSTEMI was August 2022, underwent cardiac catheterization with 100% in-stent occlusion of the ramus branch status post PCI angioplasty, diffuse 70 to 80% in-stent restenosis in OM1, s/p angioplasty  Continue aspirin, Brilinta, statin, Imdur his Imdur was increased to 120 mg daily  Essential hypertension  Continue losartan, propranolol, Imdur  Mixed hyperlipidemia  Continue statin  PVC (premature ventricular contraction)  History of PVCs, also PVCs on EKG  Continue propranolol  Major depressive disorder, recurrent (HCC)  Continue venlafaxine and trazodone  Type 2 diabetes mellitus (HCC)  Lab Results   Component Value Date    HGBA1C 8.3 (H) 12/19/2024       Recent Labs     12/19/24  1734 12/19/24  2046 12/20/24  0609 12/20/24  1039   POCGLU 143* 211* 196* 191*       Blood Sugar Average: Last 72 hrs:  (P)  185.25On Ozempic and Jardiance 25 mg at home         Medical Problems       Resolved Problems  Date Reviewed: 12/19/2024   None       Discharging Physician / Practitioner: Rabia Copeland MD  PCP: Nellie Worley DO  Admission Date:   Admission Orders (From admission, onward)       Ordered        12/19/24 1342  Place in Observation  Once                          Discharge Date: 12/20/24    Consultations During Hospital Stay:  Cardiology    Procedures Performed:   Cardiac catheterization-His cath showed chronically occluded diseas     Significant Findings / Test Results:   Cardiac catheterization see above  Chest x-ray is normal    Incidental Findings:   none    Test Results Pending at Discharge (will require follow up):   none     Outpatient Tests Requested:  none    Complications:  none    Reason for Admission: Chest pain    Hospital Course:   Kai Breen is a 54 y.o. male patient who originally presented to the hospital on 12/19/2024 due to chest pain negative chest x-ray admitted with cardiology consultation underwent left cardiac catheterization which revealed chronically occluded disease cardiology has cleared him to be discharged home his Imdur was increased to 120 mg daily there was no recurrence of chest pain postcatheterization.          Please see above list of diagnoses and related plan for additional information.     Condition at Discharge: stable    Discharge Day Visit / Exam:   Subjective: Patient seen and examined denies any chest pain or shortness of breath  Vitals: Blood Pressure: 105/57 (12/20/24 1040)  Pulse: 71 (12/20/24 1040)  Temperature: 98.1 °F (36.7 °C) (12/20/24 1040)  Temp Source: Oral (12/20/24 0753)  Respirations: 17 (12/20/24 1040)  SpO2: 97 % (12/20/24 1040)  Physical Exam  Vitals and nursing note reviewed.   Constitutional:       General: He is not in acute distress.     Appearance: He is well-developed.   HENT:      Head: Normocephalic and atraumatic.   Eyes:       Conjunctiva/sclera: Conjunctivae normal.   Cardiovascular:      Rate and Rhythm: Normal rate and regular rhythm.      Heart sounds: No murmur heard.  Pulmonary:      Effort: Pulmonary effort is normal. No respiratory distress.      Breath sounds: Normal breath sounds. No wheezing or rales.   Abdominal:      General: There is no distension.      Palpations: Abdomen is soft.      Tenderness: There is no abdominal tenderness.   Musculoskeletal:         General: No swelling.      Cervical back: Neck supple.   Skin:     General: Skin is warm and dry.      Capillary Refill: Capillary refill takes less than 2 seconds.   Neurological:      Mental Status: He is alert and oriented to person, place, and time.   Psychiatric:         Mood and Affect: Mood normal.          Discussion with Family: pt    Discharge instructions/Information to patient and family:   See after visit summary for information provided to patient and family.      Provisions for Follow-Up Care:  See after visit summary for information related to follow-up care and any pertinent home health orders.      Mobility at time of Discharge:   Basic Mobility Inpatient Raw Score: 24  JH-HLM Goal: 8: Walk 250 feet or more  JH-HLM Achieved: 7: Walk 25 feet or more  HLM Goal achieved. Continue to encourage appropriate mobility.     Disposition:   Home    Planned Readmission: no    Discharge Medications:  See after visit summary for reconciled discharge medications provided to patient and/or family.      Administrative Statements   Discharge Statement:  I have spent a total time of >35 minutes in caring for this patient on the day of the visit/encounter. >30 minutes of time was spent on: Documenting in the medical record, Reviewing / ordering tests, medicine, procedures  , and Communicating with other healthcare professionals .    **Please Note: This note may have been constructed using a voice recognition system**

## 2024-12-20 NOTE — ASSESSMENT & PLAN NOTE
Lab Results   Component Value Date    HGBA1C 8.3 (H) 12/19/2024       Recent Labs     12/19/24  1734 12/19/24  2046 12/20/24  0609 12/20/24  1039   POCGLU 143* 211* 196* 191*       Blood Sugar Average: Last 72 hrs:  (P) 185.25  On Ozempic and Januvia at home  SSI while inpatient

## 2024-12-20 NOTE — ASSESSMENT & PLAN NOTE
Patient has a history of 4 prior MIs resulting in multiple stent placements, hypertension, CAD with stable angina, and hyperlipidemia presenting for chest pain for the past 2 months which has worsened from stable to unstable angina, is localized, and is occurring every day. Troponin x 2 negative and EKG showed sinus rhythm and PVCs. In August 2022, he had a NSTEMIs/p PCI and angioplasty to R1 and OM1 at sites of prior stents that had re-stenosed.    Cardiac Cath Complete on 12/20 with below findings  Chronically occluded RCA, ramus and tiny first diagonal branch, collateralized     Moderate ISR obtuse marginal/circumflex stents with insignificant pressure wire measurement     No significant obstructive LAD disease.     Prox LAD lesion is 30% stenosed.  Optimization of current medication regiment   Imdur 120 mg daily. Increased from before of 60 mg daily dose.   Atorvastatin 40 mg daily  Losartan 100 mg daily  C/w ASA, Ticagrelor 90 mg BID  Can add Ranexa in future if has recurrent pain.   C/w home Propanolol (on it due to PVCs by EP).   Ready for discharge.  Message sent to our clinic for follow up.   Will refer to cardiac rehab.

## 2024-12-26 ENCOUNTER — TELEPHONE (OUTPATIENT)
Dept: CARDIOLOGY CLINIC | Facility: CLINIC | Age: 54
End: 2024-12-26

## 2024-12-26 NOTE — TELEPHONE ENCOUNTER
----- Message from Jeanmarie Colunga DO sent at 12/20/2024 12:20 PM EST -----  Regarding: Follow up  Please call him for a 1 week post hospital follow up for chest pain. Thanks.

## 2024-12-30 NOTE — PROGRESS NOTES
Cardiology Follow Up    Kai Breen  1970  [id number]  [facility]  [address]  [place] [address] [phone]    Assessment & Plan  Coronary artery disease involving native heart without angina pectoris, unspecified vessel or lesion type  12/20/24 LH showed chronically occluded RCA ramus and tiny first diagonal branch collateralized.  Moderate in-stent restenosis of obtuse marginal and circumflex stents with insignificant pressure wire management.  No significant obstructive LAD disease.  Prox LAD 30% stenosis  Denies chest pain  Continue on Imdur 120 mg daily, losartan 100 mg daily, Inderal  mL grams daily, Lipitor 40 mg daily, aspirin 81 mg daily, Brilinta 90 mg every 12 hours, mag 3-day acid 2000 mg twice daily  Carotid Dopplers to be done in the near future rule out internal artery carotid stenosis  Cardiac Reha dilatation in the near future  He was instructed to call our office if chest pain reoccurs.  Can start Ranexa at this time    Ischemic cardiomyopathy  LVEF 45% LVIDD 4.3 cm TTE 8/27/22  Follow up TTE in near future .  Denies symptoms of heart failure  Instruct did on a heart healthy 2 g sodium diet  Mixed hyperlipidemia  9/09/24 , , HDL 30, LDL 26  Could not be on Lipitor 40 mg daily, omega-3 fatty acids 2000 mg twice daily  Follow-up with lasting lipid profile in 3 to 6 months per primary cardiologist  Heart healthy diet  Primary hypertension  Hold on Imdur 120 mg daily, losartan 100 mg daily Inderal  mg daily  DASH Diet  PVC (premature ventricular contraction)  3/31/22 Stevano showed Isolated VEs were occasional (2.6%, 17339)  Denies palpitations   Continue on Inderal  mg daily  Type 2 diabetes mellitus with other circulatory complication, unspecified whether long term insulin use (HCC)    Lab Results   Component Value Date    HGBA1C 8.3 (H) 12/19/2024    Uncontrolled  Continues on Ozempic   Follow up with PCP           Interval History:   Mr Rg Breen was admitted to [facility] on 12/19 - 12/20/24 with CP.  Kai presented to the ED with CP on exertion.  Cardiology consulted and suggested admission for observation.  12/20/24 LHC showed chronically occluded RCA, ramus and tiny first diagonal branch,  Collateralized.  Moderate ISR obtuse marginal circumflex stents with significant pressure wire measurement.  No significant obstructive LAD disease, proximal LAD 3% stenosis.  He was discharged on Imdur 120mg daily.  Rehabilitation ordered at discharge    Kai presents to our office for a recent hospitalization follow up visit.  He denies recurrence of chest pain.  According to Kai he has not been doing much exertion.  He denies palpitations, shortness of breath with the more moderate exertion, lightheadedness or dizziness.  Taking all medications as prescribed.  He consumes a low-sodium diet not forthcoming to what he is actually eating.      Medical History   Primary Cardiologist Dr Shane GÓMEZ, multiple hx of MI, 8/26/22 [facility] showed 100% in stent restenosis of RAMUS, OM1 70% in stent restenosis, RCA  with faint collaterals.    ICM LVEF 45% LVIDd 4.3cm   DM2 HgbA1C 8.3 on 12/19/24  Hypertension  Hyperlipidemia 9/09/24 , , HDL 30, LDL 26  Depression  PVC 3/31/22 Zio showed Isolated VEs were occasional (2.6%, 03659)    12/20/24 C     Chronically occluded RCA, ramus and tiny first diagonal branch, collateralized    Moderate ISR obtuse marginal/circumflex stents with insignificant pressure wire measurement    No significant obstructive LAD disease.    Prox LAD lesion is 30% stenosed.          Patient Active Problem List   Diagnosis    Coronary artery disease involving native coronary artery    Essential hypertension    Mixed hyperlipidemia    Chest pain    PVC (premature ventricular contraction)    Palpitations    Chronic bilateral low  back pain without sciatica    Lumbar degenerative disc disease    Myofascial pain syndrome    Sacroiliitis (HCC)    Presence of drug coated stent in left circumflex coronary artery    Chronic pain syndrome    Lumbar radiculopathy    Complex regional pain syndrome type 1    Gastroesophageal reflux disease    Gastroparesis    History of PTCA 2    Major depressive disorder, recurrent (HCC)    Nicotine dependence    Radiculitis, thoracic    Displacement of thoracic intervertebral disc    Elevated troponin I level    Hypokalemia    Type 2 diabetes mellitus (HCC)    Lumbar spondylosis    ADDISON (obstructive sleep apnea)    Frequent nocturnal awakening    Snoring    Chronic insomnia     Past Medical History:   Diagnosis Date    Arthritis     Coronary artery disease     MI x 4; cardiac cath with 4 stents    GERD (gastroesophageal reflux disease)     Hypertension     Myocardial infarct (HCC)     4 MIs as of 2020    Non-insulin dependent type 2 diabetes mellitus (HCC) 2022     Social History     Socioeconomic History    Marital status: /Civil Union     Spouse name: Not on file    Number of children: Not on file    Years of education: Not on file    Highest education level: Not on file   Occupational History    Not on file   Tobacco Use    Smoking status: Former     Current packs/day: 0.00     Average packs/day: 1 pack/day for 20.7 years (20.7 ttl pk-yrs)     Types: Cigarettes     Start date: 1998     Quit date: 2018     Years since quittin.1    Smokeless tobacco: Never   Vaping Use    Vaping status: Never Used   Substance and Sexual Activity    Alcohol use: Never    Drug use: Never    Sexual activity: Yes     Partners: Female     Birth control/protection: None   Other Topics Concern    Not on file   Social History Narrative    Not on file     Social Drivers of Health     Financial Resource Strain: Not on file   Food Insecurity: No Food Insecurity (2022)    Hunger Vital Sign     Worried About  Running Out of Food in the Last Year: Never true     Ran Out of Food in the Last Year: Never true   Transportation Needs: Not on file   Physical Activity: Not on file   Stress: Not on file   Social Connections: Unknown (6/18/2024)    Received from [facility]    Social Breach Security     How often do you feel lonely or isolated from those around you? (Adult - for ages 18 years and over): Not on file   Intimate Partner Violence: Not on file   Housing Stability: Low Risk  (8/26/2022)    Housing Stability Vital Sign     Unable to Pay for Housing in the Last Year: No     Number of Places Lived in the Last Year: 1     Unstable Housing in the Last Year: No      Family History   Problem Relation Age of Onset    Heart disease Father     Hyperlipidemia Father     Diabetes Sister     Early death Sister     Diabetes Brother     Diabetes Maternal Grandmother      Past Surgical History:   Procedure Laterality Date    CARDIAC CATHETERIZATION  1/7/2022    Procedure: Cardiac catheterization;  Surgeon: Jasiel Padilla DO;  Location: [facility];  Service: Cardiology    CARDIAC CATHETERIZATION N/A 8/26/2022    Procedure: Cardiac catheterization;  Surgeon: Kirt Lezama MD;  Location: [facility];  Service: Cardiology    CARDIAC CATHETERIZATION N/A 8/26/2022    Procedure: Cardiac pci;  Surgeon: Kirt Lezama MD;  Location: [facility];  Service: Cardiology    CARDIAC CATHETERIZATION Left 8/26/2022    Procedure: Cardiac Left Heart Cath;  Surgeon: Kirt Lezama MD;  Location: [facility];  Service: Cardiology    CARDIAC CATHETERIZATION N/A 8/26/2022    Procedure: Cardiac Coronary Angiogram;  Surgeon: Kirt Lezama MD;  Location: [facility];  Service: Cardiology    CARDIAC CATHETERIZATION Left 12/20/2024    Procedure: Cardiac Left Heart Cath;  Surgeon: Jasiel Padilla DO;  [facility]: [facility];  Service: Cardiology    CARDIAC CATHETERIZATION N/A 12/20/2024    Procedure:  Cardiac Coronary Angiogram;  Surgeon: Jasiel Padilla DO;  [facility]: [facility];  Service: Cardiology    CARDIAC CATHETERIZATION N/A 12/20/2024    Procedure: Cardiac PCI;  Surgeon: Jasiel Padilla DO;  Location: [facility];  Service: Cardiology    CORONARY ANGIOPLASTY WITH STENT PLACEMENT         Current Outpatient Medications:     aspirin 81 MG tablet, Take 81 mg by mouth daily  , Disp: , Rfl:     atorvastatin (LIPITOR) 40 mg tablet, Take 1 tablet (40 mg total) by mouth every 24 hours, Disp: 90 tablet, Rfl: 0    Brilinta 90 MG, TAKE ONE TABLET BY MOUTH EVERY 12 HOURS, Disp: 180 tablet, Rfl: 0    Contour Next Test test strip, , Disp: , Rfl:     isosorbide mononitrate (IMDUR) 120 mg 24 hr tablet, Take 1 tablet (120 mg total) by mouth daily, Disp: 30 tablet, Rfl: 0    Jardiance 25 MG TABS, Take 25 mg by mouth, Disp: , Rfl:     losartan (COZAAR) 100 MG tablet, Take 1 tablet (100 mg total) by mouth every 24 hours, Disp: 30 tablet, Rfl: 3    Microlet Lancets MISC, , Disp: , Rfl:     nitroglycerin (NITROSTAT) 0.4 mg SL tablet, as directed, Disp: , Rfl:     Omega-3 Fatty Acids (OMEGA 3 PO), Take 2,000 mg by mouth 2 (two) times a day, Disp: , Rfl:     pantoprazole (PROTONIX) 40 mg tablet, Take 40 mg by mouth daily, Disp: , Rfl: 3    propranolol (INDERAL LA) 160 mg, Take 1 capsule (160 mg total) by mouth daily, Disp: 30 capsule, Rfl: 3    Semaglutide (OZEMPIC, 0.25 OR 0.5 MG/DOSE, SC), Inject 1 Dose under the skin once a week, Disp: , Rfl:     traZODone (DESYREL) 50 mg tablet, , Disp: , Rfl:     venlafaxine 150 MG TB24 24 hr tablet, , Disp: , Rfl:   No Known Allergies    Labs:  Admission on 12/19/2024, Discharged on 12/20/2024   Component Date Value    Ventricular Rate 12/19/2024 86     Atrial Rate 12/19/2024 86     TN Interval 12/19/2024 168     QRSD Interval 12/19/2024 90     QT Interval 12/19/2024 352     QTC Interval 12/19/2024 421     P Axis 12/19/2024 33     QRS [place] 12/19/2024 58     T  Wave [place] 12/19/2024 73     WBC 12/19/2024 7.84     RBC 12/19/2024 5.68 (H)     Hemoglobin 12/19/2024 17.2 (H)     Hematocrit 12/19/2024 51.8 (H)     MCV 12/19/2024 91     MCH 12/19/2024 30.3     MCHC 12/19/2024 33.2     RDW 12/19/2024 13.6     MPV 12/19/2024 9.4     Platelets 12/19/2024 230     nRBC 12/19/2024 0     Segmented % 12/19/2024 70     Immature Grans % 12/19/2024 1     Lymphocytes % 12/19/2024 19     Monocytes % 12/19/2024 7     Eosinophils Relative 12/19/2024 2     Basophils Relative 12/19/2024 1     Absolute Neutrophils 12/19/2024 5.63     Absolute Immature Grans 12/19/2024 0.04     Absolute Lymphocytes 12/19/2024 1.47     Absolute Monocytes 12/19/2024 0.53     Eosinophils Absolute 12/19/2024 0.12     Basophils Absolute 12/19/2024 0.05     Sodium 12/19/2024 136     Potassium 12/19/2024 4.0     Chloride 12/19/2024 104     CO2 12/19/2024 23     ANION GAP 12/19/2024 9     BUN 12/19/2024 16     Creatinine 12/19/2024 1.02     Glucose 12/19/2024 222 (H)     Calcium 12/19/2024 9.3     AST 12/19/2024 20     ALT 12/19/2024 25     Alkaline Phosphatase 12/19/2024 137 (H)     Total Protein 12/19/2024 7.4     Albumin 12/19/2024 4.3     Total Bilirubin 12/19/2024 0.71     eGFR 12/19/2024 83     hs TnI 0hr 12/19/2024 6     hs TnI 2hr 12/19/2024 4     Delta 2hr hsTnI 12/19/2024 -2     hs TnI 4hr 12/19/2024 5     Delta 4hr hsTnI 12/19/2024 -1     Magnesium 12/19/2024 2.4     Hemoglobin A1C 12/19/2024 8.3 (H)     EAG 12/19/2024 192     PTT 12/19/2024 23     Protime 12/19/2024 12.9     INR 12/19/2024 0.95     POC Glucose 12/19/2024 143 (H)     PTT 12/19/2024 47 (H)     POC Glucose 12/19/2024 211 (H)     WBC 12/20/2024 6.97     RBC 12/20/2024 5.02     Hemoglobin 12/20/2024 15.2     Hematocrit 12/20/2024 45.2     MCV 12/20/2024 90     MCH 12/20/2024 30.3     MCHC 12/20/2024 33.6     RDW 12/20/2024 13.6     Platelets 12/20/2024 184     MPV 12/20/2024 9.8     Sodium 12/20/2024 137     Potassium 12/20/2024 3.8     Chloride  12/20/2024 106     CO2 12/20/2024 21     ANION GAP 12/20/2024 10     BUN 12/20/2024 21     Creatinine 12/20/2024 0.99     Glucose 12/20/2024 195 (H)     Calcium 12/20/2024 9.1     eGFR 12/20/2024 86     PTT 12/20/2024 60 (H)     POC Glucose 12/20/2024 196 (H)     POC Glucose 12/20/2024 191 (H)     POC Glucose 12/20/2024 170 (H)      Imaging: Cardiac catheterization  Result Date: 12/20/2024  Narrative:   Chronically occluded RCA, ramus and tiny first diagonal branch, collateralized   Moderate ISR obtuse marginal/circumflex stents with insignificant pressure wire measurement   No significant obstructive LAD disease.   Prox LAD lesion is 30% stenosed.     XR chest pa and lateral  Result Date: 12/19/2024  Narrative: XR CHEST PA AND LATERAL INDICATION: chest pain. COMPARISON: Chest radiograph August 24, 2022 FINDINGS: Clear lungs. No pneumothorax or pleural effusion. Normal cardiomediastinal silhouette. Coronary artery stents. Bones are unremarkable for age. Normal upper abdomen.     Impression: No acute cardiopulmonary disease. Workstation performed: [id number]       Review of Systems:  Review of Systems   Gastrointestinal:         GERD   Musculoskeletal:  Positive for arthralgias, back pain and myalgias.   All other systems reviewed and are negative.      Physical Exam:  Physical Exam  Vitals reviewed.   Constitutional:       Appearance: Normal appearance.   Cardiovascular:      Rate and Rhythm: Normal rate.      Pulses: Normal pulses.      Heart sounds: Normal heart sounds.   Pulmonary:      Effort: Pulmonary effort is normal.      Breath sounds: Normal breath sounds.   Musculoskeletal:         General: Normal range of motion.      Cervical back: Normal range of motion.   Skin:     General: Skin is warm and dry.      Capillary Refill: Capillary refill takes less than 2 seconds.   Neurological:      General: No focal deficit present.      Mental Status: He is alert and oriented to person, place, and time.    Psychiatric:         Mood and Affect: Mood normal.         Behavior: Behavior normal.

## 2024-12-31 ENCOUNTER — OFFICE VISIT (OUTPATIENT)
Dept: CARDIOLOGY CLINIC | Facility: CLINIC | Age: 54
End: 2024-12-31
Payer: COMMERCIAL

## 2024-12-31 VITALS
WEIGHT: 182.4 LBS | BODY MASS INDEX: 27.65 KG/M2 | DIASTOLIC BLOOD PRESSURE: 82 MMHG | HEART RATE: 79 BPM | HEIGHT: 68 IN | OXYGEN SATURATION: 99 % | SYSTOLIC BLOOD PRESSURE: 110 MMHG

## 2024-12-31 DIAGNOSIS — E78.2 MIXED HYPERLIPIDEMIA: ICD-10-CM

## 2024-12-31 DIAGNOSIS — I10 PRIMARY HYPERTENSION: ICD-10-CM

## 2024-12-31 DIAGNOSIS — I49.3 PVC (PREMATURE VENTRICULAR CONTRACTION): ICD-10-CM

## 2024-12-31 DIAGNOSIS — I25.5 ISCHEMIC CARDIOMYOPATHY: ICD-10-CM

## 2024-12-31 DIAGNOSIS — I25.10 CORONARY ARTERY DISEASE INVOLVING NATIVE HEART WITHOUT ANGINA PECTORIS, UNSPECIFIED VESSEL OR LESION TYPE: Primary | ICD-10-CM

## 2024-12-31 DIAGNOSIS — E11.59 TYPE 2 DIABETES MELLITUS WITH OTHER CIRCULATORY COMPLICATION, UNSPECIFIED WHETHER LONG TERM INSULIN USE (HCC): ICD-10-CM

## 2024-12-31 PROCEDURE — 99214 OFFICE O/P EST MOD 30 MIN: CPT | Performed by: NURSE PRACTITIONER

## 2024-12-31 NOTE — ASSESSMENT & PLAN NOTE
Lab Results   Component Value Date    HGBA1C 8.3 (H) 12/19/2024   Uncontrolled  Continues on Ozempic   Follow up with PCP

## 2024-12-31 NOTE — ASSESSMENT & PLAN NOTE
3/31/22 Zio showed Isolated VEs were occasional (2.6%, 19894)  Denies palpitations   Continue on Inderal  mg daily

## 2024-12-31 NOTE — ASSESSMENT & PLAN NOTE
9/09/24 , , HDL 30, LDL 26  Could not be on Lipitor 40 mg daily, omega-3 fatty acids 2000 mg twice daily  Follow-up with lasting lipid profile in 3 to 6 months per primary cardiologist  Heart healthy diet

## 2025-01-06 ENCOUNTER — APPOINTMENT (OUTPATIENT)
Dept: CARDIAC REHAB | Facility: HOSPITAL | Age: 55
End: 2025-01-06
Payer: COMMERCIAL

## 2025-01-07 ENCOUNTER — CLINICAL SUPPORT (OUTPATIENT)
Dept: CARDIAC REHAB | Facility: HOSPITAL | Age: 55
End: 2025-01-07
Payer: COMMERCIAL

## 2025-01-07 DIAGNOSIS — Z95.5 S/P INSERTION OF NON-DRUG ELUTING CORONARY ARTERY STENT: Primary | ICD-10-CM

## 2025-01-07 DIAGNOSIS — I25.10 CAD (CORONARY ARTERY DISEASE): ICD-10-CM

## 2025-01-07 DIAGNOSIS — R07.9 CHEST PAIN: ICD-10-CM

## 2025-01-07 PROCEDURE — 93797 PHYS/QHP OP CAR RHAB WO ECG: CPT | Performed by: PHYSICAL THERAPIST

## 2025-01-07 NOTE — PROGRESS NOTES
CARDIAC REHABILITATION   ASSESSMENT AND INDIVIDUALIZED TREATMENT PLAN  INITIAL           Today's date: 2025   # of Exercise Sessions Completed: Initial Visit  Patient name: Kai Breen      : 1970  Age: 54 y.o.       MRN: [id number]  Referring Physician: Dr. Jeanmarie Colunga DO  Cardiologist: Dr. Robert Lynn MD  Provider: Saud  Clinician: Jolynn Kathleen, MPT, CCRP        Treatment is tailored to this patient's individual needs.  The ITP was reviewed with the patient and all questions were answered to their satisfaction.  Additional ITP documentation can be found electronically including daily and monthly exercise summaries, daily session notes with ECG summaries, education notes, daily medication reconciliation, and daily physician supervision.      Comments: Patient seen for initial evaluation this date. He performed a Sub Max TM ETT w/correct hemodynamic responses. He was a symptomatic. He was able to attain a 4.3 MET Level. Telemetry revealed NSR w/occasional  PVCs. He is scheduled to start his Cardiac Rehab exercise sessions on 1/10/2024. He plans to attend 2X week. His program will be progressed as he is able to tolerate. He will receive education specific to his disease process.         Dx: Hx S/P BEVERLY to Ramus and 2nd Marginal  Encounter Diagnoses   Name Primary?    Chest pain     CAD (coronary artery disease)        Description of Diagnosis:  Kai was admitted to [facility] on  - 24 with CP.  Kai presented to the ED with CP on exertion.  Cardiology consulted and suggested admission for observation.  24 LHC showed chronically occluded RCA, ramus and tiny first diagonal branch,  Collateralized.  Moderate ISR obtuse marginal circumflex stents with significant pressure wire measurement.  No significant obstructive LAD disease, proximal LAD 3% stenosis.  He was discharged on Imdur 120mg daily.  Rehabilitation ordered at discharge     Date of onset:  12/19/2024  Other Cardiac History: Hx MI, HTN, CAD, HLD and ADDISON        ASSESSMENT    Medical History:   Past Medical History:   Diagnosis Date    Arthritis     Coronary artery disease     MI x 4; cardiac cath with 4 stents    GERD (gastroesophageal reflux disease)     Hypertension     Myocardial infarct (HCC)     4 MIs as of 2/24/2020    Non-insulin dependent type 2 diabetes mellitus (HCC) 8/24/2022       Family History:  Family History   Problem Relation Age of Onset    Heart disease Father     Hyperlipidemia Father     Diabetes Sister     Early death Sister     Diabetes Brother     Diabetes Maternal Grandmother        Allergies:   Patient has no known allergies.    Current Medications:   Current Outpatient Medications   Medication Sig Dispense Refill    aspirin 81 MG tablet Take 81 mg by mouth daily        atorvastatin (LIPITOR) 40 mg tablet Take 1 tablet (40 mg total) by mouth every 24 hours 90 tablet 0    Brilinta 90 MG TAKE ONE TABLET BY MOUTH EVERY 12 HOURS 180 tablet 0    Contour Next Test test strip       isosorbide mononitrate (IMDUR) 120 mg 24 hr tablet Take 1 tablet (120 mg total) by mouth daily 30 tablet 0    Jardiance 25 MG TABS Take 25 mg by mouth      losartan (COZAAR) 100 MG tablet Take 1 tablet (100 mg total) by mouth every 24 hours 30 tablet 3    Microlet Lancets MISC       nitroglycerin (NITROSTAT) 0.4 mg SL tablet as directed (Patient not taking: Reported on 12/31/2024)      Omega-3 Fatty Acids (OMEGA 3 PO) Take 2,000 mg by mouth 2 (two) times a day      pantoprazole (PROTONIX) 40 mg tablet Take 40 mg by mouth daily  3    propranolol (INDERAL LA) 160 mg Take 1 capsule (160 mg total) by mouth daily 30 capsule 3    Semaglutide (OZEMPIC, 0.25 OR 0.5 MG/DOSE, SC) Inject 1 Dose under the skin once a week      traZODone (DESYREL) 50 mg tablet 50 mg daily at bedtime      venlafaxine 150 MG TB24 24 hr tablet 150 mg daily with breakfast       No current facility-administered medications for this visit.        Medication compliance: Yes   Comments: Pt reports to be compliant with medications    Physical Limitations: None    Fall Risk: Low   Comments: Ambulates with a steady gait with no assist device and Denies a fall in the past 6 months    Cultural needs: none      CAD Risk Factors:  Cholesterol: Yes  HTN: Yes  DM: Type 2   Obesity: No   Inactivity: No      EXERCISE ASSESSMENT:     Initial Fitness Assessment: Sub Max TM ETT  Resting Vitals: , /99 and 02 Sat 100%  Peak Vitals: , /72, 02 Sat 98%, RPE 4/10   4.3 MET Level  Recovery Vitals: HR 89, /66, 02 Sat 100%      ECG INTERPRETATION:  NSR w/Occ PVCs    Current Functional Status:  Occupation: unemployed  Recreation/Physical Activity: Wood working  ADL’s:resumed all ADLs able to perform self-care resumed driving  Assumption: Capable of performing light to moderate ADLs  Home exercise:  Patient ambulates daily weather pending  Other Comments:       SMART Exercise Goals:   10% improvement in functional capacity based on max METs achieved in initial fitness assessment  reduced dyspnea with physical activity    increased exercise capacity by 40% based on peak METs tolerated in cardiac rehab exercise session  maintain > 150 minutes per week of moderate intensity exercise    Patient Specific EXERCISE GOALS:       Ability to ambulate long distances w/out symptoms  Ability to lift/carry > 25# w/out symptoms  Ability to tolerate 3-4 hours of ADLs w/out anginal symptoms    Functional Capacity Screening Tool:  [facility] Activity Status Index:  6.61 METs      PSYCHOSOCIAL ASSESSMENT:    Date of last Assessment:  1/7/2025  Depression screening:  PHQ-9 = 8    Interpretation:  5-9 = Mild Depression  Anxiety screening:  SHAHLA-7 = 7  Interpretation: 5-9 = Mild anxiety    Pt self-report of depression and anxiety   Patient has a history of depression  Reports sufficient emotional support   Patient takes Trazodone for these issues w/good  "results    Self-reported stress level:  8/10   Stressors:  Health issues  Stress Management Tools: read, exercise, spend time outside, enjoy a hobby, and spend time with family    SMART Psychosocial Goals:     Reduce perceived stress to 1-3/10, Physical Fitness in [facility] Score < 3, Daily Activity in [facility] Score < 3, Quality of Life in [facility] Score < 3 ,  Increased interest and pleasure in doing things, and feel less tired with more energy    Patient Specific PSYCHOCOSOCIAL GOALS:    Reduce stress level to < 5/10  Attain a full night's sleep and fel fully rested  Maintain compliance w/Trazodone    Quality of Life Screen:  (Higher score indicates disease impact on QOL)  [facility] COOP score: 32/45     Social Support:   spouse, children, and grandchildren  Community/Social Activities: Patient currently fostering a child     Psychosocial Assessment as it relates to rehabilitation:   Patient denies issues with his family or home life that may affect their rehabilitation efforts. Patient states all his needs are ebing met at this time.       NUTRITION ASSESSMENT:    Initial Weight:  182  Current Weight: 182    Height:   Ht Readings from Last 1 Encounters:   12/31/24 5' 8\" (1.727 m)       Rate Your Plate Score: 60/81    Diabetes: T2D  A1c: 9/9/2024    last measured: 8.4    Lipid management: Discussed diet and lipid management and Last lipid profile 9/9/2024  Chol 126    HDL 30  LDL 26    Current Dietary Habits:  Patient follows a low sodium diet. He also watches intake of saturated fats. He does eat a lot of fresh produce.     SMART Nutrition Goals:   TRG <150, improved A1c  < 7.0%, eat 6 or more servings of grain products per day, eat whole grain breads, brown rice and whole grain cereals, eat red meat once a week or less, Do not cook with oil, butter or margarine, Do not eat fried foods, and never add salt to food when cooking or at the table    Patient Specific NUTRITION GOALS:     1. Improve Tri from " 356 to < 150   2. Increase intake of whole grains   3. Improve A1C to < 7.0 and reduce reliance on medication    Drug/Alcohol Use:   No      OTHER CORE COMPONENT ASSESSMENT:    Tobacco Use:   History of smoking. He quit 2018 and continues to abstain.       Anginal Symptoms:  chest pressure and SOB   NTG use: Understands proper use and is compliant taking Imdur daily    SMART Goals:   consistent, controlled resting BP < 130/80, medication compliance, and avoid second hand smoke/other respiratory irritants    Patient Specific CORE COMPONENT GOALS:    Continue to abstain from smoking  Reduce episodes of anginal symptoms  Perfrom at minimum 150 min/week moderate exercise      INDIVIDUALIZED TREATMENT PLAN      EXERCISE GOALS and PLAN      Progress toward Exercise goals:   Reviewed Pt goals and determined plan of care, Will continue to educate and progress as tolerated.    Exercise Plan:    education on home exercise guidelines, home exercise 30+ mins 2 days opposite CR, Group class: Risk Factors for Heart Disease, and Patient education:   Exercise After Cardiac Rehabilitation    The patient was counseled on exercise guidelines to achieve a minimum of 150 mins/wk of moderate intensity (RPE 4-6)   exercise and encouraged to add 1-2 days of exercise on opposite days of cardiac rehab as tolerated.       PHYSICIAN PRESCRIBED EXERCISE:    Current Aerobic Exercise Prescription:      Frequency: 2-3 days/week   Supplement with home exercise 2+ days/wk as tolerated       Minutes 30 - 40         METS: 3.0-4.50            HR: RHR +30-40bpm   RPE: 4-6/10         Modalities: Treadmill, UBE, and Recumbent bike     Exercise workloads will be progressed gradually as tolerated, within limits of patient's ability, and according to the patient's   response to the exercise program.      Aerobic Exercise Prescription Plan for Progression   Frequency: 2-3 days/week of cardiac rehab       Supplement with home exercise 2+ days/wk as tolerated     Minutes: 40 -45      >150 mins/wk of moderate intensity exercise   METS: 4.50-5.0   HR: RHR +30-40bpm     RPE: 4-6/10   Modalities: Treadmill, Airdyne bike, and Recumbent bike    Strength trainin-3 days / week  12-15 repetitions  1-2 sets per modality    Modalities: Leg Press, Chest Press, and UE free weights    Home Exercise:  Walking long distances daily weather dependent    Exercise Education: benefit of exercise for CAD risk factors, home exercise guidelines, AHA guidelines to achieve >150 mins/wk of moderate exercise, and RPE scale     Readiness to change: Preparation:  (Getting ready to change)       NUTRITION GOALS AND PLAN      Nutritional   Reviewed patient's Rate your Plate. Discussed key elements of heart healthy eating. Reviewed patient goals for dietary modifications and their clinical implications.  Reviewed most recent lipid profile.     Patient's progress toward Nutrition goals:    Reviewed Pt goals and determined plan of care, Will continue to educate and progress as tolerated.      Nutrition Plan:   group class: Reading Food Labels, increase intake of whole grains, replace refined flours with whole grains, increase daily intake of fruits and vegetables, choose lean red meat, reduce intake and /or  rarely eat processed meats , cook without fats or oils, never/rarely eat fried foods, and rarely/never eat salty snacks    Measurable goals were based Rate Your Plate Dietary Self-Assessment. These are the areas in which the patient could score higher on the assessment.  Goals include recommendations for a heart healthy diet based on American Heart Association.    Nutrition Education:   heart healthy eating principles  low sodium diet  maintaining hydration  nutrition for  lipid management  target goal for A1c <7.0    Readiness to change: Preparation:  (Getting ready to change)       PSYCHOSOCIAL GOALS AND PLAN    Psychosocial Assessment as it relates to rehabilitation:   Patient denies issues  with his/her family or home life that may affect their rehabilitation efforts.     Patient's progress toward Psychosocial goals:    Reviewed Pt goals and determined plan of care, Will continue to educate and progress as tolerated.    Psychosocial Intervention/plan:   Class: Stress and Your Health, Class: Relaxation, Practice relaxation techniques, Exercise, and Spend time outdoors    Psychosocial Education: signs/sxs of depression, class:  Relaxation, and class:  Stress and Your Health     Information to utilize [employer] was provided as well as contact information for counseling through  Behavioral Health and group psychotherapy groups available.    Readiness to change: Preparation:  (Getting ready to change)       OTHER CORE COMPONENTS GOALS and PLAN      Blood Pressure will be monitored throughout the program and cardiologist will be notified of elevated trends.    Pt will be encouraged to monitor home BP if advised by cardiologist.    Tobacco Plan:   Pt quit 2018 and has abstained since quitting.      Progress toward Core Component goals:   Reviewed Pt goals and determined plan of care, Will continue to educate and progress as tolerated.    Other Core Components Intervention:   group class: Understanding Heart Disease, medication compliance, avoid places with second hand smoke, avoid processed foods, engage in regular exercise, and monitor home BP    Group and Individual Education:  understanding high blood pressure and it's relationship to CAD, components of blood pressure management, low sodium diet and heart failure, and effects of nicotine and tobacco    Readiness to change: Preparation:  (Getting ready to change)

## 2025-01-10 ENCOUNTER — CLINICAL SUPPORT (OUTPATIENT)
Dept: CARDIAC REHAB | Facility: HOSPITAL | Age: 55
End: 2025-01-10
Payer: COMMERCIAL

## 2025-01-10 DIAGNOSIS — Z95.5 S/P INSERTION OF NON-DRUG ELUTING CORONARY ARTERY STENT: ICD-10-CM

## 2025-01-10 PROCEDURE — 93798 PHYS/QHP OP CAR RHAB W/ECG: CPT

## 2025-01-13 ENCOUNTER — APPOINTMENT (OUTPATIENT)
Dept: CARDIAC REHAB | Facility: HOSPITAL | Age: 55
End: 2025-01-13
Payer: COMMERCIAL

## 2025-01-17 ENCOUNTER — APPOINTMENT (OUTPATIENT)
Dept: CARDIAC REHAB | Facility: HOSPITAL | Age: 55
End: 2025-01-17
Payer: COMMERCIAL

## 2025-01-20 ENCOUNTER — APPOINTMENT (OUTPATIENT)
Dept: CARDIAC REHAB | Facility: HOSPITAL | Age: 55
End: 2025-01-20
Payer: COMMERCIAL

## 2025-01-24 ENCOUNTER — APPOINTMENT (OUTPATIENT)
Dept: CARDIAC REHAB | Facility: HOSPITAL | Age: 55
End: 2025-01-24
Payer: COMMERCIAL

## 2025-01-27 ENCOUNTER — APPOINTMENT (OUTPATIENT)
Dept: LAB | Facility: HOSPITAL | Age: 55
End: 2025-01-27
Payer: COMMERCIAL

## 2025-01-27 DIAGNOSIS — R19.7 DIARRHEA, UNSPECIFIED TYPE: ICD-10-CM

## 2025-01-27 PROCEDURE — 87209 SMEAR COMPLEX STAIN: CPT

## 2025-01-27 PROCEDURE — 87177 OVA AND PARASITES SMEARS: CPT

## 2025-01-31 ENCOUNTER — APPOINTMENT (OUTPATIENT)
Dept: CARDIAC REHAB | Facility: HOSPITAL | Age: 55
End: 2025-01-31
Payer: COMMERCIAL

## 2025-02-06 ENCOUNTER — HOSPITAL ENCOUNTER (OUTPATIENT)
Dept: NON INVASIVE DIAGNOSTICS | Facility: HOSPITAL | Age: 55
Discharge: HOME | End: 2025-02-06
Payer: COMMERCIAL

## 2025-02-06 ENCOUNTER — RESULTS FOLLOW-UP (OUTPATIENT)
Dept: CARDIOLOGY CLINIC | Facility: CLINIC | Age: 55
End: 2025-02-06

## 2025-02-06 VITALS
SYSTOLIC BLOOD PRESSURE: 128 MMHG | BODY MASS INDEX: 27.58 KG/M2 | DIASTOLIC BLOOD PRESSURE: 80 MMHG | HEART RATE: 74 BPM | HEIGHT: 68 IN | WEIGHT: 182 LBS

## 2025-02-06 DIAGNOSIS — I25.10 CORONARY ARTERY DISEASE INVOLVING NATIVE HEART WITHOUT ANGINA PECTORIS, UNSPECIFIED VESSEL OR LESION TYPE: ICD-10-CM

## 2025-02-06 DIAGNOSIS — I25.5 ISCHEMIC CARDIOMYOPATHY: ICD-10-CM

## 2025-02-06 LAB
AORTIC ROOT: 3.7 CM
AV LVOT MEAN GRADIENT: 2 MMHG
AV LVOT PEAK GRADIENT: 4 MMHG
BSA FOR ECHO PROCEDURE: 1.96 M2
DOP CALC LVOT PEAK VEL VTI: 19.11 CM
DOP CALC LVOT PEAK VEL: 1.02 M/S
E WAVE DECELERATION TIME: 173 MS
E/A RATIO: 0.8
FRACTIONAL SHORTENING: 34 (ref 28–44)
INTERVENTRICULAR SEPTUM IN DIASTOLE (PARASTERNAL SHORT AXIS VIEW): 1.1 CM
INTERVENTRICULAR SEPTUM: 1.1 CM (ref 0.6–1.1)
LAAS-AP2: 17.2 CM2
LAAS-AP4: 11.8 CM2
LEFT ATRIUM SIZE: 3.5 CM
LEFT ATRIUM VOLUME (MOD BIPLANE): 37 ML
LEFT ATRIUM VOLUME INDEX (MOD BIPLANE): 18.8 ML/M2
LEFT INTERNAL DIMENSION IN SYSTOLE: 3.1 CM (ref 2.1–4)
LEFT VENTRICULAR INTERNAL DIMENSION IN DIASTOLE: 4.7 CM (ref 3.5–6)
LEFT VENTRICULAR POSTERIOR WALL IN END DIASTOLE: 1.2 CM
LEFT VENTRICULAR STROKE VOLUME: 66 ML
LV EF US.2D.A4C+ESTIMATED: 53 %
LVSV (TEICH): 66 ML
MV E'TISSUE VEL-SEP: 7 CM/S
MV PEAK A VEL: 0.71 M/S
MV PEAK E VEL: 57 CM/S
MV STENOSIS PRESSURE HALF TIME: 50 MS
MV VALVE AREA P 1/2 METHOD: 4.4
RIGHT ATRIUM AREA SYSTOLE A4C: 14.5 CM2
RIGHT VENTRICLE ID DIMENSION: 2.9 CM
SL CV LEFT ATRIUM LENGTH A2C: 5.1 CM
SL CV LV EF: 50
SL CV PED ECHO LEFT VENTRICLE DIASTOLIC VOLUME (MOD BIPLANE) 2D: 104 ML
SL CV PED ECHO LEFT VENTRICLE SYSTOLIC VOLUME (MOD BIPLANE) 2D: 38 ML
TRICUSPID ANNULAR PLANE SYSTOLIC EXCURSION: 2 CM

## 2025-02-06 PROCEDURE — 93880 EXTRACRANIAL BILAT STUDY: CPT | Performed by: SURGERY

## 2025-02-06 PROCEDURE — 93306 TTE W/DOPPLER COMPLETE: CPT | Performed by: INTERNAL MEDICINE

## 2025-02-06 PROCEDURE — 93880 EXTRACRANIAL BILAT STUDY: CPT

## 2025-02-06 PROCEDURE — 93306 TTE W/DOPPLER COMPLETE: CPT

## 2025-04-02 ENCOUNTER — TELEPHONE (OUTPATIENT)
Dept: PULMONOLOGY | Facility: CLINIC | Age: 55
End: 2025-04-02

## 2025-04-10 ENCOUNTER — OFFICE VISIT (OUTPATIENT)
Dept: CARDIOLOGY CLINIC | Facility: CLINIC | Age: 55
End: 2025-04-10
Payer: COMMERCIAL

## 2025-04-10 VITALS
SYSTOLIC BLOOD PRESSURE: 110 MMHG | BODY MASS INDEX: 28.28 KG/M2 | OXYGEN SATURATION: 99 % | WEIGHT: 186 LBS | HEART RATE: 71 BPM | DIASTOLIC BLOOD PRESSURE: 70 MMHG

## 2025-04-10 DIAGNOSIS — I10 ESSENTIAL HYPERTENSION: ICD-10-CM

## 2025-04-10 DIAGNOSIS — E78.2 MIXED HYPERLIPIDEMIA: ICD-10-CM

## 2025-04-10 DIAGNOSIS — I49.3 PVC (PREMATURE VENTRICULAR CONTRACTION): ICD-10-CM

## 2025-04-10 DIAGNOSIS — I25.10 CORONARY ARTERY DISEASE INVOLVING NATIVE CORONARY ARTERY OF NATIVE HEART, UNSPECIFIED WHETHER ANGINA PRESENT: Primary | ICD-10-CM

## 2025-04-10 DIAGNOSIS — I25.118 CORONARY ARTERY DISEASE OF NATIVE ARTERY OF NATIVE HEART WITH STABLE ANGINA PECTORIS (HCC): ICD-10-CM

## 2025-04-10 DIAGNOSIS — R07.9 CHEST PAIN: ICD-10-CM

## 2025-04-10 PROCEDURE — 99214 OFFICE O/P EST MOD 30 MIN: CPT | Performed by: INTERNAL MEDICINE

## 2025-04-10 RX ORDER — PROPRANOLOL HYDROCHLORIDE 160 MG/1
160 CAPSULE, EXTENDED RELEASE ORAL DAILY
Qty: 90 CAPSULE | Refills: 3 | Status: SHIPPED | OUTPATIENT
Start: 2025-04-10

## 2025-04-10 RX ORDER — LOSARTAN POTASSIUM 100 MG/1
100 TABLET ORAL EVERY 24 HOURS
Qty: 90 TABLET | Refills: 3 | Status: SHIPPED | OUTPATIENT
Start: 2025-04-10

## 2025-04-10 RX ORDER — ATORVASTATIN CALCIUM 40 MG/1
40 TABLET, FILM COATED ORAL EVERY 24 HOURS
Qty: 90 TABLET | Refills: 3 | Status: SHIPPED | OUTPATIENT
Start: 2025-04-10

## 2025-04-10 RX ORDER — ISOSORBIDE MONONITRATE 120 MG/1
120 TABLET, EXTENDED RELEASE ORAL DAILY
Qty: 90 TABLET | Refills: 3 | Status: SHIPPED | OUTPATIENT
Start: 2025-04-10

## 2025-04-10 NOTE — ASSESSMENT & PLAN NOTE
with underlying significant premature CAD and strong family history of CAD.    With residual disease in LAD and RCA as well as per patient report.    With stent placement in the setting of unstable angina in May 2019 - multiple episodes of in stent thrombosis/restenosis and continued chest pains even with minimal exertion - he had been on Brilinta for over a year and has instent restenosis with this.    We referred for repeat cardiac cath and potential CABG - which did reveal disease, but not quite ready for CABG at this time - mild LAD atherosclerosis, 30% pLAD, 75% D2, 50% OM1 with occluded subbranch, 0% pR1 at site of prior stent, 70% mRCA at site of prior stent with eccentric lesion 70% PLV1.    We also added Imdur to help with symptom management in the meantime.    Continued on ASA, Brilinta, and ARB - we stopped amlodipine since BP is running on low end.    There did appear to be a reason for having an ARB with the reported EF of 40-45%.    We checked an echo to evaluate LV EF and wall motion as well - which revealed a normal LV function with no wall motion abnormalities.    With continued chest pain requiring nitro use, we increased Imdur to 30mg twice daily - which had helped with chest pain.    He completed cardiac rehab to help with conditioning and improving fatigue level.    For improved control of chest pain and fatigue, we changed Coreg to metoprolol for less alpha-blockade and more beta-specific blockade.    He had increased chest pain, sounds like stable angina that seems to be increasing - we increased Imdur to 60mg to see if that will help with chest pain.    With increased palpitations as well, we checked another Holter - seems like these episodes feel longer than his PVCs- this revealed common ventricular ectopy (5.4%) with brief runs (longest 4 beats in duration) - changed BB to Toprol XL to help with suppression - which has not helped symptoms of palpitations too much - increased to 100mg, and  was stable.    Since his symptoms had not improved, we repeated cardiac cath revealing Mid LAD 40% stenosis, 1st diagonal branch vessel small 100% stenosis lesion chronically occluded.  Second diagonal branch 90% stenosis vessel small.  Ramus intermedius 40% stenosis lesion was previously treated using a stent of unknown type.  Left circumflex 2nd obtuse marginal branch  80% stenosis lesion is diffuse.  Right coronary artery proximal % stenosis lesion is chronically occluded previously treated using a stent of unknown type.  Right posterior descending artery filled by collaterals from the distal LAD.   2nd obtuse marginal branch balloon angioplasty x2 with drug-eluting stent successfully placed, Resolute Luis Rx drug-eluting stent.     CP had not improved with stent placement - sometimes associated with PVCs - we referred to [facility] for possible PVC ablation - they recommended changing Toprol to propranolol for improved suppression.    Now s/p PCI to 100% in stent occlusion of R1 and angioplasty to in stent restenosis of OM1.  Patient was back in the hospital in December 2024 with chest pains resulting in cardiac catheterization revealing chronically occluded RCA and ramus with a tiny first diagonal branch that was collateralized.  Moderate in-stent restenosis of the OM/circumflex stents with insignificant pressure wire measurement noted.  No obstructive LAD disease was noted other than a 30% proximal LAD lesion  Patient's medical therapy was continued to be uptitrated with increased Imdur to 120 mg, currently with adequate chest pain control  Started with cardiac rehab as well, but he has trouble getting there - is trying to stay active at home, limited by his back  Echocardiogram in February 2025 revealed normal LV size with low normal LV function of 50% with hypokinesia of the apical portion of the lateral wall and base of the inferior wall

## 2025-04-10 NOTE — ASSESSMENT & PLAN NOTE
with underlying significant premature CAD and strong family history of CAD.    With residual disease in LAD and RCA as well as per patient report.    With stent placement in the setting of unstable angina in May 2019 - multiple episodes of in stent thrombosis/restenosis and continued chest pains even with minimal exertion - he had been on Brilinta for over a year and has instent restenosis with this.    We referred for repeat cardiac cath and potential CABG - which did reveal disease, but not quite ready for CABG at this time - mild LAD atherosclerosis, 30% pLAD, 75% D2, 50% OM1 with occluded subbranch, 0% pR1 at site of prior stent, 70% mRCA at site of prior stent with eccentric lesion 70% PLV1.    We also added Imdur to help with symptom management in the meantime.    Continued on ASA, Brilinta, and ARB - we stopped amlodipine since BP is running on low end.    There did appear to be a reason for having an ARB with the reported EF of 40-45%.    We checked an echo to evaluate LV EF and wall motion as well - which revealed a normal LV function with no wall motion abnormalities.    With continued chest pain requiring nitro use, we increased Imdur to 30mg twice daily - which had helped with chest pain.    He completed cardiac rehab to help with conditioning and improving fatigue level.    For improved control of chest pain and fatigue, we changed Coreg to metoprolol for less alpha-blockade and more beta-specific blockade.    He had increased chest pain, sounds like stable angina that seems to be increasing - we increased Imdur to 60mg to see if that will help with chest pain.    With increased palpitations as well, we checked another Holter - seems like these episodes feel longer than his PVCs- this revealed common ventricular ectopy (5.4%) with brief runs (longest 4 beats in duration) - changed BB to Toprol XL to help with suppression - which has not helped symptoms of palpitations too much - increased to 100mg, and  was stable.    Since his symptoms had not improved, we repeated cardiac cath revealing Mid LAD 40% stenosis, 1st diagonal branch vessel small 100% stenosis lesion chronically occluded.  Second diagonal branch 90% stenosis vessel small.  Ramus intermedius 40% stenosis lesion was previously treated using a stent of unknown type.  Left circumflex 2nd obtuse marginal branch  80% stenosis lesion is diffuse.  Right coronary artery proximal % stenosis lesion is chronically occluded previously treated using a stent of unknown type.  Right posterior descending artery filled by collaterals from the distal LAD.   2nd obtuse marginal branch balloon angioplasty x2 with drug-eluting stent successfully placed, Resolute Luis Rx drug-eluting stent.     CP had not improved with stent placement - sometimes associated with PVCs - we referred to [facility] for possible PVC ablation - they recommended changing Toprol to propranolol for improved suppression.    Now s/p PCI to 100% in stent occlusion of R1 and angioplasty to in stent restenosis of OM1.  Patient was back in the hospital in December 2024 with chest pains resulting in cardiac catheterization revealing chronically occluded RCA and ramus with a tiny first diagonal branch that was collateralized.  Moderate in-stent restenosis of the OM/circumflex stents with insignificant pressure wire measurement noted.  No obstructive LAD disease was noted other than a 30% proximal LAD lesion  Patient's medical therapy was continued to be uptitrated with increased Imdur to 120 mg, currently with adequate chest pain control  Continue with cardiac rehab as well  Echocardiogram in February 2025 revealed normal LV size with low normal LV function of 50% with hypokinesia of the apical portion of the lateral wall and base of the inferior wall

## 2025-04-10 NOTE — ASSESSMENT & PLAN NOTE
relatively well controlled.    Will continue Imdur and B-blocker for CAD symptom management.    We stopped amlodipine in order to allow addition of higher dose of Imdur.    Increased Toprol to 100mg at prior visit - now changed to propranolol.  Blood pressure remains well-controlled with current dosing

## 2025-04-10 NOTE — ASSESSMENT & PLAN NOTE
as above, perhaps triggered by ischemic disease.    Will continue B-blocker for the time being - with frequency of ectopy, changed metoprolol to Toprol XL BID, which he was tolerating - but still extremely symptomatic, so referred to [facility] for possible ablation - they recommended changing to propranolol.  Currently with adequate symptom control on current regiment

## 2025-04-10 NOTE — ASSESSMENT & PLAN NOTE
continued on statin.    LDL 24 in Feb 2020 - reduced Lipitor to 40mg, perhaps if there is a component of musculoskeletal pain, this will help it.    Recheck levels in 3 months after the change in dosing revealed LDL of 78, which is at goal, and improved symptoms.    Recheck in June 2021 revealed LDL of 47 and 70 in Jan 2022.  LDL 26 in September 2024

## 2025-04-10 NOTE — PROGRESS NOTES
Cardiology Follow Up    Kai Breen  1970  [id number]  [facility] [place]  [address]  [place] [address] [phone]      Assessment & Plan  Coronary artery disease involving native coronary artery of native heart, unspecified whether angina present  with underlying significant premature CAD and strong family history of CAD.    With residual disease in LAD and RCA as well as per patient report.    With stent placement in the setting of unstable angina in May 2019 - multiple episodes of in stent thrombosis/restenosis and continued chest pains even with minimal exertion - he had been on Brilinta for over a year and has instent restenosis with this.    We referred for repeat cardiac cath and potential CABG - which did reveal disease, but not quite ready for CABG at this time - mild LAD atherosclerosis, 30% pLAD, 75% D2, 50% OM1 with occluded subbranch, 0% pR1 at site of prior stent, 70% mRCA at site of prior stent with eccentric lesion 70% PLV1.    We also added Imdur to help with symptom management in the meantime.    Continued on ASA, Brilinta, and ARB - we stopped amlodipine since BP is running on low end.    There did appear to be a reason for having an ARB with the reported EF of 40-45%.    We checked an echo to evaluate LV EF and wall motion as well - which revealed a normal LV function with no wall motion abnormalities.    With continued chest pain requiring nitro use, we increased Imdur to 30mg twice daily - which had helped with chest pain.    He completed cardiac rehab to help with conditioning and improving fatigue level.    For improved control of chest pain and fatigue, we changed Coreg to metoprolol for less alpha-blockade and more beta-specific blockade.    He had increased chest pain, sounds like stable angina that seems to be increasing - we increased Imdur to 60mg to see if that will help with chest pain.    With increased palpitations as well, we  checked another Holter - seems like these episodes feel longer than his PVCs- this revealed common ventricular ectopy (5.4%) with brief runs (longest 4 beats in duration) - changed BB to Toprol XL to help with suppression - which has not helped symptoms of palpitations too much - increased to 100mg, and was stable.    Since his symptoms had not improved, we repeated cardiac cath revealing Mid LAD 40% stenosis, 1st diagonal branch vessel small 100% stenosis lesion chronically occluded.  Second diagonal branch 90% stenosis vessel small.  Ramus intermedius 40% stenosis lesion was previously treated using a stent of unknown type.  Left circumflex 2nd obtuse marginal branch  80% stenosis lesion is diffuse.  Right coronary artery proximal % stenosis lesion is chronically occluded previously treated using a stent of unknown type.  Right posterior descending artery filled by collaterals from the distal LAD.   2nd obtuse marginal branch balloon angioplasty x2 with drug-eluting stent successfully placed, Resolute Luis Rx drug-eluting stent.     CP had not improved with stent placement - sometimes associated with PVCs - we referred to EPS for possible PVC ablation - they recommended changing Toprol to propranolol for improved suppression.    Now s/p PCI to 100% in stent occlusion of R1 and angioplasty to in stent restenosis of OM1.  Patient was back in the hospital in December 2024 with chest pains resulting in cardiac catheterization revealing chronically occluded RCA and ramus with a tiny first diagonal branch that was collateralized.  Moderate in-stent restenosis of the OM/circumflex stents with insignificant pressure wire measurement noted.  No obstructive LAD disease was noted other than a 30% proximal LAD lesion  Patient's medical therapy was continued to be uptitrated with increased Imdur to 120 mg, currently with adequate chest pain control  Started with cardiac rehab as well, but he has trouble getting there -  is trying to stay active at home, limited by his back  Echocardiogram in February 2025 revealed normal LV size with low normal LV function of 50% with hypokinesia of the apical portion of the lateral wall and base of the inferior wall  Essential hypertension  relatively well controlled.    Will continue Imdur and B-blocker for CAD symptom management.    We stopped amlodipine in order to allow addition of higher dose of Imdur.    Increased Toprol to 100mg at prior visit - now changed to propranolol.  Blood pressure remains well-controlled with current dosing  PVC (premature ventricular contraction)  as above, perhaps triggered by ischemic disease.    Will continue B-blocker for the time being - with frequency of ectopy, changed metoprolol to Toprol XL BID, which he was tolerating - but still extremely symptomatic, so referred to EPS for possible ablation - they recommended changing to propranolol.  Currently with adequate symptom control on current regiment  Mixed hyperlipidemia  continued on statin.    LDL 24 in Feb 2020 - reduced Lipitor to 40mg, perhaps if there is a component of musculoskeletal pain, this will help it.    Recheck levels in 3 months after the change in dosing revealed LDL of 78, which is at goal, and improved symptoms.    Recheck in June 2021 revealed LDL of 47 and 70 in Jan 2022.  LDL 26 in September 2024  Coronary artery disease of native artery of native heart with stable angina pectoris (HCC)  with underlying significant premature CAD and strong family history of CAD.    With residual disease in LAD and RCA as well as per patient report.    With stent placement in the setting of unstable angina in May 2019 - multiple episodes of in stent thrombosis/restenosis and continued chest pains even with minimal exertion - he had been on Brilinta for over a year and has instent restenosis with this.    We referred for repeat cardiac cath and potential CABG - which did reveal disease, but not quite ready  for CABG at this time - mild LAD atherosclerosis, 30% pLAD, 75% D2, 50% OM1 with occluded subbranch, 0% pR1 at site of prior stent, 70% mRCA at site of prior stent with eccentric lesion 70% PLV1.    We also added Imdur to help with symptom management in the meantime.    Continued on ASA, Brilinta, and ARB - we stopped amlodipine since BP is running on low end.    There did appear to be a reason for having an ARB with the reported EF of 40-45%.    We checked an echo to evaluate LV EF and wall motion as well - which revealed a normal LV function with no wall motion abnormalities.    With continued chest pain requiring nitro use, we increased Imdur to 30mg twice daily - which had helped with chest pain.    He completed cardiac rehab to help with conditioning and improving fatigue level.    For improved control of chest pain and fatigue, we changed Coreg to metoprolol for less alpha-blockade and more beta-specific blockade.    He had increased chest pain, sounds like stable angina that seems to be increasing - we increased Imdur to 60mg to see if that will help with chest pain.    With increased palpitations as well, we checked another Holter - seems like these episodes feel longer than his PVCs- this revealed common ventricular ectopy (5.4%) with brief runs (longest 4 beats in duration) - changed BB to Toprol XL to help with suppression - which has not helped symptoms of palpitations too much - increased to 100mg, and was stable.    Since his symptoms had not improved, we repeated cardiac cath revealing Mid LAD 40% stenosis, 1st diagonal branch vessel small 100% stenosis lesion chronically occluded.  Second diagonal branch 90% stenosis vessel small.  Ramus intermedius 40% stenosis lesion was previously treated using a stent of unknown type.  Left circumflex 2nd obtuse marginal branch  80% stenosis lesion is diffuse.  Right coronary artery proximal % stenosis lesion is chronically occluded previously treated  using a stent of unknown type.  Right posterior descending artery filled by collaterals from the distal LAD.   2nd obtuse marginal branch balloon angioplasty x2 with drug-eluting stent successfully placed, Resolute Luis Rx drug-eluting stent.     CP had not improved with stent placement - sometimes associated with PVCs - we referred to EPS for possible PVC ablation - they recommended changing Toprol to propranolol for improved suppression.    Now s/p PCI to 100% in stent occlusion of R1 and angioplasty to in stent restenosis of OM1.  Patient was back in the hospital in December 2024 with chest pains resulting in cardiac catheterization revealing chronically occluded RCA and ramus with a tiny first diagonal branch that was collateralized.  Moderate in-stent restenosis of the OM/circumflex stents with insignificant pressure wire measurement noted.  No obstructive LAD disease was noted other than a 30% proximal LAD lesion  Patient's medical therapy was continued to be uptitrated with increased Imdur to 120 mg, currently with adequate chest pain control  Continue with cardiac rehab as well  Echocardiogram in February 2025 revealed normal LV size with low normal LV function of 50% with hypokinesia of the apical portion of the lateral wall and base of the inferior wall    Chief Complaint   Patient presents with    Follow-up     No cardiac concerns or complaints         Interval History: Patient is here for management of cardiac issues.  He had 4 prior MIs resulting in multiple stent placements.  He had an MI in May 2019 with a late in-stent thrombosis in the ramus requiring placement of 2 BEVERLY.  Now with another NSTEMI in Aug 2022 s/p PCI and angioplasty to R1 and OM1 at sites of prior stents that had re-stenosed.  He was admitted in December 2024 with chest pain and medications were adjusted for management after left heart catheterization.  Patient feels well, without complaints.  No reported chest pain, shortness of  breath, palpitations, lightheadedness, syncope, LE edema, orthopnea, PND, or significant weight changes.  Patient remains active without any increased fatigue out of the ordinary.      Blood pressure 110/70, pulse 71, weight 84.4 kg (186 lb), SpO2 99%.      Review of Systems:  Review of Systems   Constitutional:  Negative for activity change, appetite change, fatigue and fever.   HENT:  Negative for nosebleeds and sore throat.    Eyes:  Negative for photophobia and visual disturbance.   Respiratory:  Negative for cough, chest tightness, shortness of breath and wheezing.    Cardiovascular:  Negative for chest pain, palpitations and leg swelling.   Gastrointestinal:  Negative for abdominal pain, diarrhea, nausea and vomiting.   Endocrine: Negative for polyuria.   Genitourinary:  Negative for dysuria, frequency and hematuria.   Musculoskeletal:  Negative for arthralgias, back pain and gait problem.   Skin:  Negative for pallor and rash.   Neurological:  Negative for dizziness, syncope, speech difficulty and light-headedness.   Hematological:  Does not bruise/bleed easily.   Psychiatric/Behavioral:  Negative for agitation, behavioral problems and confusion.        Physical Exam:  Physical Exam  Vitals reviewed.   Constitutional:       General: He is not in acute distress.     Appearance: Normal appearance. He is well-developed. He is not diaphoretic.   HENT:      Head: Normocephalic and atraumatic.      Nose: Nose normal.   Eyes:      General: No scleral icterus.     Extraocular Movements: Extraocular movements intact.      Pupils: Pupils are equal, round, and reactive to light.   Neck:      Vascular: No JVD.   Cardiovascular:      Rate and Rhythm: Normal rate and regular rhythm.      Heart sounds: S1 normal and S2 normal. Heart sounds not distant. No murmur heard.     No systolic murmur is present.      No friction rub. No gallop. No S3 sounds.   Pulmonary:      Effort: Pulmonary effort is normal. No respiratory  distress.      Breath sounds: Normal breath sounds. No wheezing or rales.   Abdominal:      General: Bowel sounds are normal. There is no distension.      Palpations: Abdomen is soft.   Musculoskeletal:         General: No deformity.      Cervical back: Normal range of motion and neck supple.      Right lower leg: No edema.      Left lower leg: No edema.   Skin:     General: Skin is warm and dry.      Findings: No erythema.   Neurological:      Mental Status: He is alert and oriented to person, place, and time.      Cranial Nerves: No cranial nerve deficit.   Psychiatric:         Mood and Affect: Mood normal.         Behavior: Behavior normal.         Patient Active Problem List   Diagnosis    Coronary artery disease involving native coronary artery    Essential hypertension    Mixed hyperlipidemia    Chest pain    PVC (premature ventricular contraction)    Palpitations    Chronic bilateral low back pain without sciatica    Lumbar degenerative disc disease    Myofascial pain syndrome    Sacroiliitis (HCC)    Presence of drug coated stent in left circumflex coronary artery    Chronic pain syndrome    Lumbar radiculopathy    Complex regional pain syndrome type 1    Gastroesophageal reflux disease    Gastroparesis    History of PTCA 2    Major depressive disorder, recurrent (HCC)    Nicotine dependence    Radiculitis, thoracic    Displacement of thoracic intervertebral disc    Elevated troponin I level    Hypokalemia    Type 2 diabetes mellitus (HCC)    Lumbar spondylosis    ADDISON (obstructive sleep apnea)    Frequent nocturnal awakening    Snoring    Chronic insomnia     Past Medical History:   Diagnosis Date    Arthritis     Coronary artery disease     MI x 4; cardiac cath with 4 stents    GERD (gastroesophageal reflux disease)     Hypertension     Myocardial infarct (HCC)     4 MIs as of 2/24/2020    Non-insulin dependent type 2 diabetes mellitus (HCC) 8/24/2022     Social History     Socioeconomic History    Marital  status: /Civil Union     Spouse name: Not on file    Number of children: Not on file    Years of education: Not on file    Highest education level: Not on file   Occupational History    Not on file   Tobacco Use    Smoking status: Former     Current packs/day: 0.00     Average packs/day: 1 pack/day for 20.7 years (20.7 ttl pk-yrs)     Types: Cigarettes     Start date: 1998     Quit date: 2018     Years since quittin.3    Smokeless tobacco: Never   Vaping Use    Vaping status: Never Used   Substance and Sexual Activity    Alcohol use: Never    Drug use: Never    Sexual activity: Yes     Partners: Female     Birth control/protection: None   Other Topics Concern    Not on file   Social History Narrative    Not on file     Social Drivers of Health     Financial Resource Strain: Not on file   Food Insecurity: No Food Insecurity (2022)    Hunger Vital Sign     Worried About Running Out of Food in the Last Year: Never true     Ran Out of Food in the Last Year: Never true   Transportation Needs: Not on file   Physical Activity: Not on file   Stress: Not on file   Social Connections: Unknown (2024)    Received from [employer]    Social [facility]     How often do you feel lonely or isolated from those around you? (Adult - for ages 18 years and over): Not on file   Intimate Partner Violence: Not on file   Housing Stability: Low Risk  (2022)    Housing Stability Vital Sign     Unable to Pay for Housing in the Last Year: No     Number of Places Lived in the Last Year: 1     Unstable Housing in the Last Year: No      Family History   Problem Relation Age of Onset    Heart disease Father     Hyperlipidemia Father     Diabetes Sister     Early death Sister     Diabetes Brother     Diabetes Maternal Grandmother      Past Surgical History:   Procedure Laterality Date    CARDIAC CATHETERIZATION  2022    Procedure: Cardiac catheterization;  Surgeon: Jasiel Padilla DO;  Location: [facility]  CARDIAC CATH LAB;  Service: Cardiology    CARDIAC CATHETERIZATION N/A 8/26/2022    Procedure: Cardiac catheterization;  Surgeon: Kirt Lezama MD;  Location: [facility];  Service: Cardiology    CARDIAC CATHETERIZATION N/A 8/26/2022    Procedure: Cardiac pci;  Surgeon: Kirt Lezama MD;  Location: [facility]ARD[facility]AC [facility];  Service: Cardiology    CARDIAC CATHETERIZATION Left 8/26/2022    Procedure: Cardiac Left Heart Cath;  Surgeon: Kirt Lezama MD;  Location: [facility]ARD[facility];  Service: Cardiology    CARDIAC CATHETERIZATION N/A 8/26/2022    Procedure: Cardiac Coronary Angiogram;  Surgeon: Kirt Lezama MD;  Location: [facility];  Service: Cardiology    CARDIAC CATHETERIZATION Left 12/20/2024    Procedure: Cardiac Left Heart Cath;  Surgeon: Jasiel Padilla DO;  [facility]: [facility];  Service: Cardiology    CARDIAC CATHETERIZATION N/A 12/20/2024    Procedure: Cardiac Coronary Angiogram;  Surgeon: Jasiel Padilla DO;  [facility]: [facility];  Service: Cardiology    CARDIAC CATHETERIZATION N/A 12/20/2024    Procedure: Cardiac PCI;  Surgeon: Jasiel Padilla DO;  Location: [facility]ARD[facility]AC [facility];  Service: Cardiology    CORONARY ANGIOPLASTY WITH STENT PLACEMENT         Current Outpatient Medications:     aspirin 81 MG tablet, Take 81 mg by mouth daily  , Disp: , Rfl:     Contour Next Test test strip, , Disp: , Rfl:     Jardiance 25 MG TABS, Take 25 mg by mouth, Disp: , Rfl:     Microlet Lancets MISC, , Disp: , Rfl:     Omega-3 Fatty Acids (OMEGA 3 PO), Take 2,000 mg by mouth 2 (two) times a day, Disp: , Rfl:     pantoprazole (PROTONIX) 40 mg tablet, Take 40 mg by mouth daily, Disp: , Rfl: 3    Semaglutide (OZEMPIC, 0.25 OR 0.5 MG/DOSE, SC), Inject 1 Dose under the skin once a week, Disp: , Rfl:     traZODone (DESYREL) 50 mg tablet, 50 mg daily at bedtime, Disp: , Rfl:     venlafaxine 150 MG TB24 24 hr tablet, 150 mg daily with breakfast, Disp: , Rfl:      atorvastatin (LIPITOR) 40 mg tablet, Take 1 tablet (40 mg total) by mouth every 24 hours, Disp: 90 tablet, Rfl: 3    isosorbide mononitrate (IMDUR) 120 mg 24 hr tablet, Take 1 tablet (120 mg total) by mouth daily, Disp: 90 tablet, Rfl: 3    losartan (COZAAR) 100 MG tablet, Take 1 tablet (100 mg total) by mouth every 24 hours, Disp: 90 tablet, Rfl: 3    nitroglycerin (NITROSTAT) 0.4 mg SL tablet, as directed (Patient not taking: Reported on 12/31/2024), Disp: , Rfl:     propranolol (INDERAL LA) 160 mg, Take 1 capsule (160 mg total) by mouth daily, Disp: 90 capsule, Rfl: 3    ticagrelor (Brilinta) 90 MG, Take 1 tablet (90 mg total) by mouth every 12 (twelve) hours, Disp: 180 tablet, Rfl: 3  No Known Allergies    Labs:  Hospital Outpatient Visit on 02/06/2025   Component Date Value    RAA A4C 02/06/2025 14.5     LA Volume Index (BP) 02/06/2025 18.8     MV Peak A Michael 02/06/2025 0.71     MV stenosis pressure 1/2* 02/06/2025 50     MV Peak E Michael 02/06/2025 57     LVOT peak VTI 02/06/2025 19.11     LVOT peak michael 02/06/2025 1.02     E wave deceleration time 02/06/2025 173     E/A ratio 02/06/2025 0.80     MV valve area p 1/2 meth* 02/06/2025 4.40     AV LVOT peak gradient 02/06/2025 4     LVOT mn grad 02/06/2025 2.0     RVID d 02/06/2025 2.9     A4C EF 02/06/2025 53     Left ventricular stroke * 02/06/2025 66.00     IVSd 02/06/2025 1.10     Tricuspid annular plane * 02/06/2025 2.00     Ao root 02/06/2025 3.70     LVPWd 02/06/2025 1.20     LA size 02/06/2025 3.5     LA volume (BP) 02/06/2025 37     FS 02/06/2025 34     LVIDS 02/06/2025 3.10     IVS 02/06/2025 1.1     LVIDd 02/06/2025 4.70     LA length (A2C) 02/06/2025 5.10     LEFT VENTRICLE SYSTOLIC * 02/06/2025 38     LV DIASTOLIC VOLUME (MOD* 02/06/2025 104     Left Atrium Area-systoli* 02/06/2025 11.8     Left Atrium Area-systoli* 02/06/2025 17.2     MV E' Tissue Velocity Se* 02/06/2025 7     LVSV, 2D 02/06/2025 66     BSA 02/06/2025 1.96     LV EF 02/06/2025 50     Transcribe Orders on 01/27/2025   Component Date Value     C.difficile toxin by PC* 01/27/2025 Negative    Admission on 12/19/2024, Discharged on 12/20/2024   Component Date Value    Ventricular Rate 12/19/2024 86     Atrial Rate 12/19/2024 86     VT Interval 12/19/2024 168     QRSD Interval 12/19/2024 90     QT Interval 12/19/2024 352     QTC Interval 12/19/2024 421     P Axis 12/19/2024 33     QRS [place] 12/19/2024 58     T Wave [place] 12/19/2024 73     WBC 12/19/2024 7.84     RBC 12/19/2024 5.68 (H)     Hemoglobin 12/19/2024 17.2 (H)     Hematocrit 12/19/2024 51.8 (H)     MCV 12/19/2024 91     MCH 12/19/2024 30.3     MCHC 12/19/2024 33.2     RDW 12/19/2024 13.6     MPV 12/19/2024 9.4     Platelets 12/19/2024 230     nRBC 12/19/2024 0     Segmented % 12/19/2024 70     Immature Grans % 12/19/2024 1     Lymphocytes % 12/19/2024 19     Monocytes % 12/19/2024 7     Eosinophils Relative 12/19/2024 2     Basophils Relative 12/19/2024 1     Absolute Neutrophils 12/19/2024 5.63     Absolute Immature Grans 12/19/2024 0.04     Absolute Lymphocytes 12/19/2024 1.47     Absolute Monocytes 12/19/2024 0.53     Eosinophils Absolute 12/19/2024 0.12     Basophils Absolute 12/19/2024 0.05     Sodium 12/19/2024 136     Potassium 12/19/2024 4.0     Chloride 12/19/2024 104     CO2 12/19/2024 23     ANION GAP 12/19/2024 9     BUN 12/19/2024 16     Creatinine 12/19/2024 1.02     Glucose 12/19/2024 222 (H)     Calcium 12/19/2024 9.3     AST 12/19/2024 20     ALT 12/19/2024 25     Alkaline Phosphatase 12/19/2024 137 (H)     Total Protein 12/19/2024 7.4     Albumin 12/19/2024 4.3     Total Bilirubin 12/19/2024 0.71     eGFR 12/19/2024 83     hs TnI 0hr 12/19/2024 6     hs TnI 2hr 12/19/2024 4     Delta 2hr hsTnI 12/19/2024 -2     hs TnI 4hr 12/19/2024 5     Delta 4hr hsTnI 12/19/2024 -1     Magnesium 12/19/2024 2.4     Hemoglobin A1C 12/19/2024 8.3 (H)     EAG 12/19/2024 192     PTT 12/19/2024 23     Protime 12/19/2024 12.9     INR 12/19/2024  0.95     POC Glucose 12/19/2024 143 (H)     PTT 12/19/2024 47 (H)     POC Glucose 12/19/2024 211 (H)     WBC 12/20/2024 6.97     RBC 12/20/2024 5.02     Hemoglobin 12/20/2024 15.2     Hematocrit 12/20/2024 45.2     MCV 12/20/2024 90     MCH 12/20/2024 30.3     MCHC 12/20/2024 33.6     RDW 12/20/2024 13.6     Platelets 12/20/2024 184     MPV 12/20/2024 9.8     Sodium 12/20/2024 137     Potassium 12/20/2024 3.8     Chloride 12/20/2024 106     CO2 12/20/2024 21     ANION GAP 12/20/2024 10     BUN 12/20/2024 21     Creatinine 12/20/2024 0.99     Glucose 12/20/2024 195 (H)     Calcium 12/20/2024 9.1     eGFR 12/20/2024 86     PTT 12/20/2024 60 (H)     POC Glucose 12/20/2024 196 (H)     POC Glucose 12/20/2024 191 (H)     POC Glucose 12/20/2024 170 (H)      Lab Results   Component Value Date    TRIG 350 (H) 09/09/2024    HDL 30 (L) 09/09/2024     Imaging: No results found.

## 2025-06-24 ENCOUNTER — APPOINTMENT (OUTPATIENT)
Dept: MRI IMAGING | Facility: HOSPITAL | Age: 55
End: 2025-06-24
Payer: COMMERCIAL

## 2025-06-24 ENCOUNTER — HOSPITAL ENCOUNTER (OUTPATIENT)
Facility: HOSPITAL | Age: 55
Setting detail: OBSERVATION
Discharge: HOME | End: 2025-06-25
Attending: EMERGENCY MEDICINE | Admitting: INTERNAL MEDICINE
Payer: COMMERCIAL

## 2025-06-24 ENCOUNTER — APPOINTMENT (EMERGENCY)
Dept: CT IMAGING | Facility: HOSPITAL | Age: 55
End: 2025-06-24
Payer: COMMERCIAL

## 2025-06-24 DIAGNOSIS — M54.42 ACUTE LEFT-SIDED LOW BACK PAIN WITH LEFT-SIDED SCIATICA: ICD-10-CM

## 2025-06-24 DIAGNOSIS — M54.17 LUMBOSACRAL RADICULOPATHY AT L5: Primary | ICD-10-CM

## 2025-06-24 LAB
ALBUMIN SERPL BCG-MCNC: 4.1 G/DL (ref 3.5–5)
ALP SERPL-CCNC: 144 U/L (ref 34–104)
ALT SERPL W P-5'-P-CCNC: 16 U/L (ref 7–52)
ANION GAP SERPL CALCULATED.3IONS-SCNC: 11 MMOL/L (ref 4–13)
AST SERPL W P-5'-P-CCNC: 12 U/L (ref 13–39)
BILIRUB SERPL-MCNC: 0.96 MG/DL (ref 0.2–1)
BUN SERPL-MCNC: 18 MG/DL (ref 5–25)
CALCIUM SERPL-MCNC: 9 MG/DL (ref 8.4–10.2)
CHLORIDE SERPL-SCNC: 101 MMOL/L (ref 96–108)
CO2 SERPL-SCNC: 20 MMOL/L (ref 21–32)
CREAT SERPL-MCNC: 1.41 MG/DL (ref 0.6–1.3)
ERYTHROCYTE [DISTWIDTH] IN BLOOD BY AUTOMATED COUNT: 13.3 % (ref 11.6–15.1)
GFR SERPL CREATININE-BSD FRML MDRD: 56 ML/MIN/1.73SQ M
GLUCOSE SERPL-MCNC: 331 MG/DL (ref 65–140)
GLUCOSE SERPL-MCNC: 369 MG/DL (ref 65–140)
GLUCOSE SERPL-MCNC: 433 MG/DL (ref 65–140)
HCT VFR BLD AUTO: 44.6 % (ref 36.5–49.3)
HGB BLD-MCNC: 15 G/DL (ref 12–17)
MCH RBC QN AUTO: 30.2 PG (ref 26.8–34.3)
MCHC RBC AUTO-ENTMCNC: 33.6 G/DL (ref 31.4–37.4)
MCV RBC AUTO: 90 FL (ref 82–98)
PLATELET # BLD AUTO: 210 THOUSANDS/UL (ref 149–390)
PMV BLD AUTO: 9.6 FL (ref 8.9–12.7)
POTASSIUM SERPL-SCNC: 3.8 MMOL/L (ref 3.5–5.3)
PROT SERPL-MCNC: 7.2 G/DL (ref 6.4–8.4)
RBC # BLD AUTO: 4.97 MILLION/UL (ref 3.88–5.62)
SODIUM SERPL-SCNC: 132 MMOL/L (ref 135–147)
WBC # BLD AUTO: 10.86 THOUSAND/UL (ref 4.31–10.16)

## 2025-06-24 PROCEDURE — 72131 CT LUMBAR SPINE W/O DYE: CPT

## 2025-06-24 PROCEDURE — 72148 MRI LUMBAR SPINE W/O DYE: CPT

## 2025-06-24 PROCEDURE — 99285 EMERGENCY DEPT VISIT HI MDM: CPT | Performed by: EMERGENCY MEDICINE

## 2025-06-24 PROCEDURE — 82948 REAGENT STRIP/BLOOD GLUCOSE: CPT

## 2025-06-24 PROCEDURE — 96374 THER/PROPH/DIAG INJ IV PUSH: CPT

## 2025-06-24 PROCEDURE — 96372 THER/PROPH/DIAG INJ SC/IM: CPT

## 2025-06-24 PROCEDURE — 99223 1ST HOSP IP/OBS HIGH 75: CPT | Performed by: PHYSICIAN ASSISTANT

## 2025-06-24 PROCEDURE — 80053 COMPREHEN METABOLIC PANEL: CPT | Performed by: PHYSICIAN ASSISTANT

## 2025-06-24 PROCEDURE — 99284 EMERGENCY DEPT VISIT MOD MDM: CPT

## 2025-06-24 PROCEDURE — 85027 COMPLETE CBC AUTOMATED: CPT | Performed by: PHYSICIAN ASSISTANT

## 2025-06-24 RX ORDER — ACETAMINOPHEN 325 MG/1
650 TABLET ORAL EVERY 4 HOURS PRN
Status: DISCONTINUED | OUTPATIENT
Start: 2025-06-24 | End: 2025-06-25

## 2025-06-24 RX ORDER — ENOXAPARIN SODIUM 100 MG/ML
40 INJECTION SUBCUTANEOUS DAILY
Status: DISCONTINUED | OUTPATIENT
Start: 2025-06-24 | End: 2025-06-24

## 2025-06-24 RX ORDER — SODIUM CHLORIDE 9 MG/ML
75 INJECTION, SOLUTION INTRAVENOUS CONTINUOUS
Status: DISCONTINUED | OUTPATIENT
Start: 2025-06-24 | End: 2025-06-25

## 2025-06-24 RX ORDER — VENLAFAXINE HYDROCHLORIDE 150 MG/1
150 CAPSULE, EXTENDED RELEASE ORAL DAILY
Status: DISCONTINUED | OUTPATIENT
Start: 2025-06-24 | End: 2025-06-25 | Stop reason: HOSPADM

## 2025-06-24 RX ORDER — TICAGRELOR 90 MG/1
90 TABLET, FILM COATED ORAL EVERY 12 HOURS
Status: DISCONTINUED | OUTPATIENT
Start: 2025-06-24 | End: 2025-06-25 | Stop reason: HOSPADM

## 2025-06-24 RX ORDER — PANTOPRAZOLE SODIUM 40 MG/1
40 TABLET, DELAYED RELEASE ORAL DAILY
Status: DISCONTINUED | OUTPATIENT
Start: 2025-06-24 | End: 2025-06-25 | Stop reason: HOSPADM

## 2025-06-24 RX ORDER — ATORVASTATIN CALCIUM 40 MG/1
40 TABLET, FILM COATED ORAL EVERY 24 HOURS
Status: DISCONTINUED | OUTPATIENT
Start: 2025-06-24 | End: 2025-06-25 | Stop reason: HOSPADM

## 2025-06-24 RX ORDER — HYDROMORPHONE HCL/PF 1 MG/ML
0.5 SYRINGE (ML) INJECTION EVERY 4 HOURS PRN
Status: DISCONTINUED | OUTPATIENT
Start: 2025-06-24 | End: 2025-06-25

## 2025-06-24 RX ORDER — INSULIN LISPRO 100 [IU]/ML
1-5 INJECTION, SOLUTION INTRAVENOUS; SUBCUTANEOUS
Status: DISCONTINUED | OUTPATIENT
Start: 2025-06-24 | End: 2025-06-25 | Stop reason: HOSPADM

## 2025-06-24 RX ORDER — ASPIRIN 81 MG/1
81 TABLET, CHEWABLE ORAL DAILY
Status: DISCONTINUED | OUTPATIENT
Start: 2025-06-24 | End: 2025-06-25 | Stop reason: HOSPADM

## 2025-06-24 RX ORDER — HYDROMORPHONE HCL IN WATER/PF 6 MG/30 ML
0.2 PATIENT CONTROLLED ANALGESIA SYRINGE INTRAVENOUS EVERY 4 HOURS PRN
Status: DISCONTINUED | OUTPATIENT
Start: 2025-06-24 | End: 2025-06-25

## 2025-06-24 RX ORDER — PROPRANOLOL HYDROCHLORIDE 80 MG/1
160 CAPSULE, EXTENDED RELEASE ORAL DAILY
Status: DISCONTINUED | OUTPATIENT
Start: 2025-06-24 | End: 2025-06-25 | Stop reason: HOSPADM

## 2025-06-24 RX ORDER — HYDROMORPHONE HCL/PF 1 MG/ML
0.5 SYRINGE (ML) INJECTION ONCE
Refills: 0 | Status: COMPLETED | OUTPATIENT
Start: 2025-06-24 | End: 2025-06-24

## 2025-06-24 RX ORDER — LIDOCAINE 50 MG/G
1 PATCH TOPICAL DAILY
Status: DISCONTINUED | OUTPATIENT
Start: 2025-06-24 | End: 2025-06-25 | Stop reason: HOSPADM

## 2025-06-24 RX ORDER — ONDANSETRON 2 MG/ML
4 INJECTION INTRAMUSCULAR; INTRAVENOUS EVERY 6 HOURS PRN
Status: DISCONTINUED | OUTPATIENT
Start: 2025-06-24 | End: 2025-06-25 | Stop reason: HOSPADM

## 2025-06-24 RX ORDER — ISOSORBIDE MONONITRATE 30 MG/1
120 TABLET, EXTENDED RELEASE ORAL DAILY
Status: DISCONTINUED | OUTPATIENT
Start: 2025-06-24 | End: 2025-06-25 | Stop reason: HOSPADM

## 2025-06-24 RX ORDER — METHOCARBAMOL 500 MG/1
500 TABLET, FILM COATED ORAL EVERY 6 HOURS PRN
Status: DISCONTINUED | OUTPATIENT
Start: 2025-06-24 | End: 2025-06-25

## 2025-06-24 RX ORDER — INSULIN LISPRO 100 [IU]/ML
10 INJECTION, SOLUTION INTRAVENOUS; SUBCUTANEOUS ONCE
Status: COMPLETED | OUTPATIENT
Start: 2025-06-24 | End: 2025-06-24

## 2025-06-24 RX ORDER — DEXAMETHASONE SODIUM PHOSPHATE 10 MG/ML
8 INJECTION, SOLUTION INTRAMUSCULAR; INTRAVENOUS ONCE
Status: COMPLETED | OUTPATIENT
Start: 2025-06-24 | End: 2025-06-24

## 2025-06-24 RX ORDER — MORPHINE SULFATE 4 MG/ML
4 INJECTION, SOLUTION INTRAMUSCULAR; INTRAVENOUS ONCE
Status: COMPLETED | OUTPATIENT
Start: 2025-06-24 | End: 2025-06-24

## 2025-06-24 RX ORDER — TRAZODONE HYDROCHLORIDE 50 MG/1
50 TABLET ORAL
Status: DISCONTINUED | OUTPATIENT
Start: 2025-06-24 | End: 2025-06-25 | Stop reason: HOSPADM

## 2025-06-24 RX ORDER — LOSARTAN POTASSIUM 50 MG/1
100 TABLET ORAL EVERY 24 HOURS
Status: DISCONTINUED | OUTPATIENT
Start: 2025-06-24 | End: 2025-06-25 | Stop reason: HOSPADM

## 2025-06-24 RX ORDER — DIAZEPAM 10 MG/2ML
5 INJECTION, SOLUTION INTRAMUSCULAR; INTRAVENOUS ONCE
Status: COMPLETED | OUTPATIENT
Start: 2025-06-24 | End: 2025-06-24

## 2025-06-24 RX ADMIN — INSULIN LISPRO 10 UNITS: 100 INJECTION, SOLUTION INTRAVENOUS; SUBCUTANEOUS at 17:56

## 2025-06-24 RX ADMIN — ENOXAPARIN SODIUM 40 MG: 40 INJECTION SUBCUTANEOUS at 16:15

## 2025-06-24 RX ADMIN — SODIUM CHLORIDE 75 ML/HR: 0.9 INJECTION, SOLUTION INTRAVENOUS at 17:57

## 2025-06-24 RX ADMIN — INSULIN LISPRO 3 UNITS: 100 INJECTION, SOLUTION INTRAVENOUS; SUBCUTANEOUS at 22:22

## 2025-06-24 RX ADMIN — LIDOCAINE 1 PATCH: 50 PATCH CUTANEOUS at 16:15

## 2025-06-24 RX ADMIN — TRAZODONE HYDROCHLORIDE 50 MG: 50 TABLET ORAL at 22:22

## 2025-06-24 RX ADMIN — INSULIN LISPRO 4 UNITS: 100 INJECTION, SOLUTION INTRAVENOUS; SUBCUTANEOUS at 16:43

## 2025-06-24 RX ADMIN — DIAZEPAM 5 MG: 10 INJECTION, SOLUTION INTRAMUSCULAR; INTRAVENOUS at 12:23

## 2025-06-24 RX ADMIN — HYDROMORPHONE HYDROCHLORIDE 0.5 MG: 1 INJECTION, SOLUTION INTRAMUSCULAR; INTRAVENOUS; SUBCUTANEOUS at 13:43

## 2025-06-24 RX ADMIN — DEXAMETHASONE SODIUM PHOSPHATE 8 MG: 10 INJECTION, SOLUTION INTRAMUSCULAR; INTRAVENOUS at 11:06

## 2025-06-24 RX ADMIN — MORPHINE SULFATE 4 MG: 4 INJECTION INTRAVENOUS at 11:12

## 2025-06-24 RX ADMIN — HYDROMORPHONE HYDROCHLORIDE 0.5 MG: 1 INJECTION, SOLUTION INTRAMUSCULAR; INTRAVENOUS; SUBCUTANEOUS at 20:02

## 2025-06-24 NOTE — ASSESSMENT & PLAN NOTE
"Patient reports worsening low back pain that radiates down his left leg. He reports numbness in his left great toe only. He denies any incontinence of bowel/bladder.   Patient denies injury/trauma but does note he was on a ladder staining the house yesterday and believes he may have \"shivam his back\" as he was leaning against the house.   CT L spine:   No acute lumbar spine fracture or traumatic malalignment. Lower lumbar spine predominant degenerative change with severe left-sided neuroforaminal stenosis at L5-S1. Correlate for left L5 radiculopathy. Degenerative changes in the visualized lower thoracic spine with mild spinal canal narrowing and severe bilateral neuroforaminal narrowing at T11-T12.  Patient received decadron, valium, IV morphine, and IV dilaudid in the ED  Check MRI L spine  Continue IV dilaudid for now  Start robaxin, Lidoderm patch   Will hold off on additional steroids given blood sugars in the 300's  Will check CMP now for renal function before ordering NSAIDS.   Labs in am  "

## 2025-06-24 NOTE — H&P
"H&P - Hospitalist   Name: Kai Breen 55 y.o. male I MRN: [id number]  Unit/Bed#: -[facility] I Date of Admission: 6/24/2025   Date of Service: 6/24/2025 I Hospital Day: 0     Assessment & Plan  Acute left-sided low back pain with left-sided sciatica  Patient reports worsening low back pain that radiates down his left leg. He reports numbness in his left great toe only. He denies any incontinence of bowel/bladder.   Patient denies injury/trauma but does note he was on a ladder staining the house yesterday and believes he may have \"shivam his back\" as he was leaning against the house.   CT L spine:   No acute lumbar spine fracture or traumatic malalignment. Lower lumbar spine predominant degenerative change with severe left-sided neuroforaminal stenosis at L5-S1. Correlate for left L5 radiculopathy. Degenerative changes in the visualized lower thoracic spine with mild spinal canal narrowing and severe bilateral neuroforaminal narrowing at T11-T12.  Patient received decadron, valium, IV morphine, and IV dilaudid in the ED  Check MRI L spine  Continue IV dilaudid for now  Start robaxin, Lidoderm patch   Will hold off on additional steroids given blood sugars in the 300's  Will check CMP now for renal function before ordering NSAIDS.   Labs in am  Coronary artery disease involving native coronary artery  Continue home regimen of aspirin, Lipitor, Imdur, inderal, and Brilinta   Essential hypertension  Blood pressure currently controlled  Continue home regimen of losartan  Monitor blood pressure trends  Major depressive disorder, recurrent (HCC)  Continue home regimen of Effexor and trazadone  Type 2 diabetes mellitus (HCC)  Lab Results   Component Value Date    HGBA1C 8.3 (H) 12/19/2024       Recent Labs     06/24/25  1618   POCGLU 369*       Blood Sugar Average: Last 72 hrs:  (P) 369    Hold home regimen of Jardiance  Blood sugar currently elevated- likely due to steroids given in the ED  CCD 2 diet  ISS with accu checks " "qac and qhs  Monitor blood glucose trend and adjust insulin as needed      VTE Pharmacologic Prophylaxis: VTE Score: 2 Low Risk (Score 0-2) - Encourage Ambulation.  Code Status: Level 1 - Full Code   Discussion with family: Patient declined call to .     Anticipated Length of Stay: Patient will be admitted on an observation basis with an anticipated length of stay of less than 2 midnights secondary to pain control and workup of back pain with MRI.    History of Present Illness   Chief Complaint: back pain    Kai Breen is a 55 y.o. male with a PMH of DM, CAD, depression who presents with a complaint of worsening low back pain with radiation down left leg. He reports numbness in left great toe only. He reports he was up on a ladder at home staining his house and believes he may have \"shivam\" his back when leaning against the wall. He denies any trauma. He denies any loss of bowel or bladder. CT L spine Lower lumbar spine predominant degenerative change with severe left-sided neuroforaminal stenosis at L5-S1. The patient reports having difficulty sleeping last night due to the pain. He denies any chest pain, SOB, abdominal pain, nausea/vomiting, or diarrhea. He denies any fevers/chills.     Review of Systems   Musculoskeletal:  Positive for back pain.   All other systems reviewed and are negative.      Historical Information   Past Medical History[1]  Past Surgical History[2]  Social History[3]  E-Cigarette/Vaping    E-Cigarette Use Never User      E-Cigarette/Vaping Substances     Family History[4]  Social History:  Marital Status: /Civil Union   Occupation: unknown  Patient Pre-hospital Living Situation: Home  Patient Pre-hospital Level of Mobility: walks  Patient Pre-hospital Diet Restrictions: DM    Meds/Allergies   I have reviewed home medications with patient personally.  Prior to Admission medications    Medication Sig Start Date End Date Taking? Authorizing Provider   aspirin 81 MG " tablet Take 81 mg by mouth daily      Historical Provider, MD   atorvastatin (LIPITOR) 40 mg tablet Take 1 tablet (40 mg total) by mouth every 24 hours 4/10/25   Robert Lynn MD   cholestyramine sugar free (QUESTRAN LIGHT) 4 g packet Take 4 g by mouth 2 (two) times a day 4/3/25   Historical Provider, MD   Contour Next Test test strip  12/18/23   Historical Provider, MD   isosorbide mononitrate (IMDUR) 120 mg 24 hr tablet Take 1 tablet (120 mg total) by mouth daily 4/10/25   Robert Lynn MD   Jardiance 25 MG TABS Take 25 mg by mouth 3/20/24   Historical Provider, MD   losartan (COZAAR) 100 MG tablet Take 1 tablet (100 mg total) by mouth every 24 hours 4/10/25   Robert Lynn MD   Microlet Lancets MISC  12/18/23   Historical Provider, MD   nitroglycerin (NITROSTAT) 0.4 mg SL tablet as directed  Patient not taking: Reported on 12/31/2024 5/3/19   Historical Provider, MD   Omega-3 Fatty Acids (OMEGA 3 PO) Take 2,000 mg by mouth 2 (two) times a day    Historical Provider, MD   pantoprazole (PROTONIX) 40 mg tablet Take 40 mg by mouth daily 7/11/19   Historical Provider, MD   propranolol (INDERAL LA) 160 mg Take 1 capsule (160 mg total) by mouth daily 4/10/25   Robert Lynn MD   Semaglutide (OZEMPIC, 0.25 OR 0.5 MG/DOSE, SC) Inject 1 Dose under the skin once a week    Historical Provider, MD   ticagrelor (Brilinta) 90 MG Take 1 tablet (90 mg total) by mouth every 12 (twelve) hours 4/10/25   Robert Lynn MD   traZODone (DESYREL) 50 mg tablet 50 mg daily at bedtime 12/18/23   Historical Provider, MD   venlafaxine 150 MG TB24 24 hr tablet 150 mg daily with breakfast 3/20/24   Historical Provider, MD     No Known Allergies    Objective :  Temp:  [98.1 °F (36.7 °C)-99.2 °F (37.3 °C)] 98.1 °F (36.7 °C)  HR:  [72-87] 72  BP: (105-150)/(70-86) 105/71  Resp:  [18-20] 18  SpO2:  [95 %-98 %] 95 %  O2 Device: None (Room air)    Physical Exam  Vitals and nursing note reviewed.   Constitutional:       General: He is  awake.      Appearance: Normal appearance.   HENT:      Head: Normocephalic and atraumatic.     Cardiovascular:      Rate and Rhythm: Normal rate and regular rhythm.   Pulmonary:      Effort: Pulmonary effort is normal.      Breath sounds: Normal breath sounds.   Abdominal:      Palpations: Abdomen is soft.      Tenderness: There is no abdominal tenderness.     Musculoskeletal:        Legs:      Skin:     General: Skin is warm and dry.     Neurological:      Mental Status: He is alert and oriented to person, place, and time.      Sensory: Sensory deficit (left great toe) present.     Psychiatric:         Attention and Perception: Attention normal.         Mood and Affect: Mood normal.         Speech: Speech normal.         Behavior: Behavior is cooperative.         Cognition and Memory: Cognition and memory normal.          Lines/Drains:            Lab Results: I have reviewed the following results:              Results from last 7 days   Lab Units 06/24/25  1618   POC GLUCOSE mg/dl 369*     Lab Results   Component Value Date    HGBA1C 8.3 (H) 12/19/2024    HGBA1C 8.4 (H) 09/09/2024    HGBA1C 7.7 (H) 02/23/2024           Imaging Results Review: I reviewed radiology reports from this admission including: CT L spine.  Other Study Results Review: No additional pertinent studies reviewed.    Administrative Statements   I have spent a total time of 55 minutes in caring for this patient on the day of the visit/encounter including Documenting in the medical record, Reviewing/placing orders in the medical record (including tests, medications, and/or procedures), and Obtaining or reviewing history  .    ** Please Note: This note has been constructed using a voice recognition system. **         [1]   Past Medical History:  Diagnosis Date    Arthritis     Coronary artery disease     MI x 4; cardiac cath with 4 stents    GERD (gastroesophageal reflux disease)     Hypertension     Myocardial infarct (HCC)     4 MIs as of  2020    Non-insulin dependent type 2 diabetes mellitus (HCC) 2022   [2]   Past Surgical History:  Procedure Laterality Date    CARDIAC CATHETERIZATION  2022    Procedure: Cardiac catheterization;  Surgeon: Jasiel Padilla DO;  Location: [facility];  Service: Cardiology    CARDIAC CATHETERIZATION N/A 2022    Procedure: Cardiac catheterization;  Surgeon: Kirt Lezama MD;  Location: [facility];  Service: Cardiology    CARDIAC CATHETERIZATION N/A 2022    Procedure: Cardiac pci;  Surgeon: Kirt Lezama MD;  Location: [facility];  Service: Cardiology    CARDIAC CATHETERIZATION Left 2022    Procedure: Cardiac Left Heart Cath;  Surgeon: Kirt Lezama MD;  Location: [facility];  Service: Cardiology    CARDIAC CATHETERIZATION N/A 2022    Procedure: Cardiac Coronary Angiogram;  Surgeon: Kirt Lezama MD;  Location: [facility];  Service: Cardiology    CARDIAC CATHETERIZATION Left 2024    Procedure: Cardiac Left Heart Cath;  Surgeon: Jasiel Padilla DO;  Location: [facility];  Service: Cardiology    CARDIAC CATHETERIZATION N/A 2024    Procedure: Cardiac Coronary Angiogram;  Surgeon: Jasiel Padilla DO;  Location: [facility];  Service: Cardiology    CARDIAC CATHETERIZATION N/A 2024    Procedure: Cardiac PCI;  Surgeon: Jasiel Padilla DO;  Location: [facility];  Service: Cardiology    CORONARY ANGIOPLASTY WITH STENT PLACEMENT     [3]   Social History  Tobacco Use    Smoking status: Former     Current packs/day: 0.00     Average packs/day: 1 pack/day for 20.7 years (20.7 ttl pk-yrs)     Types: Cigarettes     Start date: 1998     Quit date: 2018     Years since quittin.5    Smokeless tobacco: Never   Vaping Use    Vaping status: Never Used   Substance and Sexual Activity    Alcohol use: Never    Drug use: Never    Sexual activity: Yes     Partners: Female      Birth control/protection: None   [4]   Family History  Problem Relation Name Age of Onset    Heart disease Father      Hyperlipidemia Father      Diabetes Sister      Early death Sister      Diabetes Brother      Diabetes Maternal Grandmother

## 2025-06-24 NOTE — ASSESSMENT & PLAN NOTE
Lab Results   Component Value Date    HGBA1C 8.3 (H) 12/19/2024       Recent Labs     06/24/25  1618   POCGLU 369*       Blood Sugar Average: Last 72 hrs:  (P) 369    Hold home regimen of Jardiance  Blood sugar currently elevated- likely due to steroids given in the ED  CCD 2 diet  ISS with accu checks qac and qhs  Monitor blood glucose trend and adjust insulin as needed

## 2025-06-24 NOTE — ASSESSMENT & PLAN NOTE
Blood pressure currently controlled  Continue home regimen of losartan  Monitor blood pressure trends

## 2025-06-24 NOTE — ED PROVIDER NOTES
Time reflects when diagnosis was documented in both MDM as applicable and the Disposition within this note       Time User Action Codes Description Comment    6/24/2025  2:29 PM Alem Zhou Add [M54.17] Lumbosacral radiculopathy at L5           ED Disposition       ED Disposition   Admit    Condition   Stable    Date/Time   Tue Jun 24, 2025  2:28 PM    Comment   Case was discussed with Dr. Johnson and the patient's admission status was agreed to be Admission Status: observation status to the service of Dr. Johnson.               Assessment & Plan       Medical Decision Making  55-year-old male presents emergency department secondary to increasing low back/lumbosacral pain that radiates around his left hip and into the left great toe.  Patient denies bowel or bladder continence issues and denies IV drug use fever chills or direct trauma.  Patient notes that he has had this happen to him once before but it seemed to resolve on its own but this has returned.  Patient denies weakness but admits to pain being the limiter of function at this time.  Differential diagnosis based on my evaluation is lumbar sacral strain, ligamentous injury or lumbar radiculopathy.  Malignancy is always a potential of radicular pain however likelihood is decreased.  The patient has CT scan done after confirming that the patient does not have any evidence of bowel or bladder continence issues and motor strength was found to be 5/5.  Reflexes were 1/4 to the bilateral patellar and Achilles regions and muscle strength is equal.  The patient has no saddle paresthesias.  Patient has CT scan which shows what appears to be an L5 radiculopathy.  The patient has not not had any significant relief of pain despite narcotics steroids and muscle relaxants.  The patient will therefore be observed in the hospital for further pain control and MRI.    Amount and/or Complexity of Data Reviewed  Radiology: ordered.    Risk  Prescription drug  "management.  Decision regarding hospitalization.        ED Course as of 06/24/25 1532   Tue Jun 24, 2025   1343 Patient not improved with any medications in the emergency department.  Scan is most consistent with an L5 radiculopathy.       Medications   dexamethasone (PF) (DECADRON) injection 8 mg (8 mg Intramuscular Given 6/24/25 1106)   morphine injection 4 mg (4 mg Intramuscular Given 6/24/25 1112)   diazepam (VALIUM) injection 5 mg (5 mg Intramuscular Given 6/24/25 1223)   HYDROmorphone (DILAUDID) injection 0.5 mg (0.5 mg Intravenous Given 6/24/25 1343)       ED Risk Strat Scores                    No data recorded        SBIRT 20yo+      Flowsheet Row Most Recent Value   Initial Alcohol Screen: US AUDIT-C     1. How often do you have a drink containing alcohol? 0 Filed at: 06/24/2025 1020   2. How many drinks containing alcohol do you have on a typical day you are drinking?  0 Filed at: 06/24/2025 1020   3a. Male UNDER 65: How often do you have five or more drinks on one occasion? 0 Filed at: 06/24/2025 1020   Audit-C Score 0 Filed at: 06/24/2025 1020   ANDRZEJ: How many times in the past year have you...    Used an illegal drug or used a prescription medication for non-medical reasons? Never Filed at: 06/24/2025 1020                            History of Present Illness       Chief Complaint   Patient presents with    Hip Pain     Left sided hip pain, states he fell twice and \"can barely walk\"       Past Medical History[1]   Past Surgical History[2]   Family History[3]   Social History[4]   E-Cigarette/Vaping    E-Cigarette Use Never User       E-Cigarette/Vaping Substances      I have reviewed and agree with the history as documented.     55-year-old male presents emergency room complaining of multiple day history of worsening left-sided back pain.  Patient has pain radiating around the side of the patient's left hip and extends down to the location of his left great toe.  The patient denies any fever chills " direct trauma or history of IV drug use.  Patient has had intermittent recurrent low back pain and has taken Motrin chronically, and states he has had pain this bad in the past 1 time however the patient notes that he is having difficulty getting up and getting around so he came to the hospital.  Patient denies bowel or bladder continence issues or groin numbness.        Review of Systems   Constitutional:  Positive for activity change. Negative for chills and fever.   HENT:  Negative for ear pain and sore throat.    Eyes:  Negative for pain and visual disturbance.   Respiratory:  Negative for cough and shortness of breath.    Cardiovascular:  Negative for chest pain and palpitations.   Gastrointestinal:  Negative for abdominal pain and vomiting.   Genitourinary:  Negative for dysuria and hematuria.   Musculoskeletal:  Positive for back pain. Negative for arthralgias.   Skin:  Negative for color change and rash.   Neurological:  Negative for seizures and syncope.   All other systems reviewed and are negative.          Objective       ED Triage Vitals   Temperature Pulse Blood Pressure Respirations SpO2 Patient Position - Orthostatic VS   06/24/25 1017 06/24/25 1018 06/24/25 1018 06/24/25 1018 06/24/25 1018 06/24/25 1018   99.2 °F (37.3 °C) 87 150/86 20 98 % Lying      Temp Source Heart Rate Source BP Location FiO2 (%) Pain Score    06/24/25 1017 06/24/25 1018 06/24/25 1018 -- 06/24/25 1018    Temporal Monitor Left arm  10 - Worst Possible Pain      Vitals      Date and Time Temp Pulse SpO2 Resp BP Pain Score FACES Pain Rating User   06/24/25 1503 98.1 °F (36.7 °C) 72 95 % -- 105/71 -- -- DII   06/24/25 1415 -- -- -- -- -- No Pain -- AM   06/24/25 1343 -- 82 -- 18 109/71 7 -- AM   06/24/25 1300 -- 73 96 % -- -- 9 -- AM   06/24/25 1223 -- 78 97 % -- -- 10 - Worst Possible Pain -- AM   06/24/25 1205 -- 79 96 % 18 120/70 -- -- AM   06/24/25 1145 -- -- -- -- -- 10 - Worst Possible Pain -- AM   06/24/25 1112 -- -- -- --  -- 10 - Worst Possible Pain -- JZ   06/24/25 1027 -- -- -- -- -- 10 - Worst Possible Pain -- SG   06/24/25 1019 -- -- 98 % -- -- -- -- J   06/24/25 1018 99.2 °F (37.3 °C) 87 98 % 20 150/86 10 - Worst Possible Pain --    06/24/25 1017 99.2 °F (37.3 °C) -- -- -- -- -- -- J            Physical Exam  Constitutional:       General: He is not in acute distress.     Appearance: Normal appearance. He is normal weight. He is not ill-appearing.   HENT:      Head: Normocephalic and atraumatic.      Right Ear: External ear normal.      Left Ear: External ear normal.      Nose: Nose normal.      Mouth/Throat:      Mouth: Mucous membranes are moist.     Eyes:      Conjunctiva/sclera: Conjunctivae normal.       Cardiovascular:      Rate and Rhythm: Normal rate and regular rhythm.      Pulses: Normal pulses.      Heart sounds: Normal heart sounds.   Pulmonary:      Effort: Pulmonary effort is normal.      Breath sounds: Normal breath sounds.   Abdominal:      General: Abdomen is flat. There is no distension.      Palpations: Abdomen is soft. There is no mass.     Musculoskeletal:         General: Tenderness present. No swelling or deformity. Normal range of motion.      Cervical back: Normal range of motion.      Comments: Tenderness to the left parasacral region and left SI joint.  Patient has pain over the greater trochanteric area of the left hip however this is not reproducible.     Skin:     General: Skin is warm and dry.      Capillary Refill: Capillary refill takes 2 to 3 seconds.      Coloration: Skin is not pale.      Findings: No bruising or erythema.     Neurological:      General: No focal deficit present.      Mental Status: He is alert and oriented to person, place, and time. Mental status is at baseline.      Comments: Reflexes are decreased at 1/4 bilaterally at the patellar and Achilles regions.  Muscle strength appears to be 5/5 however the patient has subjective sensory paresthesias to the left great toe and  pain that is not reproducible over the left greater trochanter.   Psychiatric:         Mood and Affect: Mood normal.         Results Reviewed       None            CT spine lumbar without contrast   Final Interpretation by Reji Augustin MD (06/24 7275)      1.  No acute lumbar spine fracture or traumatic malalignment.   2.  Lower lumbar spine predominant degenerative change with severe left-sided neuroforaminal stenosis at L5-S1. Correlate for left L5 radiculopathy.   3.  Degenerative changes in the visualized lower thoracic spine with mild spinal canal narrowing and severe bilateral neuroforaminal narrowing at T11-T12.      Workstation performed: [id number]             Procedures    ED Medication and Procedure Management   Prior to Admission Medications   Prescriptions Last Dose Informant Patient Reported? Taking?   Contour Next Test test strip  Self Yes No   Jardiance 25 MG TABS  Self Yes No   Sig: Take 25 mg by mouth   Microlet Lancets MISC  Self Yes No   Omega-3 Fatty Acids (OMEGA 3 PO)  Self Yes No   Sig: Take 2,000 mg by mouth 2 (two) times a day   Semaglutide (OZEMPIC, 0.25 OR 0.5 MG/DOSE, SC)  Self Yes No   Sig: Inject 1 Dose under the skin once a week   aspirin 81 MG tablet  Self Yes No   Sig: Take 81 mg by mouth daily     atorvastatin (LIPITOR) 40 mg tablet   No No   Sig: Take 1 tablet (40 mg total) by mouth every 24 hours   cholestyramine sugar free (QUESTRAN LIGHT) 4 g packet   Yes No   Sig: Take 4 g by mouth 2 (two) times a day   isosorbide mononitrate (IMDUR) 120 mg 24 hr tablet   No No   Sig: Take 1 tablet (120 mg total) by mouth daily   losartan (COZAAR) 100 MG tablet   No No   Sig: Take 1 tablet (100 mg total) by mouth every 24 hours   nitroglycerin (NITROSTAT) 0.4 mg SL tablet  Self Yes No   Sig: as directed   Patient not taking: Reported on 12/31/2024   pantoprazole (PROTONIX) 40 mg tablet  Self Yes No   Sig: Take 40 mg by mouth daily   propranolol (INDERAL LA) 160 mg   No No   Sig: Take 1 capsule  (160 mg total) by mouth daily   ticagrelor (Brilinta) 90 MG   No No   Sig: Take 1 tablet (90 mg total) by mouth every 12 (twelve) hours   traZODone (DESYREL) 50 mg tablet  Self Yes No   Si mg daily at bedtime   venlafaxine 150 MG TB24 24 hr tablet  Self Yes No   Si mg daily with breakfast      Facility-Administered Medications: None     Current Discharge Medication List        CONTINUE these medications which have NOT CHANGED    Details   aspirin 81 MG tablet Take 81 mg by mouth daily        atorvastatin (LIPITOR) 40 mg tablet Take 1 tablet (40 mg total) by mouth every 24 hours  Qty: 90 tablet, Refills: 3    Associated Diagnoses: Mixed hyperlipidemia      cholestyramine sugar free (QUESTRAN LIGHT) 4 g packet Take 4 g by mouth 2 (two) times a day      Contour Next Test test strip       isosorbide mononitrate (IMDUR) 120 mg 24 hr tablet Take 1 tablet (120 mg total) by mouth daily  Qty: 90 tablet, Refills: 3    Associated Diagnoses: Chest pain      Jardiance 25 MG TABS Take 25 mg by mouth      losartan (COZAAR) 100 MG tablet Take 1 tablet (100 mg total) by mouth every 24 hours  Qty: 90 tablet, Refills: 3    Associated Diagnoses: Essential hypertension; Coronary artery disease of native artery of native heart with stable angina pectoris (HCC)      Microlet Lancets MISC       nitroglycerin (NITROSTAT) 0.4 mg SL tablet as directed      Omega-3 Fatty Acids (OMEGA 3 PO) Take 2,000 mg by mouth 2 (two) times a day      pantoprazole (PROTONIX) 40 mg tablet Take 40 mg by mouth daily  Refills: 3      propranolol (INDERAL LA) 160 mg Take 1 capsule (160 mg total) by mouth daily  Qty: 90 capsule, Refills: 3    Associated Diagnoses: PVC (premature ventricular contraction)      Semaglutide (OZEMPIC, 0.25 OR 0.5 MG/DOSE, SC) Inject 1 Dose under the skin once a week      ticagrelor (Brilinta) 90 MG Take 1 tablet (90 mg total) by mouth every 12 (twelve) hours  Qty: 180 tablet, Refills: 3    Associated Diagnoses: Coronary  artery disease of native artery of native heart with stable angina pectoris (HCC)      traZODone (DESYREL) 50 mg tablet 50 mg daily at bedtime      venlafaxine 150 MG TB24 24 hr tablet 150 mg daily with breakfast           No discharge procedures on file.  ED SEPSIS DOCUMENTATION   Time reflects when diagnosis was documented in both MDM as applicable and the Disposition within this note       Time User Action Codes Description Comment    6/24/2025  2:29 PM Zhou Ferrara [M54.17] Lumbosacral radiculopathy at L5                      [1]   Past Medical History:  Diagnosis Date    Arthritis     Coronary artery disease     MI x 4; cardiac cath with 4 stents    GERD (gastroesophageal reflux disease)     Hypertension     Myocardial infarct (HCC)     4 MIs as of 2/24/2020    Non-insulin dependent type 2 diabetes mellitus (HCC) 8/24/2022   [2]   Past Surgical History:  Procedure Laterality Date    CARDIAC CATHETERIZATION  1/7/2022    Procedure: Cardiac catheterization;  Surgeon: Jasiel Padilla DO;  Location: [facility];  Service: Cardiology    CARDIAC CATHETERIZATION N/A 8/26/2022    Procedure: Cardiac catheterization;  Surgeon: Kirt Lezama MD;  Location: [facility];  Service: Cardiology    CARDIAC CATHETERIZATION N/A 8/26/2022    Procedure: Cardiac pci;  Surgeon: Kirt Lezama MD;  Location: [facility];  Service: Cardiology    CARDIAC CATHETERIZATION Left 8/26/2022    Procedure: Cardiac Left Heart Cath;  Surgeon: Kirt Lezama MD;  Location: [facility];  Service: Cardiology    CARDIAC CATHETERIZATION N/A 8/26/2022    Procedure: Cardiac Coronary Angiogram;  Surgeon: Kirt Lezama MD;  Location: [facility];  Service: Cardiology    CARDIAC CATHETERIZATION Left 12/20/2024    Procedure: Cardiac Left Heart Cath;  Surgeon: Jasiel Padilla DO;  Location: [facility];  Service: Cardiology    CARDIAC CATHETERIZATION N/A 12/20/2024    Procedure:  Cardiac Coronary Angiogram;  Surgeon: Jasiel Padilla DO;  Location: [facility];  Service: Cardiology    CARDIAC CATHETERIZATION N/A 2024    Procedure: Cardiac PCI;  Surgeon: Jasiel Padilla DO;  Location: [facility];  Service: Cardiology    CORONARY ANGIOPLASTY WITH STENT PLACEMENT     [3]   Family History  Problem Relation Name Age of Onset    Heart disease Father      Hyperlipidemia Father      Diabetes Sister      Early death Sister      Diabetes Brother      Diabetes Maternal Grandmother     [4]   Social History  Tobacco Use    Smoking status: Former     Current packs/day: 0.00     Average packs/day: 1 pack/day for 20.7 years (20.7 ttl pk-yrs)     Types: Cigarettes     Start date: 1998     Quit date: 2018     Years since quittin.5    Smokeless tobacco: Never   Vaping Use    Vaping status: Never Used   Substance Use Topics    Alcohol use: Never    Drug use: Never        Zhou Ferrara Jr.,   25 1532

## 2025-06-24 NOTE — PLAN OF CARE
Problem: Knowledge Deficit  Goal: Patient/family/caregiver demonstrates understanding of disease process, treatment plan, medications, and discharge instructions  Description: Complete learning assessment and assess knowledge base.  Interventions:  - Provide teaching at level of understanding  - Provide teaching via preferred learning methods  Outcome: Progressing     Problem: MUSCULOSKELETAL - ADULT  Goal: Maintain proper alignment of affected body part  Description: INTERVENTIONS:  - Support, maintain and protect limb and body alignment  - Provide patient/ family with appropriate education  Outcome: Progressing

## 2025-06-25 VITALS
OXYGEN SATURATION: 99 % | SYSTOLIC BLOOD PRESSURE: 130 MMHG | BODY MASS INDEX: 26.2 KG/M2 | DIASTOLIC BLOOD PRESSURE: 77 MMHG | WEIGHT: 172.84 LBS | RESPIRATION RATE: 18 BRPM | TEMPERATURE: 97.8 F | HEIGHT: 68 IN | HEART RATE: 68 BPM

## 2025-06-25 LAB
ALBUMIN SERPL BCG-MCNC: 4 G/DL (ref 3.5–5)
ALP SERPL-CCNC: 135 U/L (ref 34–104)
ALT SERPL W P-5'-P-CCNC: 14 U/L (ref 7–52)
ANION GAP SERPL CALCULATED.3IONS-SCNC: 8 MMOL/L (ref 4–13)
AST SERPL W P-5'-P-CCNC: 11 U/L (ref 13–39)
BILIRUB SERPL-MCNC: 0.8 MG/DL (ref 0.2–1)
BUN SERPL-MCNC: 22 MG/DL (ref 5–25)
CALCIUM SERPL-MCNC: 9.2 MG/DL (ref 8.4–10.2)
CHLORIDE SERPL-SCNC: 104 MMOL/L (ref 96–108)
CO2 SERPL-SCNC: 23 MMOL/L (ref 21–32)
CREAT SERPL-MCNC: 1.01 MG/DL (ref 0.6–1.3)
DME PARACHUTE DELIVERY DATE REQUESTED: NORMAL
DME PARACHUTE DELIVERY NOTE: NORMAL
DME PARACHUTE ITEM DESCRIPTION: NORMAL
DME PARACHUTE ORDER STATUS: NORMAL
DME PARACHUTE SUPPLIER NAME: NORMAL
DME PARACHUTE SUPPLIER PHONE: NORMAL
ERYTHROCYTE [DISTWIDTH] IN BLOOD BY AUTOMATED COUNT: 13 % (ref 11.6–15.1)
EST. AVERAGE GLUCOSE BLD GHB EST-MCNC: 209 MG/DL
GFR SERPL CREATININE-BSD FRML MDRD: 83 ML/MIN/1.73SQ M
GLUCOSE SERPL-MCNC: 176 MG/DL (ref 65–140)
GLUCOSE SERPL-MCNC: 211 MG/DL (ref 65–140)
GLUCOSE SERPL-MCNC: 230 MG/DL (ref 65–140)
HBA1C MFR BLD: 8.9 %
HCT VFR BLD AUTO: 43.2 % (ref 36.5–49.3)
HGB BLD-MCNC: 14.9 G/DL (ref 12–17)
MAGNESIUM SERPL-MCNC: 2.4 MG/DL (ref 1.9–2.7)
MCH RBC QN AUTO: 30.4 PG (ref 26.8–34.3)
MCHC RBC AUTO-ENTMCNC: 34.5 G/DL (ref 31.4–37.4)
MCV RBC AUTO: 88 FL (ref 82–98)
PHOSPHATE SERPL-MCNC: 4.1 MG/DL (ref 2.7–4.5)
PLATELET # BLD AUTO: 201 THOUSANDS/UL (ref 149–390)
PMV BLD AUTO: 9.7 FL (ref 8.9–12.7)
POTASSIUM SERPL-SCNC: 3.7 MMOL/L (ref 3.5–5.3)
PROT SERPL-MCNC: 7 G/DL (ref 6.4–8.4)
RBC # BLD AUTO: 4.9 MILLION/UL (ref 3.88–5.62)
SODIUM SERPL-SCNC: 135 MMOL/L (ref 135–147)
WBC # BLD AUTO: 11.91 THOUSAND/UL (ref 4.31–10.16)

## 2025-06-25 PROCEDURE — 83036 HEMOGLOBIN GLYCOSYLATED A1C: CPT | Performed by: PHYSICIAN ASSISTANT

## 2025-06-25 PROCEDURE — 83735 ASSAY OF MAGNESIUM: CPT | Performed by: PHYSICIAN ASSISTANT

## 2025-06-25 PROCEDURE — 85027 COMPLETE CBC AUTOMATED: CPT | Performed by: PHYSICIAN ASSISTANT

## 2025-06-25 PROCEDURE — 84100 ASSAY OF PHOSPHORUS: CPT | Performed by: PHYSICIAN ASSISTANT

## 2025-06-25 PROCEDURE — 80053 COMPREHEN METABOLIC PANEL: CPT | Performed by: PHYSICIAN ASSISTANT

## 2025-06-25 PROCEDURE — 82948 REAGENT STRIP/BLOOD GLUCOSE: CPT

## 2025-06-25 PROCEDURE — 99239 HOSP IP/OBS DSCHRG MGMT >30: CPT | Performed by: NURSE PRACTITIONER

## 2025-06-25 PROCEDURE — 97162 PT EVAL MOD COMPLEX 30 MIN: CPT

## 2025-06-25 RX ORDER — METHYLPREDNISOLONE 4 MG/1
8 TABLET ORAL DAILY
Status: DISCONTINUED | OUTPATIENT
Start: 2025-06-29 | End: 2025-06-25 | Stop reason: HOSPADM

## 2025-06-25 RX ORDER — METHYLPREDNISOLONE 16 MG/1
16 TABLET ORAL DAILY
Status: DISCONTINUED | OUTPATIENT
Start: 2025-06-27 | End: 2025-06-25 | Stop reason: HOSPADM

## 2025-06-25 RX ORDER — OXYCODONE HYDROCHLORIDE 10 MG/1
10 TABLET ORAL EVERY 4 HOURS PRN
Refills: 0 | Status: DISCONTINUED | OUTPATIENT
Start: 2025-06-25 | End: 2025-06-25 | Stop reason: HOSPADM

## 2025-06-25 RX ORDER — METHYLPREDNISOLONE 4 MG/1
TABLET ORAL
Qty: 15 TABLET | Refills: 0 | Status: SHIPPED | OUTPATIENT
Start: 2025-06-25 | End: 2025-06-26

## 2025-06-25 RX ORDER — METHOCARBAMOL 500 MG/1
500 TABLET, FILM COATED ORAL EVERY 6 HOURS SCHEDULED
Qty: 40 TABLET | Refills: 0 | Status: SHIPPED | OUTPATIENT
Start: 2025-06-25 | End: 2025-07-02

## 2025-06-25 RX ORDER — METHYLPREDNISOLONE 4 MG/1
12 TABLET ORAL DAILY
Status: DISCONTINUED | OUTPATIENT
Start: 2025-06-28 | End: 2025-06-25 | Stop reason: HOSPADM

## 2025-06-25 RX ORDER — HYDROMORPHONE HCL/PF 1 MG/ML
0.5 SYRINGE (ML) INJECTION
Status: DISCONTINUED | OUTPATIENT
Start: 2025-06-25 | End: 2025-06-25 | Stop reason: HOSPADM

## 2025-06-25 RX ORDER — LIDOCAINE 50 MG/G
1 PATCH TOPICAL DAILY
Qty: 30 PATCH | Refills: 0 | Status: SHIPPED | OUTPATIENT
Start: 2025-06-26

## 2025-06-25 RX ORDER — METHYLPREDNISOLONE 4 MG/1
4 TABLET ORAL DAILY
Status: DISCONTINUED | OUTPATIENT
Start: 2025-06-30 | End: 2025-06-25 | Stop reason: HOSPADM

## 2025-06-25 RX ORDER — OXYCODONE HYDROCHLORIDE 5 MG/1
5 TABLET ORAL EVERY 4 HOURS PRN
Refills: 0 | Status: DISCONTINUED | OUTPATIENT
Start: 2025-06-25 | End: 2025-06-25 | Stop reason: HOSPADM

## 2025-06-25 RX ORDER — METHOCARBAMOL 500 MG/1
500 TABLET, FILM COATED ORAL EVERY 6 HOURS SCHEDULED
Status: DISCONTINUED | OUTPATIENT
Start: 2025-06-25 | End: 2025-06-25 | Stop reason: HOSPADM

## 2025-06-25 RX ORDER — OXYCODONE HYDROCHLORIDE 5 MG/1
5 TABLET ORAL EVERY 4 HOURS PRN
Qty: 20 TABLET | Refills: 0 | Status: SHIPPED | OUTPATIENT
Start: 2025-06-25 | End: 2025-07-02

## 2025-06-25 RX ORDER — DIAZEPAM 5 MG/1
5 TABLET ORAL EVERY 12 HOURS PRN
Qty: 5 TABLET | Refills: 0 | Status: SHIPPED | OUTPATIENT
Start: 2025-06-25 | End: 2025-07-02

## 2025-06-25 RX ORDER — ACETAMINOPHEN 325 MG/1
975 TABLET ORAL EVERY 8 HOURS SCHEDULED
Status: DISCONTINUED | OUTPATIENT
Start: 2025-06-25 | End: 2025-06-25 | Stop reason: HOSPADM

## 2025-06-25 RX ADMIN — LIDOCAINE 1 PATCH: 50 PATCH CUTANEOUS at 08:46

## 2025-06-25 RX ADMIN — HYDROMORPHONE HYDROCHLORIDE 0.5 MG: 1 INJECTION, SOLUTION INTRAMUSCULAR; INTRAVENOUS; SUBCUTANEOUS at 08:46

## 2025-06-25 RX ADMIN — INSULIN LISPRO 2 UNITS: 100 INJECTION, SOLUTION INTRAVENOUS; SUBCUTANEOUS at 12:07

## 2025-06-25 RX ADMIN — ASPIRIN 81 MG: 81 TABLET, CHEWABLE ORAL at 08:46

## 2025-06-25 RX ADMIN — VENLAFAXINE HYDROCHLORIDE 150 MG: 150 CAPSULE, EXTENDED RELEASE ORAL at 08:46

## 2025-06-25 RX ADMIN — ISOSORBIDE MONONITRATE 120 MG: 30 TABLET, EXTENDED RELEASE ORAL at 08:46

## 2025-06-25 RX ADMIN — METHYLPREDNISOLONE 24 MG: 16 TABLET ORAL at 11:43

## 2025-06-25 RX ADMIN — METHOCARBAMOL 500 MG: 500 TABLET ORAL at 11:43

## 2025-06-25 RX ADMIN — INSULIN LISPRO 1 UNITS: 100 INJECTION, SOLUTION INTRAVENOUS; SUBCUTANEOUS at 07:40

## 2025-06-25 RX ADMIN — PROPRANOLOL HYDROCHLORIDE 160 MG: 80 CAPSULE, EXTENDED RELEASE ORAL at 08:47

## 2025-06-25 RX ADMIN — PANTOPRAZOLE SODIUM 40 MG: 40 TABLET, DELAYED RELEASE ORAL at 07:46

## 2025-06-25 RX ADMIN — ACETAMINOPHEN 975 MG: 325 TABLET ORAL at 11:43

## 2025-06-25 RX ADMIN — TICAGRELOR 90 MG: 90 TABLET ORAL at 04:49

## 2025-06-25 NOTE — PHYSICAL THERAPY NOTE
"   PHYSICAL THERAPY EVALUATION  NAME:  Kai Breen  DATE: 06/25/25    AGE:   55 y.o.  Mrn:   [id number]  ADMIT DX:  Hip pain [M25.559]  Lumbosacral radiculopathy at L5 [M54.17]  Problem List: Problem List[1]    Past Medical History  Past Medical History[2]    Past Surgical History  Past Surgical History[3]    Length Of Stay: 0  Performed at least 2 patient identifiers during session: Name and Epic photo       06/25/25 1149   PT Last Visit   PT Visit Date 06/25/25   Note Type   Note type Evaluation   Pain Assessment   Pain Assessment Tool 0-10   Pain Score 9   Pain Location/Orientation Orientation: Left;Location: Back   Pain Radiating Towards L Leg   Pain Onset/Description Onset: Ongoing   Effect of Pain on Daily Activities mobility   Patient's Stated Pain Goal No pain   Hospital Pain Intervention(s) Medication (See MAR)   Multiple Pain Sites No   Restrictions/Precautions   Weight Bearing Precautions Per Order No   Other Precautions Fall Risk;Pain   Home Living   Type of Home House   Home Layout Multi-level;Performs ADLs on one level;Able to live on main level with bedroom/bathroom  (0 azeem,)   Bathroom Shower/Tub Tub/shower unit   Bathroom Toilet Standard   Bathroom Equipment Grab bars in shower   Bathroom Accessibility Accessible   Home Equipment   (no ad)   Prior Function   Level of [place] Independent with ADLs;Independent with functional mobility;Independent with IADLS   Lives With Spouse  (foster son)   Receives Help From Family   IADLs Independent with driving;Independent with medication management;Independent with meal prep   Falls in the last 6 months 1 to 4  (1)   Vocational On disability   Cognition   Overall Cognitive Status WFL   Arousal/Participation Alert   Orientation Level Oriented X4   Memory Within functional limits   Following Commands Follows all commands and directions without difficulty   Subjective   Subjective \"I feel better then i did yesterday\"   RLE Assessment   RLE Assessment WFL "   LLE Assessment   LLE Assessment   (3+/5, secondary to pain)   Vision-Basic Assessment   Current Vision Wears glasses all the time   Coordination   Sensation WFL   Bed Mobility   Supine to Sit 5  Supervision   Additional items Increased time required;Bedrails   Sit to Supine 5  Supervision   Additional items Assist x 1;Increased time required   Additional Comments verbal cues for exiting bed with current back pain, pt receptive   Transfers   Sit to Stand 5  Supervision   Additional items Assist x 1;Increased time required;Bedrails;Verbal cues   Stand to Sit 5  Supervision   Additional items Increased time required;Assist x 1;Armrests;Bedrails   Additional Comments verbal cues for hand placement   Ambulation/Elevation   Gait pattern Antalgic;Improper Weight shift;Decreased foot clearance   Gait Assistance   (cga)   Additional items Assist x 1;Verbal cues   Assistive Device Rolling walker   Distance 50 ft   Ambulation/Elevation Additional Comments increased pain with ambulation, pt denied dizziness with transitional movements   Balance   Static Sitting Fair +   Dynamic Sitting Fair   Static Standing Fair -   Dynamic Standing Fair -   Ambulatory Fair -   Activity Tolerance   Activity Tolerance Patient limited by pain   Medical Staff Made Aware cm made aware   Nurse Made Aware rn pam made aware   Assessment   Prognosis Good   Problem List Decreased strength;Decreased mobility;Impaired balance;Pain   Assessment Pt is 55 y.o. male seen for PT evaluation s/p [facility] w/ Acute left-sided low back pain with left-sided sciatica. PT consulted to assess pt's functional mobility and d/c needs. Order placed for PT eval and tx, w/ up and OOB as tolerated order. Pt agreeable to PT  session upon arrival, pt found supine in bed.  PTA, pt was independent w/ all functional mobility w/ no ad and lives w/ spouse in 2 level home .  Pt to benefit from continued PT tx to address deficits and maximize level  of functional independent mobility and consistency. Upon conclusion pt  supine in bed and with all needs in reach. Complexity: Comorbidities affecting pt's physical performance at time of assessment include:  radiculopathy, left sided sciatica, htn, dm, depression . Personal factors affecting pt at time of IE include: advanced age, limited mobility, lives in multistory home, and ambulating with assistive device. Please find objective findings from PT assessment regarding body systems outlined above with impairments and limitations including weakness, decreased ROM, impaired balance, pain, and fall risk.  Pt's clinical presentation is currently unstable/unpredictable seen in pt's presentation of abnormal WBC count, abnormal blood sugar levels, pain, and decline in mobility status. The patient's AM-PAC Basic Mobility Inpatient Short Form Raw Score is 18 .  Based on patient presentations and impairments, pt would most appropriately benefit from Level 3 resource intensity upon discharge. A Raw score of greater than 16 suggests the patient may benefit from discharge to home. Please also refer to the recommendation of the Physical Therapist for safe discharge planning. RN verbalized pt appropriate for PT session.   Goals   Patient Goals to get home   LTG Expiration Date 07/05/25   Long Term Goal #1 Pt will: Perform bed mobility tasks to modified I to increase Indep in home environment and improve ease of bed mobility. Perform transfers to modified I to increase Indep in home environment, decrease risk for falls, and improve ease of transfers. Perform ambulation with rw for 100 ft to  increase Indep in home environment, decrease risk for falls, improve activity tolerance, and improve gait quality. Increase dynamic standing balance to F+ to decrease fall risk.   Increase OOB activity tolerance to 10 minutes without s/s of exertion to decrease fall risk.   PT Treatment Day 0   Plan   Treatment/Interventions Functional  transfer training;LE strengthening/ROM;Elevations;Therapeutic exercise;Endurance training;Gait training   PT Frequency 3-5x/wk   Discharge Recommendation   Rehab Resource Intensity Level, PT III (Minimum Resource Intensity)   Equipment Recommended Walker   AM-PAC Basic Mobility Inpatient   Turning in Flat Bed Without Bedrails 3   Lying on Back to Sitting on Edge of Flat Bed Without Bedrails 3   Moving Bed to Chair 3   Standing Up From Chair Using Arms 3   Walk in Room 3   Climb 3-5 Stairs With Railing 3   Basic Mobility Inpatient Raw Score 18   Basic Mobility Standardized Score 41.05   [facility] Highest Level Of Mobility   -HLM Goal 6: Walk 10 steps or more   JH-HLM Achieved 7: Walk 25 feet or more     Pt seen as a co-eval with OT due to the patient's co-morbidities, clinically unstable presentation, and present impairments which are a regression from the patient's baseline.     Time In: 1147  Time Out: 1202  Total Evaluation Minutes: 15    Iggy Doherty PT         [1]   Patient Active Problem List  Diagnosis    Coronary artery disease involving native coronary artery of native heart without angina pectoris    Essential hypertension    Mixed hyperlipidemia    Chest pain    PVC (premature ventricular contraction)    Palpitations    Chronic bilateral low back pain without sciatica    Lumbar degenerative disc disease    Myofascial pain syndrome    Sacroiliitis (HCC)    Presence of drug coated stent in left circumflex coronary artery    Chronic pain syndrome    Lumbar radiculopathy    Complex regional pain syndrome type 1    Gastroesophageal reflux disease    Gastroparesis    History of PTCA 2    Major depressive disorder, recurrent (HCC)    Nicotine dependence    Radiculitis, thoracic    Displacement of thoracic intervertebral disc    Elevated troponin I level    Hypokalemia    Type 2 diabetes mellitus (HCC)    Lumbar spondylosis    ADDISON (obstructive sleep apnea)    Frequent nocturnal awakening    Snoring    Chronic  insomnia    Acute left-sided low back pain with left-sided sciatica   [2]   Past Medical History:  Diagnosis Date    Arthritis     Coronary artery disease     MI x 4; cardiac cath with 4 stents    GERD (gastroesophageal reflux disease)     Hypertension     Myocardial infarct (HCC)     4 MIs as of 2/24/2020    Non-insulin dependent type 2 diabetes mellitus (HCC) 8/24/2022   [3]   Past Surgical History:  Procedure Laterality Date    CARDIAC CATHETERIZATION  1/7/2022    Procedure: Cardiac catheterization;  Surgeon: Jasiel Padilla DO;  [facility]: [facility];  Service: Cardiology    CARDIAC CATHETERIZATION N/A 8/26/2022    Procedure: Cardiac catheterization;  Surgeon: Kirt Lezama MD;  [facility];  Service: Cardiology    CARDIAC CATHETERIZATION N/A 8/26/2022    Procedure: Cardiac pci;  Surgeon: Kirt Lezama MD;  [facility];  Service: Cardiology    CARDIAC CATHETERIZATION Left 8/26/2022    Procedure: Cardiac Left Heart Cath;  Surgeon: Kirt Lezama MD;  [facility];  Service: Cardiology    CARDIAC CATHETERIZATION N/A 8/26/2022    Procedure: Cardiac Coronary Angiogram;  Surgeon: Kirt Lezama MD;  [facility];  Service: Cardiology    CARDIAC CATHETERIZATION Left 12/20/2024    Procedure: Cardiac Left Heart Cath;  Surgeon: Jasiel Padilla DO;  [facility];  Service: Cardiology    CARDIAC CATHETERIZATION N/A 12/20/2024    Procedure: Cardiac Coronary Angiogram;  Surgeon: Jasiel Padilla DO;  [facility];  Service: Cardiology    CARDIAC CATHETERIZATION N/A 12/20/2024    Procedure: Cardiac PCI;  Surgeon: Jasiel Padilla DO;  [facility];  Service: Cardiology    CORONARY ANGIOPLASTY WITH STENT PLACEMENT

## 2025-06-25 NOTE — DISCHARGE SUMMARY
"Discharge Summary - Hospitalist   Name: Kai Breen 55 y.o. male I MRN: [id number]  Unit/Bed#: -01 I Date of Admission: 6/24/2025   Date of Service: 6/25/2025 I Hospital Day: 0     Assessment & Plan  Acute left-sided low back pain with left-sided sciatica  Patient reports worsening low back pain that radiates down his left leg. He reports numbness in his left great toe only. He denies any incontinence of bowel/bladder.   Patient denies injury/trauma but does note he was on a ladder staining the house and believes he may have \"shivam his back\" as he was leaning against the house.   CT L spine:   No acute lumbar spine fracture or traumatic malalignment. Lower lumbar spine predominant degenerative change with severe left-sided neuroforaminal stenosis at L5-S1. Correlate for left L5 radiculopathy. Degenerative changes in the visualized lower thoracic spine with mild spinal canal narrowing and severe bilateral neuroforaminal narrowing at T11-T12.  Patient received decadron, valium, IV morphine, and IV dilaudid in the ED  MRI L spine- Mild worsening multilevel lumbar spondylosis, as described above, contributing to at most mild canal stenosis, left subarticular zone narrowing and moderate left neural foraminal stenosis at L5-S1. Remaining levels appear grossly unchanged. Lower thoracic spondylosis, contributing to mild canal stenosis and bilateral neural foraminal stenosis at T11-T12. This appears stable when compared to the prior study.  Discussed case with neurosurgery- no acute surgical intervention indicated.   PT/OT input appreciated. Recommends outpatient rehab.   Started on Medrol Dosepak; monitor blood glucose closely at home.   Will continue with pain management upon discharge- tylenol, robaxin, Lidoderm patch, PRN oxycodone, PRN valium HS  Outpatient referral to neurosurgery and orthopedics   Declined pain management follow up at this time as he previously saw them in the past and told that there were no " further intervention recommended.   Coronary artery disease involving native coronary artery of native heart without angina pectoris  Continue home regimen of aspirin, Lipitor, Imdur, inderal, and Brilinta   Outpatient follow up with cardiology   Essential hypertension  Blood pressure currently controlled  Continue home regimen of losartan and propranolol   Continue outpatient follow up with PCP   Major depressive disorder, recurrent (HCC)  Continue home regimen of Effexor and trazadone   Type 2 diabetes mellitus (HCC)  Lab Results   Component Value Date    HGBA1C 8.3 (H) 12/19/2024       Recent Labs     06/24/25  1618 06/24/25  2132 06/25/25  0737 06/25/25  1107   POCGLU 369* 331* 176* 230*       Blood Sugar Average: Last 72 hrs:  (P) 276.5    Resume home regimen  Continue to monitor blood sugar closely while on steroid therapy      Discharging Physician / Practitioner: OLEGARIO Ye  PCP: Nellie Worley DO  Admission Date:   Admission Orders (From admission, onward)       Ordered        06/24/25 1429  Place in Observation  Once                          Discharge Date: 06/25/25    Reason for Admission: acute left-sided lower back pain with left-sided sciatica    Discharge Diagnoses:     Principal Problem:    Acute left-sided low back pain with left-sided sciatica  Active Problems:    Coronary artery disease involving native coronary artery of native heart without angina pectoris    Essential hypertension    Major depressive disorder, recurrent (HCC)    Type 2 diabetes mellitus (HCC)  Resolved Problems:    * No resolved hospital problems. *      Consultation During Hospital Stay:  None    Procedures Performed:     None     Significant Findings / Test Results:     MRI lumbar spine wo contrast  Result Date: 6/25/2025  Mild worsening multilevel lumbar spondylosis, as described above, contributing to at most mild canal stenosis, left subarticular zone narrowing and moderate left neural foraminal stenosis  "at L5-S1. Remaining levels appear grossly unchanged. Lower thoracic spondylosis, contributing to mild canal stenosis and bilateral neural foraminal stenosis at T11-T12.     CT spine lumbar without contrast  Result Date: 6/24/2025  1.  No acute lumbar spine fracture or traumatic malalignment. 2.  Lower lumbar spine predominant degenerative change with severe left-sided neuroforaminal stenosis at L5-S1. Correlate for left L5 radiculopathy. 3.  Degenerative changes in the visualized lower thoracic spine with mild spinal canal narrowing and severe bilateral neuroforaminal narrowing at T11-T12.     Incidental Findings:   None      Test Results Pending at Discharge (will require follow up):   None      Outpatient Tests Requested:  Follow up with PCP, neurosurgery, orthopedics    Complications: None    Hospital Course:     Kai Breen is a 55 y.o. male patient who originally presented to the hospital on 6/24/2025 due to acute onset of lower back pain with radiation down his left leg.  The patient with past medical history of diabetes, CAD and depression.  The patient reported that he was up on the ladder at home staining his house and believes that he may have \"shivam\" his back when leaning against the wall trying to break a fall.  He presented with severe left hip pain which radiates to his left lower extremity unable to ambulate.  CT of the lumbar spine shows degenerative change with severe left-sided neuroforaminal stenosis at L5-S1.  A MRI of the lumbar spine shows moderate left neuroforaminal stenosis at L5-S1.  The patient initially presented with mild left great toe numbness which has significantly improved since admission.  He was treated with pain management, muscle relaxant and steroid which will be continued upon discharge.  Discussed discharge planning with the patient and his wife in details.  The patient will need to continue to monitor his blood glucose closely while on steroid therapy. He was noted to have " "mild creatinine elevation on admission likely due to volume depletion while on NSAIDs. He received IV fluids with improvement.  Outpatient referrals to neurosurgery and orthopedics place. Prior to discharge, all questions were answered and patient expressed understanding of the plan. This serves as a brief discharge summary. See prior documentation for full details.     Condition at Discharge: fair     Discharge Day Visit / Exam:     Subjective: Patient was seen and examined.  He states that his pain is about a 9 out of 10 intensity.  He states however that that significantly improved since he came in.  He is looking forward to returning home as he has been dealing with neck and lower back pain all his life. He feels comfortable managing his pain at home.   Vitals: Blood Pressure: 130/77 (06/25/25 0719)  Pulse: 68 (06/25/25 0719)  Temperature: 97.8 °F (36.6 °C) (06/25/25 0719)  Temp Source: Oral (06/24/25 1503)  Respirations: 18 (06/24/25 2138)  Height: 5' 8\" (172.7 cm) (06/24/25 1503)  Weight - Scale: 78.4 kg (172 lb 13.5 oz) (06/24/25 1503)  SpO2: 99 % (06/25/25 0719)  Exam:   Physical Exam  Vitals and nursing note reviewed.   Constitutional:       General: He is not in acute distress.     Appearance: Normal appearance.   HENT:      Head: Normocephalic and atraumatic.      Right Ear: External ear normal.      Left Ear: External ear normal.      Nose: Nose normal.      Mouth/Throat:      Mouth: Mucous membranes are moist.      Pharynx: Oropharynx is clear.     Eyes:      General:         Right eye: No discharge.         Left eye: No discharge.      Extraocular Movements: Extraocular movements intact.      Pupils: Pupils are equal, round, and reactive to light.       Cardiovascular:      Rate and Rhythm: Normal rate and regular rhythm.      Pulses: Normal pulses.      Heart sounds: Normal heart sounds. No murmur heard.  Pulmonary:      Effort: Pulmonary effort is normal. No respiratory distress.      Breath sounds: " Normal breath sounds. No wheezing or rales.   Abdominal:      General: Bowel sounds are normal. There is no distension.      Palpations: Abdomen is soft. There is no mass.      Tenderness: There is no abdominal tenderness.     Musculoskeletal:         General: No swelling, tenderness or deformity.      Cervical back: Normal range of motion and neck supple. No rigidity.      Right hip: Bony tenderness present. Decreased range of motion.     Skin:     General: Skin is warm and dry.      Capillary Refill: Capillary refill takes less than 2 seconds.      Coloration: Skin is not pale.      Findings: No erythema.     Neurological:      General: No focal deficit present.      Mental Status: He is alert and oriented to person, place, and time. Mental status is at baseline.     Psychiatric:         Mood and Affect: Mood normal.         Behavior: Behavior normal.         Thought Content: Thought content normal.         Judgment: Judgment normal.       Discussion with Family: wife at bedside     Discharge instructions/Information to patient and family:   See after visit summary for information provided to patient and family.      Provisions for Follow-Up Care:  See after visit summary for information related to follow-up care and any pertinent home health orders.      Disposition:     Home    Planned Readmission: no      Discharge Statement:  I spent 42 minutes discharging the patient. This time was spent on the day of discharge. I had direct contact with the patient on the day of discharge. Greater than 50% of the total time was spent examining patient, answering all patient questions, arranging and discussing plan of care with patient as well as directly providing post-discharge instructions.  Additional time then spent on discharge activities.    Discharge Medications:  See after visit summary for reconciled discharge medications provided to patient and family.      ** Please Note: This note has been constructed using a  voice recognition system **

## 2025-06-25 NOTE — CASE MANAGEMENT
Case Management Discharge Planning Note    Patient name Kai Breen  Location /-01 MRN [id number]  : 1970 Date 2025       Current Admission Date: 2025  Current Admission Diagnosis:Acute left-sided low back pain with left-sided sciatica   Patient Active Problem List    Diagnosis Date Noted    Acute left-sided low back pain with left-sided sciatica 2025    Frequent nocturnal awakening 2024    Snoring 2024    Chronic insomnia 2024    ADDISON (obstructive sleep apnea) 2024    Lumbar spondylosis 03/10/2023    Elevated troponin I level 2022    Hypokalemia 2022    Type 2 diabetes mellitus (HCC) 2022    Chronic pain syndrome 2022    Lumbar radiculopathy 2022    Presence of drug coated stent in left circumflex coronary artery 2022    Chronic bilateral low back pain without sciatica 2021    Lumbar degenerative disc disease 2021    Myofascial pain syndrome 2021    Sacroiliitis (HCC) 2021    Palpitations 2021    PVC (premature ventricular contraction) 2019    Coronary artery disease involving native coronary artery 2019    Essential hypertension 2019    Mixed hyperlipidemia 2019    Chest pain 2019    Major depressive disorder, recurrent (HCC) 2018    Nicotine dependence 2018    Gastroesophageal reflux disease 2017    Gastroparesis 2017    History of PTCA 2 2017    Complex regional pain syndrome type 1 2016    Radiculitis, thoracic 2015    Displacement of thoracic intervertebral disc 2015      LOS (days): 0  Geometric Mean LOS (GMLOS) (days):   Days to GMLOS:     OBJECTIVE:            Current admission status: Observation   Preferred Pharmacy:   [facility]  [place] [address] [facility] [facility]  [address]  [place] [address]  Phone: [phone] Fax: [phone]    [facility] -  [place] - [address]  [address] [place] [address]  Phone: [phone] Fax: [phone]    Primary Care Provider: Nellie Worley DO    Primary Insurance: CAPITAL  Secondary Insurance:     DISCHARGE DETAILS:                                                                                                    Additional Comments: Patient medically clear for discharge home today.  PT/OT recommending outpatient.  CM ordered a RW and delivered from consignment. Family to transport.

## 2025-06-25 NOTE — CASE MANAGEMENT
Case Management Assessment & Discharge Planning Note    Patient name Kai Breen  Location /-01 MRN [id number]  : 1970 Date 2025       Current Admission Date: 2025  Current Admission Diagnosis:Acute left-sided low back pain with left-sided sciatica   Patient Active Problem List    Diagnosis Date Noted    Acute left-sided low back pain with left-sided sciatica 2025    Frequent nocturnal awakening 2024    Snoring 2024    Chronic insomnia 2024    ADDISON (obstructive sleep apnea) 2024    Lumbar spondylosis 03/10/2023    Elevated troponin I level 2022    Hypokalemia 2022    Type 2 diabetes mellitus (HCC) 2022    Chronic pain syndrome 2022    Lumbar radiculopathy 2022    Presence of drug coated stent in left circumflex coronary artery 2022    Chronic bilateral low back pain without sciatica 2021    Lumbar degenerative disc disease 2021    Myofascial pain syndrome 2021    Sacroiliitis (HCC) 2021    Palpitations 2021    PVC (premature ventricular contraction) 2019    Coronary artery disease involving native coronary artery 2019    Essential hypertension 2019    Mixed hyperlipidemia 2019    Chest pain 2019    Major depressive disorder, recurrent (HCC) 2018    Nicotine dependence 2018    Gastroesophageal reflux disease 2017    Gastroparesis 2017    History of PTCA 2 2017    Complex regional pain syndrome type 1 2016    Radiculitis, thoracic 2015    Displacement of thoracic intervertebral disc 2015      LOS (days): 0  Geometric Mean LOS (GMLOS) (days):   Days to GMLOS:     OBJECTIVE:              Current admission status: Observation       Preferred Pharmacy:   [email or web address] - [place], [place] [address] [facility] [facility]  [address]  [place] [address]  Phone: [phone] Fax:  [phone]    [facility] [place]OW[place] - [address]  [address] [place] [address]  Phone: [phone] Fax: [phone]    Primary Care Provider: Nellie Worley DO    Primary Insurance: CAPITAL  Secondary Insurance:     ASSESSMENT:  Active Health Care Proxies    There are no active Health Care Proxies on file.                                                          DISCHARGE DETAILS:    Discharge planning discussed with:: Patient                      Contacts  Patient Contacts: wife Maggy Malone  Relationship to Patient:: Family  Contact Method: In Person  Reason/Outcome: Continuity of Care, Emergency Contact, Discharge Planning                           Expected Discharge Disposition: Home or Self Care                                            Additional Comments: patient independent PTA.  [facility] 18.  PT/OT pending.  Possible discharge home today.  CM will continue to follow.

## 2025-06-25 NOTE — PLAN OF CARE
Problem: SAFETY ADULT  Goal: Maintain or return to baseline ADL function  Description: INTERVENTIONS:  -  Assess patient's ability to carry out ADLs; assess patient's baseline for ADL function and identify physical deficits which impact ability to perform ADLs (bathing, care of mouth/teeth, toileting, grooming, dressing, etc.)  - Assess/evaluate cause of self-care deficits   - Assess range of motion  - Assess patient's mobility; develop plan if impaired  - Assess patient's need for assistive devices and provide as appropriate  - Encourage maximum independence but intervene and supervise when necessary  - Involve family in performance of ADLs  - Assess for home care needs following discharge   - Consider OT consult to assist with ADL evaluation and planning for discharge  - Provide patient education as appropriate  - Monitor functional capacity and physical performance, use of AM PAC & -HLM   - Monitor gait, balance and fatigue with ambulation    Outcome: Progressing     Problem: Knowledge Deficit  Goal: Patient/family/caregiver demonstrates understanding of disease process, treatment plan, medications, and discharge instructions  Description: Complete learning assessment and assess knowledge base.  Interventions:  - Provide teaching at level of understanding  - Provide teaching via preferred learning methods  Outcome: Progressing     Problem: MUSCULOSKELETAL - ADULT  Goal: Maintain proper alignment of affected body part  Description: INTERVENTIONS:  - Support, maintain and protect limb and body alignment  - Provide patient/ family with appropriate education  Outcome: Progressing

## 2025-06-25 NOTE — ASSESSMENT & PLAN NOTE
"Patient reports worsening low back pain that radiates down his left leg. He reports numbness in his left great toe only. He denies any incontinence of bowel/bladder.   Patient denies injury/trauma but does note he was on a ladder staining the house and believes he may have \"shivam his back\" as he was leaning against the house.   CT L spine:   No acute lumbar spine fracture or traumatic malalignment. Lower lumbar spine predominant degenerative change with severe left-sided neuroforaminal stenosis at L5-S1. Correlate for left L5 radiculopathy. Degenerative changes in the visualized lower thoracic spine with mild spinal canal narrowing and severe bilateral neuroforaminal narrowing at T11-T12.  Patient received decadron, valium, IV morphine, and IV dilaudid in the ED  MRI L spine- Mild worsening multilevel lumbar spondylosis, as described above, contributing to at most mild canal stenosis, left subarticular zone narrowing and moderate left neural foraminal stenosis at L5-S1. Remaining levels appear grossly unchanged. Lower thoracic spondylosis, contributing to mild canal stenosis and bilateral neural foraminal stenosis at T11-T12. This appears stable when compared to the prior study.  Discussed case with neurosurgery- no acute surgical intervention indicated.   PT/OT input appreciated. Recommends outpatient rehab.   Started on Medrol Dosepak; monitor blood glucose closely at home.   Will continue with pain management upon discharge- tylenol, robaxin, Lidoderm patch, PRN oxycodone, PRN valium HS  Outpatient referral to neurosurgery and orthopedics   Declined pain management follow up at this time as he previously saw them in the past and told that there were no further intervention recommended.   "

## 2025-06-25 NOTE — ASSESSMENT & PLAN NOTE
Continue home regimen of aspirin, Lipitor, Imdur, inderal, and Brilinta   Outpatient follow up with cardiology

## 2025-06-25 NOTE — PLAN OF CARE
Problem: PHYSICAL THERAPY ADULT  Goal: Performs mobility at highest level of function for planned discharge setting.  See evaluation for individualized goals.  Description: Treatment/Interventions: Functional transfer training, LE strengthening/ROM, Elevations, Therapeutic exercise, Endurance training, Gait training  Equipment Recommended: Walker       See flowsheet documentation for full assessment, interventions and recommendations.  Outcome: Progressing  Note: Prognosis: Good  Problem List: Decreased strength, Decreased mobility, Impaired balance, Pain  Assessment: Pt is 55 y.o. male seen for PT evaluation s/p admit to [facility] on 6/24/2025 w/ Acute left-sided low back pain with left-sided sciatica. PT consulted to assess pt's functional mobility and d/c needs. Order placed for PT eval and tx, w/ up and OOB as tolerated order. Pt agreeable to PT  session upon arrival, pt found supine in bed.  PTA, pt was independent w/ all functional mobility w/ no ad and lives w/ spouse in 2 level home .  Pt to benefit from continued PT tx to address deficits and maximize level of functional independent mobility and consistency. Upon conclusion pt  supine in bed and with all needs in reach. Complexity: Comorbidities affecting pt's physical performance at time of assessment include:  radiculopathy, left sided sciatica, htn, dm, depression . Personal factors affecting pt at time of IE include: advanced age, limited mobility, lives in multistory home, and ambulating with assistive device. Please find objective findings from PT assessment regarding body systems outlined above with impairments and limitations including weakness, decreased ROM, impaired balance, pain, and fall risk.  Pt's clinical presentation is currently unstable/unpredictable seen in pt's presentation of abnormal WBC count, abnormal blood sugar levels, pain, and decline in mobility status. The patient's AM-PAC Basic Mobility Inpatient Short Form Raw  Score is 18 .  Based on patient presentations and impairments, pt would most appropriately benefit from Level 3 resource intensity upon discharge. A Raw score of greater than 16 suggests the patient may benefit from discharge to home. Please also refer to the recommendation of the Physical Therapist for safe discharge planning. RN verbalized pt appropriate for PT session.        Rehab Resource Intensity Level, PT: III (Minimum Resource Intensity)    See flowsheet documentation for full assessment.

## 2025-06-25 NOTE — ASSESSMENT & PLAN NOTE
Blood pressure currently controlled  Continue home regimen of losartan and propranolol   Continue outpatient follow up with PCP

## 2025-06-25 NOTE — PLAN OF CARE
Problem: Potential for Falls  Goal: Patient will remain free of falls  Description: INTERVENTIONS:  - Educate patient/family on patient safety including physical limitations  - Instruct patient to call for assistance with activity   - Consider consulting OT/PT to assist with strengthening/mobility based on AM PAC & JH-M score  - Consult OT/PT to assist with strengthening/mobility   - Keep Call bell within reach  - Keep bed low and locked with side rails adjusted as appropriate  - Keep care items and personal belongings within reach  - Initiate and maintain comfort rounds  - Make Fall Risk Sign visible to staff  - Offer Toileting every 2 Hours, in advance of need  - Initiate/Maintain bed alarm  - Apply yellow socks and bracelet for high fall risk patients  - Consider moving patient to room near nurses station  Outcome: Progressing     Problem: PAIN - ADULT  Goal: Verbalizes/displays adequate comfort level or baseline comfort level  Description: Interventions:  - Encourage patient to monitor pain and request assistance  - Assess pain using appropriate pain scale  - Administer analgesics as ordered based on type and severity of pain and evaluate response  - Implement non-pharmacological measures as appropriate and evaluate response  - Consider cultural and social influences on pain and pain management  - Notify physician/advanced practitioner if interventions unsuccessful or patient reports new pain  - Educate patient/family on pain management process including their role and importance of  reporting pain   - Provide non-pharmacologic/complimentary pain relief interventions  Outcome: Progressing     Problem: SAFETY ADULT  Goal: Patient will remain free of falls  Description: INTERVENTIONS:  - Educate patient/family on patient safety including physical limitations  - Instruct patient to call for assistance with activity   - Consider consulting OT/PT to assist with strengthening/mobility based on AM PAC & JH-M  score  - Consult OT/PT to assist with strengthening/mobility   - Keep Call bell within reach  - Keep bed low and locked with side rails adjusted as appropriate  - Keep care items and personal belongings within reach  - Initiate and maintain comfort rounds  - Make Fall Risk Sign visible to staff  - Offer Toileting every 2 Hours, in advance of need  - Initiate/Maintain bed alarm  - Apply yellow socks and bracelet for high fall risk patients  - Consider moving patient to room near nurses station  Outcome: Progressing

## 2025-06-25 NOTE — NURSING NOTE
IV site removed.  Discharge instructions given to patient, verbalized understanding.  Patient discharged via wheelchair to car accompanied by PCA and spouse

## 2025-06-25 NOTE — DISCHARGE INSTR - AVS FIRST PAGE
Dear Kai Breen,     It was our pleasure to care for you here at [facility].  It is our hope that we were always able to exceed the expected standards for your care during your stay.  You were hospitalized due to acute lower back pain.  You were cared for on the third floor by OLEGARIO Ye with the [facility] who covers for your primary care physician (PCP), Nellie Worley DO, while you were hospitalized.  If you have any questions or concerns related to this hospitalization, you may contact us at .  For follow up as well as any medication refills, we recommend that you follow up with your primary care physician.  A registered nurse will reach out to you by phone within a few days after your discharge to answer any additional questions that you may have after going home.  However, at this time we provide for you here, the most important instructions / recommendations at discharge:     Notable Medication Adjustments -   Medrol Dosepak  Robaxin 500 mg every 6 hours-muscle relaxants  Valium 5 mg as needed every 12 hours-muscle relaxants  Oxycodone 5 mg as needed every 4-6 hours for pain  Lidoderm patch  Testing Required after Discharge -   None  Important follow up information -   Follow-up with PCP, neurosurgery, and orthopedics  Other Instructions -   Please check your blood glucose closely while on steroid.   Please refer to discharge instructions  Please review this entire after visit summary as additional general instructions including medication list, appointments, activity, diet, any pertinent wound care, and other additional recommendations from your care team that may be provided for you.      Sincerely,     OLEGARIO Ye

## 2025-06-25 NOTE — ASSESSMENT & PLAN NOTE
Lab Results   Component Value Date    HGBA1C 8.3 (H) 12/19/2024       Recent Labs     06/24/25  1618 06/24/25  2132 06/25/25  0737 06/25/25  1107   POCGLU 369* 331* 176* 230*       Blood Sugar Average: Last 72 hrs:  (P) 276.5    Resume home regimen  Continue to monitor blood sugar closely while on steroid therapy

## 2025-06-26 RX ORDER — METHYLPREDNISOLONE 4 MG/1
TABLET ORAL
Qty: 21 TABLET | Refills: 0 | Status: SHIPPED | OUTPATIENT
Start: 2025-06-26 | End: 2025-07-02

## 2025-06-30 NOTE — PROGRESS NOTES
"PT Evaluation     Today's date: 2025  Patient name: Kai Breen  : 1970  MRN: [id number]  Referring provider: Fanta Robertson CRNP  Dx:   Encounter Diagnosis     ICD-10-CM    1. Acute left-sided low back pain with left-sided sciatica  M54.42 Ambulatory referral to Physical Therapy          Start Time: 1302  Stop Time: 1345  Total time in clinic (min): 43 minutes    Assessment  Impairments: abnormal gait, abnormal or restricted ROM, activity intolerance, impaired balance, impaired physical strength, lacks appropriate home exercise program, pain with function, poor posture , poor body mechanics, unable to perform ADL, participation limitations, activity limitations and endurance  Symptom irritability: moderate    Assessment details: Kai \"Bonifacio\"Jamshid is a 55-year-old male who presented to outpatient physical therapy services for acute low back pain with left-sided sciatica. He exhibited limitations in lumbar AROM with FLX, B/L SB, and B/L ROT. Strength in the B/L hips was WFL apart from L hip IR, and pain was noted with resisted L hip ER/IR. Increased neural tension was noted with a R Slump Test, but he did not experience an exacerbation of his radicular symptoms. Tenderness was present in the R SI joint, and special testing indicated mechanical pathology of the lumbar spine and SI join dysfunction. Recent imaging of the lumbar spine and pelvis was reviewed, and his signs and symptoms are consistent with the referring diagnosis. He would benefit from skilled physical therapy services 2 times/week for 6 weeks to address impairments in lumbar mobility, pelvic stability, posture, activity tolerance, and nerve mobility. Bonifacio reviewed and performed the exercises included in his HEP to provide concurrent feedback regarding proper positioning and their purpose within the POC. Time was allotted for questions and concerns, and these were answered to Bonifacio's satisfaction. Thank you for your referral. "   Understanding of Dx/Px/POC: good     Prognosis: fair    Goals  Short-Term Goals (1-4 Weeks)  1. Bonifacio will demonstrate 55 degrees of lumbar FLX AROM.  2. Bonifacio will note minimal TTP of the R SI joint.  3. Bonifacio will exhibit a L hip IR strength of 4+/5.  4. Bonifacio will perform 10 repetitions of lumbar FLX with minimal exacerbation of symptoms.   5. Bonifacio will perform his HEP at least 3 days/week with minimal cueing.     Long-Term Goals (5-8 Weeks)  1. Bonifacio will stand for 15 minutes without low back pain to demonstrate improved activity tolerance.   2. Bonifacio will bend forward for 5 repetitions without symptoms to simulate the ability to pick objects off of the ground.   3. Bonifacio will ambulate on even ground for 20 minutes without symptoms to encourage greater access to community settings.   4. Bonifacio will exhibit a (-) SI Compression Test on the R to indicate reduced irritability of the SI joint.   5. Bonifacio will progress his HEP and demonstrate adherence as advised to show improvement self-management skills.   6. Bonifacio will report an overall reduction in symptoms by at least 50% to improve his quality of life and show decreased symptom severity.   7. Bonifacio will achieve a FOTO score of (TBD at [facility]) prior to discharge.     Plan  Patient would benefit from: skilled physical therapy  Planned modality interventions: TENS, thermotherapy: hydrocollator packs and cryotherapy    Planned therapy interventions: abdominal trunk stabilization, ADL retraining, balance, body mechanics training, breathing training, functional ROM exercises, flexibility, graded activity, gait training, graded exercise, IADL retraining, home exercise program, stretching, strengthening, therapeutic activities, therapeutic exercise, postural training, patient/caregiver education, neuromuscular re-education, IASTM, joint mobilization, kinesiology taping, manual therapy, massage and nerve gliding    Frequency: 2x week  Duration in weeks: 6  Plan of Care beginning date:  "7/1/2025  Plan of Care expiration date: 8/15/2025  Treatment plan discussed with: patient  Plan details: Kai reviewed and agreed with the POC.        Subjective Evaluation    History of Present Illness  Date of onset: 6/24/2025  Mechanism of injury: Bonifacio reported that he experienced a \"jarring\" pain in his low back on 06/24/2025, which radiated from his left hip to great toe. He had difficulty walking, and he went to the ED where imaging showed significant stenosis at L5-S1. He was prescribed muscle relaxers and a steroid, which did not affect his pain. Bonifacio stated that he has a long history of low back and hip pain, and he has tried many medications without avail. He feels like the pain is coming from his hip, and he is in active treatment with pain management. Due to his pain, he has difficulty with walking, standing, stair climbing, and his usual household activities. He continues to have radicular symptoms into his LLE, and he occasionally experiences buckling in the knees, which does cause him to fall on occasion. He currently manages his symptoms with OxyCodone, usually at night time, but it does not completely alleviate his symptoms. Through skilled physical therapy services, Bonifacio would like to reduce his pain, improve his tolerance for activity, and minimize his radicular symptoms.       MRI of Lumbar Spine on 06/24/2025:  FINDINGS:  VERTEBRAL BODIES:  There are 5 lumbar type vertebral bodies. Mild retrolisthesis of L3 on L4 and L4 and L5. No compression fractures identified. Degenerative endplate changes at multiple levels, including mixed Modic degenerative endplate changes at T11-T12. Scattered hemangiomas involving the lumbar spine.  SACRUM:  Normal signal within the sacrum. No evidence of insufficiency or stress fracture.  DISTAL CORD AND CONUS:  Normal size and signal within the distal cord and conus. Conus terminates at L1-L2.  PARASPINAL SOFT TISSUES:  Paraspinal soft tissues are unremarkable.  LOWER " THORACIC DISC SPACES: Disc height loss, disc bulges with superimposed disc protrusions at T10-T12, contributing to mild canal stenosis at both levels with bilateral foraminal stenosis at T11-T12. Findings appear stable when compared to the prior   study.  LUMBAR DISC SPACES:  L1-L2:  Normal.  L2-L3: Disc bulge with superimposed central disc extrusion extending from the superiorly and inferiorly, and right foraminal and extraforaminal disc protrusion. Bilateral facet arthropathy. Mild canal stenosis. Right subarticular zone narrowing. Mild   right foraminal stenosis. Findings appear similar.  L3-L4: Disc bulge with superimposed shallow right central and subarticular disc protrusion. Bilateral facet arthropathy. No canal stenosis. No significant foraminal stenosis. Findings appear similar.  L4-L5: Disc bulge with superimposed central right central disc protrusion as well as left subarticular and foraminal disc protrusion. Bilateral facet arthropathy. No canal stenosis. Mild bilateral foraminal stenosis. Findings appear stable.  L5-S1: Disc bulge with superimposed central disc protrusion, as well as left subarticular and foraminal disc protrusion. Bilateral hypertrophic facet arthropathy with facet effusions. Mild canal stenosis. Left subarticular zone narrowing. Moderate left   and minimal right foraminal stenosis. Findings appear slightly worse when compared to the prior study.  OTHER FINDINGS: Lower pole left renal cyst for which no further imaging evaluation or follow-up is needed.  IMPRESSION: Mild worsening multilevel lumbar spondylosis, as described above, contributing to at most mild canal stenosis, left subarticular zone narrowing and moderate left neural foraminal stenosis at L5-S1. Remaining levels appear grossly unchanged. Lower thoracic spondylosis, contributing to mild canal stenosis and bilateral neural foraminal stenosis at T11-T12. This appears stable when compared to the prior study.      CT of Lumbar  Spine on 2025:  FINDINGS:  ALIGNMENT:  There are 5 lumbar type vertebral bodies.  Normal alignment of the lumbar spine.  VERTEBRAE:  No fracture.  No lytic or blastic lesion.  DEGENERATIVE CHANGES:  Lower Thoracic spine:  Calcified disc bulge at T11-T12 resulting in mild spinal canal narrowing. There is also severe bilateral neural foraminal stenosis at T11-T12.  Lumbar Spine:  Spinal canal narrowing: No mild spinal canal narrowing at L2-3 secondary to a circumferential disc bulge.  Neuroforaminal narrowing: Severe left-sided neuroforaminal stenosis at L5-S1. Moderate neuroforaminal narrowing at L2-L3 (right) L4-L5 (bilateral), L5-S1 (right).  PARASPINAL SOFT TISSUES:  Normal.  OTHER: Mild atherosclerotic calcifications of the abdominal and pelvic vasculature.  IMPRESSION:  1.  No acute lumbar spine fracture or traumatic malalignment.  2.  Lower lumbar spine predominant degenerative change with severe left-sided neuroforaminal stenosis at L5-S1. Correlate for left L5 radiculopathy.  3.  Degenerative changes in the visualized lower thoracic spine with mild spinal canal narrowing and severe bilateral neuroforaminal narrowing at T11-T12.          Recurrent probem    Quality of life: fair    Patient Goals  Patient goals for therapy: decreased pain, improved balance, increased motion, increased strength, independence with ADLs/IADLs and return to sport/leisure activities    Pain  Current pain rating: 10  At best pain ratin  At worst pain ratin  Quality: sharp, radiating, tight, knife-like and burning  Relieving factors: medications and rest (laying down)  Aggravating factors: standing, walking, sitting, lifting, overhead activity and stair climbing  Progression: worsening    Social Support  Lives with: spouse and young children    Employment status: not working    Diagnostic Tests  MRI studies: abnormal (see subjective above)  CT scan: abnormal (see subjective above)  Treatments  Current treatment:  medication and physical therapy        Objective     Tenderness     Left Hip   No tenderness in the ASIS, PSIS, iliac crest and sacroiliac joint.     Right Hip   Tenderness in the sacroiliac joint. No tenderness in the ASIS, PSIS, greater trochanter and iliac crest.     Active Range of Motion   Cervical/Thoracic Spine       Thoracic    Flexion: 35 degrees   Extension: 20 degrees        Lumbar   Flexion: 48 degrees   Extension: 14 degrees   Left lateral flexion: 17 degrees     Right lateral flexion: 22 degrees   Left rotation:  Restriction level: minimal  Right rotation:  Restriction level: moderate  Left Hip   Flexion: 114 degrees   External rotation (90/90): 35 degrees   Internal rotation (90/90): 25 degrees     Right Hip   Flexion: 115 degrees   External rotation (90/90): 46 degrees   Internal rotation (90/90): 20 degrees     Additional Active Range of Motion Details  Hamstrings  L: -11 degrees  R: -13 degrees    Joint Play     Pain: L1, L2, L3, L4, L5 and S1     Strength/Myotome Testing     Left Hip   Planes of Motion   Flexion: 4+  Abduction: 4+  External rotation: 4  Internal rotation: 4-    Right Hip   Planes of Motion   Flexion: 4+  Abduction: 4  External rotation: 4  Internal rotation: 4    Tests     Lumbar   Negative SIJ compression and sacroiliac distraction.     Left   Positive passive SLR.   Negative crossed SLR.     Right   Negative crossed SLR and passive SLR.     Left Pelvic Girdle/Sacrum   Positive: active SLR test.     Right Pelvic Girdle/Sacrum   Negative: active SLR test.     Left Hip   Positive FADIR.   Negative MINOO, long sit, SI compression and SI distraction.     Right Hip   Negative MINOO, FADIR, long sit, SI compression and SI distraction.     Ambulation     Observational Gait   Gait: antalgic   Decreased walking speed, stride length, left stance time, right swing time and right step length.   Left foot contact pattern: foot flat  Right foot contact pattern: heel to toe  Left arm swing:  "increased  Right arm swing: increased  Base of support: increased    Quality of Movement During Gait     Pelvis  Neutral pelvic tilt.              Precautions: Hx of PVCs, HTN, CAD, GERD, T2DM, CRPS, depression, CPS, and MI 4x  HEP Code: [id number]  POC: 07/01/2025-08/15/2025      FOTO         Visit Number         Current Score         Goal Score             Manuals 07/01        MFR/STM of Lumbar Paraspinals/QL/  Piriformis                  Neuro Re-Ed         Palloff Press Walk-Outs         Resisted Lumbar ROT w/ TheraBand         Supine Posterior Pelvic Tilts 15x        TA Hold w/ Palpation 10x3\"        OH Diagonals w/ FWD Bend and Lumbar ROT                  Ther Ex         Recumbent Bike (Strength)         Standing Hip FLX w/ TheraBand         Open Book Against Wall         Supermans         Supine Clamshells 15x        Prone Hip EXT w/ Knee EXT 15x each        Side-Lying Clamshells w/ Ball Between Heels                  Ther Activity         STS w/ Plate Hold         Squats         Suitcase Carry         Sled Push/Pull                  Modalities                                          "

## 2025-07-01 ENCOUNTER — EVALUATION (OUTPATIENT)
Dept: PHYSICAL THERAPY | Facility: CLINIC | Age: 55
End: 2025-07-01
Attending: NURSE PRACTITIONER
Payer: COMMERCIAL

## 2025-07-01 DIAGNOSIS — M54.42 ACUTE LEFT-SIDED LOW BACK PAIN WITH LEFT-SIDED SCIATICA: Primary | ICD-10-CM

## 2025-07-01 PROCEDURE — 97161 PT EVAL LOW COMPLEX 20 MIN: CPT

## 2025-07-01 PROCEDURE — 97110 THERAPEUTIC EXERCISES: CPT

## 2025-07-02 ENCOUNTER — OFFICE VISIT (OUTPATIENT)
Dept: PHYSICAL THERAPY | Facility: CLINIC | Age: 55
End: 2025-07-02
Attending: NURSE PRACTITIONER
Payer: COMMERCIAL

## 2025-07-02 ENCOUNTER — OFFICE VISIT (OUTPATIENT)
Age: 55
End: 2025-07-02
Payer: COMMERCIAL

## 2025-07-02 VITALS — WEIGHT: 172 LBS | BODY MASS INDEX: 26.07 KG/M2 | HEIGHT: 68 IN

## 2025-07-02 DIAGNOSIS — M79.18 MYOFASCIAL PAIN SYNDROME: ICD-10-CM

## 2025-07-02 DIAGNOSIS — G89.4 CHRONIC PAIN SYNDROME: Primary | ICD-10-CM

## 2025-07-02 DIAGNOSIS — M54.42 ACUTE LEFT-SIDED LOW BACK PAIN WITH LEFT-SIDED SCIATICA: Primary | ICD-10-CM

## 2025-07-02 DIAGNOSIS — M54.50 CHRONIC BILATERAL LOW BACK PAIN WITHOUT SCIATICA: ICD-10-CM

## 2025-07-02 DIAGNOSIS — M46.1 SACROILIITIS (HCC): ICD-10-CM

## 2025-07-02 DIAGNOSIS — M54.16 LUMBAR RADICULOPATHY: ICD-10-CM

## 2025-07-02 DIAGNOSIS — G89.29 CHRONIC BILATERAL LOW BACK PAIN WITHOUT SCIATICA: ICD-10-CM

## 2025-07-02 PROCEDURE — 99214 OFFICE O/P EST MOD 30 MIN: CPT | Performed by: NURSE PRACTITIONER

## 2025-07-02 PROCEDURE — 97140 MANUAL THERAPY 1/> REGIONS: CPT

## 2025-07-02 PROCEDURE — 97110 THERAPEUTIC EXERCISES: CPT

## 2025-07-02 PROCEDURE — 97112 NEUROMUSCULAR REEDUCATION: CPT

## 2025-07-02 NOTE — PROGRESS NOTES
"Daily Note     Today's date: 2025  Patient name: Kai Breen  : 1970  MRN: [id number]  Referring provider: Fanta Robertson CRNP  Dx:   Encounter Diagnosis     ICD-10-CM    1. Acute left-sided low back pain with left-sided sciatica  M54.42           Start Time: 1109  Stop Time: 1153  Total time in clinic (min): 44 minutes    Subjective: Bonifacio reported hat       Objective: See treatment diary below      Assessment: Bonifacio tolerated treatment well. Demonstrations and cues were provided throughout this initial treatment visit to ensure appropriate execution of the exercises outlined in his program below, and he was encouraged to provide subjective feedback. Exercises were conducted within his tolerance with minimal symptoms. STM was performed on the paraspinals near the right SI joint, and he noted muscular soreness post-treatment without exacerbation of radicular symptoms. Bonifacio would benefit from continued PT to improve his lumbar joint stability and reduce his intolerance for continuous ambulation.       Plan: Continue per plan of care.      Precautions: Hx of PVCs, HTN, CAD, GERD, T2DM, CRPS, depression, CPS, and MI 4x  HEP Code: [email or web address]  POC: 2025-08/15/2025      FOTO         Visit Number 2        Current Score 45        Goal Score 67            Manuals        MFR/STM of Lumbar Paraspinals/QL/  Piriformis  14 min                Neuro Re-Ed         Palloff Press Walk-Outs  10x5\" ea  DRed       Resisted Lumbar ROT w/ TheraBand         Supine Posterior Pelvic Tilts 15x 20x       TA Hold w/ Palpation 10x3\" 15x3\"       OH Diagonals w/ FWD Bend and Lumbar ROT                  Ther Ex         Recumbent Bike (Strength)  5 min  L1       Standing Hip FLX w/ TheraBand         Open Book Against Wall         Supermans         Supine Clamshells 15x  Green 20x3\"  Green       Prone Hip EXT w/ Knee EXT 15x each 2x10 each       Prone Hip EXT w/ Knee FLX  15x each       Side-Lying Clamshells w/ Ball " Between Heels                  Ther Activity         STS w/ Plate Hold         Squats         Suitcase Carry         Sled Push/Pull                  Modalities

## 2025-07-02 NOTE — ASSESSMENT & PLAN NOTE
Orders:    tiZANidine (ZANAFLEX) 4 mg tablet; Take 0.5-1 tablets (2-4 mg total) by mouth every 8 (eight) hours as needed for muscle spasms    While the patient was in the office today, I did have a thorough conversation regarding their chronic pain syndrome, medication management, and treatment plan options.  Patient is being seen for a follow-up visit.  He was last seen on 3/8/2024 at which time he underwent bilateral sacroiliac joint injections.  Patient reports that his pain was up to 40% improved for about 2 to 3 weeks after the injection.  He had similar results in the past from sacroiliac joint injections.  He previously underwent epidural steroid injections which failed to provide him with lasting results.  He has tried physical therapy and chiropractic care in the past which were not helpful.    Recently, pain became so bad that he went to the emergency department and was kept overnight for observation on 6/24/2025.  He was discharged to home on a tapering dose of prednisone, methocarbamol, Valium, oxycodone.  He denies any relief from any of these medications.    Previous medications tried in the past include diclofenac, Cymbalta, gabapentin, tramadol.  All were discontinued due to lack of efficacy.    A recently updated MRI of his lumbar spine reveals mild worsening multilevel spondylosis contributing to mild canal stenosis, left subarticular zone narrowing and moderate left neuroforaminal stenosis at L5-S1.  Remaining levels are grossly unchanged from previous study performed in 2021.    Pain just started physical therapy again yesterday.    Referral was made for neurosurgery upon discharge from the hospital.  He states that someone from the neurosurgeons office called him and told him that they wanted him to proceed with conservative options first.  At this point, patient has undergone interventional therapy in the past without great relief.  He is not a candidate for steroid injections at this time due  to elevated hemoglobin A1c of 8.9.    Discontinue methocarbamol, Valium, oxycodone.  Try tizanidine 2 to 4 mg every 8 hours as needed for spasms.    I think patient probably would benefit from neurosurgical opinion at this time.  I told him that even if surgery was recommended, his blood sugars would need to be better controlled first.  The patient was agreeable and verbalized an understanding.    Ultimately, we could consider neuromodulation if there are no other options.  We briefly discussed this during today's visit.    Follow-up in 6 weeks.

## 2025-07-02 NOTE — PROGRESS NOTES
Name: Kai Breen      : 1970      MRN: [id number]  Encounter Provider: Barbara Kocher, CRNP  Encounter Date: 2025   Encounter department: [facility] [place]  :  Assessment & Plan  Chronic pain syndrome  Chronic bilateral low back pain without sciatica  Sacroiliitis (HCC)  Lumbar radiculopathy  Myofascial pain syndrome  Orders:    tiZANidine (ZANAFLEX) 4 mg tablet; Take 0.5-1 tablets (2-4 mg total) by mouth every 8 (eight) hours as needed for muscle spasms    While the patient was in the office today, I did have a thorough conversation regarding their chronic pain syndrome, medication management, and treatment plan options.  Patient is being seen for a follow-up visit.  He was last seen on 3/8/2024 at which time he underwent bilateral sacroiliac joint injections.  Patient reports that his pain was up to 40% improved for about 2 to 3 weeks after the injection.  He had similar results in the past from sacroiliac joint injections.  He previously underwent epidural steroid injections which failed to provide him with lasting results.  He has tried physical therapy and chiropractic care in the past which were not helpful.    Recently, pain became so bad that he went to the emergency department and was kept overnight for observation on 2025.  He was discharged to home on a tapering dose of prednisone, methocarbamol, Valium, oxycodone.  He denies any relief from any of these medications.    Previous medications tried in the past include diclofenac, Cymbalta, gabapentin, tramadol.  All were discontinued due to lack of efficacy.    A recently updated MRI of his lumbar spine reveals mild worsening multilevel spondylosis contributing to mild canal stenosis, left subarticular zone narrowing and moderate left neuroforaminal stenosis at L5-S1.  Remaining levels are grossly unchanged from previous study performed in .    Pain just started physical therapy again yesterday.    Referral was  made for neurosurgery upon discharge from the hospital.  He states that someone from the neurosurgeons office called him and told him that they wanted him to proceed with conservative options first.  At this point, patient has undergone interventional therapy in the past without great relief.  He is not a candidate for steroid injections at this time due to elevated hemoglobin A1c of 8.9.    Discontinue methocarbamol, Valium, oxycodone.  Try tizanidine 2 to 4 mg every 8 hours as needed for spasms.    I think patient probably would benefit from neurosurgical opinion at this time.  I told him that even if surgery was recommended, his blood sugars would need to be better controlled first.  The patient was agreeable and verbalized an understanding.    Ultimately, we could consider neuromodulation if there are no other options.  We briefly discussed this during today's visit.    Follow-up in 6 weeks.    My impressions and treatment recommendations were discussed in detail with the patient who verbalized understanding and had no further questions.  Discharge instructions were provided. I personally saw and examined the patient and I agree with the above discussed plan of care.    History of Present Illness     Kai Breen is a 55 y.o. male who presents for a follow up office visit in regards to Hip Pain. The patient’s current symptoms include complaints of left-sided low back pain.  Pain can radiate down the anterior left leg to his ankle.  Patient reports occasional sense of weakness in the left leg.  Current pain level is an 8/10.  Quality pain is described as dull and aching.    Review of Systems   Constitutional:  Negative for unexpected weight change.   HENT:  Negative for hearing loss.    Eyes:  Negative for visual disturbance.   Respiratory:  Negative for shortness of breath.    Cardiovascular:  Negative for leg swelling.   Gastrointestinal:  Negative for constipation.   Endocrine: Negative for polyuria.  "  Genitourinary:  Negative for difficulty urinating.   Musculoskeletal:  Positive for gait problem. Negative for joint swelling and myalgias.   Skin:  Negative for rash.   Neurological:  Negative for weakness and headaches.   Psychiatric/Behavioral:  Negative for decreased concentration.    All other systems reviewed and are negative.      Medical History Reviewed by provider this encounter:     .  Medications Ordered Prior to Encounter[1]      Objective   Ht 5' 8\" (1.727 m)   Wt 78 kg (172 lb)   BMI 26.15 kg/m²      Pain Score:   8  Physical Exam  Constitutional: normal, well developed, well nourished, alert, in no distress and non-toxic and no overt pain behavior.  Eyes: anicteric  HEENT: grossly intact  Neck: supple, symmetric, trachea midline and no masses   Pulmonary: even and unlabored  Cardiovascular: No edema or pitting edema present  Skin: Normal without rashes or lesions and well hydrated  Psychiatric: Mood and affect appropriate  Neurologic: Cranial Nerves II-XII grossly intact  Musculoskeletal: Gait is slow and guarded.  Range of motion of the lumbar spine is limited in all planes.  There is tenderness over the left sacroiliac joint.           [1]   Current Outpatient Medications on File Prior to Visit   Medication Sig Dispense Refill    aspirin 81 MG tablet Take 81 mg by mouth in the morning.      atorvastatin (LIPITOR) 40 mg tablet Take 1 tablet (40 mg total) by mouth every 24 hours 90 tablet 3    cholestyramine sugar free (QUESTRAN LIGHT) 4 g packet Take 4 g by mouth 2 (two) times a day      Contour Next Test test strip       isosorbide mononitrate (IMDUR) 120 mg 24 hr tablet Take 1 tablet (120 mg total) by mouth daily 90 tablet 3    Jardiance 25 MG TABS Take 25 mg by mouth      lidocaine (LIDODERM) 5 % Apply 1 patch topically daily over 12 hours Remove & Discard patch within 12 hours or as directed by MD 30 patch 0    losartan (COZAAR) 100 MG tablet Take 1 tablet (100 mg total) by mouth every 24 " hours 90 tablet 3    Microlet Lancets MISC       Omega-3 Fatty Acids (OMEGA 3 PO) Take 2,000 mg by mouth in the morning and 2,000 mg before bedtime.      pantoprazole (PROTONIX) 40 mg tablet Take 40 mg by mouth in the morning.  3    propranolol (INDERAL LA) 160 mg Take 1 capsule (160 mg total) by mouth daily 90 capsule 3    ticagrelor (Brilinta) 90 MG Take 1 tablet (90 mg total) by mouth every 12 (twelve) hours 180 tablet 3    traZODone (DESYREL) 50 mg tablet 50 mg daily at bedtime      [DISCONTINUED] diazepam (VALIUM) 5 mg tablet Take 1 tablet (5 mg total) by mouth every 12 (twelve) hours as needed for muscle spasms for up to 10 days 5 tablet 0    venlafaxine 150 MG TB24 24 hr tablet 150 mg daily with breakfast (Patient not taking: Reported on 6/25/2025)      [DISCONTINUED] methocarbamol (ROBAXIN) 500 mg tablet Take 1 tablet (500 mg total) by mouth every 6 (six) hours for 10 days (Patient not taking: Reported on 7/2/2025) 40 tablet 0    [DISCONTINUED] methylPREDNISolone 4 MG tablet therapy pack Take 6 tablets (24 mg total) by mouth daily for 1 day, THEN 5 tablets (20 mg total) daily for 1 day, THEN 4 tablets (16 mg total) daily for 1 day, THEN 3 tablets (12 mg total) daily for 1 day, THEN 2 tablets (8 mg total) daily for 1 day, THEN 1 tablet (4 mg total) daily for 1 day. Use as directed on package. (Patient not taking: Reported on 7/2/2025) 21 tablet 0    [DISCONTINUED] oxyCODONE (ROXICODONE) 5 immediate release tablet Take 1 tablet (5 mg total) by mouth every 4 (four) hours as needed for moderate pain for up to 10 days Max Daily Amount: 30 mg (Patient not taking: Reported on 7/2/2025) 20 tablet 0     No current facility-administered medications on file prior to visit.

## 2025-07-07 ENCOUNTER — OFFICE VISIT (OUTPATIENT)
Dept: PHYSICAL THERAPY | Facility: CLINIC | Age: 55
End: 2025-07-07
Attending: NURSE PRACTITIONER
Payer: COMMERCIAL

## 2025-07-07 DIAGNOSIS — M54.42 ACUTE LEFT-SIDED LOW BACK PAIN WITH LEFT-SIDED SCIATICA: Primary | ICD-10-CM

## 2025-07-07 PROCEDURE — 97110 THERAPEUTIC EXERCISES: CPT

## 2025-07-07 PROCEDURE — 97112 NEUROMUSCULAR REEDUCATION: CPT

## 2025-07-07 PROCEDURE — 97140 MANUAL THERAPY 1/> REGIONS: CPT

## 2025-07-07 NOTE — PROGRESS NOTES
"Daily Note     Today's date: 2025  Patient name: Kai Breen  : 1970  MRN: [id number]  Referring provider: Fanta Robertson CRNP  Dx:   Encounter Diagnosis     ICD-10-CM    1. Acute left-sided low back pain with left-sided sciatica  M54.42           Start Time: 1703  Stop Time: 1756  Total time in clinic (min): 53 minutes    Subjective: Bonifacio reported that his back is a 4/10 today, and he noted pain in his low back following his previous PT visit. He remarked that he was staining his home over the weekend, and the eaves were most challenging due to his back pain.       Objective: See treatment diary below      Assessment: Bonifacio tolerated treatment well. A thoracic stretch was added without exacerbation of his radicular symptoms. Tactile cues were employed for correction of his pelvic tilts. Manual therapy was conducted within his tolerance, and he was advised to continue performing his HEP as advised to maximize the benefit of services. Bonifacio demonstrated fatigue post treatment and would benefit from continued PT to improve his thoracolumbar mobility and      Plan: Continue per plan of care.      Precautions: Hx of PVCs, HTN, CAD, GERD, T2DM, CRPS, depression, CPS, and MI 4x  HEP Code: [email or web address]  POC: 2025-08/15/2025      FOTO         Visit Number 2        Current Score 45        Goal Score 67            Manuals       MFR/STM of Lumbar Paraspinals/QL/  Piriformis  14 min 14 min               Neuro Re-Ed         Palloff Press Walk-Outs  10x5\" ea  DRed 10x5\" ea  DRed      Resisted Lumbar ROT w/ TheraBand         Supine Posterior Pelvic Tilts 15x 20x 20x      TA Hold w/ Palpation 10x3\" 15x3\" 15x3\"      OH Diagonals w/ FWD Bend and Lumbar ROT                  Ther Ex         Recumbent Bike (Strength)  5 min  L1 6 min  L1      Standing Hip FLX w/ TheraBand         Open Book Against Wall   10x5\"      Supermans         Supine Clamshells 15x  Green 20x3\"  Green 20x3\" Green      Prone Hip " EXT w/ Knee EXT 15x each 2x10 each 2x10 each      Prone Hip EXT w/ Knee FLX  15x each 15x each      Side-Lying Clamshells w/ Ball Between Heels                  Ther Activity         STS w/ Plate Hold         Squats         Suitcase Carry         Sled Push/Pull                  Modalities

## 2025-07-10 ENCOUNTER — OFFICE VISIT (OUTPATIENT)
Dept: PHYSICAL THERAPY | Facility: CLINIC | Age: 55
End: 2025-07-10
Attending: NURSE PRACTITIONER
Payer: COMMERCIAL

## 2025-07-10 DIAGNOSIS — M54.42 ACUTE LEFT-SIDED LOW BACK PAIN WITH LEFT-SIDED SCIATICA: Primary | ICD-10-CM

## 2025-07-10 PROCEDURE — 97110 THERAPEUTIC EXERCISES: CPT

## 2025-07-10 PROCEDURE — 97112 NEUROMUSCULAR REEDUCATION: CPT

## 2025-07-10 NOTE — PROGRESS NOTES
"Daily Note     Today's date: 7/10/2025  Patient name: Kai Breen  : 1970  MRN: [id number]  Referring provider: Fanta Robertson CRNP  Dx:   Encounter Diagnosis     ICD-10-CM    1. Acute left-sided low back pain with left-sided sciatica  M54.42           Start Time: 1000  Stop Time: 1047  Total time in clinic (min): 47 minutes    Subjective: Bonifacio reported that he has a 2/10 aching pain today in the low back       Objective: See treatment diary below      Assessment: Bonifacio tolerated treatment well. Palloff walk-outs were held secondary to patient complaints of pain in the right SIJ with the exercise. An updated HEP was provided, and the new exercises were incorporated into today's visit to ensure approbate execution and provide patient education about their purpose within the POC. Bonifacio would benefit from continued PT to strengthen his pelvic girdle and reduce his symptom irritability during household and recreational activities.       Plan: Continue per plan of care.      Precautions: Hx of PVCs, HTN, CAD, GERD, T2DM, CRPS, depression, CPS, and MI 4x  HEP Code: [email or web address]  POC: 2025-08/15/2025      FOTO         Visit Number 2        Current Score 45        Goal Score 67            Manuals 07/01 07/02 07/07 07/10     MFR/STM of Lumbar Paraspinals/QL/  Piriformis  14 min 14 min 10 min              Neuro Re-Ed         Palloff Press Walk-Outs  10x5\" ea  DRed 10x5\" ea  DRed HELD     Resisted Lumbar ROT w/ TheraBand         Seated Sciatic Nerve Glides    20x     Supine Posterior Pelvic Tilts 15x 20x 20x 20x     TA Hold w/ Palpation 10x3\" 15x3\" 15x3\" D/C     PB Press-Down w/ TA Contraction (2\" Hold)    15x     OH Diagonals w/ FWD Bend and Lumbar ROT                  Ther Ex         Recumbent Bike (Strength)  5 min  L1 6 min  L1 7 min  L1     Standing Hip FLX w/ TheraBand    15x each  Green     Open Book Against Wall   10x5\" 10x5\"     Supermans         Supine Clamshells 15x  Green 20x3\"  Green 20x3\" Green " D/C     Prone Hip EXT w/ Knee EXT 15x each 2x10 each 2x10 each 2x10 each     Prone Hip EXT w/ Knee FLX  15x each 15x each 15x each     Side-Lying Clamshells w/ Ball Between Heels    15x each              Ther Activity         STS w/ Plate Hold         Squats         Suitcase Carry         Sled Push/Pull                  Modalities

## 2025-07-14 ENCOUNTER — OFFICE VISIT (OUTPATIENT)
Dept: PHYSICAL THERAPY | Facility: CLINIC | Age: 55
End: 2025-07-14
Attending: NURSE PRACTITIONER
Payer: COMMERCIAL

## 2025-07-14 DIAGNOSIS — M54.42 ACUTE LEFT-SIDED LOW BACK PAIN WITH LEFT-SIDED SCIATICA: Primary | ICD-10-CM

## 2025-07-14 PROCEDURE — 97140 MANUAL THERAPY 1/> REGIONS: CPT

## 2025-07-14 PROCEDURE — 97110 THERAPEUTIC EXERCISES: CPT

## 2025-07-14 PROCEDURE — 97112 NEUROMUSCULAR REEDUCATION: CPT
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
## 2025-07-16 LAB
DME PARACHUTE DELIVERY DATE ACTUAL: NORMAL
DME PARACHUTE DELIVERY DATE REQUESTED: NORMAL
DME PARACHUTE DELIVERY NOTE: NORMAL
DME PARACHUTE ITEM DESCRIPTION: NORMAL
DME PARACHUTE ORDER STATUS: NORMAL
DME PARACHUTE SUPPLIER NAME: NORMAL
DME PARACHUTE SUPPLIER PHONE: NORMAL

## 2025-07-17 ENCOUNTER — OFFICE VISIT (OUTPATIENT)
Dept: PHYSICAL THERAPY | Facility: CLINIC | Age: 55
End: 2025-07-17
Attending: NURSE PRACTITIONER
Payer: COMMERCIAL

## 2025-07-17 DIAGNOSIS — M54.42 ACUTE LEFT-SIDED LOW BACK PAIN WITH LEFT-SIDED SCIATICA: Primary | ICD-10-CM

## 2025-07-17 PROCEDURE — 97140 MANUAL THERAPY 1/> REGIONS: CPT

## 2025-07-17 PROCEDURE — 97112 NEUROMUSCULAR REEDUCATION: CPT

## 2025-07-17 PROCEDURE — 97110 THERAPEUTIC EXERCISES: CPT

## 2025-07-17 NOTE — PROGRESS NOTES
"Daily Note     Today's date: 2025  Patient name: Kai Breen  : 1970  MRN: [id number]  Referring provider: Fanta Robertson CRNP  Dx:   Encounter Diagnosis     ICD-10-CM    1. Acute left-sided low back pain with left-sided sciatica  M54.42           Start Time: 09  Stop Time: 1034  Total time in clinic (min): 62 minutes    Subjective: Bonifacio reported that he had difficulty sleeping last night, and he woke up with pain in his big toe on the LLE.       Objective: See treatment diary below      Assessment: Bonifacio tolerated treatment well. Posterior pelvic tilts were progressed to the PhysioBall, and he required tactile cues to appropriately execute the exercise. Manual therapy remains well tolerated at the B/L SIJ and lumbar paraspinals. He was advised to perform his HEP as outlined, and he can apply thermal modalities as needed. Bonifacio would benefit from continued PT to strength his lumbar musculature and reduce his intolerance for activity.       Plan: Continue per plan of care.      Precautions: Hx of PVCs, HTN, CAD, GERD, T2DM, CRPS, depression, CPS, and MI 4x  HEP Code: [email or web address]  POC: 2025-08/15/2025      FOTO         Visit Number 2        Current Score 45        Goal Score 67            Manuals 07/01 07/02 07/07 07/10 07/14 07/17   MFR/STM of Lumbar Paraspinals/QL/  Piriformis  14 min 14 min 10 min 10 min 12 min            Neuro Re-Ed         Palloff Press Walk-Outs  10x5\" ea  DRed 10x5\" ea  DRed HELD 10x5\" ea  DRed 10x5\" ea  DRed   Resisted Lumbar ROT w/ TheraBand         Seated Sciatic Nerve Glides    20x 20x 20x   Supine Posterior Pelvic Tilts 15x 20x 20x 20x 20x D/C   PB Press-Down w/ TA Contraction (2\" Hold)    15x 15x 20x    Posterior Pelvic Tilts on PB      20x   Seated Marches on PB      20x each   Lumbar ROT w/ MB on PB      15x each   OH Diagonals w/ FWD Bend and Lumbar ROT                  Ther Ex         Recumbent Bike (Strength)  5 min  L1 6 min  L1 7 min  L1 6 min  L1 7 min  L1 " "  Standing Hip FLX w/ TheraBand    15x each  Green 20x each  Green    Open Book Against Wall   10x5\" ea 10x5\" ea 10x5\" ea 10x5\" ea   Supermans         Bridges w/ Isometric Hip ADD     10x    Prone Hip EXT w/ Knee EXT 15x each 2x10 each 2x10 each 2x10 each 2x10 each      Prone Hip EXT w/ Knee FLX  15x each 15x each 15x each 15x each    Side-Lying Clamshells w/ Ball Between Heels    15x each 20x each 20x each            Ther Activity         STS w/ Plate Hold         Squats         Suitcase Carry         Sled Push/Pull                  Modalities                                                  "

## 2025-07-21 ENCOUNTER — OFFICE VISIT (OUTPATIENT)
Dept: PHYSICAL THERAPY | Facility: CLINIC | Age: 55
End: 2025-07-21
Attending: NURSE PRACTITIONER
Payer: COMMERCIAL

## 2025-07-21 ENCOUNTER — OFFICE VISIT (OUTPATIENT)
Dept: NEUROSURGERY | Facility: CLINIC | Age: 55
End: 2025-07-21
Attending: NURSE PRACTITIONER
Payer: COMMERCIAL

## 2025-07-21 VITALS
HEART RATE: 80 BPM | HEIGHT: 68 IN | RESPIRATION RATE: 17 BRPM | BODY MASS INDEX: 26.98 KG/M2 | TEMPERATURE: 98.6 F | OXYGEN SATURATION: 99 % | DIASTOLIC BLOOD PRESSURE: 64 MMHG | WEIGHT: 178 LBS | SYSTOLIC BLOOD PRESSURE: 116 MMHG

## 2025-07-21 DIAGNOSIS — M54.42 CHRONIC LEFT-SIDED LOW BACK PAIN WITH LEFT-SIDED SCIATICA: Primary | ICD-10-CM

## 2025-07-21 DIAGNOSIS — M54.42 ACUTE LEFT-SIDED LOW BACK PAIN WITH LEFT-SIDED SCIATICA: ICD-10-CM

## 2025-07-21 DIAGNOSIS — E11.9 TYPE 2 DIABETES MELLITUS WITHOUT COMPLICATION, WITHOUT LONG-TERM CURRENT USE OF INSULIN (HCC): ICD-10-CM

## 2025-07-21 DIAGNOSIS — M54.42 ACUTE LEFT-SIDED LOW BACK PAIN WITH LEFT-SIDED SCIATICA: Primary | ICD-10-CM

## 2025-07-21 DIAGNOSIS — G89.29 CHRONIC LEFT-SIDED LOW BACK PAIN WITH LEFT-SIDED SCIATICA: Primary | ICD-10-CM

## 2025-07-21 DIAGNOSIS — I25.10 CORONARY ARTERY DISEASE INVOLVING NATIVE CORONARY ARTERY OF NATIVE HEART WITHOUT ANGINA PECTORIS: ICD-10-CM

## 2025-07-21 PROBLEM — M54.50 CHRONIC BILATERAL LOW BACK PAIN WITHOUT SCIATICA: Status: RESOLVED | Noted: 2021-07-22 | Resolved: 2025-07-21

## 2025-07-21 PROCEDURE — 97110 THERAPEUTIC EXERCISES: CPT

## 2025-07-21 PROCEDURE — 99204 OFFICE O/P NEW MOD 45 MIN: CPT | Performed by: PHYSICIAN ASSISTANT

## 2025-07-21 PROCEDURE — 97112 NEUROMUSCULAR REEDUCATION: CPT
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
## 2025-07-21 NOTE — ASSESSMENT & PLAN NOTE
H/o MI x5 with 8 cardiac stents  On ASA 81mg, Brilinta 90mg daily  If a candidate for surgery, will need cardiac clearance
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
## 2025-07-21 NOTE — ASSESSMENT & PLAN NOTE
Lab Results   Component Value Date    HGBA1C 8.9 (H) 06/25/2025     Continue medical management  Consider endocrinology referral  Repeat HbgA1c end of September  Recommend A1c < 7.5 to proceed with any offered surgical intervention

## 2025-07-24 ENCOUNTER — APPOINTMENT (OUTPATIENT)
Dept: PHYSICAL THERAPY | Facility: CLINIC | Age: 55
End: 2025-07-24
Attending: NURSE PRACTITIONER
Payer: COMMERCIAL

## 2025-07-24 DIAGNOSIS — M54.42 ACUTE LEFT-SIDED LOW BACK PAIN WITH LEFT-SIDED SCIATICA: Primary | ICD-10-CM

## 2025-07-24 PROCEDURE — 97110 THERAPEUTIC EXERCISES: CPT

## 2025-07-24 PROCEDURE — 97140 MANUAL THERAPY 1/> REGIONS: CPT

## 2025-07-24 PROCEDURE — 97112 NEUROMUSCULAR REEDUCATION: CPT
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
## 2025-07-28 ENCOUNTER — OFFICE VISIT (OUTPATIENT)
Dept: PHYSICAL THERAPY | Facility: CLINIC | Age: 55
End: 2025-07-28
Attending: NURSE PRACTITIONER
Payer: COMMERCIAL

## 2025-07-28 DIAGNOSIS — M54.42 ACUTE LEFT-SIDED LOW BACK PAIN WITH LEFT-SIDED SCIATICA: Primary | ICD-10-CM

## 2025-07-28 PROCEDURE — 97140 MANUAL THERAPY 1/> REGIONS: CPT

## 2025-07-28 PROCEDURE — 97112 NEUROMUSCULAR REEDUCATION: CPT

## 2025-07-28 PROCEDURE — 97110 THERAPEUTIC EXERCISES: CPT

## 2025-07-31 ENCOUNTER — OFFICE VISIT (OUTPATIENT)
Dept: PHYSICAL THERAPY | Facility: CLINIC | Age: 55
End: 2025-07-31
Attending: NURSE PRACTITIONER
Payer: COMMERCIAL

## 2025-07-31 DIAGNOSIS — M54.42 ACUTE LEFT-SIDED LOW BACK PAIN WITH LEFT-SIDED SCIATICA: Primary | ICD-10-CM

## 2025-07-31 PROCEDURE — 97110 THERAPEUTIC EXERCISES: CPT

## 2025-07-31 PROCEDURE — 97140 MANUAL THERAPY 1/> REGIONS: CPT

## 2025-07-31 PROCEDURE — 97112 NEUROMUSCULAR REEDUCATION: CPT

## 2025-08-02 ENCOUNTER — DOCUMENTATION (OUTPATIENT)
Dept: GASTROENTEROLOGY | Facility: CLINIC | Age: 55
End: 2025-08-02

## 2025-08-05 ENCOUNTER — CONSULT (OUTPATIENT)
Dept: GASTROENTEROLOGY | Facility: CLINIC | Age: 55
End: 2025-08-05
Payer: COMMERCIAL

## 2025-08-05 ENCOUNTER — TELEPHONE (OUTPATIENT)
Dept: GASTROENTEROLOGY | Facility: CLINIC | Age: 55
End: 2025-08-05

## 2025-08-05 VITALS
DIASTOLIC BLOOD PRESSURE: 82 MMHG | BODY MASS INDEX: 27.74 KG/M2 | SYSTOLIC BLOOD PRESSURE: 128 MMHG | OXYGEN SATURATION: 98 % | WEIGHT: 183 LBS | HEART RATE: 83 BPM | TEMPERATURE: 97.9 F | RESPIRATION RATE: 16 BRPM | HEIGHT: 68 IN

## 2025-08-05 DIAGNOSIS — K31.84 GASTROPARESIS: ICD-10-CM

## 2025-08-05 DIAGNOSIS — Z79.899 MEDICATION MANAGEMENT: ICD-10-CM

## 2025-08-05 DIAGNOSIS — R13.14 PHARYNGOESOPHAGEAL DYSPHAGIA: ICD-10-CM

## 2025-08-05 DIAGNOSIS — Z12.11 SCREENING FOR COLON CANCER: ICD-10-CM

## 2025-08-05 DIAGNOSIS — K21.9 GASTROESOPHAGEAL REFLUX DISEASE, UNSPECIFIED WHETHER ESOPHAGITIS PRESENT: Primary | ICD-10-CM

## 2025-08-05 DIAGNOSIS — Z79.02 ANTIPLATELET OR ANTITHROMBOTIC LONG-TERM USE: ICD-10-CM

## 2025-08-05 PROCEDURE — 99204 OFFICE O/P NEW MOD 45 MIN: CPT | Performed by: INTERNAL MEDICINE

## 2025-08-05 RX ORDER — GLIPIZIDE 2.5 MG/1
2.5 TABLET ORAL 2 TIMES DAILY WITH MEALS
COMMUNITY

## 2025-08-05 RX ORDER — SODIUM CHLORIDE, SODIUM LACTATE, POTASSIUM CHLORIDE, CALCIUM CHLORIDE 600; 310; 30; 20 MG/100ML; MG/100ML; MG/100ML; MG/100ML
125 INJECTION, SOLUTION INTRAVENOUS CONTINUOUS
OUTPATIENT
Start: 2025-08-05

## 2025-08-08 ENCOUNTER — APPOINTMENT (OUTPATIENT)
Dept: LAB | Facility: HOSPITAL | Age: 55
End: 2025-08-08

## 2025-08-18 PROBLEM — I24.9 ACS (ACUTE CORONARY SYNDROME) (HCC): Status: ACTIVE | Noted: 2025-08-18

## 2025-08-18 PROBLEM — I21.4 NSTEMI (NON-ST ELEVATED MYOCARDIAL INFARCTION) (HCC): Status: ACTIVE | Noted: 2025-08-18

## (undated) DEVICE — TREK CORONARY DILATATION CATHETER 2.50 MM X 15 MM / RAPID-EXCHANGE: Brand: TREK

## (undated) DEVICE — CATH GUIDE LAUNCHER 5FR JL 3.5

## (undated) DEVICE — TR BAND RADIAL ARTERY COMPRESSION DEVICE: Brand: TR BAND

## (undated) DEVICE — TREK CORONARY DILATATION CATHETER 3.0 MM X 15 MM / RAPID-EXCHANGE: Brand: TREK

## (undated) DEVICE — HT BALANCE MIDDLEWEIGHT ELITE GUIDE WIRE .014" 3 CM STRAIGHT TIP 190 CM: Brand: HI-TORQUE BALANCE MIDDLEWEIGHT ELITE

## (undated) DEVICE — GUIDEWIRE FFR VERRATA PLUS 185CM STR

## (undated) DEVICE — CATH DIAG 5FR IMPULSE 100CM FR4

## (undated) DEVICE — TREK CORONARY DILATATION CATHETER 3.50 MM X 15 MM / RAPID-EXCHANGE: Brand: TREK

## (undated) DEVICE — DGW .035 FC J3MM 260CM TEF: Brand: EMERALD

## (undated) DEVICE — CATH GUIDE LAUNCHER 6FR EBU 3.5

## (undated) DEVICE — RADIFOCUS OPTITORQUE ANGIOGRAPHIC CATHETER: Brand: OPTITORQUE

## (undated) DEVICE — TREK CORONARY DILATATION CATHETER 2.50 MM X 20 MM / RAPID-EXCHANGE: Brand: TREK

## (undated) DEVICE — GLIDESHEATH SLENDER STAINLESS STEEL KIT: Brand: GLIDESHEATH SLENDER

## (undated) DEVICE — NC TREK CORONARY DILATATION CATHETER 3.0 MM X 15 MM / RAPID-EXCHANGE: Brand: NC TREK

## (undated) DEVICE — GUIDEWIRE WHOLEY HI TORQUE INTERM MOD J .035 145CM

## (undated) DEVICE — CATH IVUS EAGLE EYE PLATINUM 5FR 150CM

## (undated) DEVICE — PRESSURE GUIDEWIRE: Brand: COMET™ II

## (undated) DEVICE — HI-TORQUE PILOT 50 GUIDE WIRE .014 STRAIGHT TIP 3.0 CM X 190 CM: Brand: HI-TORQUE PILOT

## (undated) DEVICE — Device: Brand: PROWATER

## (undated) DEVICE — GUIDELINER CATHETERS ARE INTENDED TO BE USED IN CONJUNCTION WITH GUIDE CATHETERS TO ACCESS DISCRETE REGIONS OF THE CORONARY AND/OR PERIPHERAL VASCULATURE, AND TO FACILITATE PLACEMENT OF INTERVENTIONAL DEVICES.: Brand: GUIDELINER® V3 CATHETER

## (undated) DEVICE — NC TREK CORONARY DILATATION CATHETER 4.0 MM X 15 MM / RAPID-EXCHANGE: Brand: NC TREK

## (undated) DEVICE — GLIDESHEATH BASIC HYDROPHILIC COATED INTRODUCER SHEATH: Brand: GLIDESHEATH

## (undated) DEVICE — NC TREK CORONARY DILATATION CATHETER 2.75 MM X 15 MM / RAPID-EXCHANGE: Brand: NC TREK